# Patient Record
Sex: FEMALE | Race: BLACK OR AFRICAN AMERICAN | NOT HISPANIC OR LATINO | Employment: UNEMPLOYED | URBAN - METROPOLITAN AREA
[De-identification: names, ages, dates, MRNs, and addresses within clinical notes are randomized per-mention and may not be internally consistent; named-entity substitution may affect disease eponyms.]

---

## 2017-08-17 ENCOUNTER — TELEPHONE (OUTPATIENT)
Dept: OBSTETRICS AND GYNECOLOGY | Facility: CLINIC | Age: 57
End: 2017-08-17

## 2017-08-17 NOTE — TELEPHONE ENCOUNTER
----- Message from Vic Pichardo sent at 8/17/2017 12:36 PM CDT -----  Contact: [phone]/SELF  Patient would like orders put in the system for a Mammo. Please advise.

## 2017-08-17 NOTE — TELEPHONE ENCOUNTER
Last routine GYN exam 12/15/2016.   Last mammogram 12/15/2016.  Patient is scheduled for annual 9/20/17.  Patient will be too early for WWE and Mammogram.     Left message to return call.

## 2019-05-15 ENCOUNTER — TELEPHONE (OUTPATIENT)
Dept: OBSTETRICS AND GYNECOLOGY | Facility: CLINIC | Age: 59
End: 2019-05-15

## 2019-05-15 ENCOUNTER — HOSPITAL ENCOUNTER (EMERGENCY)
Facility: HOSPITAL | Age: 59
Discharge: HOME | End: 2019-05-15
Attending: FAMILY MEDICINE
Payer: MEDICAID

## 2019-05-15 VITALS
SYSTOLIC BLOOD PRESSURE: 160 MMHG | TEMPERATURE: 99 F | BODY MASS INDEX: 33.04 KG/M2 | WEIGHT: 175 LBS | HEART RATE: 60 BPM | RESPIRATION RATE: 19 BRPM | DIASTOLIC BLOOD PRESSURE: 85 MMHG | OXYGEN SATURATION: 99 % | HEIGHT: 61 IN

## 2019-05-15 DIAGNOSIS — Z12.31 SCREENING MAMMOGRAM, ENCOUNTER FOR: Primary | ICD-10-CM
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
## 2019-05-15 PROCEDURE — 99284 EMERGENCY DEPT VISIT MOD MDM: CPT | Mod: ER

## 2019-05-15 PROCEDURE — 25000003 PHARM REV CODE 250: Mod: ER | Performed by: FAMILY MEDICINE

## 2019-05-15 PROCEDURE — 63600175 PHARM REV CODE 636 W HCPCS: Mod: ER | Performed by: FAMILY MEDICINE

## 2019-05-15 RX ORDER — PREDNISONE 20 MG/1
60 TABLET ORAL
Status: COMPLETED | OUTPATIENT
Start: 2019-05-15 | End: 2019-05-15

## 2019-05-15 RX ORDER — SULFAMETHOXAZOLE AND TRIMETHOPRIM 800; 160 MG/1; MG/1
1 TABLET ORAL
Status: COMPLETED | OUTPATIENT
Start: 2019-05-15 | End: 2019-05-15

## 2019-05-15 RX ORDER — SULFAMETHOXAZOLE AND TRIMETHOPRIM 800; 160 MG/1; MG/1
1 TABLET ORAL 2 TIMES DAILY
Qty: 14 TABLET | Refills: 0 | Status: SHIPPED | OUTPATIENT
Start: 2019-05-15 | End: 2019-05-22

## 2019-05-15 RX ORDER — PREDNISONE 20 MG/1
40 TABLET ORAL DAILY
Qty: 10 TABLET | Refills: 0 | Status: SHIPPED | OUTPATIENT
Start: 2019-05-15 | End: 2019-05-20

## 2019-05-15 RX ADMIN — PREDNISONE 60 MG: 20 TABLET ORAL at 10:05

## 2019-05-15 RX ADMIN — SULFAMETHOXAZOLE AND TRIMETHOPRIM 1 TABLET: 800; 160 TABLET ORAL at 10:05

## 2019-05-15 NOTE — ED PROVIDER NOTES
Encounter Date: 5/15/2019       History     Chief Complaint   Patient presents with    Hand Pain     PT reports left thumb swelling that started saturday. Denies injury     Patient complains of swelling, tenderness and pain in her left thumb at metacarpophalangeal joint- she noticed while on a cruise on Saturday.  Swelling has been progressively decreasing.  Pain also improved.  No fever.  She is able to bend her thumb.  Patient took 2 Aleve so far and nothing else.  No other lesions or bruising on the body.      The history is provided by the patient.     Review of patient's allergies indicates:  No Known Allergies  Past Medical History:   Diagnosis Date    Hypertension     Tendonitis      Past Surgical History:   Procedure Laterality Date    colonoscopy N/A 9/19/2014    Performed by Linda Yusuf [facility] at [facility]    HEEL SPUR SURGERY Left 02/14/2013    HYSTERECTOMY  10/2009    WHITNEY    OOPHORECTOMY  10/2009    Bilateral     Family History   Problem Relation Age of Onset    Hypertension Mother     Breast cancer Cousin 51    Colon cancer Neg Hx     Ovarian cancer Neg Hx      Social History     Tobacco Use    Smoking status: Never Smoker    Smokeless tobacco: Never Used   Substance Use Topics    Alcohol use: No    Drug use: No     Review of Systems   Constitutional: Negative for activity change, appetite change, chills and fever.   HENT: Negative for congestion, ear discharge, rhinorrhea, sinus pressure, sinus pain, sore throat and trouble swallowing.    Eyes: Negative for photophobia, pain, discharge, redness, itching and visual disturbance.   Respiratory: Negative for cough, chest tightness, shortness of breath and wheezing.    Cardiovascular: Negative for chest pain, palpitations and leg swelling.   Gastrointestinal: Negative for abdominal distention, abdominal pain, constipation, diarrhea, nausea and vomiting.   Genitourinary: Negative for dysuria, flank pain, frequency and hematuria.    Musculoskeletal: Negative for back pain, gait problem, neck pain and neck stiffness.   Skin: Negative for rash and wound.   Neurological: Negative for dizziness, tremors, seizures, syncope, speech difficulty, weakness, light-headedness, numbness and headaches.   Psychiatric/Behavioral: Negative for behavioral problems, confusion, hallucinations and sleep disturbance. The patient is not nervous/anxious.    All other systems reviewed and are negative.      Physical Exam     Initial Vitals [05/15/19 0955]   BP Pulse Resp Temp SpO2   (!) 175/108 61 20 98 °F (36.7 °C) 100 %      MAP       --         Physical Exam    Nursing note and vitals reviewed.  Constitutional: Vital signs are normal. She appears well-developed and well-nourished. She is active.   HENT:   Head: Normocephalic and atraumatic.   Nose: Nose normal.   Mouth/Throat: Oropharynx is clear and moist.   Eyes: Conjunctivae and lids are normal.   Neck: Trachea normal, normal range of motion and full passive range of motion without pain. Neck supple. Normal range of motion present. No neck rigidity.   Cardiovascular: Normal rate, regular rhythm, S1 normal, S2 normal, normal heart sounds, intact distal pulses and normal pulses.   Pulmonary/Chest: Breath sounds normal.   Abdominal: Soft. Normal appearance and bowel sounds are normal. She exhibits no distension. There is no tenderness.   Musculoskeletal: Normal range of motion.        Arms:  0.5 cm swelling noted in left dorsal MCP area which is like tenderness and bruising noted.   Lymphadenopathy:     She has no cervical adenopathy.   Neurological: She is alert and oriented to person, place, and time. She has normal reflexes. No cranial nerve deficit or sensory deficit. GCS eye subscore is 4. GCS verbal subscore is 5. GCS motor subscore is 6.   Skin: Skin is warm and intact. Capillary refill takes less than 2 seconds. Bruising noted. No abrasion and no rash noted.   Psychiatric: She has a normal mood and affect.  Her speech is normal and behavior is normal. Judgment and thought content normal. She is not actively hallucinating. Cognition and memory are normal. She is attentive.         ED Course   Procedures  Labs Reviewed - No data to display       Imaging Results    None          Medical Decision Making:   Initial Assessment:   Left hand with possible insect bite with swelling and tenderness  Differential Diagnosis:   Insect bite, cellulitis, allergic reaction.  ED Management:  Patient is treated with Bactrim, prednisone.  Advised to follow up with primary care physician in next 2 days for resolution.  Follow up ER with worsening of symptoms.                      Clinical Impression:       ICD-10-CM ICD-9-CM   1. Insect bite, initial encounter W57.XXXA 919.4     E906.4         Disposition:   Disposition: Discharged  Condition: Stable                        Ed Block MD  05/15/19 1970

## 2019-05-15 NOTE — TELEPHONE ENCOUNTER
----- Message from Hortensia Barraza sent at 5/15/2019  3:24 PM CDT -----  Contact: [phone]/self  Pt would like mammo orders put in system.

## 2019-05-15 NOTE — ED TRIAGE NOTES
Pt complains of left thumb swelling and itching, denies any trauma, swelling noted, increased pain with  movement, pt states she took 2 Aleve with  No relief on sat

## 2019-05-16 ENCOUNTER — TELEPHONE (OUTPATIENT)
Dept: OBSTETRICS AND GYNECOLOGY | Facility: CLINIC | Age: 59
End: 2019-05-16

## 2019-05-30 ENCOUNTER — OFFICE VISIT (OUTPATIENT)
Dept: OBSTETRICS AND GYNECOLOGY | Facility: CLINIC | Age: 59
End: 2019-05-30
Payer: MEDICAID

## 2019-05-30 VITALS
SYSTOLIC BLOOD PRESSURE: 122 MMHG | DIASTOLIC BLOOD PRESSURE: 76 MMHG | WEIGHT: 168.88 LBS | BODY MASS INDEX: 31.91 KG/M2

## 2019-05-30 DIAGNOSIS — Z01.419 WELL WOMAN EXAM WITH ROUTINE GYNECOLOGICAL EXAM: Primary | ICD-10-CM

## 2019-05-30 PROCEDURE — 99396 PR PREVENTIVE VISIT,EST,40-64: ICD-10-PCS | Mod: S$PBB,,, | Performed by: OBSTETRICS & GYNECOLOGY

## 2019-05-30 PROCEDURE — 99396 PREV VISIT EST AGE 40-64: CPT | Mod: S$PBB,,, | Performed by: OBSTETRICS & GYNECOLOGY

## 2019-05-30 PROCEDURE — 99999 PR PBB SHADOW E&M-EST. PATIENT-LVL II: ICD-10-PCS | Mod: PBBFAC,,, | Performed by: OBSTETRICS & GYNECOLOGY

## 2019-05-30 PROCEDURE — 99212 OFFICE O/P EST SF 10 MIN: CPT | Mod: PBBFAC,PO | Performed by: OBSTETRICS & GYNECOLOGY

## 2019-05-30 PROCEDURE — 99999 PR PBB SHADOW E&M-EST. PATIENT-LVL II: CPT | Mod: PBBFAC,,, | Performed by: OBSTETRICS & GYNECOLOGY

## 2019-05-30 NOTE — PROGRESS NOTES
OBSTETRIC HISTORY:   OB History        1    Para   1    Term   1            AB        Living   1       SAB        TAB        Ectopic        Multiple        Live Births   1                COMPREHENSIVE GYN HISTORY:  PAP History: Denies abnormal Paps.  Infection History: Denies STDs. Denies PID.  Benign History: Denies uterine fibroids. Denies ovarian cysts. Denies endometriosis.   Cancer History: Denies cervical cancer. Denies uterine cancer or hyperplasia. Denies ovarian cancer. Denies vulvar cancer or pre-cancer. Denies vaginal cancer or pre-cancer. Denies breast cancer. Denies colon cancer.  Sexual Activity History:   reports that she currently engages in sexual activity. She reports using the following method of birth control/protection: Post-menopausal.         HPI:   58 y.o.  No LMP recorded (lmp unknown). Patient has had a hysterectomy.    Patient is  here for her annual gynecologic exam.  She has no complaints. She denies bladder, bowel and breast complaints.    ROS:  GENERAL: Denies weight gain or weight loss. Feeling well overall.   SKIN: Denies rash or lesions.   HEAD: Denies headache.   NODES: Denies enlarged lymph nodes.   CHEST: Denies shortness of breath.   ABDOMEN: No abdominal pain, constipation, diarrhea, nausea, vomiting or rectal bleeding.   URINARY: No frequency, dysuria, hematuria, or burning on urination.  REPRODUCTIVE: See HPI.   BREASTS: The patient denies pain, lumps, or nipple discharge.   HEMATOLOGIC: No easy bruisability.   MUSCULOSKELETAL: Denies joint pain or back pain.   NEUROLOGIC: Denies weakness.   PSYCHIATRIC: Denies depression, anxiety or mood swings.      PE:   /76   Wt 76.6 kg (168 lb 14 oz)   LMP  (LMP Unknown)   BMI 31.91 kg/m²   APPEARANCE: Well nourished, well developed, in no acute distress.  NECK: Neck symmetric without thyromegaly.  NODES: No inguinal or cervical lymph node enlargement.  CHEST: Lungs clear to auscultation.  HEART: Regular rate and  rhythm, no murmurs, rubs or gallops.  ABDOMEN: Soft. No tenderness or masses. No hernias.  BREASTS: Symmetrical, no skin changes or visible lesions. No palpable masses, nipple discharge or adenopathy bilaterally.  VULVA: No lesions. Normal female genitalia.  URETHRAL MEATUS: Normal size and location, no lesions, no prolapse.  URETHRA: No masses, tenderness, prolapse or scarring.  VAGINA: Moist and well rugated, no discharge, no significant cystocele or rectocele.  CERVIX AND UTERUS SURGICALLY ABSENT.   ADNEXA: No masses or tenderness.    PROCEDURES:  Pap smear -- NOT INDICATED    Assessment:  Normal Gynecologic Exam    Plan:  Mammogram and Colonoscopy if indicated by current recommendations.  Return to clinic in one year or for any problems or complaints.    Counseling:  Patient was counseled today on A.C.S. Pap guidelines and recommendations for yearly pelvic exams and monthly self breast exams; to see her PCP for other health maintenance.Regular exercise and healthy diet.

## 2019-06-05 ENCOUNTER — HOSPITAL ENCOUNTER (OUTPATIENT)
Dept: RADIOLOGY | Facility: HOSPITAL | Age: 59
Discharge: HOME | End: 2019-06-05
Attending: OBSTETRICS & GYNECOLOGY
Payer: MEDICAID

## 2019-06-05 DIAGNOSIS — Z12.31 SCREENING MAMMOGRAM, ENCOUNTER FOR: ICD-10-CM

## 2019-06-05 PROCEDURE — 77063 MAMMO DIGITAL SCREENING BILAT WITH TOMOSYNTHESIS_CAD: ICD-10-PCS | Mod: 26,,, | Performed by: RADIOLOGY

## 2019-06-05 PROCEDURE — 77067 SCR MAMMO BI INCL CAD: CPT | Mod: 26,,, | Performed by: RADIOLOGY

## 2019-06-05 PROCEDURE — 77067 SCR MAMMO BI INCL CAD: CPT | Mod: TC

## 2019-06-05 PROCEDURE — 77067 MAMMO DIGITAL SCREENING BILAT WITH TOMOSYNTHESIS_CAD: ICD-10-PCS | Mod: 26,,, | Performed by: RADIOLOGY

## 2019-06-05 PROCEDURE — 77063 BREAST TOMOSYNTHESIS BI: CPT | Mod: 26,,, | Performed by: RADIOLOGY

## 2020-06-08 ENCOUNTER — TELEPHONE (OUTPATIENT)
Dept: OBSTETRICS AND GYNECOLOGY | Facility: CLINIC | Age: 60
End: 2020-06-08

## 2020-06-08 DIAGNOSIS — Z12.31 ENCOUNTER FOR SCREENING MAMMOGRAM FOR MALIGNANT NEOPLASM OF BREAST: Primary | ICD-10-CM

## 2020-06-08 NOTE — TELEPHONE ENCOUNTER
----- Message from Carly Eric sent at 6/8/2020  9:47 AM CDT -----  Contact: Self [phone]  Patient is calling to get Mammo Orders.

## 2020-07-15 ENCOUNTER — OFFICE VISIT (OUTPATIENT)
Dept: OBSTETRICS AND GYNECOLOGY | Facility: CLINIC | Age: 60
End: 2020-07-15
Payer: MEDICAID

## 2020-07-15 VITALS
WEIGHT: 183.19 LBS | HEIGHT: 61 IN | BODY MASS INDEX: 34.58 KG/M2 | SYSTOLIC BLOOD PRESSURE: 140 MMHG | DIASTOLIC BLOOD PRESSURE: 82 MMHG

## 2020-07-15 DIAGNOSIS — Z12.31 ENCOUNTER FOR SCREENING MAMMOGRAM FOR MALIGNANT NEOPLASM OF BREAST: ICD-10-CM

## 2020-07-15 DIAGNOSIS — Z01.419 WELL WOMAN EXAM WITH ROUTINE GYNECOLOGICAL EXAM: Primary | ICD-10-CM

## 2020-07-15 PROCEDURE — 99396 PREV VISIT EST AGE 40-64: CPT | Mod: S$PBB,,, | Performed by: OBSTETRICS & GYNECOLOGY

## 2020-07-15 PROCEDURE — 99999 PR PBB SHADOW E&M-EST. PATIENT-LVL III: CPT | Mod: PBBFAC,,, | Performed by: OBSTETRICS & GYNECOLOGY

## 2020-07-15 PROCEDURE — 99999 PR PBB SHADOW E&M-EST. PATIENT-LVL III: ICD-10-PCS | Mod: PBBFAC,,, | Performed by: OBSTETRICS & GYNECOLOGY

## 2020-07-15 PROCEDURE — 99396 PR PREVENTIVE VISIT,EST,40-64: ICD-10-PCS | Mod: S$PBB,,, | Performed by: OBSTETRICS & GYNECOLOGY

## 2020-07-15 PROCEDURE — 99213 OFFICE O/P EST LOW 20 MIN: CPT | Mod: PBBFAC,PO | Performed by: OBSTETRICS & GYNECOLOGY

## 2020-07-15 NOTE — PROGRESS NOTES
"  OBSTETRIC HISTORY:   OB History        1    Para   1    Term   1            AB        Living   1       SAB        TAB        Ectopic        Multiple        Live Births   1                COMPREHENSIVE GYN HISTORY:  PAP History: Denies abnormal Paps.  Infection History: Denies STDs. Denies PID.  Benign History: Denies uterine fibroids. Denies ovarian cysts. Denies endometriosis.   Cancer History: Denies cervical cancer. Denies uterine cancer or hyperplasia. Denies ovarian cancer. Denies vulvar cancer or pre-cancer. Denies vaginal cancer or pre-cancer. Denies breast cancer. Denies colon cancer.  Sexual Activity History:   reports that she currently engages in sexual activity. She reports using the following method of birth control/protection: Post-menopausal.              HPI:   59 y.o. No LMP recorded (lmp unknown). Patient has had a hysterectomy.    Patient is  here for her annual gynecologic exam.  She has no complaints. She denies bladder, bowel and breast complaints.    ROS:  GENERAL: Denies weight gain or weight loss. Feeling well overall.   SKIN: Denies rash or lesions.   HEAD: Denies headache.   NODES: Denies enlarged lymph nodes.   CHEST: Denies shortness of breath.   ABDOMEN: No abdominal pain, constipation, diarrhea, nausea, vomiting or rectal bleeding.   URINARY: No frequency, dysuria, hematuria, or burning on urination.  REPRODUCTIVE: See HPI.   BREASTS: The patient denies pain, lumps, or nipple discharge.   HEMATOLOGIC: No easy bruisability.   MUSCULOSKELETAL: Denies joint pain or back pain.   NEUROLOGIC: Denies weakness.   PSYCHIATRIC: Denies depression, anxiety or mood swings.        PE:   BP (!) 140/82   Ht 5' 1" (1.549 m)   Wt 83.1 kg (183 lb 3.2 oz)   LMP  (LMP Unknown)   BMI 34.62 kg/m²   APPEARANCE: Well nourished, well developed, in no acute distress.  NECK: Neck symmetric without thyromegaly.  NODES: No inguinal or cervical lymph node enlargement.  CHEST: Lungs clear to " auscultation.  HEART: Regular rate and rhythm, no murmurs, rubs or gallops.  ABDOMEN: Soft. No tenderness or masses. No hernias.  BREASTS: Symmetrical, no skin changes or visible lesions. No palpable masses, nipple discharge or adenopathy bilaterally.  VULVA: No lesions. Normal female genitalia.  URETHRAL MEATUS: Normal size and location, no lesions, no prolapse.  URETHRA: No masses, tenderness, prolapse or scarring.  VAGINA: Atrophic and not well rugated, no discharge, has second degree cystocele and minimal rectocele, vault prolapse.  CERVIX AND UTERUS SURGICALLY ABSENT.   ADNEXA: No masses or tenderness.    PROCEDURES:  Pap smear -- NOT INDICATED    Assessment:  Normal Gynecologic Exam  Prolapse-- use stool softeners/Miralax so not straining and losing weight will help reverse prolapse    Plan:  Mammogram and Colonoscopy if indicated by current recommendations.  Return to clinic in one year or for any problems or complaints.    Counseling:  Patient was counseled today on A.C.S. Pap guidelines and recommendations for yearly pelvic exams and monthly self breast exams; to see her PCP for other health maintenance. Regular exercise and healthy diet.

## 2020-08-06 ENCOUNTER — HOSPITAL ENCOUNTER (OUTPATIENT)
Dept: RADIOLOGY | Facility: HOSPITAL | Age: 60
Discharge: HOME | End: 2020-08-06
Attending: OBSTETRICS & GYNECOLOGY
Payer: MEDICAID

## 2020-08-06 DIAGNOSIS — Z12.31 ENCOUNTER FOR SCREENING MAMMOGRAM FOR MALIGNANT NEOPLASM OF BREAST: ICD-10-CM

## 2020-08-06 PROCEDURE — 77063 MAMMO DIGITAL SCREENING BILAT WITH TOMOSYNTHESIS_CAD: ICD-10-PCS | Mod: 26,,, | Performed by: RADIOLOGY

## 2020-08-06 PROCEDURE — 77067 MAMMO DIGITAL SCREENING BILAT WITH TOMOSYNTHESIS_CAD: ICD-10-PCS | Mod: 26,,, | Performed by: RADIOLOGY

## 2020-08-06 PROCEDURE — 77067 SCR MAMMO BI INCL CAD: CPT | Mod: 26,,, | Performed by: RADIOLOGY

## 2020-08-06 PROCEDURE — 77063 BREAST TOMOSYNTHESIS BI: CPT | Mod: 26,,, | Performed by: RADIOLOGY

## 2020-08-06 PROCEDURE — 77067 SCR MAMMO BI INCL CAD: CPT | Mod: TC

## 2020-08-14 ENCOUNTER — HOSPITAL ENCOUNTER (OUTPATIENT)
Dept: RADIOLOGY | Facility: HOSPITAL | Age: 60
Discharge: HOME | End: 2020-08-14
Attending: OBSTETRICS & GYNECOLOGY
Payer: MEDICAID

## 2020-08-14 DIAGNOSIS — R92.8 ABNORMAL MAMMOGRAM: ICD-10-CM

## 2020-08-14 PROCEDURE — 77061 BREAST TOMOSYNTHESIS UNI: CPT | Mod: 26,,, | Performed by: RADIOLOGY

## 2020-08-14 PROCEDURE — 77065 MAMMO DIGITAL DIAGNOSTIC RIGHT WITH TOMOSYNTHESIS_CAD: ICD-10-PCS | Mod: 26,,, | Performed by: RADIOLOGY

## 2020-08-14 PROCEDURE — 77065 DX MAMMO INCL CAD UNI: CPT | Mod: TC

## 2020-08-14 PROCEDURE — 77061 MAMMO DIGITAL DIAGNOSTIC RIGHT WITH TOMOSYNTHESIS_CAD: ICD-10-PCS | Mod: 26,,, | Performed by: RADIOLOGY

## 2020-08-14 PROCEDURE — 77065 DX MAMMO INCL CAD UNI: CPT | Mod: 26,,, | Performed by: RADIOLOGY

## 2021-03-27 ENCOUNTER — IMMUNIZATION (OUTPATIENT)
Dept: PRIMARY CARE CLINIC | Facility: CLINIC | Age: 61
End: 2021-03-27
Payer: MEDICAID

## 2021-03-27 DIAGNOSIS — Z23 NEED FOR VACCINATION: Primary | ICD-10-CM

## 2021-03-27 PROCEDURE — 0001A PR IMMUNIZ ADMIN, SARS-COV-2 COVID-19 VACC, 30MCG/0.3ML, 1ST DOSE: ICD-10-PCS | Mod: CV19,,, | Performed by: INTERNAL MEDICINE

## 2021-03-27 PROCEDURE — 0001A PR IMMUNIZ ADMIN, SARS-COV-2 COVID-19 VACC, 30MCG/0.3ML, 1ST DOSE: CPT | Mod: CV19,,, | Performed by: INTERNAL MEDICINE

## 2021-03-27 PROCEDURE — 91300 PR SARS-COV- 2 COVID-19 VACCINE, NO PRSV, 30MCG/0.3ML, IM: CPT | Mod: ,,, | Performed by: INTERNAL MEDICINE

## 2021-03-27 PROCEDURE — 91300 PR SARS-COV- 2 COVID-19 VACCINE, NO PRSV, 30MCG/0.3ML, IM: ICD-10-PCS | Mod: ,,, | Performed by: INTERNAL MEDICINE

## 2021-03-27 RX ADMIN — Medication 0.3 ML: at 10:03

## 2021-04-17 ENCOUNTER — IMMUNIZATION (OUTPATIENT)
Dept: PRIMARY CARE CLINIC | Facility: CLINIC | Age: 61
End: 2021-04-17

## 2021-04-17 DIAGNOSIS — Z23 NEED FOR VACCINATION: Primary | ICD-10-CM

## 2021-04-17 PROCEDURE — 0002A COVID-19, MRNA, LNP-S, PF, 30 MCG/0.3 ML DOSE VACCINE: ICD-10-PCS | Mod: CV19,,, | Performed by: FAMILY MEDICINE

## 2021-04-17 PROCEDURE — 91300 COVID-19, MRNA, LNP-S, PF, 30 MCG/0.3 ML DOSE VACCINE: CPT | Mod: ,,, | Performed by: FAMILY MEDICINE

## 2021-04-17 PROCEDURE — 0002A COVID-19, MRNA, LNP-S, PF, 30 MCG/0.3 ML DOSE VACCINE: CPT | Mod: CV19,,, | Performed by: FAMILY MEDICINE

## 2021-04-17 PROCEDURE — 91300 COVID-19, MRNA, LNP-S, PF, 30 MCG/0.3 ML DOSE VACCINE: ICD-10-PCS | Mod: ,,, | Performed by: FAMILY MEDICINE

## 2021-06-17 ENCOUNTER — TELEPHONE (OUTPATIENT)
Dept: OBSTETRICS AND GYNECOLOGY | Facility: CLINIC | Age: 61
End: 2021-06-17

## 2021-06-17 DIAGNOSIS — Z12.31 ENCOUNTER FOR SCREENING MAMMOGRAM FOR MALIGNANT NEOPLASM OF BREAST: Primary | ICD-10-CM

## 2021-07-21 ENCOUNTER — OFFICE VISIT (OUTPATIENT)
Dept: OBSTETRICS AND GYNECOLOGY | Facility: CLINIC | Age: 61
End: 2021-07-21
Payer: MEDICAID

## 2021-07-21 VITALS
HEIGHT: 61 IN | DIASTOLIC BLOOD PRESSURE: 90 MMHG | SYSTOLIC BLOOD PRESSURE: 140 MMHG | WEIGHT: 180 LBS | BODY MASS INDEX: 33.99 KG/M2

## 2021-07-21 DIAGNOSIS — Z01.419 WELL WOMAN EXAM WITH ROUTINE GYNECOLOGICAL EXAM: Primary | ICD-10-CM

## 2021-07-21 PROCEDURE — 99999 PR PBB SHADOW E&M-EST. PATIENT-LVL III: ICD-10-PCS | Mod: PBBFAC,,, | Performed by: OBSTETRICS & GYNECOLOGY

## 2021-07-21 PROCEDURE — 99396 PR PREVENTIVE VISIT,EST,40-64: ICD-10-PCS | Mod: S$PBB,,, | Performed by: OBSTETRICS & GYNECOLOGY

## 2021-07-21 PROCEDURE — 99396 PREV VISIT EST AGE 40-64: CPT | Mod: S$PBB,,, | Performed by: OBSTETRICS & GYNECOLOGY

## 2021-07-21 PROCEDURE — 99999 PR PBB SHADOW E&M-EST. PATIENT-LVL III: CPT | Mod: PBBFAC,,, | Performed by: OBSTETRICS & GYNECOLOGY

## 2021-07-21 PROCEDURE — 99213 OFFICE O/P EST LOW 20 MIN: CPT | Mod: PBBFAC,PO | Performed by: OBSTETRICS & GYNECOLOGY

## 2021-08-16 ENCOUNTER — HOSPITAL ENCOUNTER (OUTPATIENT)
Dept: RADIOLOGY | Facility: HOSPITAL | Age: 61
Discharge: HOME | End: 2021-08-16
Attending: OBSTETRICS & GYNECOLOGY
Payer: MEDICAID

## 2021-08-16 DIAGNOSIS — Z12.31 ENCOUNTER FOR SCREENING MAMMOGRAM FOR MALIGNANT NEOPLASM OF BREAST: ICD-10-CM

## 2021-08-16 PROCEDURE — 77067 MAMMO DIGITAL SCREENING BILAT WITH TOMO: ICD-10-PCS | Mod: 26,,, | Performed by: RADIOLOGY

## 2021-08-16 PROCEDURE — 77067 SCR MAMMO BI INCL CAD: CPT | Mod: TC

## 2021-08-16 PROCEDURE — 77063 MAMMO DIGITAL SCREENING BILAT WITH TOMO: ICD-10-PCS | Mod: 26,,, | Performed by: RADIOLOGY

## 2021-08-16 PROCEDURE — 77067 SCR MAMMO BI INCL CAD: CPT | Mod: 26,,, | Performed by: RADIOLOGY

## 2021-08-16 PROCEDURE — 77063 BREAST TOMOSYNTHESIS BI: CPT | Mod: 26,,, | Performed by: RADIOLOGY

## 2022-05-31 ENCOUNTER — HOSPITAL ENCOUNTER (INPATIENT)
Facility: HOSPITAL | Age: 62
LOS: 2 days | Discharge: HOME | DRG: 163 | End: 2022-06-02
Attending: EMERGENCY MEDICINE | Admitting: INTERNAL MEDICINE
Payer: MEDICAID

## 2022-05-31 DIAGNOSIS — R07.9 CHEST PAIN: ICD-10-CM

## 2022-05-31 DIAGNOSIS — I26.02 ACUTE SADDLE PULMONARY EMBOLISM WITH ACUTE COR PULMONALE: Primary | ICD-10-CM

## 2022-05-31 DIAGNOSIS — R53.1 WEAKNESS: ICD-10-CM

## 2022-05-31 DIAGNOSIS — I26.99 BILATERAL PULMONARY EMBOLISM: ICD-10-CM

## 2022-05-31 PROBLEM — I26.92 SADDLE EMBOLUS OF PULMONARY ARTERY: Status: ACTIVE | Noted: 2022-05-31

## 2022-05-31 PROBLEM — R06.02 SOB (SHORTNESS OF BREATH): Status: ACTIVE | Noted: 2022-05-31

## 2022-05-31 PROBLEM — N17.9 AKI (ACUTE KIDNEY INJURY): Status: ACTIVE | Noted: 2022-05-31

## 2022-05-31 LAB
ALBUMIN SERPL BCP-MCNC: 3.8 G/DL (ref 3.5–5.2)
ALLENS TEST: ABNORMAL
ALP SERPL-CCNC: 85 U/L (ref 55–135)
ALT SERPL W/O P-5'-P-CCNC: 92 U/L (ref 10–44)
ANION GAP SERPL CALC-SCNC: 21 MMOL/L (ref 8–16)
AORTIC ROOT ANNULUS: 3 CM
APTT BLDCRRT: 23.8 SEC (ref 21–32)
APTT BLDCRRT: 95.7 SEC (ref 21–32)
AST SERPL-CCNC: 76 U/L (ref 10–40)
AV MEAN GRADIENT: 4 MMHG
AV PEAK GRADIENT: 7 MMHG
BASOPHILS # BLD AUTO: 0.03 K/UL (ref 0–0.2)
BASOPHILS # BLD AUTO: 0.03 K/UL (ref 0–0.2)
BASOPHILS NFR BLD: 0.2 % (ref 0–1.9)
BASOPHILS NFR BLD: 0.3 % (ref 0–1.9)
BILIRUB SERPL-MCNC: 0.9 MG/DL (ref 0.1–1)
BNP SERPL-MCNC: 629 PG/ML (ref 0–99)
BSA FOR ECHO PROCEDURE: 1.86 M2
BUN SERPL-MCNC: 30 MG/DL (ref 6–20)
CALCIUM SERPL-MCNC: 9.5 MG/DL (ref 8.7–10.5)
CHLORIDE SERPL-SCNC: 104 MMOL/L (ref 95–110)
CO2 SERPL-SCNC: 15 MMOL/L (ref 23–29)
CREAT SERPL-MCNC: 1.6 MG/DL (ref 0.5–1.4)
CTP QC/QA: YES
CV ECHO LV RWT: 0.92 CM
D DIMER PPP IA.FEU-MCNC: 17.25 MG/L FEU
DELSYS: ABNORMAL
DIFFERENTIAL METHOD: ABNORMAL
DIFFERENTIAL METHOD: ABNORMAL
DOP CALC AO PEAK VEL: 1.35 M/S
DOP CALC AO VTI: 16.29 CM
DOP CALC LVOT AREA: 2.4 CM2
DOP CALC LVOT DIAMETER: 1.76 CM
DOP CALC MV VTI: 13.84 CM
E WAVE DECELERATION TIME: 225.34 MSEC
E/A RATIO: 0.58
E/E' RATIO: 7.54 M/S
ECHO LV POSTERIOR WALL: 1.07 CM (ref 0.6–1.1)
EJECTION FRACTION: 75 %
EOSINOPHIL # BLD AUTO: 0 K/UL (ref 0–0.5)
EOSINOPHIL # BLD AUTO: 0 K/UL (ref 0–0.5)
EOSINOPHIL NFR BLD: 0 % (ref 0–8)
EOSINOPHIL NFR BLD: 0.1 % (ref 0–8)
ERYTHROCYTE [DISTWIDTH] IN BLOOD BY AUTOMATED COUNT: 12.8 % (ref 11.5–14.5)
ERYTHROCYTE [DISTWIDTH] IN BLOOD BY AUTOMATED COUNT: 12.9 % (ref 11.5–14.5)
EST. GFR  (AFRICAN AMERICAN): 40 ML/MIN/1.73 M^2
EST. GFR  (NON AFRICAN AMERICAN): 35 ML/MIN/1.73 M^2
FLOW: 3
FRACTIONAL SHORTENING: 33 % (ref 28–44)
GLUCOSE SERPL-MCNC: 196 MG/DL (ref 70–110)
HCO3 UR-SCNC: 23.2 MMOL/L (ref 24–28)
HCT VFR BLD AUTO: 45.9 % (ref 37–48.5)
HCT VFR BLD AUTO: 48.6 % (ref 37–48.5)
HCT VFR BLD CALC: 41 %PCV (ref 36–54)
HGB BLD-MCNC: 14 G/DL
HGB BLD-MCNC: 14.7 G/DL (ref 12–16)
HGB BLD-MCNC: 15.9 G/DL (ref 12–16)
IMM GRANULOCYTES # BLD AUTO: 0.04 K/UL (ref 0–0.04)
IMM GRANULOCYTES # BLD AUTO: 0.06 K/UL (ref 0–0.04)
IMM GRANULOCYTES NFR BLD AUTO: 0.5 % (ref 0–0.5)
IMM GRANULOCYTES NFR BLD AUTO: 0.5 % (ref 0–0.5)
INR PPP: 1.1 (ref 0.8–1.2)
INTERVENTRICULAR SEPTUM: 1.44 CM (ref 0.6–1.1)
LA MAJOR: 4 CM
LA MINOR: 4.59 CM
LA WIDTH: 2.77 CM
LACTATE SERPL-SCNC: 3.4 MMOL/L (ref 0.5–2.2)
LACTATE SERPL-SCNC: 5 MMOL/L (ref 0.5–2.2)
LACTATE SERPL-SCNC: 6.8 MMOL/L (ref 0.5–2.2)
LACTATE SERPL-SCNC: 8.2 MMOL/L (ref 0.5–2.2)
LEFT ATRIUM SIZE: 2.43 CM
LEFT ATRIUM VOLUME INDEX MOD: 11.4 ML/M2
LEFT ATRIUM VOLUME INDEX: 13.6 ML/M2
LEFT ATRIUM VOLUME MOD: 20.58 CM3
LEFT ATRIUM VOLUME: 24.46 CM3
LEFT INTERNAL DIMENSION IN SYSTOLE: 1.57 CM (ref 2.1–4)
LEFT VENTRICLE DIASTOLIC VOLUME INDEX: 10.41 ML/M2
LEFT VENTRICLE DIASTOLIC VOLUME: 18.73 ML
LEFT VENTRICLE MASS INDEX: 47 G/M2
LEFT VENTRICLE SYSTOLIC VOLUME INDEX: 3.8 ML/M2
LEFT VENTRICLE SYSTOLIC VOLUME: 6.78 ML
LEFT VENTRICULAR INTERNAL DIMENSION IN DIASTOLE: 2.33 CM (ref 3.5–6)
LEFT VENTRICULAR MASS: 84.41 G
LV LATERAL E/E' RATIO: 7 M/S
LV SEPTAL E/E' RATIO: 8.17 M/S
LYMPHOCYTES # BLD AUTO: 2.2 K/UL (ref 1–4.8)
LYMPHOCYTES # BLD AUTO: 2.9 K/UL (ref 1–4.8)
LYMPHOCYTES NFR BLD: 22.5 % (ref 18–48)
LYMPHOCYTES NFR BLD: 25.4 % (ref 18–48)
MAGNESIUM SERPL-MCNC: 2.2 MG/DL (ref 1.6–2.6)
MCH RBC QN AUTO: 29.1 PG (ref 27–31)
MCH RBC QN AUTO: 29.2 PG (ref 27–31)
MCHC RBC AUTO-ENTMCNC: 32 G/DL (ref 32–36)
MCHC RBC AUTO-ENTMCNC: 32.7 G/DL (ref 32–36)
MCV RBC AUTO: 89 FL (ref 82–98)
MCV RBC AUTO: 91 FL (ref 82–98)
MODE: ABNORMAL
MONOCYTES # BLD AUTO: 0.5 K/UL (ref 0.3–1)
MONOCYTES # BLD AUTO: 0.9 K/UL (ref 0.3–1)
MONOCYTES NFR BLD: 5.3 % (ref 4–15)
MONOCYTES NFR BLD: 6.8 % (ref 4–15)
MV MEAN GRADIENT: 0 MMHG
MV PEAK A VEL: 0.85 M/S
MV PEAK E VEL: 0.49 M/S
MV PEAK GRADIENT: 3 MMHG
MV STENOSIS PRESSURE HALF TIME: 65.35 MS
MV VALVE AREA P 1/2 METHOD: 3.37 CM2
NEUTROPHILS # BLD AUTO: 5.9 K/UL (ref 1.8–7.7)
NEUTROPHILS # BLD AUTO: 8.9 K/UL (ref 1.8–7.7)
NEUTROPHILS NFR BLD: 68.4 % (ref 38–73)
NEUTROPHILS NFR BLD: 70 % (ref 38–73)
NRBC BLD-RTO: 0 /100 WBC
NRBC BLD-RTO: 0 /100 WBC
PCO2 BLDA: 54.7 MMHG (ref 35–45)
PH SMN: 7.24 [PH] (ref 7.35–7.45)
PHOSPHATE SERPL-MCNC: 3.6 MG/DL (ref 2.7–4.5)
PISA TR MAX VEL: 3.47 M/S
PLATELET # BLD AUTO: 189 K/UL (ref 150–450)
PLATELET # BLD AUTO: 213 K/UL (ref 150–450)
PLATELET BLD QL SMEAR: ABNORMAL
PMV BLD AUTO: 10 FL (ref 9.2–12.9)
PMV BLD AUTO: 10 FL (ref 9.2–12.9)
PO2 BLDA: 19 MMHG (ref 40–60)
POC BE: -4 MMOL/L
POC SATURATED O2: 22 % (ref 95–100)
POC TCO2: 25 MMOL/L (ref 24–29)
POTASSIUM BLD-SCNC: 5.1 MMOL/L (ref 3.5–5.1)
POTASSIUM SERPL-SCNC: 5 MMOL/L (ref 3.5–5.1)
PROT SERPL-MCNC: 8.4 G/DL (ref 6–8.4)
PROTHROMBIN TIME: 11.6 SEC (ref 9–12.5)
PROTHROMBIN TIME: 11.6 SEC (ref 9–12.5)
PROTHROMBIN TIME: 11.8 SEC (ref 9–12.5)
PV MEAN GRADIENT: 1.86 MMHG
PV PEAK VELOCITY: 0.94 CM/S
RA MAJOR: 4.59 CM
RA PRESSURE: 8 MMHG
RA WIDTH: 3.26 CM
RBC # BLD AUTO: 5.06 M/UL (ref 4–5.4)
RBC # BLD AUTO: 5.45 M/UL (ref 4–5.4)
RIGHT VENTRICULAR END-DIASTOLIC DIMENSION: 2.96 CM
RV MID DIAMA: 2 CM
RV TISSUE DOPPLER FREE WALL SYSTOLIC VELOCITY 1 (APICAL 4 CHAMBER VIEW): 9.25 CM/S
SAMPLE: ABNORMAL
SARS-COV-2 RDRP RESP QL NAA+PROBE: NEGATIVE
SITE: ABNORMAL
SODIUM BLD-SCNC: 139 MMOL/L (ref 136–145)
SODIUM SERPL-SCNC: 140 MMOL/L (ref 136–145)
TDI LATERAL: 0.07 M/S
TDI SEPTAL: 0.06 M/S
TDI: 0.07 M/S
TR MAX PG: 48 MMHG
TRICUSPID ANNULAR PLANE SYSTOLIC EXCURSION: 1.07 CM
TROPONIN I SERPL DL<=0.01 NG/ML-MCNC: 2.63 NG/ML (ref 0–0.03)
TROPONIN I SERPL DL<=0.01 NG/ML-MCNC: 3.47 NG/ML (ref 0–0.03)
TV REST PULMONARY ARTERY PRESSURE: 56 MMHG
WBC # BLD AUTO: 12.73 K/UL (ref 3.9–12.7)
WBC # BLD AUTO: 8.69 K/UL (ref 3.9–12.7)

## 2022-05-31 PROCEDURE — 85379 FIBRIN DEGRADATION QUANT: CPT | Performed by: STUDENT IN AN ORGANIZED HEALTH CARE EDUCATION/TRAINING PROGRAM

## 2022-05-31 PROCEDURE — 63600175 PHARM REV CODE 636 W HCPCS: Performed by: STUDENT IN AN ORGANIZED HEALTH CARE EDUCATION/TRAINING PROGRAM

## 2022-05-31 PROCEDURE — 85610 PROTHROMBIN TIME: CPT | Mod: 91 | Performed by: STUDENT IN AN ORGANIZED HEALTH CARE EDUCATION/TRAINING PROGRAM

## 2022-05-31 PROCEDURE — 99223 1ST HOSP IP/OBS HIGH 75: CPT | Mod: ,,, | Performed by: INTERNAL MEDICINE

## 2022-05-31 PROCEDURE — 80053 COMPREHEN METABOLIC PANEL: CPT | Performed by: STUDENT IN AN ORGANIZED HEALTH CARE EDUCATION/TRAINING PROGRAM

## 2022-05-31 PROCEDURE — 96360 HYDRATION IV INFUSION INIT: CPT

## 2022-05-31 PROCEDURE — 93010 ELECTROCARDIOGRAM REPORT: CPT | Mod: ,,, | Performed by: INTERNAL MEDICINE

## 2022-05-31 PROCEDURE — 99223 PR INITIAL HOSPITAL CARE,LEVL III: ICD-10-PCS | Mod: ,,, | Performed by: INTERNAL MEDICINE

## 2022-05-31 PROCEDURE — 83880 ASSAY OF NATRIURETIC PEPTIDE: CPT | Performed by: STUDENT IN AN ORGANIZED HEALTH CARE EDUCATION/TRAINING PROGRAM

## 2022-05-31 PROCEDURE — 93005 ELECTROCARDIOGRAM TRACING: CPT

## 2022-05-31 PROCEDURE — 96361 HYDRATE IV INFUSION ADD-ON: CPT

## 2022-05-31 PROCEDURE — 83735 ASSAY OF MAGNESIUM: CPT | Performed by: STUDENT IN AN ORGANIZED HEALTH CARE EDUCATION/TRAINING PROGRAM

## 2022-05-31 PROCEDURE — 83605 ASSAY OF LACTIC ACID: CPT | Mod: 91 | Performed by: NURSE PRACTITIONER

## 2022-05-31 PROCEDURE — 84100 ASSAY OF PHOSPHORUS: CPT | Performed by: STUDENT IN AN ORGANIZED HEALTH CARE EDUCATION/TRAINING PROGRAM

## 2022-05-31 PROCEDURE — 93010 EKG 12-LEAD: ICD-10-PCS | Mod: 76,,, | Performed by: INTERNAL MEDICINE

## 2022-05-31 PROCEDURE — 99900035 HC TECH TIME PER 15 MIN (STAT)

## 2022-05-31 PROCEDURE — 85730 THROMBOPLASTIN TIME PARTIAL: CPT | Performed by: INTERNAL MEDICINE

## 2022-05-31 PROCEDURE — 99285 EMERGENCY DEPT VISIT HI MDM: CPT | Mod: 25

## 2022-05-31 PROCEDURE — 63600175 PHARM REV CODE 636 W HCPCS: Performed by: EMERGENCY MEDICINE

## 2022-05-31 PROCEDURE — 85730 THROMBOPLASTIN TIME PARTIAL: CPT | Mod: 91 | Performed by: EMERGENCY MEDICINE

## 2022-05-31 PROCEDURE — U0002 COVID-19 LAB TEST NON-CDC: HCPCS | Performed by: STUDENT IN AN ORGANIZED HEALTH CARE EDUCATION/TRAINING PROGRAM

## 2022-05-31 PROCEDURE — 83605 ASSAY OF LACTIC ACID: CPT | Performed by: STUDENT IN AN ORGANIZED HEALTH CARE EDUCATION/TRAINING PROGRAM

## 2022-05-31 PROCEDURE — 36415 COLL VENOUS BLD VENIPUNCTURE: CPT | Performed by: STUDENT IN AN ORGANIZED HEALTH CARE EDUCATION/TRAINING PROGRAM

## 2022-05-31 PROCEDURE — 25500020 PHARM REV CODE 255: Performed by: EMERGENCY MEDICINE

## 2022-05-31 PROCEDURE — 85025 COMPLETE CBC W/AUTO DIFF WBC: CPT | Mod: 91 | Performed by: EMERGENCY MEDICINE

## 2022-05-31 PROCEDURE — 87040 BLOOD CULTURE FOR BACTERIA: CPT | Performed by: STUDENT IN AN ORGANIZED HEALTH CARE EDUCATION/TRAINING PROGRAM

## 2022-05-31 PROCEDURE — 82803 BLOOD GASES ANY COMBINATION: CPT

## 2022-05-31 PROCEDURE — 84484 ASSAY OF TROPONIN QUANT: CPT | Mod: 91 | Performed by: STUDENT IN AN ORGANIZED HEALTH CARE EDUCATION/TRAINING PROGRAM

## 2022-05-31 PROCEDURE — 84484 ASSAY OF TROPONIN QUANT: CPT | Performed by: STUDENT IN AN ORGANIZED HEALTH CARE EDUCATION/TRAINING PROGRAM

## 2022-05-31 PROCEDURE — 83605 ASSAY OF LACTIC ACID: CPT | Mod: 91 | Performed by: STUDENT IN AN ORGANIZED HEALTH CARE EDUCATION/TRAINING PROGRAM

## 2022-05-31 PROCEDURE — 85610 PROTHROMBIN TIME: CPT | Performed by: INTERNAL MEDICINE

## 2022-05-31 PROCEDURE — 25000003 PHARM REV CODE 250: Performed by: STUDENT IN AN ORGANIZED HEALTH CARE EDUCATION/TRAINING PROGRAM

## 2022-05-31 PROCEDURE — 20000000 HC ICU ROOM

## 2022-05-31 PROCEDURE — 85025 COMPLETE CBC W/AUTO DIFF WBC: CPT | Performed by: STUDENT IN AN ORGANIZED HEALTH CARE EDUCATION/TRAINING PROGRAM

## 2022-05-31 PROCEDURE — 85520 HEPARIN ASSAY: CPT | Performed by: STUDENT IN AN ORGANIZED HEALTH CARE EDUCATION/TRAINING PROGRAM

## 2022-05-31 RX ORDER — ASPIRIN 325 MG
325 TABLET ORAL
Status: COMPLETED | OUTPATIENT
Start: 2022-05-31 | End: 2022-05-31

## 2022-05-31 RX ORDER — HEPARIN SODIUM,PORCINE/D5W 25000/250
0-40 INTRAVENOUS SOLUTION INTRAVENOUS CONTINUOUS
Status: DISCONTINUED | OUTPATIENT
Start: 2022-05-31 | End: 2022-06-01

## 2022-05-31 RX ORDER — NAPROXEN SODIUM 220 MG
220 TABLET ORAL 2 TIMES DAILY PRN
COMMUNITY
End: 2022-07-26

## 2022-05-31 RX ORDER — DEXTROSE MONOHYDRATE AND SODIUM CHLORIDE 5; .9 G/100ML; G/100ML
INJECTION, SOLUTION INTRAVENOUS CONTINUOUS
Status: DISCONTINUED | OUTPATIENT
Start: 2022-05-31 | End: 2022-06-01

## 2022-05-31 RX ORDER — SODIUM CHLORIDE 0.9 % (FLUSH) 0.9 %
10 SYRINGE (ML) INJECTION
Status: DISCONTINUED | OUTPATIENT
Start: 2022-05-31 | End: 2022-06-02 | Stop reason: HOSPADM

## 2022-05-31 RX ORDER — ASCORBIC ACID 500 MG
500 TABLET ORAL DAILY
COMMUNITY

## 2022-05-31 RX ORDER — HEPARIN SODIUM,PORCINE/D5W 25000/250
0-40 INTRAVENOUS SOLUTION INTRAVENOUS CONTINUOUS
Status: DISCONTINUED | OUTPATIENT
Start: 2022-05-31 | End: 2022-05-31

## 2022-05-31 RX ADMIN — SODIUM CHLORIDE, SODIUM LACTATE, POTASSIUM CHLORIDE, AND CALCIUM CHLORIDE 1000 ML: .6; .31; .03; .02 INJECTION, SOLUTION INTRAVENOUS at 01:05

## 2022-05-31 RX ADMIN — HEPARIN SODIUM AND DEXTROSE 29.51 UNITS/KG/HR: 10000; 5 INJECTION INTRAVENOUS at 04:05

## 2022-05-31 RX ADMIN — IOHEXOL 100 ML: 350 INJECTION, SOLUTION INTRAVENOUS at 02:05

## 2022-05-31 RX ADMIN — SODIUM CHLORIDE, SODIUM LACTATE, POTASSIUM CHLORIDE, AND CALCIUM CHLORIDE 500 ML: .6; .31; .03; .02 INJECTION, SOLUTION INTRAVENOUS at 02:05

## 2022-05-31 RX ADMIN — DEXTROSE AND SODIUM CHLORIDE: 5; .9 INJECTION, SOLUTION INTRAVENOUS at 10:05

## 2022-05-31 RX ADMIN — ASPIRIN 325 MG ORAL TABLET 325 MG: 325 PILL ORAL at 02:05

## 2022-05-31 NOTE — H&P (VIEW-ONLY)
[facility] - Emergency Dept  Cardiology  Consult Note    Patient Name: Mehreen Benites  MRN: [id number]  Admission Date: 5/31/2022  Hospital Length of Stay: 0 days  Code Status: No Order   Attending Provider: Henry Kapadia MD   Consulting Provider: LIZZY Bustamante, NAGA  Primary Care Physician: Sabra Gupta MD  Principal Problem:<principal problem not specified>    Patient information was obtained from patient, relative(s), past medical records and ER records.     [facility]  Consult performed by: LIZZY Harris ANP  Consult ordered by: Henry Kapadia MD  Reason for consult: PE        Subjective:     Chief Complaint:  SOB     HPI:   62yo female with HTN who presented to the ER with complaints of SOB. She complains of SOB for the past day with progressive worsening today. She complains of SOB with minimal exertion that will relieve with rest. She also reports weakness and diaphoresis and a general ill feeling today prompting her to present. She denies any history other than HTN and takes HCTZ 25mg po daily. She denies any recent surgery, immobilization or long travels. She does not take HRT and is up to date on cancer screenings with mammogram and colonoscopy. She reports her son was recently diagnosed with PE in [place]. She denies any chest pain, hemoptysis or other bleeding issues.       Past Medical History:   Diagnosis Date    Hypertension     Tendonitis        Past Surgical History:   Procedure Laterality Date    HEEL SPUR SURGERY Left 02/14/2013    HYSTERECTOMY  10/2009    WHITNEY    OOPHORECTOMY  10/2009    Bilateral       Review of patient's allergies indicates:  No Known Allergies    No current facility-administered medications on file prior to encounter.     Current Outpatient Medications on File Prior to Encounter   Medication Sig    hydrochlorothiazide (HYDRODIURIL) 25 MG tablet Take 25 mg by mouth once daily.       Family History       Problem  Relation (Age of Onset)    Breast cancer Maternal Cousin (51), Maternal Aunt, Paternal Aunt    Hypertension Mother          Tobacco Use    Smoking status: Never Smoker    Smokeless tobacco: Never Used   Substance and Sexual Activity    Alcohol use: No    Drug use: No    Sexual activity: Yes     Partners: Male     Birth control/protection: Post-menopausal, See Surgical Hx     Comment:      Review of Systems   Constitutional: Positive for diaphoresis (wtih SOB/ALFONSO). Negative for chills, decreased appetite and fever.   Cardiovascular:  Positive for dyspnea on exertion. Negative for chest pain, claudication, cyanosis, irregular heartbeat, leg swelling, near-syncope, orthopnea, palpitations, paroxysmal nocturnal dyspnea and syncope.   Respiratory:  Negative for cough, hemoptysis, shortness of breath and wheezing.    Gastrointestinal:  Negative for bloating, abdominal pain, constipation, diarrhea, melena, nausea and vomiting.   Neurological:  Positive for weakness (with SOB/ALFONSO). Negative for dizziness.   Objective:     Vital Signs (Most Recent):  Temp: 97.6 °F (36.4 °C) (05/31/22 1206)  Pulse: 96 (05/31/22 1442)  Resp: (!) 28 (05/31/22 1442)  BP: 113/76 (05/31/22 1443)  SpO2: 95 % (05/31/22 1442)   Vital Signs (24h Range):  Temp:  [97.6 °F (36.4 °C)] 97.6 °F (36.4 °C)  Pulse:  [] 96  Resp:  [22-28] 28  SpO2:  [92 %-95 %] 95 %  BP: (104-119)/(76-88) 113/76     Weight: 80.7 kg (178 lb)  Body mass index is 33.63 kg/m².    SpO2: 95 %  O2 Device (Oxygen Therapy): nasal cannula    No intake or output data in the 24 hours ending 05/31/22 1552    Lines/Drains/Airways       Peripheral Intravenous Line  Duration                  Peripheral IV - Single Lumen 05/31/22 1219 22 G Left Antecubital <1 day                    Physical Exam  Constitutional:       General: She is not in acute distress.     Appearance: She is well-developed.   Cardiovascular:      Rate and Rhythm: Normal rate and regular rhythm.      Heart  sounds: No murmur heard.    No gallop.   Pulmonary:      Effort: Pulmonary effort is normal. Tachypnea (mildly) present. No respiratory distress.      Breath sounds: Normal breath sounds. No wheezing.   Abdominal:      General: Bowel sounds are normal. There is no distension.      Palpations: Abdomen is soft.      Tenderness: There is no abdominal tenderness.   Musculoskeletal:      Right lower leg: Edema present.      Left lower leg: Edema present.   Skin:     General: Skin is warm and dry.   Neurological:      Mental Status: She is alert and oriented to person, place, and time.       Significant Labs: BMP:   Recent Labs   Lab 05/31/22  1307   *      K 5.0      CO2 15*   BUN 30*   CREATININE 1.6*   CALCIUM 9.5   MG 2.2   , CBC   Recent Labs   Lab 05/31/22  1308   WBC 8.69   HGB 15.9   HCT 48.6*      , and Troponin   Recent Labs   Lab 05/31/22  1307   TROPONINI 3.474*       Significant Imaging: Echocardiogram: Transthoracic echo (TTE) complete (Cupid Only): final result pending     Assessment and Plan:     SEDRICK (acute kidney injury)  - creatinine 1.6  - no previous reading available for comparison  - hold HCTZ; BP stable for now; will monitor closely  - will hold IVF for now; monitor closely given contrast with CTA     SOB (shortness of breath)  - related to PE  - will start IV Heparin  - echo with prelim results with normal LVEF      Saddle embolus of pulmonary artery  - CTA with saddle PE with RH strain  - HR 90s-100s SBP 100s-110s; BNP elevated at 629 troponin elevated at 3.474 D dimer 17.25  - BLE venous ultrasound pending  - will start IV Heparin drip; echo with prelim read per Dr. Ferguson in ER with normal LVEF, RV>LV, PA 65mmHg CVP 15  - admit to ICU; monitor closely overnigth; if acute decompensation occurs low threshold to use systemic TPA overnight otherwise will plan to proceed with thrombectomy tomorrow morning- case discussed with Dr. Fiore and Dr. Ferguson   - dose of TPA:  100mg IV over 2 hours and can continue IV Heparin during TPA infusion to prevent acute/new clot formation as long as PTT less than 1.5x normal APTT     Hypertension  - on HCTZ as an outpatient  - SBP 100s-110s upon admission  - hold HCTZ; monitor BP closely       VTE Risk Mitigation (From admission, onward)         Ordered     heparin 25,000 units in dextrose 5% (100 units/ml) IV bolus from bag - ADDITIONAL PRN BOLUS - 60 units/kg  As needed (PRN)        Question:  Heparin Infusion Adjustment (DO NOT MODIFY ANSWER)  Answer:  [email or web address]\[email or web address]\Images\Pharmacy\HeparinInfusions\heparin HIGH INTENSITY nomogram for [facility] [id number]    05/31/22 1529     heparin 25,000 units in dextrose 5% (100 units/ml) IV bolus from bag - ADDITIONAL PRN BOLUS - 30 units/kg  As needed (PRN)        Question:  Heparin Infusion Adjustment (DO NOT MODIFY ANSWER)  Answer:  [email or web address]\epic\Images\Pharmacy\HeparinInfusions\heparin HIGH INTENSITY nomogram for [facility] [id number]    05/31/22 1529     heparin 25,000 units in dextrose 5% (100 units/ml) IV bolus from bag INITIAL BOLUS  Once        Question:  Heparin Infusion Adjustment (DO NOT MODIFY ANSWER)  Answer:  [email or web address]\epic\Images\Pharmacy\HeparinInfusions\heparin HIGH INTENSITY nomogram for [facility] [id number]    05/31/22 1529     heparin 25,000 units in dextrose 5% 250 mL (100 units/mL) infusion HIGH INTENSITY nomogram - [facility]  Continuous        Question Answer Comment   Heparin Infusion Adjustment (DO NOT MODIFY ANSWER) [email or web address]\epic\Images\Pharmacy\HeparinInfusions\heparin HIGH INTENSITY nomogram for [facility] [id number]    Begin at (in units/kg/hr) 18        05/31/22 1529                Thank you for your consult. I will follow-up with patient. Please contact us if you have any additional questions.    Michelle Estrada, APRN, ANP  Cardiology   [facility] - Emergency Dept

## 2022-05-31 NOTE — ED PROVIDER NOTES
Encounter Date: 5/31/2022       History     Chief Complaint   Patient presents with    Weakness     Pt relates dizziness, weakness onset prev pm after bathing; pt tachypneic, tachycardic, diaphoretic upon arrival, note postural/orthostatic changes w/ pt movement.  Pt denies pn, n/v/d or other medical c/o.     61-year-old female with past medical history of hypertension presenting to the ED with complaint of generalized fatigue and dyspnea on exertion.  Patient states that she was in the bath last night when she felt overheated.  When she stood, she states that she felt lightheaded, weak and short of breath.  She also reports transient crushing chest pain that lasted about 10 seconds before resolving on its own.  Patient states that she awoke this morning with continued dyspnea on exertion and fatigue.  She is not on any anticoagulation.  She had 1 episode of white foamy emesis but denies any abdominal pain.  She also reports lightheadedness with standing.  She has no other medical history other than high blood pressure.  She denies calf pain and has not been on any long car rides or flights.  No fevers, chills, nausea, headache, vertigo, numbness, unilateral weakness, or dysarthria.         Review of patient's allergies indicates:  No Known Allergies  Past Medical History:   Diagnosis Date    Hypertension     Tendonitis      Past Surgical History:   Procedure Laterality Date    HEEL SPUR SURGERY Left 02/14/2013    HYSTERECTOMY  10/2009    WHITNEY    OOPHORECTOMY  10/2009    Bilateral     Family History   Problem Relation Age of Onset    Hypertension Mother     Breast cancer Maternal Cousin 51    Breast cancer Maternal Aunt     Breast cancer Paternal Aunt     Colon cancer Neg Hx     Ovarian cancer Neg Hx      Social History     Tobacco Use    Smoking status: Never Smoker    Smokeless tobacco: Never Used   Substance Use Topics    Alcohol use: No    Drug use: No     Review of Systems   Neurological:  Positive for light-headedness.       Physical Exam     Initial Vitals [05/31/22 1206]   BP Pulse Resp Temp SpO2   104/81 106 (!) 22 97.6 °F (36.4 °C) (!) 92 %      MAP       --         Physical Exam    Nursing note and vitals reviewed.  Constitutional: She appears well-nourished. She is not diaphoretic.   Fatigued appearing female of stated age.    HENT:   Head: Normocephalic and atraumatic.   Mouth/Throat: Oropharynx is clear and moist.   Eyes: Conjunctivae and EOM are normal. Pupils are equal, round, and reactive to light.   +horizontal nystagmus   Neck: Neck supple.   Normal range of motion.  Cardiovascular: Regular rhythm, normal heart sounds and intact distal pulses.   +Tachycardia   Pulmonary/Chest: Breath sounds normal. She has no wheezes. She has no rhonchi. She has no rales.   Tachypnea. On 4L NC.   Abdominal: Abdomen is soft. She exhibits no distension. There is no abdominal tenderness.   Musculoskeletal:         General: No edema. Normal range of motion.      Cervical back: Normal range of motion and neck supple.     Neurological: She is alert and oriented to person, place, and time. She has normal strength. No cranial nerve deficit or sensory deficit.   Skin: Skin is warm and dry. Capillary refill takes less than 2 seconds.         ED Course   Procedures  Labs Reviewed   CBC W/ AUTO DIFFERENTIAL - Abnormal; Notable for the following components:       Result Value    RBC 5.45 (*)     Hematocrit 48.6 (*)     All other components within normal limits   COMPREHENSIVE METABOLIC PANEL - Abnormal; Notable for the following components:    CO2 15 (*)     Glucose 196 (*)     BUN 30 (*)     Creatinine 1.6 (*)     AST 76 (*)     ALT 92 (*)     Anion Gap 21 (*)     eGFR if  40 (*)     eGFR if non  35 (*)     All other components within normal limits   TROPONIN I - Abnormal; Notable for the following components:    Troponin I 3.474 (*)     All other components within normal limits    B-TYPE NATRIURETIC PEPTIDE - Abnormal; Notable for the following components:     (*)     All other components within normal limits   D DIMER, QUANTITATIVE - Abnormal; Notable for the following components:    D-Dimer 17.25 (*)     All other components within normal limits   LACTIC ACID, PLASMA - Abnormal; Notable for the following components:    Lactate (Lactic Acid) 6.8 (*)     All other components within normal limits    Narrative:     LA critical result(s) called and verbal readback obtained from Yessica Mariscal RN. by RL1 05/31/2022 13:50   CULTURE, BLOOD   MAGNESIUM   PHOSPHORUS   ANTI-XA HEPARIN MONITORING   PROTIME-INR   APTT   PROTIME-INR   CBC W/ AUTO DIFFERENTIAL   LACTIC ACID, PLASMA   SARS-COV-2 RDRP GENE        ECG Results          EKG 12-lead (In process)  Result time 05/31/22 15:43:24    In process by Interface, Lab In [facility] (05/31/22 15:43:24)                 Narrative:    Test Reason : R53.1,    Vent. Rate : 100 BPM     Atrial Rate : 100 BPM     P-R Int : 144 ms          QRS Dur : 080 ms      QT Int : 372 ms       P-R-T Axes : 060 042 -10 degrees     QTc Int : 479 ms    Normal sinus rhythm  Biatrial enlargement  Anteroseptal infarct (cited on or before 31-MAY-2022)  T wave abnormality, consider inferior ischemia  Abnormal ECG  When compared with ECG of 31-MAY-2022 12:04,  Non-specific change in ST segment in Inferior leads  Non-specific change in ST segment in Lateral leads    Referred By: [email or web address]   SELF           Confirmed By:                              EKG 12-lead (In process)  Result time 05/31/22 13:39:18    In process by Interface, Lab In [facility] (05/31/22 13:39:18)                 Narrative:    Test Reason : R53.1,    Vent. Rate : 102 BPM     Atrial Rate : 102 BPM     P-R Int : 148 ms          QRS Dur : 080 ms      QT Int : 350 ms       P-R-T Axes : 063 046 003 degrees     QTc Int : 456 ms    Sinus tachycardia  Biatrial enlargement  Anteroseptal infarct ,age  undetermined  Abnormal ECG  No previous ECGs available    Referred By: [email or web address]   SELF           Confirmed By:                             Imaging Results           CTA Chest Non-Coronary (PE Study) (Final result)  Result time 05/31/22 15:38:41    Final result by Tremayne Cantu MD (05/31/22 15:38:41)                 Impression:      1. Study is positive for pulmonary emboli with saddle embolus formation and bilateral upper and lower lobe segmental pulmonary emboli.  See above comments.  Follow-up recommended.  2. Cardiomegaly with possible right heart strain.  Follow-up recommended.  3.  This report was flagged in Epic as abnormal.      Electronically signed by: Tremayne Cantu  Date:    05/31/2022  Time:    15:38             Narrative:    EXAMINATION:  CTA CHEST NON CORONARY    CLINICAL HISTORY:  Pulmonary embolism (PE) suspected, high prob;Pulmonary embolism (PE) suspected, positive D-dimer;    TECHNIQUE:  Low dose axial images, sagittal and coronal reformations were obtained from the thoracic inlet to the lung bases following the IV administration of 100 mL of Omnipaque 350.  Contrast timing was optimized to evaluate the pulmonary arteries.  MIP images were performed.    COMPARISON:  None    FINDINGS:  Pulmonary arteries:Pulmonary arteries are adequately enhanced.Saddle pulmonary embolism better seen in the left main pulmonary artery.  Bilateral segmental pulmonary emboli involving upper and lower lobes, more prominent in the lower lobes.  Follow-up recommended.    Thoracic soft tissues: Unremarkable.    Aorta: Left-sided aortic arch.  No aneurysm or dissection.    Heart: Heart is mildly enlarged.  Slight prominence of the right atrium and ventricle with mild contrast reflux in the IVC and hepatic veins.  This could indicate right heart strain associated with pulmonary emboli.  Follow-up recommended.    Betty/Mediastinum: No pathologic tamika enlargement.    Airways: Patent.    Lungs/Pleura: Clear lungs. No  pleural effusion or thickening.    Esophagus: Unremarkable.    Upper Abdomen: No abnormality of the partially imaged upper abdomen.    Bones: No acute fracture. No suspicious lytic or sclerotic lesions.                               X-Ray Chest AP Portable (Final result)  Result time 05/31/22 14:24:00    Final result by Addy Capone MD (05/31/22 14:24:00)                 Impression:      No convincing evidence of acute cardiopulmonary disease.      Electronically signed by: Addy Capone  Date:    05/31/2022  Time:    14:24             Narrative:    EXAMINATION:  XR CHEST AP PORTABLE    CLINICAL HISTORY:  hypoxia;    TECHNIQUE:  Single frontal view of the chest was performed.    COMPARISON:  None    FINDINGS:  Monitoring leads overlie the chest.  Obliquely oriented linear focus of increased attenuation overlies the cardiac silhouette, possibly external to the patient.  Correlation recommended.    Borderline enlargement the cardiac silhouette.  The lungs appear essentially clear.  No definite pneumothorax or large volume pleural effusion.    No acute findings identified in the visualized abdomen.    Osseous and soft tissue structures appear without definite acute abnormality.                                 Medications   heparin 25,000 units in dextrose 5% (100 units/ml) IV bolus from bag INITIAL BOLUS (has no administration in time range)   heparin 25,000 units in dextrose 5% 250 mL (100 units/mL) infusion HIGH INTENSITY nomogram - [facility] (has no administration in time range)   heparin 25,000 units in dextrose 5% (100 units/ml) IV bolus from bag - ADDITIONAL PRN BOLUS - 60 units/kg (has no administration in time range)   heparin 25,000 units in dextrose 5% (100 units/ml) IV bolus from bag - ADDITIONAL PRN BOLUS - 30 units/kg (has no administration in time range)   lactated ringers bolus 1,000 mL (0 mLs Intravenous Stopped 5/31/22 1423)   lactated ringers bolus 500 mL (0 mLs Intravenous Stopped 5/31/22 1518)    aspirin tablet 325 mg (325 mg Oral Given 5/31/22 1416)   iohexoL (OMNIPAQUE 350) injection 100 mL (100 mLs Intravenous Given 5/31/22 4)     Medical Decision Making:   History:   Old Medical Records: I decided to obtain old medical records.  Initial Assessment:   61-year-old female with past medical history of hypertension presenting to the ED with complaint of generalized fatigue and dyspnea on exertion.    Differential Diagnosis:   Pulmonary embolism  ACS  CHF  Pneumonia  Viral URI  Sepsis  Malignancy  Clinical Tests:   Lab Tests: Ordered and Reviewed  Radiological Study: Ordered and Reviewed  ED Management:  Patient is tachycardic with soft blood pressure.  1L IVF bolus given with improvement in vitals. Upon presentation, she was tachypneic and hypoxic to 91%.  Patient placed on 4 L oxygen via nasal cannula with improved saturation. COVID-19 test is negative. Labs notable for d-dimer 17.25, troponin 3.474, , lactate 6.8, Cr 1.6. CT PE ordered which demonstrated pulmonary emboli with saddle embolus formation and bilateral upper and lower lobe segmental pulmonary emboli. Anticoagulation with high-dose heparin ordered.  Cardiology consulted. Patient admitted to hospital medicine for further management.              ED Course as of 05/31/22 1556   Tue May 31, 2022   1355 Lactate, Sathish(!!): 6.8 [NP]   1355 Ideal body weight 30mL/kg bolus approx 1500mL. Additional 500mL bolus ordered. [NP]      ED Course User Index  [NP] Henry Kapadia MD             Clinical Impression:   Final diagnoses:  [R53.1] Weakness  [R07.9] Chest pain  [I26.99] Bilateral pulmonary embolism                 Chen Perez MD  Resident  05/31/22 1556

## 2022-05-31 NOTE — Clinical Note
An angiography was performed of the Left and Right PA via power injection. Injection was performed with 30 mL contrast at 15 mL/s.

## 2022-05-31 NOTE — HOSPITAL COURSE
5/31/2022 Presented with SOB with progressive worsening. Troponin 3.474  D dimer 17.25 Lactic acid 6.8. HR 90s-100s SBP 100s-110s pulse ox 93-95% on 2LPM. CTA with bilateral saddle PE with RH strain. Cardiology consulted for acute PE  6/1/2022 On IV Heparin overnight. Remains on NC with stable sats. HR and BP stable. BLE venous ultrasound with non occlusive DVT to right popliteal. NPO for thrombectomy today   6/2/2022 Thrombectomy yesterday via RCFV access with significant bilateral PA thrombus noted and successful mechanical thrombectomy- see full report for details. Tolerated procedure well. Transitioned to Xarelto yesterday. OOB this AM with no issues per patient. SOB resolved

## 2022-05-31 NOTE — Clinical Note
A percutaneous stick to the right femoral artery and vein was performed. Ultrasound guidance was used to obtain access.

## 2022-05-31 NOTE — ASSESSMENT & PLAN NOTE
- creatinine 1.6  - no previous reading available for comparison  - hold HCTZ; BP stable for now; will monitor closely  - will hold IVF for now; monitor closely given contrast with CTA

## 2022-05-31 NOTE — Clinical Note
Diagnosis: Acute saddle pulmonary embolism with acute cor pulmonale [[id number]]   Admitting Provider:: JAMIE BOBO [[id number]]   Future Attending Provider: JAMIE BOBO [[id number]]   Reason for IP Medical Treatment  (Clinical interventions that can only be accomplished in the IP setting? ) :: IV anticoag, risk strat with ECHO, poss thrombectomy   Estimated Length of Stay:: 2 midnights   I certify that Inpatient services for greater than or equal to 2 midnights are medically necessary:: Yes   Plans for Post-Acute care--if anticipated (pick the single best option):: A. No post acute care anticipated at this time   Special Needs:: No Special Needs [1]

## 2022-05-31 NOTE — SUBJECTIVE & OBJECTIVE
Past Medical History:   Diagnosis Date    Hypertension     Tendonitis        Past Surgical History:   Procedure Laterality Date    HEEL SPUR SURGERY Left 02/14/2013    HYSTERECTOMY  10/2009    WHITNEY    OOPHORECTOMY  10/2009    Bilateral       Review of patient's allergies indicates:  No Known Allergies    No current facility-administered medications on file prior to encounter.     Current Outpatient Medications on File Prior to Encounter   Medication Sig    hydrochlorothiazide (HYDRODIURIL) 25 MG tablet Take 25 mg by mouth once daily.       Family History       Problem Relation (Age of Onset)    Breast cancer Maternal Cousin (51), Maternal Aunt, Paternal Aunt    Hypertension Mother          Tobacco Use    Smoking status: Never Smoker    Smokeless tobacco: Never Used   Substance and Sexual Activity    Alcohol use: No    Drug use: No    Sexual activity: Yes     Partners: Male     Birth control/protection: Post-menopausal, See Surgical Hx     Comment:      Review of Systems   Constitutional: Positive for diaphoresis (wtih SOB/ALFONSO). Negative for chills, decreased appetite and fever.   Cardiovascular:  Positive for dyspnea on exertion. Negative for chest pain, claudication, cyanosis, irregular heartbeat, leg swelling, near-syncope, orthopnea, palpitations, paroxysmal nocturnal dyspnea and syncope.   Respiratory:  Negative for cough, hemoptysis, shortness of breath and wheezing.    Gastrointestinal:  Negative for bloating, abdominal pain, constipation, diarrhea, melena, nausea and vomiting.   Neurological:  Positive for weakness (with SOB/ALFONSO). Negative for dizziness.   Objective:     Vital Signs (Most Recent):  Temp: 97.6 °F (36.4 °C) (05/31/22 1206)  Pulse: 96 (05/31/22 1442)  Resp: (!) 28 (05/31/22 1442)  BP: 113/76 (05/31/22 1443)  SpO2: 95 % (05/31/22 1442)   Vital Signs (24h Range):  Temp:  [97.6 °F (36.4 °C)] 97.6 °F (36.4 °C)  Pulse:  [] 96  Resp:  [22-28] 28  SpO2:  [92 %-95 %] 95 %  BP:  (104-119)/(76-88) 113/76     Weight: 80.7 kg (178 lb)  Body mass index is 33.63 kg/m².    SpO2: 95 %  O2 Device (Oxygen Therapy): nasal cannula    No intake or output data in the 24 hours ending 05/31/22 1552    Lines/Drains/Airways       Peripheral Intravenous Line  Duration                  Peripheral IV - Single Lumen 05/31/22 1219 22 G Left Antecubital <1 day                    Physical Exam  Constitutional:       General: She is not in acute distress.     Appearance: She is well-developed.   Cardiovascular:      Rate and Rhythm: Normal rate and regular rhythm.      Heart sounds: No murmur heard.    No gallop.   Pulmonary:      Effort: Pulmonary effort is normal. Tachypnea (mildly) present. No respiratory distress.      Breath sounds: Normal breath sounds. No wheezing.   Abdominal:      General: Bowel sounds are normal. There is no distension.      Palpations: Abdomen is soft.      Tenderness: There is no abdominal tenderness.   Musculoskeletal:      Right lower leg: Edema present.      Left lower leg: Edema present.   Skin:     General: Skin is warm and dry.   Neurological:      Mental Status: She is alert and oriented to person, place, and time.       Significant Labs: BMP:   Recent Labs   Lab 05/31/22  1307   *      K 5.0      CO2 15*   BUN 30*   CREATININE 1.6*   CALCIUM 9.5   MG 2.2   , CBC   Recent Labs   Lab 05/31/22  1308   WBC 8.69   HGB 15.9   HCT 48.6*      , and Troponin   Recent Labs   Lab 05/31/22  1307   TROPONINI 3.474*       Significant Imaging: Echocardiogram: Transthoracic echo (TTE) complete (Cupid Only): final result pending

## 2022-05-31 NOTE — Clinical Note
An angiography of the  right femoral artery was performed to evaluate for the placement of a closure device. vein

## 2022-05-31 NOTE — HPI
60yo female with HTN who presented to the ER with complaints of SOB. She complains of SOB for the past day with progressive worsening today. She complains of SOB with minimal exertion that will relieve with rest. She also reports weakness and diaphoresis and a general ill feeling today prompting her to present. She denies any history other than HTN and takes HCTZ 25mg po daily. She denies any recent surgery, immobilization or long travels. She does not take HRT and is up to date on cancer screenings with mammogram and colonoscopy. She reports her son was recently diagnosed with PE in [place]. She denies any chest pain, hemoptysis or other bleeding issues.

## 2022-05-31 NOTE — CONSULTS
[facility] - Emergency Dept  Cardiology  Consult Note    Patient Name: Mehreen Benites  MRN: [id number]  Admission Date: 5/31/2022  Hospital Length of Stay: 0 days  Code Status: No Order   Attending Provider: Henry Kapadia MD   Consulting Provider: LIZZY Bustamante, NAGA  Primary Care Physician: Sabra Gupta MD  Principal Problem:<principal problem not specified>    Patient information was obtained from patient, relative(s), past medical records and ER records.     [facility]  Consult performed by: LIZZY Harris ANP  Consult ordered by: Henry Kapadia MD  Reason for consult: PE        Subjective:     Chief Complaint:  SOB     HPI:   62yo female with HTN who presented to the ER with complaints of SOB. She complains of SOB for the past day with progressive worsening today. She complains of SOB with minimal exertion that will relieve with rest. She also reports weakness and diaphoresis and a general ill feeling today prompting her to present. She denies any history other than HTN and takes HCTZ 25mg po daily. She denies any recent surgery, immobilization or long travels. She does not take HRT and is up to date on cancer screenings with mammogram and colonoscopy. She reports her son was recently diagnosed with PE in [place]. She denies any chest pain, hemoptysis or other bleeding issues.       Past Medical History:   Diagnosis Date    Hypertension     Tendonitis        Past Surgical History:   Procedure Laterality Date    HEEL SPUR SURGERY Left 02/14/2013    HYSTERECTOMY  10/2009    WHITNEY    OOPHORECTOMY  10/2009    Bilateral       Review of patient's allergies indicates:  No Known Allergies    No current facility-administered medications on file prior to encounter.     Current Outpatient Medications on File Prior to Encounter   Medication Sig    hydrochlorothiazide (HYDRODIURIL) 25 MG tablet Take 25 mg by mouth once daily.       Family History       Problem  Relation (Age of Onset)    Breast cancer Maternal Cousin (51), Maternal Aunt, Paternal Aunt    Hypertension Mother          Tobacco Use    Smoking status: Never Smoker    Smokeless tobacco: Never Used   Substance and Sexual Activity    Alcohol use: No    Drug use: No    Sexual activity: Yes     Partners: Male     Birth control/protection: Post-menopausal, See Surgical Hx     Comment:      Review of Systems   Constitutional: Positive for diaphoresis (wtih SOB/ALFONSO). Negative for chills, decreased appetite and fever.   Cardiovascular:  Positive for dyspnea on exertion. Negative for chest pain, claudication, cyanosis, irregular heartbeat, leg swelling, near-syncope, orthopnea, palpitations, paroxysmal nocturnal dyspnea and syncope.   Respiratory:  Negative for cough, hemoptysis, shortness of breath and wheezing.    Gastrointestinal:  Negative for bloating, abdominal pain, constipation, diarrhea, melena, nausea and vomiting.   Neurological:  Positive for weakness (with SOB/ALFONSO). Negative for dizziness.   Objective:     Vital Signs (Most Recent):  Temp: 97.6 °F (36.4 °C) (05/31/22 1206)  Pulse: 96 (05/31/22 1442)  Resp: (!) 28 (05/31/22 1442)  BP: 113/76 (05/31/22 1443)  SpO2: 95 % (05/31/22 1442)   Vital Signs (24h Range):  Temp:  [97.6 °F (36.4 °C)] 97.6 °F (36.4 °C)  Pulse:  [] 96  Resp:  [22-28] 28  SpO2:  [92 %-95 %] 95 %  BP: (104-119)/(76-88) 113/76     Weight: 80.7 kg (178 lb)  Body mass index is 33.63 kg/m².    SpO2: 95 %  O2 Device (Oxygen Therapy): nasal cannula    No intake or output data in the 24 hours ending 05/31/22 1552    Lines/Drains/Airways       Peripheral Intravenous Line  Duration                  Peripheral IV - Single Lumen 05/31/22 1219 22 G Left Antecubital <1 day                    Physical Exam  Constitutional:       General: She is not in acute distress.     Appearance: She is well-developed.   Cardiovascular:      Rate and Rhythm: Normal rate and regular rhythm.      Heart  sounds: No murmur heard.    No gallop.   Pulmonary:      Effort: Pulmonary effort is normal. Tachypnea (mildly) present. No respiratory distress.      Breath sounds: Normal breath sounds. No wheezing.   Abdominal:      General: Bowel sounds are normal. There is no distension.      Palpations: Abdomen is soft.      Tenderness: There is no abdominal tenderness.   Musculoskeletal:      Right lower leg: Edema present.      Left lower leg: Edema present.   Skin:     General: Skin is warm and dry.   Neurological:      Mental Status: She is alert and oriented to person, place, and time.       Significant Labs: BMP:   Recent Labs   Lab 05/31/22  1307   *      K 5.0      CO2 15*   BUN 30*   CREATININE 1.6*   CALCIUM 9.5   MG 2.2   , CBC   Recent Labs   Lab 05/31/22  1308   WBC 8.69   HGB 15.9   HCT 48.6*      , and Troponin   Recent Labs   Lab 05/31/22  1307   TROPONINI 3.474*       Significant Imaging: Echocardiogram: Transthoracic echo (TTE) complete (Cupid Only): final result pending     Assessment and Plan:     SEDRICK (acute kidney injury)  - creatinine 1.6  - no previous reading available for comparison  - hold HCTZ; BP stable for now; will monitor closely  - will hold IVF for now; monitor closely given contrast with CTA     SOB (shortness of breath)  - related to PE  - will start IV Heparin  - echo with prelim results with normal LVEF      Saddle embolus of pulmonary artery  - CTA with saddle PE with RH strain  - HR 90s-100s SBP 100s-110s; BNP elevated at 629 troponin elevated at 3.474 D dimer 17.25  - BLE venous ultrasound pending  - will start IV Heparin drip; echo with prelim read per Dr. Ferguson in ER with normal LVEF, RV>LV, PA 65mmHg CVP 15  - admit to ICU; monitor closely overnigth; if acute decompensation occurs low threshold to use systemic TPA overnight otherwise will plan to proceed with thrombectomy tomorrow morning- case discussed with Dr. Fiore and Dr. Ferguson   - dose of TPA:  100mg IV over 2 hours and can continue IV Heparin during TPA infusion to prevent acute/new clot formation as long as PTT less than 1.5x normal APTT     Hypertension  - on HCTZ as an outpatient  - SBP 100s-110s upon admission  - hold HCTZ; monitor BP closely       VTE Risk Mitigation (From admission, onward)         Ordered     heparin 25,000 units in dextrose 5% (100 units/ml) IV bolus from bag - ADDITIONAL PRN BOLUS - 60 units/kg  As needed (PRN)        Question:  Heparin Infusion Adjustment (DO NOT MODIFY ANSWER)  Answer:  [email or web address]\epic\Images\Pharmacy\HeparinInfusions\heparin HIGH INTENSITY nomogram for [facility] [id number].pdf    05/31/22 1529     heparin 25,000 units in dextrose 5% (100 units/ml) IV bolus from bag - ADDITIONAL PRN BOLUS - 30 units/kg  As needed (PRN)        Question:  Heparin Infusion Adjustment (DO NOT MODIFY ANSWER)  Answer:  \\[email or web address]\epic\Images\Pharmacy\HeparinInfusions\heparin HIGH INTENSITY nomogram for [facility] [id number].pdf    05/31/22 1529     heparin 25,000 units in dextrose 5% (100 units/ml) IV bolus from bag INITIAL BOLUS  Once        Question:  Heparin Infusion Adjustment (DO NOT MODIFY ANSWER)  Answer:  [email or web address]\epic\Images\Pharmacy\HeparinInfusions\heparin HIGH INTENSITY nomogram for [facility] [id number].pdf    05/31/22 1529     heparin 25,000 units in dextrose 5% 250 mL (100 units/mL) infusion HIGH INTENSITY nomogram - [facility]  Continuous        Question Answer Comment   Heparin Infusion Adjustment (DO NOT MODIFY ANSWER) [email or web address]\epic\Images\Pharmacy\HeparinInfusions\heparin HIGH INTENSITY nomogram for [facility] [id number].pdf    Begin at (in units/kg/hr) 18        05/31/22 1529                Thank you for your consult. I will follow-up with patient. Please contact us if you have any additional questions.    Michelle Estrada, APRN, ANP  Cardiology   [facility] - Emergency Dept

## 2022-05-31 NOTE — ASSESSMENT & PLAN NOTE
- CTA with saddle PE with RH strain  - HR 90s-100s SBP 100s-110s; BNP elevated at 629 troponin elevated at 3.474 D dimer 17.25  - BLE venous ultrasound pending  - will start IV Heparin drip; echo with prelim read per Dr. Ferguson in ER with normal LVEF, RV>LV, PA 65mmHg CVP 15  - monitor closely; if acute decompensation occurs low threshold to use systemic TPA overnight otherwise will plan to proceed with thrombectomy tomorrow morning- case discussed with Dr. Fiore and Dr. Ferguson   - dose of TPA: 100mg IV over 2 hours and can continue IV Heparin during TPA infusion to prevent acute/new clot formation

## 2022-05-31 NOTE — PHARMACY MED REC
"  Admission Medication History     The home medication history was taken by Ileana Posey CPhT.    Medication history obtained from, Patient Verified    You may go to "Admission" then "Reconcile Home Medications" tabs to review and/or act upon these items.      The home medication list has been updated by the Pharmacy department.    Please read ALL comments highlighted in yellow.    Please address this information as you see fit.     Feel free to contact us if you have any questions or require assistance.            Ileana Posey CPhT.  Ext [phone]              .          "

## 2022-06-01 PROBLEM — I82.431 ACUTE DEEP VEIN THROMBOSIS (DVT) OF POPLITEAL VEIN OF RIGHT LOWER EXTREMITY: Status: ACTIVE | Noted: 2022-06-01

## 2022-06-01 LAB
ALBUMIN SERPL BCP-MCNC: 3.4 G/DL (ref 3.5–5.2)
ALP SERPL-CCNC: 65 U/L (ref 55–135)
ALT SERPL W/O P-5'-P-CCNC: 101 U/L (ref 10–44)
ANION GAP SERPL CALC-SCNC: 12 MMOL/L (ref 8–16)
APTT BLDCRRT: 87.7 SEC (ref 21–32)
AST SERPL-CCNC: 66 U/L (ref 10–40)
BASOPHILS # BLD AUTO: 0.06 K/UL (ref 0–0.2)
BASOPHILS # BLD AUTO: 0.06 K/UL (ref 0–0.2)
BASOPHILS NFR BLD: 0.5 % (ref 0–1.9)
BASOPHILS NFR BLD: 0.5 % (ref 0–1.9)
BILIRUB SERPL-MCNC: 0.9 MG/DL (ref 0.1–1)
BUN SERPL-MCNC: 27 MG/DL (ref 6–20)
CALCIUM SERPL-MCNC: 8.9 MG/DL (ref 8.7–10.5)
CHLORIDE SERPL-SCNC: 107 MMOL/L (ref 95–110)
CO2 SERPL-SCNC: 20 MMOL/L (ref 23–29)
CREAT SERPL-MCNC: 1.5 MG/DL (ref 0.5–1.4)
DIFFERENTIAL METHOD: ABNORMAL
DIFFERENTIAL METHOD: ABNORMAL
EOSINOPHIL # BLD AUTO: 0 K/UL (ref 0–0.5)
EOSINOPHIL # BLD AUTO: 0 K/UL (ref 0–0.5)
EOSINOPHIL NFR BLD: 0.2 % (ref 0–8)
EOSINOPHIL NFR BLD: 0.2 % (ref 0–8)
ERYTHROCYTE [DISTWIDTH] IN BLOOD BY AUTOMATED COUNT: 13.2 % (ref 11.5–14.5)
ERYTHROCYTE [DISTWIDTH] IN BLOOD BY AUTOMATED COUNT: 13.2 % (ref 11.5–14.5)
EST. GFR  (AFRICAN AMERICAN): 44 ML/MIN/1.73 M^2
EST. GFR  (NON AFRICAN AMERICAN): 38 ML/MIN/1.73 M^2
ESTIMATED AVG GLUCOSE: 137 MG/DL (ref 68–131)
FACT X PPP CHRO-ACNC: 0.75 IU/ML (ref 0.3–0.7)
FACT X PPP CHRO-ACNC: <0.1 IU/ML (ref 0.3–0.7)
GLUCOSE SERPL-MCNC: 172 MG/DL (ref 70–110)
HBA1C MFR BLD: 6.4 % (ref 4–5.6)
HCT VFR BLD AUTO: 40.4 % (ref 37–48.5)
HCT VFR BLD AUTO: 40.4 % (ref 37–48.5)
HGB BLD-MCNC: 13.4 G/DL (ref 12–16)
HGB BLD-MCNC: 13.4 G/DL (ref 12–16)
IMM GRANULOCYTES # BLD AUTO: 0.05 K/UL (ref 0–0.04)
IMM GRANULOCYTES # BLD AUTO: 0.05 K/UL (ref 0–0.04)
IMM GRANULOCYTES NFR BLD AUTO: 0.4 % (ref 0–0.5)
IMM GRANULOCYTES NFR BLD AUTO: 0.4 % (ref 0–0.5)
LACTATE SERPL-SCNC: 2.6 MMOL/L (ref 0.5–2.2)
LACTATE SERPL-SCNC: 2.6 MMOL/L (ref 0.5–2.2)
LACTATE SERPL-SCNC: 3.1 MMOL/L (ref 0.5–2.2)
LACTATE SERPL-SCNC: 3.3 MMOL/L (ref 0.5–2.2)
LYMPHOCYTES # BLD AUTO: 4.1 K/UL (ref 1–4.8)
LYMPHOCYTES # BLD AUTO: 4.1 K/UL (ref 1–4.8)
LYMPHOCYTES NFR BLD: 33.5 % (ref 18–48)
LYMPHOCYTES NFR BLD: 33.5 % (ref 18–48)
MCH RBC QN AUTO: 29.5 PG (ref 27–31)
MCH RBC QN AUTO: 29.5 PG (ref 27–31)
MCHC RBC AUTO-ENTMCNC: 33.2 G/DL (ref 32–36)
MCHC RBC AUTO-ENTMCNC: 33.2 G/DL (ref 32–36)
MCV RBC AUTO: 89 FL (ref 82–98)
MCV RBC AUTO: 89 FL (ref 82–98)
MONOCYTES # BLD AUTO: 0.9 K/UL (ref 0.3–1)
MONOCYTES # BLD AUTO: 0.9 K/UL (ref 0.3–1)
MONOCYTES NFR BLD: 7 % (ref 4–15)
MONOCYTES NFR BLD: 7 % (ref 4–15)
NEUTROPHILS # BLD AUTO: 7.1 K/UL (ref 1.8–7.7)
NEUTROPHILS # BLD AUTO: 7.1 K/UL (ref 1.8–7.7)
NEUTROPHILS NFR BLD: 58.4 % (ref 38–73)
NEUTROPHILS NFR BLD: 58.4 % (ref 38–73)
NRBC BLD-RTO: 0 /100 WBC
NRBC BLD-RTO: 0 /100 WBC
PLATELET # BLD AUTO: 193 K/UL (ref 150–450)
PLATELET # BLD AUTO: 193 K/UL (ref 150–450)
PMV BLD AUTO: 10.4 FL (ref 9.2–12.9)
PMV BLD AUTO: 10.4 FL (ref 9.2–12.9)
POCT GLUCOSE: 135 MG/DL (ref 70–110)
POCT GLUCOSE: 87 MG/DL (ref 70–110)
POTASSIUM SERPL-SCNC: 5.3 MMOL/L (ref 3.5–5.1)
PROT SERPL-MCNC: 7 G/DL (ref 6–8.4)
RBC # BLD AUTO: 4.55 M/UL (ref 4–5.4)
RBC # BLD AUTO: 4.55 M/UL (ref 4–5.4)
SODIUM SERPL-SCNC: 139 MMOL/L (ref 136–145)
TROPONIN I SERPL DL<=0.01 NG/ML-MCNC: 1.77 NG/ML (ref 0–0.03)
WBC # BLD AUTO: 12.23 K/UL (ref 3.9–12.7)
WBC # BLD AUTO: 12.23 K/UL (ref 3.9–12.7)

## 2022-06-01 PROCEDURE — 36014 PLACE CATHETER IN ARTERY: CPT | Mod: 50,,, | Performed by: INTERNAL MEDICINE

## 2022-06-01 PROCEDURE — 37184 PRIM ART M-THRMBC 1ST VSL: CPT | Mod: 50,59,51, | Performed by: INTERNAL MEDICINE

## 2022-06-01 PROCEDURE — 83036 HEMOGLOBIN GLYCOSYLATED A1C: CPT | Performed by: STUDENT IN AN ORGANIZED HEALTH CARE EDUCATION/TRAINING PROGRAM

## 2022-06-01 PROCEDURE — 25000003 PHARM REV CODE 250

## 2022-06-01 PROCEDURE — 99900035 HC TECH TIME PER 15 MIN (STAT)

## 2022-06-01 PROCEDURE — 85520 HEPARIN ASSAY: CPT | Performed by: STUDENT IN AN ORGANIZED HEALTH CARE EDUCATION/TRAINING PROGRAM

## 2022-06-01 PROCEDURE — 99153 MOD SED SAME PHYS/QHP EA: CPT | Performed by: INTERNAL MEDICINE

## 2022-06-01 PROCEDURE — 99152 PR MOD CONSCIOUS SEDATION, SAME PHYS, 5+ YRS, FIRST 15 MIN: ICD-10-PCS | Mod: ,,, | Performed by: INTERNAL MEDICINE

## 2022-06-01 PROCEDURE — C1757 CATH, THROMBECTOMY/EMBOLECT: HCPCS | Performed by: INTERNAL MEDICINE

## 2022-06-01 PROCEDURE — C1769 GUIDE WIRE: HCPCS | Performed by: INTERNAL MEDICINE

## 2022-06-01 PROCEDURE — 36014 PR PLACE CATH IN LT/RT PULM ART: ICD-10-PCS | Mod: 50,,, | Performed by: INTERNAL MEDICINE

## 2022-06-01 PROCEDURE — 94760 N-INVAS EAR/PLS OXIMETRY 1: CPT

## 2022-06-01 PROCEDURE — 84484 ASSAY OF TROPONIN QUANT: CPT | Performed by: NURSE PRACTITIONER

## 2022-06-01 PROCEDURE — 99152 MOD SED SAME PHYS/QHP 5/>YRS: CPT | Performed by: INTERNAL MEDICINE

## 2022-06-01 PROCEDURE — 63600175 PHARM REV CODE 636 W HCPCS: Performed by: INTERNAL MEDICINE

## 2022-06-01 PROCEDURE — 25000003 PHARM REV CODE 250: Performed by: INTERNAL MEDICINE

## 2022-06-01 PROCEDURE — 36415 COLL VENOUS BLD VENIPUNCTURE: CPT | Performed by: STUDENT IN AN ORGANIZED HEALTH CARE EDUCATION/TRAINING PROGRAM

## 2022-06-01 PROCEDURE — 11000001 HC ACUTE MED/SURG PRIVATE ROOM

## 2022-06-01 PROCEDURE — C1760 CLOSURE DEV, VASC: HCPCS | Performed by: INTERNAL MEDICINE

## 2022-06-01 PROCEDURE — 85347 COAGULATION TIME ACTIVATED: CPT | Performed by: INTERNAL MEDICINE

## 2022-06-01 PROCEDURE — 83605 ASSAY OF LACTIC ACID: CPT | Mod: 91 | Performed by: STUDENT IN AN ORGANIZED HEALTH CARE EDUCATION/TRAINING PROGRAM

## 2022-06-01 PROCEDURE — 27000221 HC OXYGEN, UP TO 24 HOURS

## 2022-06-01 PROCEDURE — 99152 MOD SED SAME PHYS/QHP 5/>YRS: CPT | Mod: ,,, | Performed by: INTERNAL MEDICINE

## 2022-06-01 PROCEDURE — 63600175 PHARM REV CODE 636 W HCPCS: Performed by: STUDENT IN AN ORGANIZED HEALTH CARE EDUCATION/TRAINING PROGRAM

## 2022-06-01 PROCEDURE — 37184 PRIM ART M-THRMBC 1ST VSL: CPT | Performed by: INTERNAL MEDICINE

## 2022-06-01 PROCEDURE — 85025 COMPLETE CBC W/AUTO DIFF WBC: CPT | Performed by: STUDENT IN AN ORGANIZED HEALTH CARE EDUCATION/TRAINING PROGRAM

## 2022-06-01 PROCEDURE — 25500020 PHARM REV CODE 255: Performed by: INTERNAL MEDICINE

## 2022-06-01 PROCEDURE — 94761 N-INVAS EAR/PLS OXIMETRY MLT: CPT

## 2022-06-01 PROCEDURE — 85730 THROMBOPLASTIN TIME PARTIAL: CPT | Performed by: INTERNAL MEDICINE

## 2022-06-01 PROCEDURE — 37184 PR THROMBECTOMY, NON CORONARY, 1ST VESSEL: ICD-10-PCS | Mod: 50,59,51, | Performed by: INTERNAL MEDICINE

## 2022-06-01 PROCEDURE — C1894 INTRO/SHEATH, NON-LASER: HCPCS | Performed by: INTERNAL MEDICINE

## 2022-06-01 PROCEDURE — 36014 PLACE CATHETER IN ARTERY: CPT | Performed by: INTERNAL MEDICINE

## 2022-06-01 PROCEDURE — 80053 COMPREHEN METABOLIC PANEL: CPT | Performed by: STUDENT IN AN ORGANIZED HEALTH CARE EDUCATION/TRAINING PROGRAM

## 2022-06-01 RX ORDER — HEPARIN SODIUM 1000 [USP'U]/ML
INJECTION, SOLUTION INTRAVENOUS; SUBCUTANEOUS
Status: DISCONTINUED | OUTPATIENT
Start: 2022-06-01 | End: 2022-06-01 | Stop reason: HOSPADM

## 2022-06-01 RX ORDER — LIDOCAINE HYDROCHLORIDE 10 MG/ML
INJECTION, SOLUTION EPIDURAL; INFILTRATION; INTRACAUDAL; PERINEURAL
Status: DISCONTINUED | OUTPATIENT
Start: 2022-06-01 | End: 2022-06-01 | Stop reason: HOSPADM

## 2022-06-01 RX ORDER — IODIXANOL 320 MG/ML
INJECTION, SOLUTION INTRAVASCULAR
Status: DISCONTINUED | OUTPATIENT
Start: 2022-06-01 | End: 2022-06-01 | Stop reason: HOSPADM

## 2022-06-01 RX ORDER — HEPARIN SODIUM 200 [USP'U]/100ML
INJECTION, SOLUTION INTRAVENOUS
Status: DISCONTINUED | OUTPATIENT
Start: 2022-06-01 | End: 2022-06-01

## 2022-06-01 RX ORDER — SODIUM CHLORIDE 9 MG/ML
INJECTION, SOLUTION INTRAVENOUS
Status: DISCONTINUED | OUTPATIENT
Start: 2022-06-01 | End: 2022-06-01

## 2022-06-01 RX ORDER — INSULIN ASPART 100 [IU]/ML
1-10 INJECTION, SOLUTION INTRAVENOUS; SUBCUTANEOUS EVERY 6 HOURS PRN
Status: DISCONTINUED | OUTPATIENT
Start: 2022-06-01 | End: 2022-06-02 | Stop reason: HOSPADM

## 2022-06-01 RX ORDER — GLUCAGON 1 MG
1 KIT INJECTION
Status: DISCONTINUED | OUTPATIENT
Start: 2022-06-01 | End: 2022-06-02 | Stop reason: HOSPADM

## 2022-06-01 RX ORDER — CEFAZOLIN SODIUM 1 G/3ML
INJECTION, POWDER, FOR SOLUTION INTRAMUSCULAR; INTRAVENOUS
Status: DISCONTINUED | OUTPATIENT
Start: 2022-06-01 | End: 2022-06-01 | Stop reason: HOSPADM

## 2022-06-01 RX ORDER — FENTANYL CITRATE 50 UG/ML
INJECTION, SOLUTION INTRAMUSCULAR; INTRAVENOUS
Status: DISCONTINUED | OUTPATIENT
Start: 2022-06-01 | End: 2022-06-01 | Stop reason: HOSPADM

## 2022-06-01 RX ORDER — SODIUM CHLORIDE 9 MG/ML
INJECTION, SOLUTION INTRAVENOUS CONTINUOUS
Status: ACTIVE | OUTPATIENT
Start: 2022-06-01 | End: 2022-06-01

## 2022-06-01 RX ORDER — MIDAZOLAM HYDROCHLORIDE 1 MG/ML
INJECTION, SOLUTION INTRAMUSCULAR; INTRAVENOUS
Status: DISCONTINUED | OUTPATIENT
Start: 2022-06-01 | End: 2022-06-01 | Stop reason: HOSPADM

## 2022-06-01 RX ADMIN — DEXTROSE AND SODIUM CHLORIDE: 5; .9 INJECTION, SOLUTION INTRAVENOUS at 06:06

## 2022-06-01 RX ADMIN — RIVAROXABAN 15 MG: 15 TABLET, FILM COATED ORAL at 02:06

## 2022-06-01 RX ADMIN — SODIUM CHLORIDE: 0.9 INJECTION, SOLUTION INTRAVENOUS at 12:06

## 2022-06-01 NOTE — NURSING
Received pt from Cath Lab.Attached to bedside monitors.Pt easily arousable;lying flat x4 hrs post cath.bbs clear and equal , pt states she is breathing better, sats 100% on 2l nc; rt groin perclose site , dsg cdi with gauze and tegaderm in place. Pt instructed to keep rt leg straight and will lie flat till approximately 12 the patient aware.rt foot pedal pulses palpable ,pt wiggles toes. Rt groin site soft upon palpation no visible bleeding noted .    Pt lying flat rt groin site with small amt of bloody drainage to site ;soft upon palpation. Pedal pulses strong feet warm with brisk cap refill.

## 2022-06-01 NOTE — NURSING
Pt transferred to floor room 466 via WC on room air. Rt Groin site dsg with same bloody drainage ;soft upon palpation;pt denies pain at site. AAox4,  accompanied to floor.Report given to RN at bedside handoff.

## 2022-06-01 NOTE — H&P
[facility] Medicine H&P Note     Admitting Team: [facility] Hospitalist Team B  Attending Physician: William Lemus MD  Resident: Memo Espinal MD  Intern: Shani Kim MD     Date of Admit: 5/31/2022    Chief Complaint     SOB x 1 Day     Subjective:      History of Present Illness:  Mehreen Benites is a 61 y.o. with a past medical history of hypertension who presents to [facility] ED for shortness of breath for 1 day. The patient was in her usual state of health (indepenent in all ADLs) until the day prior to admission when she suddenly felt short of breath while taking a shower. On presentation to the ED she was tachycardiac to 106 and hypoxic to 92% which improved to 94% with 3L of oxygen by nasal cannula. The patient denies any fevers, chills, chest pain, nausea, vomiting, lower extremity swelling, weight loss, long car trips or recent acute illness. She endorses headaches last night that have since resolved and sweating at night if she does not have her air conditioner on. She took some milk of magnesia for constipation yesterday and had a small bowel movement the day of admission.     Past Medical History:  Hypertension    Past Surgical History:  Past Surgical History:   Procedure Laterality Date    HEEL SPUR SURGERY Left 02/14/2013    HYSTERECTOMY  10/2009    WHITNEY    OOPHORECTOMY  10/2009    Bilateral     Per patient hysterectomy for fibroids   Per patient had cataract surgery recently     Allergies:  Review of patient's allergies indicates:  No Known Allergies    Home Medications:  Hydrochlorothiazide 25 mg daily     Family History:  Family History   Problem Relation Age of Onset    Hypertension Mother     Breast cancer Maternal Cousin 51    Breast cancer Maternal Aunt     Breast cancer Paternal Aunt     Colon cancer Neg Hx     Ovarian cancer Neg Hx    No family history of unprovoked blood clots, son had provoked blood clot (he works in the  and often goes on 18 to 20 hour long  "rides)    Social History:  Social History     Tobacco Use    Smoking status: Never Smoker    Smokeless tobacco: Never Used   Substance Use Topics    Alcohol use: No    Drug use: No     Review of Systems:  Pertinent items are noted in HPI. All other systems are reviewed and are negative.    Health Maintaince :   Primary Care Physician: Brandon Gutierrez MD    Immunizations:   Immunization History   Administered Date(s) Administered    COVID-19, MRNA, LN-S, PF (Pfizer) (Purple Cap) 2021, 2021     TDap: per patient unsure of tetanus immunization    Flu: per patient has not had flu shot last    Pna: per patient has not had pneumonia vaccine     Cancer Screening:  PAP: 2015 per gynecology notes not indicated   MM2021 with no evidence of malignancy   Colonoscopy:  with 8 mm hyperplastic rectal polyp      Objective:   Last 24 Hour Vital Signs:  BP  Min: 104/81  Max: 130/89  Temp  Av.6 °F (36.4 °C)  Min: 97.6 °F (36.4 °C)  Max: 97.6 °F (36.4 °C)  Pulse  Av.1  Min: 91  Max: 106  Resp  Av.4  Min: 18  Max: 40  SpO2  Av.8 %  Min: 91 %  Max: 95 %  Height  Av' 1" (154.9 cm)  Min: 5' 1" (154.9 cm)  Max: 5' 1" (154.9 cm)  Weight  Av.7 kg (178 lb)  Min: 80.7 kg (178 lb)  Max: 80.7 kg (178 lb)  Body mass index is 33.63 kg/m².  No intake/output data recorded.    Physical Examination:  General: Patient resting comfortably on 3L of oxygen by nasal cannula in no distress  HEENT: EOM intact, PERRL, oropharynx clear, mucous membranes clear and moist   Pulmonary: no resp distress, clear to ascultation bilaterally   Cardiovascular: tachycardic, reg rhythm, no murmurs,rubs or gallops  Abdomen: soft, non-tender, no distended no splenomegaly or hepatomegaly   Skin: warm, dry, no erythema, no rashes    Extremities: periph pulses intact, no cyanosis or edema   Neuro: a/ox4, clear speech, follows commands, no weakness or focal neurologic  deficit   Psych: mood and behavior normal    "   Laboratory:  Most Recent Data:  CBC:   Lab Results   Component Value Date    WBC 12.73 (H) 05/31/2022    HGB 14.7 05/31/2022    HCT 41 05/31/2022     05/31/2022    MCV 91 05/31/2022    RDW 12.8 05/31/2022     WBC Differential: 68.4 % N, 0 % Bands, 25 % L, 5 % M, 0.1 % Eo, 0.3 % Baso, no additional cells seen    BMP:   Lab Results   Component Value Date     05/31/2022    K 5.0 05/31/2022     05/31/2022    CO2 15 (L) 05/31/2022    BUN 30 (H) 05/31/2022    CREATININE 1.6 (H) 05/31/2022     (H) 05/31/2022    CALCIUM 9.5 05/31/2022    MG 2.2 05/31/2022    PHOS 3.6 05/31/2022     LFTs:   Lab Results   Component Value Date    PROT 8.4 05/31/2022    ALBUMIN 3.8 05/31/2022    BILITOT 0.9 05/31/2022    AST 76 (H) 05/31/2022    ALKPHOS 85 05/31/2022    ALT 92 (H) 05/31/2022     Coags:   Lab Results   Component Value Date    INR 1.1 05/31/2022    INR 1.1 05/31/2022     FLP: No results found for: CHOL, HDL, LDLCALC, TRIG, CHOLHDL  DM:   Lab Results   Component Value Date    CREATININE 1.6 (H) 05/31/2022     Thyroid: No results found for: TSH, FREET4, S5SQNYW, U9OXUVT, THYROIDAB  Anemia: No results found for: IRON, TIBC, FERRITIN, [id number], FOLATE  Cardiac:   Lab Results   Component Value Date    TROPONINI 3.474 (H) 05/31/2022     (H) 05/31/2022     Urinalysis: No results found for: LABURIN, COLORU, PHUA, CLARITYU, SPECGRAV, LABSPEC, NITRITE, PROTEINUR, GLUCOSEU, KETONESU, UROBILINOGEN, BILIRUBINUR, BLOODU, RBCU, WBCUA    Trended Lab Data:  Recent Labs   Lab 05/31/22  1307 05/31/22  1308 05/31/22  1632 05/31/22  1634   WBC  --  8.69 12.73*  --    HGB  --  15.9 14.7  --    HCT  --  48.6* 45.9 41   PLT  --  213 189  --    MCV  --  89 91  --    RDW  --  12.9 12.8  --      --   --   --    K 5.0  --   --   --      --   --   --    CO2 15*  --   --   --    BUN 30*  --   --   --    CREATININE 1.6*  --   --   --    *  --   --   --    PROT 8.4  --   --   --    ALBUMIN 3.8  --   --    --    BILITOT 0.9  --   --   --    AST 76*  --   --   --    ALKPHOS 85  --   --   --    ALT 92*  --   --   --      Trended Cardiac Data:  Recent Labs   Lab 05/31/22  1307 05/31/22  1308   TROPONINI 3.474*  --    BNP  --  629*     Microbiology Data:  Microbiology Results (last 7 days)     Procedure Component Value Units Date/Time    Blood culture [[id number]] Collected: 05/31/22 1307    Order Status: Sent Specimen: Blood Updated: 05/31/22 1936        Other Results:  EKG (my interpretation): nonspecific ST and T waves changes, sinus tachycardia.    Radiology:  Imaging Results           CTA Chest Non-Coronary (PE Study) (Final result)  Result time 05/31/22 15:38:41    Final result by Tremayne Cantu MD (05/31/22 15:38:41)                 Impression:      1. Study is positive for pulmonary emboli with saddle embolus formation and bilateral upper and lower lobe segmental pulmonary emboli.  See above comments.  Follow-up recommended.  2. Cardiomegaly with possible right heart strain.  Follow-up recommended.  3.  This report was flagged in Epic as abnormal.      Electronically signed by: Tremayne Cantu  Date:    05/31/2022  Time:    15:38             Narrative:    EXAMINATION:  CTA CHEST NON CORONARY    CLINICAL HISTORY:  Pulmonary embolism (PE) suspected, high prob;Pulmonary embolism (PE) suspected, positive D-dimer;    TECHNIQUE:  Low dose axial images, sagittal and coronal reformations were obtained from the thoracic inlet to the lung bases following the IV administration of 100 mL of Omnipaque 350.  Contrast timing was optimized to evaluate the pulmonary arteries.  MIP images were performed.    COMPARISON:  None    FINDINGS:  Pulmonary arteries:Pulmonary arteries are adequately enhanced.Saddle pulmonary embolism better seen in the left main pulmonary artery.  Bilateral segmental pulmonary emboli involving upper and lower lobes, more prominent in the lower lobes.  Follow-up recommended.    Thoracic soft tissues:  Unremarkable.    Aorta: Left-sided aortic arch.  No aneurysm or dissection.    Heart: Heart is mildly enlarged.  Slight prominence of the right atrium and ventricle with mild contrast reflux in the IVC and hepatic veins.  This could indicate right heart strain associated with pulmonary emboli.  Follow-up recommended.    Betty/Mediastinum: No pathologic tamika enlargement.    Airways: Patent.    Lungs/Pleura: Clear lungs. No pleural effusion or thickening.    Esophagus: Unremarkable.    Upper Abdomen: No abnormality of the partially imaged upper abdomen.    Bones: No acute fracture. No suspicious lytic or sclerotic lesions.                               X-Ray Chest AP Portable (Final result)  Result time 05/31/22 14:24:00    Final result by Addy Capone MD (05/31/22 14:24:00)                 Impression:      No convincing evidence of acute cardiopulmonary disease.      Electronically signed by: Addy Capone  Date:    05/31/2022  Time:    14:24             Narrative:    EXAMINATION:  XR CHEST AP PORTABLE    CLINICAL HISTORY:  hypoxia;    TECHNIQUE:  Single frontal view of the chest was performed.    COMPARISON:  None    FINDINGS:  Monitoring leads overlie the chest.  Obliquely oriented linear focus of increased attenuation overlies the cardiac silhouette, possibly external to the patient.  Correlation recommended.    Borderline enlargement the cardiac silhouette.  The lungs appear essentially clear.  No definite pneumothorax or large volume pleural effusion.    No acute findings identified in the visualized abdomen.    Osseous and soft tissue structures appear without definite acute abnormality.                              Assessment:     Mehreen Benites is a 61 y.o. with a past medical history of hypertension who presents to [facility] ED for shortness of breath for 1 day. The patient was in her usual state of health (indepenent in all ADLs) until the day prior to admission when she suddenly felt short  of breath while taking a shower. Patient tachycardiac and hypoxic on admission, maintaining adequate blood pressures. Work-up notable for saddle PE and right heart strain. Heparin drip started. Patient admitted to the ICU.       Plan:     Saddle Pulmonary Embolism, Unprovoked    - Acute onset shortness of breath with hypoxia and tachycardia on admission   - D-dimer elevated to 17.25, Troponin elevated to 3.474 on admission  - EKG with ventricular rate of 100 and T-wave inversion in V3   - CTA chest with pulmonary emboli with saddle embolus formation and bilateral upper and lower lobe segmental pulmonary emboli with evidence of right heart strain   - Echocardiogram with concentric remodeling and ejection fraction of 75%, abnormal septal wall motion and RV strain    - Patient denies recent long periods of immobilization, not all her cancer screening is up to date, encouraged to maintain cancer screening up to date, counseled on possibility for further testing for thrombophilia outpatient   - Aspirin loaded in the ED and started on heparin drip   - Hold HCTZ given acute state  - Cardiology consulted, appreciate recommendations   - Cardiac diet, NPO at midnight for thrombectomy  - Maintain oxygen saturation >92%    Lactic Acidosis   - Sodium 140, chloride of 104 and CO2 of 15, anion gap of 21 on admission CMP  - Lactic acid on admission 6.8, increased to 8.2 now 5.0   - Continue maintenance IV fluids (dextrose and half normal saline)  - Monitor q 4 hrs for resolution     Hypertension  - Hold HCTZ in the setting of acute pulmonary embolism     SEDRICK vs. CKD  - Creatinine on admission 1.6 with an eGFR of 35-40  - No prior labs available for review   - Will continue to monitor daily, if not improving with fluids will obtain FeUrea given patient takes hydrochlorothiazide     Code Status:     PPX: Heparin Drip   DIET: Cardiac Diet   DISPO: Admit to ICU    Memo Espinal MD  [employer] [facility] HO-II    [facility] Medicine  Hospitalist Pager numbers:   [facility] Hospitalist Medicine Team A (Keon/Malka): [phone]  [facility] Hospitalist Medicine Team B (Allan/Martine):  [phone]

## 2022-06-01 NOTE — INTERVAL H&P NOTE
The patient has been examined and the H&P has been reviewed:    I concur with the findings and no changes have occurred since H&P was written.    Anesthesia/Surgery risks, benefits and alternative options discussed and understood by patient/family.          Active Hospital Problems    Diagnosis  POA    *Saddle embolus of pulmonary artery [I26.92]  Yes    Acute deep vein thrombosis (DVT) of popliteal vein of right lower extremity [I82.431]  Yes    SOB (shortness of breath) [R06.02]  Yes    SEDRICK (acute kidney injury) [N17.9]  Yes    Hypertension [I10]  Yes      Resolved Hospital Problems   No resolved problems to display.

## 2022-06-01 NOTE — EICU
EICU BRIEF INITIAL EVALUATION:       HISTORY:      61-year-old woman presented with shortness of breath.  Found to have extensive pulmonary emboli with saddle component and right heart strain.  She was started on heparin drip.  Plan is for thrombectomy in the morning but will received tPA if she decompensates overnight.  No prior history of thromboembolism and no obvious inciting factors, however, her son recently had a pulmonary embolus  History of hypertension    DVT prophylaxis-fully anticoagulated  GI prophylaxis not indicated    /87, P 97, R 32, O2 sat 94  Resting comfortably on Nasal cannula    CAMERA ASSESSMENT: Yes      TELEMETRY: Reviewed      NOTES: Reviewed     LABS: Reviewed      IMAGING: Personally reviewed.      DISCUSSED with bedside nurse     ASSESSMENT AND PLAN:     Acute pulmonary embolus with large clot burden-continue heparin drip.  Plan for thrombectomy in the morning.  Will need long-term anticoagulation and full coagulation evaluation when this episode is resolved    Hypertension-blood pressure currently well controlled.  Hold home medications.  Will treat p.r.n. for SBP greater than 160    SEDRICK - avoid nephrotoxins, renally dose medications.  Monitor renal function and urine output.  Gentle hydration    Elevated troponin-most likely demand ischemia.  Anticoagulated.  Trend    Elevated lactate-combination of previous hypoxia and poor renal clearance.  Continue to follow     MARILU Wong MD  eICU Attending  [phone]

## 2022-06-01 NOTE — EICU
eICU Physician Virtual/Remote Brief Evaluation Note      Telephone call RN  APTT 95  Patient has nomogram ordered  On 18 mL/hour of heparin  Chart reviewed, discussed with RN  Hold heparin for 2 hours then we draw APTT and restart heparin at 14 as per nomogram      B. Jose Wong MD  [facility] Attending  [phone]    This report has been created through the use of M-Zad[employer] dictation software. Typographical and content errors may occur with this process. While efforts are made to detect and correct such errors, in some cases errors will persist. For this reason, wording in this document should be considered in the proper context and not strictly verbatim

## 2022-06-01 NOTE — PROCEDURES
: Kendall Fiore MD  Pre-procedure diagnosis: Submassive PE  Post-procedure diagnosis: PE    Post Procedure Note: Mechanical thrombectomy    The pt was brought to the cath lab and under sterile technique, RCFV access was obtained without difficulty. Images were obtained in multiple views and significant bilateral PA thrombus noted. Successful mechanical thrombectomy with Lightening 12 device with excellent results. Please see full report for details. The pt tolerated the procedure well without complications. Perclose closure device used with successful hemostasis.     Vitals:    06/01/22 0800 06/01/22 0900 06/01/22 0905 06/01/22 1200   BP:  (!) 121/94  (!) 140/82   BP Location:    Left arm   Patient Position:    Lying   Pulse: 91 92 95 77   Resp: (!) 25 (!) 31 (!) 34 20   Temp:    97.3 °F (36.3 °C)   TempSrc:    Oral   SpO2: (!) 94% 97% 98% 100%   Weight:       Height:             Gen: NAD  Ext: 2+ fem pulse, no evidence of hematoma  Estimated blood loss: < 50 cc    Plan:  -Post cath care per protocol  -DOAC  -OK to stepdown and D/C later today after protocol complete and the patient has ambulated  -OK to start DOAC 2 hours after return from procedure

## 2022-06-01 NOTE — NURSING
0705-Pt AAOx4 on 4l NC sats 99%,pt becomes sob with turning.Heparin drip infusing per protocol.VSS,denies pain.    0920-Pt to OR per transport on monitor. Heparin drip stopped.Family at bedside accompanies pt to OR waiting room.

## 2022-06-01 NOTE — PLAN OF CARE
Pt had Thrombectomy today for PE.Tolerated procedure well. VSS pt lying flat x4 hrs;rt groin site cdi with small amt of bloody drainage .Sats 100% on 2l nc .Waiting to transfer pt to floor.Pts  at bedside updated on care

## 2022-06-01 NOTE — PROGRESS NOTES
"[facility] Hospital Medicine Progress Note    Primary Team: [facility] Hospitalist Team B  Attending Physician: MD Allan  Resident: MD Kylie  Intern: Tasha    Subjective:      Patient is sitting upright in bed on 4 L NC. Patient had no acute events overnight. Reports that her SOB has improved. Denies chest pain. Patient is undergoing thrombectomy today.      Objective:     Last 24 Hour Vital Signs:  BP  Min: 104/81  Max: 148/73  Temp  Av.6 °F (36.4 °C)  Min: 97.2 °F (36.2 °C)  Max: 98.2 °F (36.8 °C)  Pulse  Av.6  Min: 89  Max: 106  Resp  Av.2  Min: 18  Max: 41  SpO2  Av.3 %  Min: 91 %  Max: 98 %  Height  Av' 1" (154.9 cm)  Min: 5' 1" (154.9 cm)  Max: 5' 1" (154.9 cm)  Weight  Av.7 kg (178 lb)  Min: 80.7 kg (178 lb)  Max: 80.7 kg (178 lb)  I/O last 3 completed shifts:  In: 1573.6 [P.O.:480; I.V.:1093.6]  Out: -     Physical Examination:  General: Patient resting comfortably on 4L of oxygen by nasal cannula in no distress  HEENT: EOM intact, mucous membranes clear and moist   Pulmonary: no resp distress, clear to ascultation bilaterally   Cardiovascular: tachycardic, reg rhythm, no murmurs,rubs or gallops  Abdomen: soft, non-tender, non distended  Skin: warm, dry, no erythema, no rashes    Extremities: periph pulses intact, no cyanosis or edema   Neuro: a/ox4, clear speech, follows commands, no weakness or focal neurologic  deficit   Psych: mood and behavior normal     Laboratory:  Laboratory Data Reviewed: yes  Pertinent Findings:  Lactate 3.1 --> 2.6  D dimer 17.25  Troponin 3.47--> 1.77    Microbiology Data Reviewed: yes  Pertinent Findings:  Blood culture 22 NGTD    Other Results:  EKG (my interpretation):   Sinus rhythm, biatrial enlargement     Radiology Data Reviewed: yes  Pertinent Findings:  US Lower Extremity Veins Bilateral   Final Result   Abnormal      Findings compatible with partially occlusive thrombus involving the right popliteal vein in this patient with known pulmonary " thromboembolism.      This report was flagged in Epic as abnormal.         Electronically signed by: Maury Carvalho MD   Date:    05/31/2022   Time:    22:59      CTA Chest Non-Coronary (PE Study)   Final Result   Abnormal      1. Study is positive for pulmonary emboli with saddle embolus formation and bilateral upper and lower lobe segmental pulmonary emboli.  See above comments.  Follow-up recommended.   2. Cardiomegaly with possible right heart strain.  Follow-up recommended.   3.  This report was flagged in Epic as abnormal.         Electronically signed by: Tremayne Irizarry   Date:    05/31/2022   Time:    15:38      X-Ray Chest AP Portable   Final Result      No convincing evidence of acute cardiopulmonary disease.         Electronically signed by: Addy Capone   Date:    05/31/2022   Time:    14:24            Current Medications:     Infusions:   dextrose 5 % and 0.9 % NaCl 125 mL/hr at 06/01/22 0617    heparin (porcine) in D5W 25.51 Units/kg/hr (06/01/22 0603)        Scheduled:       PRN:  dextrose 10%, glucagon (human recombinant), heparin (PORCINE), heparin (PORCINE), insulin aspart U-100, sodium chloride 0.9%      Assessment:     Mehreen Benites is a 61 y.o.female with  Patient Active Problem List    Diagnosis Date Noted    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    SOB (shortness of breath) 05/31/2022    SEDRICK (acute kidney injury) 05/31/2022    Screen for colon cancer 09/19/2014    Colon cancer screening 05/23/2014    Hypertension      Mehreen Benites is a 61 y.o. with a past medical history of hypertension who presents to [facility] ED for shortness of breath for 1 day. The patient was in her usual state of health (indepenent in all ADLs) until the day prior to admission when she suddenly felt short of breath while taking a shower. Patient tachycardiac and hypoxic on admission, maintaining adequate blood pressures. Work-up notable for saddle PE and right heart strain. Heparin  drip started. Patient admitted to the ICU.     Plan:     Saddle Pulmonary Embolism, Unprovoked    - Acute onset shortness of breath with hypoxia and tachycardia on admission   - D-dimer elevated to 17.25, Troponin elevated to 3.474 on admission  - EKG with ventricular rate of 100 and T-wave inversion in V3   - CTA chest with pulmonary emboli with saddle embolus formation and bilateral upper and lower lobe segmental pulmonary emboli with evidence of right heart strain   - Echocardiogram with concentric remodeling and ejection fraction of 75%, abnormal septal wall motion and RV strain    - Patient denies recent long periods of immobilization, not all her cancer screening is up to date, encouraged to maintain cancer screening up to date, counseled on possibility for further testing for thrombophilia outpatient   - Aspirin loaded in the ED and started on heparin drip   - Hold HCTZ given acute state  - Cardiology consulted, appreciate recommendations   - Cardiac diet, NPO at midnight for thrombectomy  - Maintain oxygen saturation >92%  - Pending thrombectomy today      Lactic Acidosis   - Sodium 140, chloride of 104 and CO2 of 15, anion gap of 21 on admission CMP  - Lactic acid on admission 6.8, increased to 8.2 now 5.0 --> 2.6  - Continue maintenance IV fluids (dextrose and half normal saline)  - Monitor q 4 hrs for resolution      Hypertension  - Hold HCTZ in the setting of acute pulmonary embolism      SEDRICK vs. CKD  - Creatinine on admission 1.6 with an eGFR of 35-40  - No prior labs available for review   - Will continue to monitor daily, if not improving with fluids will obtain FeUrea given patient takes hydrochlorothiazide     Davey Cordova MD  [facility] Internal Medicine HO-I    [facility] Medicine Hospitalist Pager numbers:   [facility] Hospitalist Medicine Team A (Keon/Malka): [phone]  [facility] Hospitalist Medicine Team B (Allan/Martine):  [phone]

## 2022-06-01 NOTE — NURSING
Pt has arrived on the unit from ICU to room 466 . Report received from Honey CESAR. Pt has been oriented to the room and unit . Pt is lying in bed awake AOX4 ,talking to  at bedside , with patent IV , tele-monitor on , and respiration unlabored on room air. Pt has gauze and clear Tegaderm to right groin with bloody drainage, site soft upon palpation, and  pt denies any pain/numbness/tingleing  at the site.  Pedal pulses palpable , pt can wiggle toes and has sensation . Bed in the lowest position , belonging and call light in reach . Safety/ fall precautions in place . Pt and family understands the plan for today and has no questions or concerns at this time .

## 2022-06-02 ENCOUNTER — TELEPHONE (OUTPATIENT)
Dept: PRIMARY CARE CLINIC | Facility: CLINIC | Age: 62
End: 2022-06-02
Payer: MEDICAID

## 2022-06-02 VITALS
BODY MASS INDEX: 33.61 KG/M2 | HEIGHT: 61 IN | DIASTOLIC BLOOD PRESSURE: 86 MMHG | WEIGHT: 178 LBS | OXYGEN SATURATION: 97 % | HEART RATE: 87 BPM | TEMPERATURE: 98 F | RESPIRATION RATE: 18 BRPM | SYSTOLIC BLOOD PRESSURE: 127 MMHG

## 2022-06-02 LAB
ALBUMIN SERPL BCP-MCNC: 3.1 G/DL (ref 3.5–5.2)
ALP SERPL-CCNC: 57 U/L (ref 55–135)
ALT SERPL W/O P-5'-P-CCNC: 68 U/L (ref 10–44)
ANION GAP SERPL CALC-SCNC: 9 MMOL/L (ref 8–16)
AST SERPL-CCNC: 42 U/L (ref 10–40)
BILIRUB SERPL-MCNC: 0.8 MG/DL (ref 0.1–1)
BUN SERPL-MCNC: 22 MG/DL (ref 6–20)
CALCIUM SERPL-MCNC: 8.6 MG/DL (ref 8.7–10.5)
CHLORIDE SERPL-SCNC: 104 MMOL/L (ref 95–110)
CO2 SERPL-SCNC: 26 MMOL/L (ref 23–29)
CREAT SERPL-MCNC: 1.2 MG/DL (ref 0.5–1.4)
EST. GFR  (AFRICAN AMERICAN): 57 ML/MIN/1.73 M^2
EST. GFR  (NON AFRICAN AMERICAN): 50 ML/MIN/1.73 M^2
GLUCOSE SERPL-MCNC: 99 MG/DL (ref 70–110)
POCT GLUCOSE: 110 MG/DL (ref 70–110)
POCT GLUCOSE: 111 MG/DL (ref 70–110)
POCT GLUCOSE: 113 MG/DL (ref 70–110)
POTASSIUM SERPL-SCNC: 4.2 MMOL/L (ref 3.5–5.1)
PROT SERPL-MCNC: 6.5 G/DL (ref 6–8.4)
SODIUM SERPL-SCNC: 139 MMOL/L (ref 136–145)

## 2022-06-02 PROCEDURE — 99900035 HC TECH TIME PER 15 MIN (STAT)

## 2022-06-02 PROCEDURE — 25000003 PHARM REV CODE 250: Performed by: NURSE PRACTITIONER

## 2022-06-02 PROCEDURE — 99233 SBSQ HOSP IP/OBS HIGH 50: CPT | Mod: ,,, | Performed by: NURSE PRACTITIONER

## 2022-06-02 PROCEDURE — 80053 COMPREHEN METABOLIC PANEL: CPT | Performed by: STUDENT IN AN ORGANIZED HEALTH CARE EDUCATION/TRAINING PROGRAM

## 2022-06-02 PROCEDURE — 36415 COLL VENOUS BLD VENIPUNCTURE: CPT | Performed by: STUDENT IN AN ORGANIZED HEALTH CARE EDUCATION/TRAINING PROGRAM

## 2022-06-02 PROCEDURE — 99233 PR SUBSEQUENT HOSPITAL CARE,LEVL III: ICD-10-PCS | Mod: ,,, | Performed by: NURSE PRACTITIONER

## 2022-06-02 RX ADMIN — RIVAROXABAN 15 MG: 15 TABLET, FILM COATED ORAL at 08:06

## 2022-06-02 NOTE — PLAN OF CARE
Discharge orders noted. Additional clinical references attached. Patient's discharge instructions given by bedside RN and reviewed by this VN with patient and spouse at bedside. Education provided on home care instructions, new and previous medications, diagnosis, when to seek medical attention, and follow-up appointments. New medications delivered by pharmacy. Patient verbalized understanding and teach back method was used. Patient's family to provide ride/transportation home. All questions answered. Transport to [facility] requested. Floor nurse notified.

## 2022-06-02 NOTE — ASSESSMENT & PLAN NOTE
- CTA with saddle PE with RH strain  - BNP elevated at 629 troponin elevated at 3.474 D dimer 17.25  - BLE venous ultrasound with non occlusive right popliteal DVT   - successful thrombectomy yesterday; transitioned to Xarelto; continue 15mg po BID x 21 days then 20mg po daily thereafter; OOB today and ambulate and suitable for discharge from cardiac standpoint with follow up with Cardiology and Hem Onc

## 2022-06-02 NOTE — PLAN OF CARE
SW met with pt and pts , Laurie [phone] via [employer] to discuss dc planning.     Pt confirmed information on chart. Pt reported that she resides at home with her . Pts physical  address is [address] [place] [address]. Pt is independent in ADLs. Pt has no HH/DME at home. Pt reported that upon dc her  will transport her home. Pt was made aware to contact SW with any future questions or concerns.     SW will continue to follow pt throughout her transitions of care and assist with any dc needs.     Requested Hem/Onc  Requested Cradi    Future Appointments   Date Time Provider Department Center   6/8/2022  9:00 AM Angela Green MD [facility]        06/02/22 1119   Discharge Assessment   Assessment Type Discharge Planning Assessment   Confirmed/corrected address, phone number and insurance Yes   Confirmed Demographics Correct on Facesheet   Source of Information patient   Communicated ERICK with patient/caregiver Yes   Reason For Admission Saddle embolus of pulmonary artery   Lives With spouse   Do you expect to return to your current living situation? Yes   Do you have help at home or someone to help you manage your care at home? Yes   Who are your caregiver(s) and their phone number(s)? Laurie () [phone]   Prior to hospitilization cognitive status: Alert/Oriented   Current cognitive status: Alert/Oriented   Walking or Climbing Stairs Difficulty none   Dressing/Bathing Difficulty none   Equipment Currently Used at Home none   Readmission within 30 days? No   Patient currently being followed by outpatient case management? No   Do you currently have service(s) that help you manage your care at home? No   Do you take prescription medications? Yes   Do you have prescription coverage? Yes   Coverage Medicaid   Do you have any problems affording any of your prescribed medications? No   Is the patient taking medications as prescribed? yes   Who is going to help you  get home at discharge? Laurie () [phone]   How do you get to doctors appointments? family or friend will provide   Are you on dialysis? No   Do you take coumadin? No   Discharge Plan A Home with family   DME Needed Upon Discharge  none   Discharge Plan discussed with: Patient;Spouse/sig other   Name(s) and Number(s) Laurie () [phone]   Discharge Barriers Identified None   Relationship/Environment   Name(s) of Who Lives With Patient Laurie () [phone]

## 2022-06-02 NOTE — TELEPHONE ENCOUNTER
[facility] RN met with patient and spouse [facility]ie regarding [facility] hospital follow up upon discharge. Pt agreeable to hospital follow up .RN informed pt of scheduled appointment and that appointment will also appear on d/c AVS. Patient informed to bring portable home O2 if used and all medication bottles to [facility] follow up appointment.  [facility] information handout, appointment letter and folder provided to patient. Pt states her spouse  will provide transportation to appointment.    Patient Contact info:  [phone]      Person providing transportation contact info: Laurie Benites (Spouse)   [phone] (Home Phone)    Barriers to attending PCC visit: patient lives in [place]    Future Appointments   Date Time Provider Department Center   6/8/2022  9:00 AM Angela Green MD [facility]   6/21/2022 10:00 AM Sonny Brandt MD [facility] HEM ONC Lindsay [facility]

## 2022-06-02 NOTE — PLAN OF CARE
Pt will dc with no needs noted. Pts  will transport home upon dc. Pt was made aware to check MyChart for future follow up appointments. SW will continue to follow pt throughout her transitions of care and assist with any dc needs.     Reqested Hem/Onc  Requested Cardi    Future Appointments   Date Time Provider Department Center   6/8/2022  9:00 AM Angela Green MD [facility]        06/02/22 1123   Final Note   Assessment Type Final Discharge Note   Anticipated Discharge Disposition Home   Hospital Resources/Appts/Education Provided Appointments scheduled by Navigator/Coordinator   Post-Acute Status   Discharge Delays None known at this time

## 2022-06-02 NOTE — PROGRESS NOTES
[facility]                    Pharmacy       Discharge Medication Education    Patient ACCEPTED medication education. Pharmacy has provided education on the name, indication, and possible side effects of the medication(s) prescribed, using teach-back method.     The following medications have also been discussed, during this admission.        Medication List        START taking these medications      * rivaroxaban 15 mg Tab  Commonly known as: XARELTO  Take 1 tablet (15 mg total) by mouth 2 (two) times daily with meals. for 21 days     * rivaroxaban 20 mg Tab  Commonly known as: XARELTO  Take 1 tablet (20 mg total) by mouth before dinner.  Start taking on: June 23, 2022           * This list has 2 medication(s) that are the same as other medications prescribed for you. Read the directions carefully, and ask your doctor or other care provider to review them with you.                CONTINUE taking these medications      ascorbic acid (vitamin C) 500 MG tablet  Commonly known as: VITAMIN C     hydroCHLOROthiazide 25 MG tablet  Commonly known as: HYDRODIURIL     naproxen sodium 220 MG tablet  Commonly known as: ANAPROX               Where to Get Your Medications        These medications were sent to [facility] [address] [place], [place] [address]  [facility]  [address]  [facility] [address]      Phone: [phone]   rivaroxaban 15 mg Tab  rivaroxaban 20 mg Tab          Thank you  Torsten Cramer, PharmD  [phone]

## 2022-06-02 NOTE — DISCHARGE SUMMARY
[facility] Hospital Medicine Discharge Summary    Primary Team: [facility] Hospitalist Team B  Attending Physician: MD Allan  Resident: Kylie  Intern: Tasha    Date of Admit: 5/31/2022  Date of Discharge: 6/2/2022    Discharge to: Home  Condition: Fair    Discharge Diagnoses     Patient Active Problem List   Diagnosis    Hypertension    Colon cancer screening    Screen for colon cancer    Saddle embolus of pulmonary artery    SOB (shortness of breath)    SEDRICK (acute kidney injury)    Acute deep vein thrombosis (DVT) of popliteal vein of right lower extremity       Consultants and Procedures     Consultants:  Cardiology    Procedures:   Thrombectomy    Imaging:  US Lower Extremity Veins Bilateral   Final Result   Abnormal      Findings compatible with partially occlusive thrombus involving the right popliteal vein in this patient with known pulmonary thromboembolism.      This report was flagged in Epic as abnormal.         Electronically signed by: Maury Carvalho MD   Date:    05/31/2022   Time:    22:59      CTA Chest Non-Coronary (PE Study)   Final Result   Abnormal      1. Study is positive for pulmonary emboli with saddle embolus formation and bilateral upper and lower lobe segmental pulmonary emboli.  See above comments.  Follow-up recommended.   2. Cardiomegaly with possible right heart strain.  Follow-up recommended.   3.  This report was flagged in Epic as abnormal.         Electronically signed by: Tremayne Irizarry   Date:    05/31/2022   Time:    15:38      X-Ray Chest AP Portable   Final Result      No convincing evidence of acute cardiopulmonary disease.         Electronically signed by: Addy Capone   Date:    05/31/2022   Time:    14:24            Brief History of Present Illness      Mehreen Benites is a 61 y.o. with a past medical history of hypertension who presents to [facility] ED for shortness of breath for 1 day. The patient was in her usual state of health (indepenent in all ADLs) until  the day prior to admission when she suddenly felt short of breath while taking a shower. On presentation to the ED she was tachycardiac to 106 and hypoxic to 92% which improved to 94% with 3L of oxygen by nasal cannula. The patient denies any fevers, chills, chest pain, nausea, vomiting, lower extremity swelling, weight loss, long car trips or recent acute illness. She endorses headaches last night that have since resolved and sweating at night if she does not have her air conditioner on. She took some milk of magnesia for constipation yesterday and had a small bowel movement the day of admission.     During her hospitalization, the patient underwent a CTA chest that was remarkable for pulmonary emboli with saddle embolus formation and bilateral upper and lower lobe segmental pulmonary emboli, and cardiomegaly with possible right heart strain. Later in her hospitalization, a LE US was remarkable for findings compatible with partially occlusive thrombus involving the right popliteal vein. Patient underwent TTE which showed abnormal septal wall motion, systolic and diastolic flattening of interventricular septum consistent with right ventricle pressure and volume overload, moderate right ventricular enlargement with reduced RV systolic function, moderate tricuspid regurgitation, mild pulmonic regurgitation, apical sparing suggestive RV strain from PE, and pulmonary hypertension. The patient was palced on 4 L NC due to hypoxic respiratory failure, started on a therapeutic heparin drip, and underwent mechanical thrombectomy via right femoral with cardiology. After the procedure, the patient tolerated room air, was transitioned to xarelto, and was deemed stable for discharge. The patient will followup with cardiology, heme/onc, and primary care.    Physical Exam:  General: Patient resting comfortably on room air in no acute distress  HEENT: EOM intact, mucous membranes clear and moist   Pulmonary: no resp distress, clear  to ascultation bilaterally   Cardiovascular: regular rate, reg rhythm, no murmurs,rubs or gallops  Abdomen: soft, non-tender, non distended  Skin: warm, dry, no erythema, no rashes, bandage over right femoral site, no drainage or swelling noted   Extremities: periph pulses intact, no cyanosis or edema   Neuro: a/ox4, clear speech, follows commands, no weakness or focal neurologic  deficit   Psych: mood and behavior normal     For the full HPI please refer to the History & Physical from this admission.    Hospital Course By Problem with Pertinent Findings     Saddle Pulmonary Embolism, right popliteal DVT, Unprovoked   - Acute onset shortness of breath with hypoxia and tachycardia on admission   - D-dimer elevated to 17.25, Troponin elevated to 3.474 on admission  - EKG with ventricular rate of 100 and T-wave inversion in V3   - CTA chest with pulmonary emboli with saddle embolus formation and bilateral upper and lower lobe segmental pulmonary emboli with evidence of right heart strain   - Echocardiogram with concentric remodeling and ejection fraction of 75%, abnormal septal wall motion and RV strain    - Patient denied recent long periods of immobilization, not all her cancer screening is up to date, encouraged to maintain cancer screening up to date, counseled on possibility for further testing for thrombophilia outpatient   - Aspirin loaded in the ED and started on heparin drip   - Patient underwent mechanical thrombectomy with cardiology on 6/1/22. Patient tolerated room air, and was transitioned to oral xarelto 15 mg BID for 21 days, then 20 mg QD.   - Patient will followup with cardiology, PCP.  - Will place referral to hematology/oncology for possible coagulopathy workup in setting of unprovoked PE, DVT and history of unprovoked PE in son.     Lactic Acidosis  - Sodium 140, chloride of 104 and CO2 of 15, anion gap of 21 on admission CMP  - Lactic acid on admission 6.8, increased to 8.2 now 5.0 --> 2.6  -  Administered intravenous fluids      Hypertension  - Held home HCTZ in the setting of acute pulmonary embolism      SEDRICK vs. CKD  - Creatinine on admission 1.6 with an eGFR of 35-40  - No prior labs available for review   - Improved to 1.2 prior to discharged    Discharge Medications        Medication List      START taking these medications    * rivaroxaban 15 mg Tab  Commonly known as: XARELTO  Take 1 tablet (15 mg total) by mouth 2 (two) times daily with meals. for 21 days     * rivaroxaban 20 mg Tab  Commonly known as: XARELTO  Take 1 tablet (20 mg total) by mouth before dinner.  Start taking on: June 23, 2022         * This list has 2 medication(s) that are the same as other medications prescribed for you. Read the directions carefully, and ask your doctor or other care provider to review them with you.            CONTINUE taking these medications    ascorbic acid (vitamin C) 500 MG tablet  Commonly known as: VITAMIN C     hydroCHLOROthiazide 25 MG tablet  Commonly known as: HYDRODIURIL     naproxen sodium 220 MG tablet  Commonly known as: ANAPROX           Where to Get Your Medications      These medications were [facility] to [facility] [address] [place] [address] [facility] [facility] [address]    Phone: [phone]   · rivaroxaban 15 mg Tab  · rivaroxaban 20 mg Tab           Discharge Information:   Diet:  Regular    Physical Activity:  As tolerated             Instructions:  1. Take all medications as prescribed  2. Keep all follow-up appointments  3. Return to the hospital or call your primary care physicians if any worsening symptoms such as fever, chest pain, shortness of breath, return of symptoms, or any other concerns.    Follow-Up Appointments:  Cardiology, primary care, hematology/oncology    Davey Cordova MD  [facility] Internal Medicine, [place]

## 2022-06-02 NOTE — SUBJECTIVE & OBJECTIVE
Review of Systems   Constitutional: Negative for chills, decreased appetite, diaphoresis and fever.   Cardiovascular:  Negative for chest pain, claudication, cyanosis, dyspnea on exertion, irregular heartbeat, leg swelling, near-syncope, orthopnea, palpitations, paroxysmal nocturnal dyspnea and syncope.   Respiratory:  Negative for cough, hemoptysis, shortness of breath and wheezing.    Gastrointestinal:  Negative for bloating, abdominal pain, constipation, diarrhea, melena, nausea and vomiting.   Neurological:  Negative for dizziness and weakness.   Objective:     Vital Signs (Most Recent):  Temp: 98 °F (36.7 °C) (06/02/22 0736)  Pulse: 86 (06/02/22 0736)  Resp: 18 (06/02/22 0736)  BP: 120/75 (06/02/22 0736)  SpO2: 98 % (06/02/22 0736) Vital Signs (24h Range):  Temp:  [97.3 °F (36.3 °C)-99.2 °F (37.3 °C)] 98 °F (36.7 °C)  Pulse:  [] 86  Resp:  [18-34] 18  SpO2:  [63 %-100 %] 98 %  BP: (120-157)/(75-94) 120/75     Weight: 80.7 kg (178 lb)  Body mass index is 33.63 kg/m².     SpO2: 98 %  O2 Device (Oxygen Therapy): room air      Intake/Output Summary (Last 24 hours) at 6/2/2022 0935  Last data filed at 6/1/2022 2009  Gross per 24 hour   Intake 100 ml   Output 550 ml   Net -450 ml       Lines/Drains/Airways       Peripheral Intravenous Line  Duration                  Peripheral IV - Single Lumen 05/31/22 1219 22 G Left Antecubital 1 day                    Physical Exam  Constitutional:       General: She is not in acute distress.     Appearance: She is well-developed.   Cardiovascular:      Rate and Rhythm: Normal rate and regular rhythm.      Heart sounds: No murmur heard.    No gallop.   Pulmonary:      Effort: Pulmonary effort is normal. No respiratory distress.      Breath sounds: Normal breath sounds. No wheezing.   Abdominal:      General: Bowel sounds are normal. There is no distension.      Palpations: Abdomen is soft.      Tenderness: There is no abdominal tenderness.   Skin:     General: Skin is  warm and dry.   Neurological:      Mental Status: She is alert and oriented to person, place, and time.       Significant Labs: BMP:   Recent Labs   Lab 05/31/22  1307 06/01/22  0414 06/02/22  0254   * 172* 99    139 139   K 5.0 5.3* 4.2    107 104   CO2 15* 20* 26   BUN 30* 27* 22*   CREATININE 1.6* 1.5* 1.2   CALCIUM 9.5 8.9 8.6*   MG 2.2  --   --     and CBC   Recent Labs   Lab 05/31/22  1308 05/31/22  1632 05/31/22  1634 06/01/22  0413   WBC 8.69 12.73*  --  12.23  12.23   HGB 15.9 14.7  --  13.4  13.4   HCT 48.6* 45.9   < > 40.4  40.4    189  --  193  193    < > = values in this interval not displayed.       Significant Imaging: Echocardiogram: Transthoracic echo (TTE) complete (Cupid Only):   Results for orders placed or performed during the hospital encounter of 05/31/22   Echo   Result Value Ref Range    BSA 1.86 m2    TDI SEPTAL 0.06 m/s    LV LATERAL E/E' RATIO 7.00 m/s    LV SEPTAL E/E' RATIO 8.17 m/s    LA WIDTH 2.77 cm    RV mid diameter 2.00 cm    TDI LATERAL 0.07 m/s    PV PEAK VELOCITY 0.94 cm/s    LVIDd 2.33 (A) 3.5 - 6.0 cm    IVS 1.44 (A) 0.6 - 1.1 cm    Posterior Wall 1.07 0.6 - 1.1 cm    LVIDs 1.57 (A) 2.1 - 4.0 cm    FS 33 28 - 44 %    LA volume 24.46 cm3    LV mass 84.41 g    LA size 2.43 cm    RVDD 2.96 cm    RV S' 9.25 cm/s    Left Ventricle Relative Wall Thickness 0.92 cm    AV mean gradient 4 mmHg    MV valve area p 1/2 method 3.37 cm2    E/A ratio 0.58     Mean e' 0.07 m/s    LVOT diameter 1.76 cm    LVOT area 2.4 cm2    Ao peak [place] 1.35 m/s    Ao VTI 16.29 cm    AV peak gradient 7 mmHg    PV mean gradient 1.86 mmHg    E/E' ratio 7.54 m/s    MV Peak E [place] 0.49 m/s    TR Max [place] 3.47 m/s    MV VTI 13.84 cm    MV stenosis pressure 1/2 time 65.35 ms    MV Peak A [place] 0.85 m/s    LA Volume Index 13.6 mL/m2    LV Mass Index 47 g/m2    RA Major Axis 4.59 cm    Left Atrium Minor Axis 4.59 cm    Left Atrium Major Axis 4.00 cm    Triscuspid Valve Regurgitation Peak  Gradient 48 mmHg    RA Width 3.26 cm    Right Atrial Pressure (from IVC) 8 mmHg    EF 75 %    Ao root annulus 3.00 cm    TAPSE 1.07 cm    MV mean gradient 0 mmHg    E wave deceleration time 225.34 msec    MV peak gradient 3 mmHg    TV rest pulmonary artery pressure 56 mmHg    LV Systolic Volume 6.78 mL    LV Systolic Volume Index 3.8 mL/m2    LV Diastolic Volume 18.73 mL    LV Diastolic Volume Index 10.41 mL/m2    LA Volume Index (Mod) 11.4 mL/m2    LA volume (mod) 20.58 cm3    Narrative    · The left ventricle is normal in size with concentric remodeling and   hyperdynamic systolic function.  · The estimated ejection fraction is 75%.  · There is abnormal septal wall motion. There is systolic and diastolic   flattening of the interventricular septum consistent with right ventricle   pressure and volume overload.  · Moderate right ventricular enlargement with moderately reduced right   ventricular systolic function. Apical sparing suggestive RV strain from   PE.  · Moderate tricuspid regurgitation.  · Mild pulmonic regurgitation.  · Intermediate central venous pressure (8 mmHg).  · The estimated PA systolic pressure is 56 mmHg.  · There is pulmonary hypertension.

## 2022-06-02 NOTE — PROGRESS NOTES
[place] - Telemetry  Cardiology  Progress Note    Patient Name: Mehreen Benites  MRN: [id number]  Admission Date: 5/31/2022  Hospital Length of Stay: 2 days  Code Status: Full Code   Attending Physician: Jone Farley MD   Primary Care Physician: Sabra Gupta MD  Expected Discharge Date:   Principal Problem:Saddle embolus of pulmonary artery    Subjective:     Hospital Course:   5/31/2022 Presented with SOB with progressive worsening. Troponin 3.474  D dimer 17.25 Lactic acid 6.8. HR 90s-100s SBP 100s-110s pulse ox 93-95% on 2LPM. CTA with bilateral saddle PE with RH strain. Cardiology consulted for acute PE  6/1/2022 On IV Heparin overnight. Remains on NC with stable sats. HR and BP stable. BLE venous ultrasound with non occlusive DVT to right popliteal. NPO for thrombectomy today   6/2/2022 Thrombectomy yesterday via RCFV access with significant bilateral PA thrombus noted and successful mechanical thrombectomy- see full report for details. Tolerated procedure well. Transitioned to Xarelto yesterday. OOB this AM with no issues per patient. SOB resolved           Review of Systems   Constitutional: Negative for chills, decreased appetite, diaphoresis and fever.   Cardiovascular:  Negative for chest pain, claudication, cyanosis, dyspnea on exertion, irregular heartbeat, leg swelling, near-syncope, orthopnea, palpitations, paroxysmal nocturnal dyspnea and syncope.   Respiratory:  Negative for cough, hemoptysis, shortness of breath and wheezing.    Gastrointestinal:  Negative for bloating, abdominal pain, constipation, diarrhea, melena, nausea and vomiting.   Neurological:  Negative for dizziness and weakness.   Objective:     Vital Signs (Most Recent):  Temp: 98 °F (36.7 °C) (06/02/22 0736)  Pulse: 86 (06/02/22 0736)  Resp: 18 (06/02/22 0736)  BP: 120/75 (06/02/22 0736)  SpO2: 98 % (06/02/22 0736) Vital Signs (24h Range):  Temp:  [97.3 °F (36.3 °C)-99.2 °F (37.3 °C)] 98 °F (36.7 °C)  Pulse:  []  86  Resp:  [18-34] 18  SpO2:  [63 %-100 %] 98 %  BP: (120-157)/(75-94) 120/75     Weight: 80.7 kg (178 lb)  Body mass index is 33.63 kg/m².     SpO2: 98 %  O2 Device (Oxygen Therapy): room air      Intake/Output Summary (Last 24 hours) at 6/2/2022 0935  Last data filed at 6/1/2022 2009  Gross per 24 hour   Intake 100 ml   Output 550 ml   Net -450 ml       Lines/Drains/Airways       Peripheral Intravenous Line  Duration                  Peripheral IV - Single Lumen 05/31/22 1219 22 G Left Antecubital 1 day                    Physical Exam  Constitutional:       General: She is not in acute distress.     Appearance: She is well-developed.   Cardiovascular:      Rate and Rhythm: Normal rate and regular rhythm.      Heart sounds: No murmur heard.    No gallop.   Pulmonary:      Effort: Pulmonary effort is normal. No respiratory distress.      Breath sounds: Normal breath sounds. No wheezing.   Abdominal:      General: Bowel sounds are normal. There is no distension.      Palpations: Abdomen is soft.      Tenderness: There is no abdominal tenderness.   Skin:     General: Skin is warm and dry.   Neurological:      Mental Status: She is alert and oriented to person, place, and time.       Significant Labs: BMP:   Recent Labs   Lab 05/31/22  1307 06/01/22  0414 06/02/22  0254   * 172* 99    139 139   K 5.0 5.3* 4.2    107 104   CO2 15* 20* 26   BUN 30* 27* 22*   CREATININE 1.6* 1.5* 1.2   CALCIUM 9.5 8.9 8.6*   MG 2.2  --   --     and CBC   Recent Labs   Lab 05/31/22  1308 05/31/22  1632 05/31/22  1634 06/01/22  0413   WBC 8.69 12.73*  --  12.23  12.23   HGB 15.9 14.7  --  13.4  13.4   HCT 48.6* 45.9   < > 40.4  40.4    189  --  193  193    < > = values in this interval not displayed.       Significant Imaging: Echocardiogram: Transthoracic echo (TTE) complete (Cupid Only):   Results for orders placed or performed during the hospital encounter of 05/31/22   Echo   Result Value Ref Range    BSA  1.86 m2    TDI SEPTAL 0.06 m/s    LV LATERAL E/E' RATIO 7.00 m/s    LV SEPTAL E/E' RATIO 8.17 m/s    LA WIDTH 2.77 cm    RV mid diameter 2.00 cm    TDI LATERAL 0.07 m/s    PV PEAK VELOCITY 0.94 cm/s    LVIDd 2.33 (A) 3.5 - 6.0 cm    IVS 1.44 (A) 0.6 - 1.1 cm    Posterior Wall 1.07 0.6 - 1.1 cm    LVIDs 1.57 (A) 2.1 - 4.0 cm    FS 33 28 - 44 %    LA volume 24.46 cm3    LV mass 84.41 g    LA size 2.43 cm    RVDD 2.96 cm    RV S' 9.25 cm/s    Left Ventricle Relative Wall Thickness 0.92 cm    AV mean gradient 4 mmHg    MV valve area p 1/2 method 3.37 cm2    E/A ratio 0.58     Mean e' 0.07 m/s    LVOT diameter 1.76 cm    LVOT area 2.4 cm2    Ao peak [place] 1.35 m/s    Ao VTI 16.29 cm    AV peak gradient 7 mmHg    PV mean gradient 1.86 mmHg    E/E' ratio 7.54 m/s    MV Peak E [place] 0.49 m/s    TR Max [place] 3.47 m/s    MV VTI 13.84 cm    MV stenosis pressure 1/2 time 65.35 ms    MV Peak A [place] 0.85 m/s    LA Volume Index 13.6 mL/m2    LV Mass Index 47 g/m2    RA Major Axis 4.59 cm    Left Atrium Minor Axis 4.59 cm    Left Atrium Major Axis 4.00 cm    Triscuspid Valve Regurgitation Peak Gradient 48 mmHg    RA Width 3.26 cm    Right Atrial Pressure (from IVC) 8 mmHg    EF 75 %    Ao root annulus 3.00 cm    TAPSE 1.07 cm    MV mean gradient 0 mmHg    E wave deceleration time 225.34 msec    MV peak gradient 3 mmHg    TV rest pulmonary artery pressure 56 mmHg    LV Systolic Volume 6.78 mL    LV Systolic Volume Index 3.8 mL/m2    LV Diastolic Volume 18.73 mL    LV Diastolic Volume Index 10.41 mL/m2    LA Volume Index (Mod) 11.4 mL/m2    LA volume (mod) 20.58 cm3    Narrative    · The left ventricle is normal in size with concentric remodeling and   hyperdynamic systolic function.  · The estimated ejection fraction is 75%.  · There is abnormal septal wall motion. There is systolic and diastolic   flattening of the interventricular septum consistent with right ventricle   pressure and volume overload.  · Moderate right ventricular  enlargement with moderately reduced right   ventricular systolic function. Apical sparing suggestive RV strain from   PE.  · Moderate tricuspid regurgitation.  · Mild pulmonic regurgitation.  · Intermediate central venous pressure (8 mmHg).  · The estimated PA systolic pressure is 56 mmHg.  · There is pulmonary hypertension.        Assessment and Plan:     Brief HPI: Seen this morning on AM rounds while resting in bed. Reports feeling much better and able to ambulate around room without any issues.Discussed POC as detailed below-verbalized understanding and agrees with POC     * Saddle embolus of pulmonary artery  - CTA with saddle PE with RH strain  - BNP elevated at 629 troponin elevated at 3.474 D dimer 17.25  - BLE venous ultrasound with non occlusive right popliteal DVT   - successful thrombectomy yesterday; transitioned to Xarelto; continue 15mg po BID x 21 days then 20mg po daily thereafter; OOB today and ambulate and suitable for discharge from cardiac standpoint with follow up with Cardiology and Hem Onc      Acute deep vein thrombosis (DVT) of popliteal vein of right lower extremity  - non occlusive DVT to right popliteal  - Xarelto as detailed under PE     SEDRICK (acute kidney injury)  - creatinine 1.6 yesterday; down to 1.2 this AM       SOB (shortness of breath)  - related to PE  - improved per patient  - echo with normal LVEF     Hypertension  - on HCTZ as an outpatient  - SBP 120s-150s overnight   - resume HCTZ upon discharge         VTE Risk Mitigation (From admission, onward)         Ordered     rivaroxaban tablet 20 mg  with dinner         06/01/22 1426     rivaroxaban tablet 15 mg  2 times daily with meals         06/01/22 1426     IP VTE HIGH RISK PATIENT  Once         05/31/22 1724              Plan as detailed above. Suitable for discharge from cardiac standpoint with Cardiology and Hem Onc follow up as an outpatient     LIZZY Bustamante, ANP  Cardiology  [place] - Telemetry

## 2022-06-02 NOTE — NURSING
Shift assessment complete. Pt is alert and oriented x 4. Right groin site intact and soft to touch. Old blood on gauze. Watching TV in bed. Bed in lowest position, locked, and side rails up x 3. Bed alarm on. Call light in reach.

## 2022-06-02 NOTE — NURSING
Pt is lying in bed awake AOX4 , with patent IV , tele-monitor on , and respiration unlabored on room air. Pt has gauze and clear Tegaderm to right groin with old bloody drainage, site soft upon palpation, and  pt denies any pain/numbness/tingleing  at the site.  Pedal pulses palpable , pt can wiggle toes and has sensation . Bed in the lowest position , belonging and call light in reach . Safety/ fall precautions in place . Pt  understands the plan for today and has no questions or concerns at this time.

## 2022-06-04 ENCOUNTER — NURSE TRIAGE (OUTPATIENT)
Dept: ADMINISTRATIVE | Facility: CLINIC | Age: 62
End: 2022-06-04
Payer: MEDICAID

## 2022-06-05 LAB — BACTERIA BLD CULT: NORMAL

## 2022-06-05 NOTE — TELEPHONE ENCOUNTER
Pt reports had a Thrombectomy on 6/1/22 but the dressing over her incision fell off and it was suppose to stay on until her follow up appointment on Wednesday. Pt wanting to know if she needs to put another dressing on or not, pt denies any redness, drainage, worsening pain, or fever. Call placed to on call MD, Dr. Wili Cassidy, who advised that pt may leave the dressing off for now. Pt informed and encouraged to call back with any worsening symptoms or questions. Pt verbalized understanding.    Reason for Disposition   [1] Caller has URGENT question AND [2] triager unable to answer question    Additional Information   Negative: [1] Major abdominal surgical incision AND [2] wound gaping open AND [3] visible internal organs   Negative: Sounds like a life-threatening emergency to the triager   Negative: [1] Bleeding from incision AND [2] won't stop after 10 minutes of direct pressure   Negative: [1] Widespread rash AND [2] bright red, sunburn-like   Negative: Severe pain in the incision   Negative: [1] Incision gaping open AND [2] < 48 hours since wound re-opened   Negative: [1] Incision gaping open AND [2] length of opening > 2 inches (5 cm)   Negative: Patient sounds very sick or weak to the triager   Negative: Sounds like a serious complication to the triager   Negative: Fever > 100.4 F (38.0 C)   Negative: [1] Incision looks infected (spreading redness, pain) AND [2] fever > 99.5 F (37.5 C)   Negative: [1] Incision looks infected (spreading redness, pain) AND [2] large red area (> 2 in. or 5 cm)   Negative: [1] Incision looks infected (spreading redness, pain) AND [2] face wound   Negative: [1] Red streak runs from the incision AND [2] longer than 1 inch (2.5 cm)   Negative: [1] Pus or bad-smelling fluid draining from incision AND [2] no fever   Negative: [1] Post-op pain AND [2] not controlled with pain medications   Negative: Dressing soaked with blood or body fluid (e.g., drainage)    Negative: [1] Raised bruise and [2] size > 2 inches (5 cm) and expanding    Protocols used: POST-OP INCISION SYMPTOMS AND [id number]

## 2022-06-06 ENCOUNTER — TELEPHONE (OUTPATIENT)
Dept: PRIMARY CARE CLINIC | Facility: CLINIC | Age: 62
End: 2022-06-06
Payer: MEDICAID

## 2022-06-06 LAB
POC ACTIVATED CLOTTING TIME K: 297 SEC (ref 74–137)
SAMPLE: ABNORMAL

## 2022-06-06 NOTE — TELEPHONE ENCOUNTER
Offered an earlier appointment to patient on 6/7. Pt accepted earlier appointment.       Future Appointments   Date Time Provider Department Center   6/7/2022  9:00 AM Angela Green MD [facility] IMPRI [place]   6/15/2022 10:00 AM Suzanne Morales NP [facility] CARDIO [place]   6/21/2022 10:00 AM Sonny Barndt MD [facility] HEM ONC [place]

## 2022-06-06 NOTE — PHYSICIAN QUERY
PT Name: Mehreen Benites  MR #: [id number]     DOCUMENTATION CLARIFICATION     CDS: Lenora Lockhart RN, CCDS   Contact Information: [email or web address]    This form is a permanent document in the medical record.     Query Date: June 6, 2022    By submitting this query, we are merely seeking further clarification of documentation.  Please utilize your independent clinical judgment when addressing the question(s) below.  The Medical Record contains the following   Indicators   Supporting Clinical Findings Location in Medical Record    x SOB, ALFONSO, Wheezing, Productive Cough, Use of Accessory Muscles, etc.  Pt tachypneic, tachycardic, diaphoretic upon arrival    5/31 ED, Dr. Chris malcolm RR         ABGs         O2 sat  5/31 1206: R 22, SpO2 92% 2L NC   5/31 1648: R 40, SpO2 91% no oxygen documented   6/1 1600: R 22, SpO2 63% 2L NC        05/31/22 16:34   POC PH 7.236 (L)   POC PCO2 54.7 (H)   POC PO2 19 (L)   POC BE -4   POC HCO3 23.2 (L)   POC SATURATED O2 22 (L)   POC TCO2 25   Flow 3   Sample VENOUS   DelSys Nasal Can       Flowsheets           Labs    x Hypoxia/Hypercapnia  Tachypneic and hypoxic to 91%  5/31 ED, Dr. Perez    BiPAP/Intubation /Mechanical Ventilation      x Supplemental O2  Placed on 4 L oxygen via nasal cannula with improved saturation  5/31 ED, Dr. Perez    Home O2, Oxygen Dependence      x Respiratory distress or failure  Hypoxic respiratory failure    6/2 DCS, Dr. Tasha malcolm Radiology findings  CT Chest: positive for pulmonary emboli with saddle embolus formation and bilateral upper and lower lobe segmental pulmonary emboli  5/31 Radiology     x Acute/Chronic Illness  61-year-old female with past medical history of hypertension presenting to the ED with complaint of generalized fatigue and dyspnea on exertion.  Patient states that she was in the bath last night when she felt overheated.  When she stood, she states that she felt lightheaded, weak and short of breath.  She also  reports transient crushing chest pain that lasted about 10 seconds before resolving on its own.    Bilateral pulmonary embolism  5/31 ED, Dr. Chris malcolm Treatment  Continuous Oxygen 3LPM Titrate O2 per Oxygen Titration Protocol to maintain SpO2 goal of >=90%     Pulse Oximetry Q4H  Orders    Other         The noted clinical guidelines are only system guidelines and do not replace the providers clinical judgment.    Provider, please specify the diagnosis or diagnoses associated with above clinical findings.     [ x   ]  Acute Respiratory Failure with Hypoxia - ABG pO2 < 60 mmHg or O2 sat of <91% on room air and respiratory symptoms documented   [    ]  Other Respiratory Diagnosis (please specify): _________________   [   ]  Clinically Undetermined       Please document in your progress notes daily for the duration of treatment until resolved and include in your discharge summary.     Reference:    TROY Escobedo MD. (2020, March 13). Acute respiratory distress syndrome: Clinical features, diagnosis, and complications in adults ([id number] MADHURI Milner MD & [id number] DUANE Rojas MD, Eds.). Retrieved November 13, 2020, from [email or web address]?search=ards&source=search_result&selectedTitle=1~150&usage_type=default&display_rank=1  Form No. 25132

## 2022-06-06 NOTE — PHYSICIAN QUERY
PT Name: Mehreen Benites  MR #: [id number]     DOCUMENTATION CLARIFICATION      CDS: Lenora Lockhart RN, CCDS   Contact Information: [email or web address]    This form is a permanent document in the medical record.    Query Date: June 6, 2022    By submitting this query, we are merely seeking further clarification of documentation to reflect the severity of illness of your patient. Please utilize your independent clinical judgment when addressing the question(s) below.     The Medical Record contains the following:   Indicators   Supporting Clinical Findings Location in Medical Record    x Chest Pain, Angina  Reports transient crushing chest pain that lasted about 10 seconds before resolving on its own.    Tachypneic, tachycardic, diaphoretic upon arrival  5/31 ED, Dr. Perez    Coronary Artery Disease      x EKG  5/31 1204: EKG: Sinus tachycardia, Biatrial enlargement, Anteroseptal infarct ,age undetermined     5/31 1404: EKG: Normal sinus rhythm, Biatrial enlargement    T wave abnormality, consider inferior ischemia    When compared with ECG of 31-MAY-2022 12:04,    Non-specific change in ST segment in Inferior leads    Non-specific change in ST segment in Lateral leads      EKG with ventricular rate of 100 and T-wave inversion in V3     EKG's                     5/31 H&P, Dr. Julian Espinal    x  Troponin     05/31/22 13:07 05/31/22 21:28 06/01/22 04:14   Troponin I 3.474 (H) 2.631 (H) 1.771 (H)       Labs    x Echo Results  The left ventricle is normal in size with hyperdynamic systolic function. The estimated ejection fraction is 75%. There is abnormal septal motion. There is systolic and diastolic flattening of the interventricular septum consistent with right ventricle pressure and volume overload.  5/31 Echocardiogram     Angiography       x Documentation of acute cardiac condition  Elevated troponin-most likely demand ischemia    5/31 Critical Care, Dr. Judith malcolm  Medication/Treatment  Anticoagulated.  Trend     Mechanical Thrombectomy: Images were obtained in multiple views and significant bilateral PA thrombus noted. Successful mechanical thrombectomy with Lightening 12 device with excellent results.     Placed on 4 L oxygen via nasal cannula with improved saturation     Pulse Oximetry Q4H     Start IV Heparin drip  5/31 Critical Care, Dr. Wong     6/1 Cardiology, Dr. Fiore           5/31 ED, Dr. Chris Patel     5/31 Cardiology, LIZZY Estrada / Dr. Marty malcolm Other  61-year-old female with past medical history of hypertension presenting to the ED with complaint of generalized fatigue and dyspnea on exertion.  Patient states that she was in the bath last night when she felt overheated.  When she stood, she states that she felt lightheaded, weak and short of breath.   Bilateral pulmonary embolism  5/31 ED, Dr. Perez      Provider, please clarify the diagnosis related to the above documentation:    [  x ]  Demand Ischemia   [   ]  NSTEMI/Myocardial Infarction Type 2 due to (please specify): __________   [   ]  Acute Myocardial Injury, non-ischemic due to (if known, please specify): ____________________   [   ]  Other Cardiac Diagnosis (please specify): _______________   [   ]  Clinically Undetermined     Please document in your progress notes daily for the duration of treatment until resolved, and include in your discharge summary.  Form No. [id number]

## 2022-06-07 ENCOUNTER — OFFICE VISIT (OUTPATIENT)
Dept: PRIMARY CARE CLINIC | Facility: CLINIC | Age: 62
End: 2022-06-07
Payer: MEDICAID

## 2022-06-07 VITALS
TEMPERATURE: 99 F | HEART RATE: 70 BPM | SYSTOLIC BLOOD PRESSURE: 113 MMHG | HEIGHT: 61 IN | DIASTOLIC BLOOD PRESSURE: 78 MMHG | BODY MASS INDEX: 32.8 KG/M2 | WEIGHT: 173.75 LBS | OXYGEN SATURATION: 96 %

## 2022-06-07 DIAGNOSIS — I26.02 ACUTE SADDLE PULMONARY EMBOLISM WITH ACUTE COR PULMONALE: Primary | ICD-10-CM

## 2022-06-07 DIAGNOSIS — I10 PRIMARY HYPERTENSION: ICD-10-CM

## 2022-06-07 DIAGNOSIS — I26.99 PULMONARY EMBOLISM, BILATERAL: ICD-10-CM

## 2022-06-07 DIAGNOSIS — N17.9 AKI (ACUTE KIDNEY INJURY): ICD-10-CM

## 2022-06-07 DIAGNOSIS — I82.431 ACUTE DEEP VEIN THROMBOSIS (DVT) OF RIGHT POPLITEAL VEIN: ICD-10-CM

## 2022-06-07 DIAGNOSIS — R73.03 PRE-DIABETES: ICD-10-CM

## 2022-06-07 PROBLEM — R06.02 SOB (SHORTNESS OF BREATH): Status: RESOLVED | Noted: 2022-05-31 | Resolved: 2022-06-07

## 2022-06-07 PROCEDURE — 1160F RVW MEDS BY RX/DR IN RCRD: CPT | Mod: CPTII,,, | Performed by: INTERNAL MEDICINE

## 2022-06-07 PROCEDURE — 1159F PR MEDICATION LIST DOCUMENTED IN MEDICAL RECORD: ICD-10-PCS | Mod: CPTII,,, | Performed by: INTERNAL MEDICINE

## 2022-06-07 PROCEDURE — 1111F DSCHRG MED/CURRENT MED MERGE: CPT | Mod: CPTII,,, | Performed by: INTERNAL MEDICINE

## 2022-06-07 PROCEDURE — 1160F PR REVIEW ALL MEDS BY PRESCRIBER/CLIN PHARMACIST DOCUMENTED: ICD-10-PCS | Mod: CPTII,,, | Performed by: INTERNAL MEDICINE

## 2022-06-07 PROCEDURE — 3074F SYST BP LT 130 MM HG: CPT | Mod: CPTII,,, | Performed by: INTERNAL MEDICINE

## 2022-06-07 PROCEDURE — 1111F PR DISCHARGE MEDS RECONCILED W/ CURRENT OUTPATIENT MED LIST: ICD-10-PCS | Mod: CPTII,,, | Performed by: INTERNAL MEDICINE

## 2022-06-07 PROCEDURE — 3044F PR MOST RECENT HEMOGLOBIN A1C LEVEL <7.0%: ICD-10-PCS | Mod: CPTII,,, | Performed by: INTERNAL MEDICINE

## 2022-06-07 PROCEDURE — 3044F HG A1C LEVEL LT 7.0%: CPT | Mod: CPTII,,, | Performed by: INTERNAL MEDICINE

## 2022-06-07 PROCEDURE — 3074F PR MOST RECENT SYSTOLIC BLOOD PRESSURE < 130 MM HG: ICD-10-PCS | Mod: CPTII,,, | Performed by: INTERNAL MEDICINE

## 2022-06-07 PROCEDURE — 99213 OFFICE O/P EST LOW 20 MIN: CPT | Mod: PBBFAC,PO | Performed by: INTERNAL MEDICINE

## 2022-06-07 PROCEDURE — 99999 PR PBB SHADOW E&M-EST. PATIENT-LVL III: ICD-10-PCS | Mod: PBBFAC,,, | Performed by: INTERNAL MEDICINE

## 2022-06-07 PROCEDURE — 99205 PR OFFICE/OUTPT VISIT, NEW, LEVL V, 60-74 MIN: ICD-10-PCS | Mod: S$PBB,,, | Performed by: INTERNAL MEDICINE

## 2022-06-07 PROCEDURE — 99999 PR PBB SHADOW E&M-EST. PATIENT-LVL III: CPT | Mod: PBBFAC,,, | Performed by: INTERNAL MEDICINE

## 2022-06-07 PROCEDURE — 3008F BODY MASS INDEX DOCD: CPT | Mod: CPTII,,, | Performed by: INTERNAL MEDICINE

## 2022-06-07 PROCEDURE — 1159F MED LIST DOCD IN RCRD: CPT | Mod: CPTII,,, | Performed by: INTERNAL MEDICINE

## 2022-06-07 PROCEDURE — 3008F PR BODY MASS INDEX (BMI) DOCUMENTED: ICD-10-PCS | Mod: CPTII,,, | Performed by: INTERNAL MEDICINE

## 2022-06-07 PROCEDURE — 99205 OFFICE O/P NEW HI 60 MIN: CPT | Mod: S$PBB,,, | Performed by: INTERNAL MEDICINE

## 2022-06-07 PROCEDURE — 3078F PR MOST RECENT DIASTOLIC BLOOD PRESSURE < 80 MM HG: ICD-10-PCS | Mod: CPTII,,, | Performed by: INTERNAL MEDICINE

## 2022-06-07 PROCEDURE — 3078F DIAST BP <80 MM HG: CPT | Mod: CPTII,,, | Performed by: INTERNAL MEDICINE

## 2022-06-07 RX ORDER — HYDROCHLOROTHIAZIDE 25 MG/1
25 TABLET ORAL DAILY
Qty: 90 TABLET | Refills: 3 | Status: SHIPPED | OUTPATIENT
Start: 2022-06-07 | End: 2023-07-25 | Stop reason: SDUPTHER

## 2022-06-07 NOTE — PROGRESS NOTES
[facility]   New Visit Progress Note   Recent Hospital Discharge     PRESENTING HISTORY     Chief Complaint/Reason for Admission:  Follow up Hospital Discharge   PCP: Sabra Gupta MD    History of Present Illness:  Ms. Mehreen Benites is a 61 y.o. female who was recently admitted to the hospital.    \[facility]\"" Hospital Medicine Discharge Summary  Primary Team: \[facility]\"" Hospitalist Team B  Attending Physician: MD Allan  Resident: Kylie  Intern: Tasha  Date of Admit: 5/31/2022  Date of Discharge: 6/2/2022  Discharge to: Home  Condition: Fair  ___________________________________________________________________    Today:  [facility] for [facility] follow up.  Recently hospitalized for acute bilateral pulmonary emboli with saddle embolus.  Further complicated by cardiomegaly, R ventricular strain, lactic acidosis, and SEDRICK (no baseline for comparison).   LE US with partially occlusive DVT, R popliteal vein.   She required 4 L NC for hypoxia.  Heparin infusion initiated.   Seen in consultation with Interventional Cardiology department-> underwent mechanical thrombectomy via R femoral.   Responded well to above interventions and supportive care.  Heparin infusion transitioned to Xarelto.   Weaned from NC to room air.   Patient discharged to home.     Accompanied today by her .  Ambulatory and independent with ADL's.  Reports compliance with all medication.  No previous DVT.  Family hx significant for son (40 yo) with DVT/PE and maternal first cousin (41 yo) with DVT/PE.      Review of Systems  General ROS: negative for chills, fever or weight loss  Psychological ROS: negative for hallucination, depression or suicidal ideation  Ophthalmic ROS: negative for blurry vision, photophobia or eye pain  ENT ROS: negative for epistaxis, sore throat or rhinorrhea  Respiratory ROS: no cough, shortness of breath, or wheezing  Cardiovascular ROS: no chest pain or dyspnea on  exertion  Gastrointestinal ROS: no abdominal pain, change in bowel habits, or black/ bloody stools  Genito-Urinary ROS: no dysuria, trouble voiding, or hematuria  Musculoskeletal ROS: negative for gait disturbance or muscular weakness  Neurological ROS: no syncope or seizures; no ataxia  Dermatological ROS: negative for pruritis, rash and jaundice      PAST HISTORY:     Past Medical History:   Diagnosis Date    Hypertension     Tendonitis        Past Surgical History:   Procedure Laterality Date    HEEL SPUR SURGERY Left 02/14/2013    HYSTERECTOMY  10/2009    WHITNEY    OOPHORECTOMY  10/2009    Bilateral    PERCUTANEOUS TRANSLUMINAL ANGIOPLASTY (PTA) OF PERIPHERAL VESSEL N/A 6/1/2022    Procedure: PTA, PERIPHERAL VESSEL;  Surgeon: Kendall Fiore MD;  Location: [facility] CATH LAB/EP;  Service: Cardiology;  Laterality: N/A;    THROMBECTOMY N/A 6/1/2022    Procedure: THROMBECTOMY;  Surgeon: Kendall Fiore MD;  Location: [facility] CATH LAB/EP;  Service: Cardiology;  Laterality: N/A;       Family History   Problem Relation Age of Onset    Hypertension Mother     Breast cancer Maternal Cousin 51    Breast cancer Maternal Aunt     Breast cancer Paternal Aunt     Colon cancer Neg Hx     Ovarian cancer Neg Hx          MEDICATIONS & ALLERGIES:     Current Outpatient Medications on File Prior to Visit   Medication Sig Dispense Refill    ascorbic acid, vitamin C, (VITAMIN C) 500 MG tablet Take 500 mg by mouth once daily.      hydrochlorothiazide (HYDRODIURIL) 25 MG tablet Take 25 mg by mouth once daily.        naproxen sodium (ANAPROX) 220 MG tablet Take 220 mg by mouth 2 (two) times daily as needed.      rivaroxaban (XARELTO) 15 mg Tab Take 1 tablet (15 mg total) by mouth daily with dinner or evening meal. Please follow dosage instructions given by physician. 42 tablet 0    [START ON 6/23/2022] rivaroxaban (XARELTO) 20 mg Tab Take 1 tablet (20 mg total) by mouth before dinner. 90 tablet 0     No current  "facility-administered medications on file prior to visit.        Review of patient's allergies indicates:  No Known Allergies    OBJECTIVE:     Vital Signs:  /78 (BP Location: Left arm, Patient Position: Sitting)   Pulse 70   Temp 98.8 °F (37.1 °C) (Oral)   Ht 5' 1" (1.549 m)   Wt 78.8 kg (173 lb 11.6 oz)   LMP  (LMP Unknown)   SpO2 96%   BMI 32.82 kg/m²   Wt Readings from Last 3 Encounters:   05/31/22 1600 80.7 kg (178 lb)   05/31/22 1206 80.7 kg (178 lb)   07/21/21 1352 81.6 kg (180 lb)   07/15/20 1014 83.1 kg (183 lb 3.2 oz)     Body mass index is 32.82 kg/m².        Physical Exam:  /78 (BP Location: Left arm, Patient Position: Sitting)   Pulse 70   Temp 98.8 °F (37.1 °C) (Oral)   Ht 5' 1" (1.549 m)   Wt 78.8 kg (173 lb 11.6 oz)   LMP  (LMP Unknown)   SpO2 96%   BMI 32.82 kg/m²   General appearance: alert, cooperative, no distress  Constitutional:Oriented to person, place, and time  + appears well-developed and well-nourished.   HEENT: Normocephalic, atraumatic, neck symmetrical, no nasal discharge   Eyes: conjunctivae/corneas clear, PERRL, EOM's intact  Lungs: clear to auscultation bilaterally, no dullness to percussion bilaterally  Heart: regular rate and rhythm without rub; no displacement of the PMI   Abdomen: soft, non-tender; bowel sounds normoactive; no organomegaly  Extremities: extremities symmetric; no clubbing, cyanosis, or edema, no calf tenderness  Integument: Skin color, texture, turgor normal; no rashes; hair distrubution normal  Neurologic: Alert and oriented X 3, normal strength, normal coordination and gait  Psychiatric: no pressured speech; normal affect; no evidence of impaired cognition     Laboratory  Lab Results   Component Value Date    WBC 12.23 06/01/2022    WBC 12.23 06/01/2022    HGB 13.4 06/01/2022    HGB 13.4 06/01/2022    HCT 40.4 06/01/2022    HCT 40.4 06/01/2022    MCV 89 06/01/2022    MCV 89 06/01/2022     06/01/2022     06/01/2022 "     BMP  Lab Results   Component Value Date     06/02/2022    K 4.2 06/02/2022     06/02/2022    CO2 26 06/02/2022    BUN 22 (H) 06/02/2022    CREATININE 1.2 06/02/2022    CALCIUM 8.6 (L) 06/02/2022    ANIONGAP 9 06/02/2022    ESTGFRAFRICA 57 (A) 06/02/2022    EGFRNONAA 50 (A) 06/02/2022     Lab Results   Component Value Date    ALT 68 (H) 06/02/2022    AST 42 (H) 06/02/2022    ALKPHOS 57 06/02/2022    BILITOT 0.8 06/02/2022     Lab Results   Component Value Date    INR 1.1 05/31/2022    INR 1.1 05/31/2022    INR 1.1 05/31/2022     Lab Results   Component Value Date    HGBA1C 6.4 (H) 06/01/2022       Diagnostic Results:    2 D echo 5/31/22:  · The left ventricle is normal in size with concentric remodeling and hyperdynamic systolic function.  · The estimated ejection fraction is 75%.  · There is abnormal septal wall motion. There is systolic and diastolic flattening of the interventricular septum consistent with right ventricle pressure and volume overload.  · Moderate right ventricular enlargement with moderately reduced right ventricular systolic function. Apical sparing suggestive RV strain from PE.  · Moderate tricuspid regurgitation.  · Mild pulmonic regurgitation.  · Intermediate central venous pressure (8 mmHg).  · The estimated PA systolic pressure is 56 mmHg.  · There is pulmonary hypertension.    CTA Chest, Non coronary 5/31/22:  1. Study is positive for pulmonary emboli with saddle embolus formation and bilateral upper and lower lobe segmental pulmonary emboli.  See above comments.  Follow-up recommended.  2. Cardiomegaly with possible right heart strain.  Follow-up recommended.  3.  This report was flagged in Epic as abnormal.     Ultrasound Bilateral LE 5/31/22:  Findings compatible with partially occlusive thrombus involving the right popliteal vein in this patient with known pulmonary thromboembolism.       ASSESSMENT & PLAN:         Acute saddle pulmonary embolism with acute cor  pulmonale  Pulmonary embolism, bilateral  Acute deep vein thrombosis (DVT) of right popliteal vein  - on Xarelto  - s/p mechanical thrombectomy   - has Cardiology follow up 6/1/22  - has Hematology eval 6/21/22     SEDRICK (acute kidney injury)  - Cr improved to 1.2 at discharge     Primary hypertension  - at goal on current regimen   -     hydroCHLOROthiazide (HYDRODIURIL) 25 MG tablet; Take 1 tablet (25 mg total) by mouth once daily.  Dispense: 90 tablet; Refill: 3    Pre-diabetes  - HgBA1C 6.4  - counseling and education provided  - BMI 32-> weight loss encouraged     [facility] will [facility].  She will see Dr Rdz, [facility], 7/26/22 to establish new Primary Care.    Instructions for the patient:      Scheduled Follow-up :  Future Appointments   Date Time Provider Department Center   6/15/2022 10:00 AM Suzanne Morales NP [facility] CARDIO [place]   6/21/2022 10:00 AM Sonny Brandt MD [facility] HEM ONC [facility]   7/26/2022  9:20 AM Abdiel Rdz MD [facility]       Post Visit Medication List:     Medication List          Accurate as of June 7, 2022  9:57 AM. If you have any questions, ask your nurse or doctor.            CHANGE how you take these medications    * rivaroxaban 15 mg Tab  Commonly known as: XARELTO  Take 1 tablet (15 mg total) by mouth daily with dinner or evening meal. Please follow dosage instructions given by physician.  What changed: when to take this     * rivaroxaban 20 mg Tab  Commonly known as: XARELTO  Take 1 tablet (20 mg total) by mouth before dinner.  Start taking on: June 23, 2022  What changed: Another medication with the same name was changed. Make sure you understand how and when to take each.         * This list has 2 medication(s) that are the same as other medications prescribed for you. Read the directions carefully, and ask your doctor or other care provider to review them with you.            CONTINUE taking these medications     ascorbic acid (vitamin C) 500 MG tablet  Commonly known as: VITAMIN C     hydroCHLOROthiazide 25 MG tablet  Commonly known as: HYDRODIURIL  Take 1 tablet (25 mg total) by mouth once daily.     naproxen sodium 220 MG tablet  Commonly known as: ANAPROX           Where to Get Your Medications      These medications were sent to [id number] [address] [facility]  [address]  [place] [facility] [address]    Phone: [phone]   · hydroCHLOROthiazide 25 MG tablet         Signing Physician:  Angela Green MD

## 2022-06-15 ENCOUNTER — OFFICE VISIT (OUTPATIENT)
Dept: CARDIOLOGY | Facility: CLINIC | Age: 62
End: 2022-06-15
Payer: MEDICAID

## 2022-06-15 ENCOUNTER — LAB VISIT (OUTPATIENT)
Dept: LAB | Facility: HOSPITAL | Age: 62
End: 2022-06-15
Attending: NURSE PRACTITIONER
Payer: MEDICAID

## 2022-06-15 VITALS
BODY MASS INDEX: 32.51 KG/M2 | DIASTOLIC BLOOD PRESSURE: 81 MMHG | OXYGEN SATURATION: 98 % | HEART RATE: 78 BPM | WEIGHT: 172.19 LBS | SYSTOLIC BLOOD PRESSURE: 118 MMHG | HEIGHT: 61 IN

## 2022-06-15 DIAGNOSIS — I26.99 BILATERAL PULMONARY EMBOLISM: ICD-10-CM

## 2022-06-15 DIAGNOSIS — N17.9 AKI (ACUTE KIDNEY INJURY): ICD-10-CM

## 2022-06-15 DIAGNOSIS — I10 PRIMARY HYPERTENSION: ICD-10-CM

## 2022-06-15 DIAGNOSIS — N17.9 AKI (ACUTE KIDNEY INJURY): Primary | ICD-10-CM

## 2022-06-15 LAB
ANION GAP SERPL CALC-SCNC: 6 MMOL/L (ref 8–16)
CALCIUM SERPL-MCNC: 10 MG/DL (ref 8.7–10.5)
CHLORIDE SERPL-SCNC: 101 MMOL/L (ref 95–110)
CO2 SERPL-SCNC: 32 MMOL/L (ref 23–29)
CREAT SERPL-MCNC: 1.23 MG/DL (ref 0.5–1.4)
EST. GFR  (AFRICAN AMERICAN): 55.5 ML/MIN/1.73 M^2
EST. GFR  (NON AFRICAN AMERICAN): 48.1 ML/MIN/1.73 M^2
GLUCOSE SERPL-MCNC: 106 MG/DL (ref 70–110)
POTASSIUM SERPL-SCNC: 4.6 MMOL/L (ref 3.5–5.1)
SODIUM SERPL-SCNC: 139 MMOL/L (ref 136–145)
UUN UR-MCNC: 17 MG/DL (ref 7–17)

## 2022-06-15 PROCEDURE — 99213 OFFICE O/P EST LOW 20 MIN: CPT | Mod: PBBFAC,PO | Performed by: NURSE PRACTITIONER

## 2022-06-15 PROCEDURE — 3044F PR MOST RECENT HEMOGLOBIN A1C LEVEL <7.0%: ICD-10-PCS | Mod: CPTII,,, | Performed by: NURSE PRACTITIONER

## 2022-06-15 PROCEDURE — 3079F PR MOST RECENT DIASTOLIC BLOOD PRESSURE 80-89 MM HG: ICD-10-PCS | Mod: CPTII,,, | Performed by: NURSE PRACTITIONER

## 2022-06-15 PROCEDURE — 99214 PR OFFICE/OUTPT VISIT, EST, LEVL IV, 30-39 MIN: ICD-10-PCS | Mod: S$PBB,,, | Performed by: NURSE PRACTITIONER

## 2022-06-15 PROCEDURE — 99999 PR PBB SHADOW E&M-EST. PATIENT-LVL III: ICD-10-PCS | Mod: PBBFAC,,, | Performed by: NURSE PRACTITIONER

## 2022-06-15 PROCEDURE — 80048 BASIC METABOLIC PNL TOTAL CA: CPT | Mod: PO | Performed by: NURSE PRACTITIONER

## 2022-06-15 PROCEDURE — 99999 PR PBB SHADOW E&M-EST. PATIENT-LVL III: CPT | Mod: PBBFAC,,, | Performed by: NURSE PRACTITIONER

## 2022-06-15 PROCEDURE — 3074F SYST BP LT 130 MM HG: CPT | Mod: CPTII,,, | Performed by: NURSE PRACTITIONER

## 2022-06-15 PROCEDURE — 99214 OFFICE O/P EST MOD 30 MIN: CPT | Mod: S$PBB,,, | Performed by: NURSE PRACTITIONER

## 2022-06-15 PROCEDURE — 1111F PR DISCHARGE MEDS RECONCILED W/ CURRENT OUTPATIENT MED LIST: ICD-10-PCS | Mod: CPTII,,, | Performed by: NURSE PRACTITIONER

## 2022-06-15 PROCEDURE — 3008F PR BODY MASS INDEX (BMI) DOCUMENTED: ICD-10-PCS | Mod: CPTII,,, | Performed by: NURSE PRACTITIONER

## 2022-06-15 PROCEDURE — 3079F DIAST BP 80-89 MM HG: CPT | Mod: CPTII,,, | Performed by: NURSE PRACTITIONER

## 2022-06-15 PROCEDURE — 3008F BODY MASS INDEX DOCD: CPT | Mod: CPTII,,, | Performed by: NURSE PRACTITIONER

## 2022-06-15 PROCEDURE — 36415 COLL VENOUS BLD VENIPUNCTURE: CPT | Mod: PO | Performed by: NURSE PRACTITIONER

## 2022-06-15 PROCEDURE — 1160F PR REVIEW ALL MEDS BY PRESCRIBER/CLIN PHARMACIST DOCUMENTED: ICD-10-PCS | Mod: CPTII,,, | Performed by: NURSE PRACTITIONER

## 2022-06-15 PROCEDURE — 3074F PR MOST RECENT SYSTOLIC BLOOD PRESSURE < 130 MM HG: ICD-10-PCS | Mod: CPTII,,, | Performed by: NURSE PRACTITIONER

## 2022-06-15 PROCEDURE — 1159F PR MEDICATION LIST DOCUMENTED IN MEDICAL RECORD: ICD-10-PCS | Mod: CPTII,,, | Performed by: NURSE PRACTITIONER

## 2022-06-15 PROCEDURE — 1159F MED LIST DOCD IN RCRD: CPT | Mod: CPTII,,, | Performed by: NURSE PRACTITIONER

## 2022-06-15 PROCEDURE — 3044F HG A1C LEVEL LT 7.0%: CPT | Mod: CPTII,,, | Performed by: NURSE PRACTITIONER

## 2022-06-15 PROCEDURE — 1160F RVW MEDS BY RX/DR IN RCRD: CPT | Mod: CPTII,,, | Performed by: NURSE PRACTITIONER

## 2022-06-15 PROCEDURE — 1111F DSCHRG MED/CURRENT MED MERGE: CPT | Mod: CPTII,,, | Performed by: NURSE PRACTITIONER

## 2022-06-15 NOTE — PROGRESS NOTES
Cardiology Clinic note    Subjective:   Patient ID:  Mehreen Benites is a 61 y.o. female who presents for follow-up of PE with right heart strain    History:  Hypertension, pre-diabetes    Patient presented to ER May 31, 2022 with complaints of shortness of breath, weakness, diureses and overall not feeling well.  D-dimer 17.5.  Creatinine 1.6 on admission, corrected prior to discharge at 1.2.  Troponin 3.474.  Echo with EF 75%, abnormal septal wall motion, moderate right ventricle enlargement with moderately reduced right ventricular systolic function suggestive of right heart strain from PE, moderate TR, mild GA, PA pressure 56 mmHg. CTA revealed saddle PE with right heart strain. Had bilateral venous ultrasound with   partially occlusive thrombus involving the right popliteal vein in this patient with known pulmonary thromboembolism.  No recent surgeries, immobilization, long trips or previous DVT. Reported family history for son and nephew with hx DVT/PE.  Patient underwent successful mechanical thrombectomy with Lightening 12 device for bilateral GA thrombus; discharged on Xarelto.    Here for follow-up visit; reports medication compliance. Takes HCTZ for HTN; BP controlled. Reports no bruising or bleeding since starting Xarelto. Non-smoker.    Labs reviewed    She denies CP, SOB/ALFONSO, palpitations, BLE swelling, orthopnea, PND, weakness or syncope.     HPI:   Mehreen Benites  has a past medical history of Hypertension and Tendonitis.          Patient Active Problem List    Diagnosis Date Noted    Pulmonary embolism, bilateral 06/07/2022    Acute deep vein thrombosis (DVT) of right popliteal vein 06/07/2022    Pre-diabetes 06/07/2022    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    SEDRICK (acute kidney injury) 05/31/2022    Screen for colon cancer 09/19/2014    Colon cancer screening 05/23/2014    Hypertension        Patient's Medications   New Prescriptions    No medications on file   Previous  "Medications    ASCORBIC ACID, VITAMIN C, (VITAMIN C) 500 MG TABLET    Take 500 mg by mouth once daily.    HYDROCHLOROTHIAZIDE (HYDRODIURIL) 25 MG TABLET    Take 1 tablet (25 mg total) by mouth once daily.    NAPROXEN SODIUM (ANAPROX) 220 MG TABLET    Take 220 mg by mouth 2 (two) times daily as needed.    RIVAROXABAN (XARELTO) 15 MG TAB    Take 1 tablet (15 mg total) by mouth daily with dinner or evening meal. Please follow dosage instructions given by physician.    RIVAROXABAN (XARELTO) 20 MG TAB    Take 1 tablet (20 mg total) by mouth before dinner.   Modified Medications    No medications on file   Discontinued Medications    No medications on file        Review of Systems   Constitutional: Negative.   Cardiovascular: Negative.    Respiratory: Negative.    Musculoskeletal: Negative.    Neurological: Negative.    Psychiatric/Behavioral: Negative.          Objective:   Vitals  Vitals:    06/15/22 1009   BP: 118/81   Pulse: 78   SpO2: 98%   Weight: 78.1 kg (172 lb 3.2 oz)   Height: 5' 1" (1.549 m)          Physical Exam  Constitutional:       Appearance: She is obese.   Cardiovascular:      Rate and Rhythm: Normal rate and regular rhythm.      Heart sounds: Normal heart sounds.   Pulmonary:      Effort: Pulmonary effort is normal.      Breath sounds: Normal breath sounds.   Musculoskeletal:         General: Normal range of motion.   Skin:     General: Skin is warm.   Neurological:      Mental Status: She is alert and oriented to person, place, and time.   Psychiatric:         Mood and Affect: Mood normal.           Lab Results    Lab Results   Component Value Date    WBC 12.23 06/01/2022    WBC 12.23 06/01/2022    HGB 13.4 06/01/2022    HGB 13.4 06/01/2022    HCT 40.4 06/01/2022    HCT 40.4 06/01/2022    HCT 41 05/31/2022    MCV 89 06/01/2022    MCV 89 06/01/2022       Lab Results   Component Value Date     06/01/2022     06/01/2022    INR 1.1 05/31/2022       Lab Results   Component Value Date    K 4.2 " 06/02/2022    MG 2.2 05/31/2022    BUN 22 (H) 06/02/2022    CREATININE 1.2 06/02/2022       Lab Results   Component Value Date    GLU 99 06/02/2022    HGBA1C 6.4 (H) 06/01/2022       Lab Results   Component Value Date    AST 42 (H) 06/02/2022    ALT 68 (H) 06/02/2022    ALBUMIN 3.1 (L) 06/02/2022    PROT 6.5 06/02/2022       No results found for: CHOL, HDL, LDLCALC, TRIG    Lab Results   Component Value Date     (H) 05/31/2022       Cardiac Studies  Significant Imaging: Echocardiogram:   Transthoracic echo (TTE) complete ([facility] Only):   Results for orders placed or performed during the hospital encounter of 05/31/22   Echo   Result Value Ref Range    BSA 1.86 m2    TDI SEPTAL 0.06 m/s    LV LATERAL E/E' RATIO 7.00 m/s    LV SEPTAL E/E' RATIO 8.17 m/s    LA WIDTH 2.77 cm    RV mid diameter 2.00 cm    TDI LATERAL 0.07 m/s    PV PEAK VELOCITY 0.94 cm/s    LVIDd 2.33 (A) 3.5 - 6.0 cm    IVS 1.44 (A) 0.6 - 1.1 cm    Posterior Wall 1.07 0.6 - 1.1 cm    LVIDs 1.57 (A) 2.1 - 4.0 cm    FS 33 28 - 44 %    LA volume 24.46 cm3    LV mass 84.41 g    LA size 2.43 cm    RVDD 2.96 cm    RV S' 9.25 cm/s    Left Ventricle Relative Wall Thickness 0.92 cm    AV mean gradient 4 mmHg    MV valve area p 1/2 method 3.37 cm2    E/A ratio 0.58     Mean e' 0.07 m/s    LVOT diameter 1.76 cm    LVOT area 2.4 cm2    Ao peak [place] 1.35 m/s    Ao VTI 16.29 cm    AV peak gradient 7 mmHg    PV mean gradient 1.86 mmHg    E/E' ratio 7.54 m/s    MV Peak E [place] 0.49 m/s    TR Max [place] 3.47 m/s    MV VTI 13.84 cm    MV stenosis pressure 1/2 time 65.35 ms    MV Peak A [place] 0.85 m/s    LA Volume Index 13.6 mL/m2    LV Mass Index 47 g/m2    RA Major Axis 4.59 cm    Left Atrium Minor Axis 4.59 cm    Left Atrium Major Axis 4.00 cm    Triscuspid Valve Regurgitation Peak Gradient 48 mmHg    RA Width 3.26 cm    Right Atrial Pressure (from IVC) 8 mmHg    EF 75 %    Ao root annulus 3.00 cm    TAPSE 1.07 cm    MV mean gradient 0 mmHg    E wave deceleration time  225.34 msec    MV peak gradient 3 mmHg    TV rest pulmonary artery pressure 56 mmHg    LV Systolic Volume 6.78 mL    LV Systolic Volume Index 3.8 mL/m2    LV Diastolic Volume 18.73 mL    LV Diastolic Volume Index 10.41 mL/m2    LA Volume Index (Mod) 11.4 mL/m2    LA volume (mod) 20.58 cm3    Narrative    · The left ventricle is normal in size with concentric remodeling and   hyperdynamic systolic function.  · The estimated ejection fraction is 75%.  · There is abnormal septal wall motion. There is systolic and diastolic   flattening of the interventricular septum consistent with right ventricle   pressure and volume overload.  · Moderate right ventricular enlargement with moderately reduced right   ventricular systolic function. Apical sparing suggestive RV strain from   PE.  · Moderate tricuspid regurgitation.  · Mild pulmonic regurgitation.  · Intermediate central venous pressure (8 mmHg).  · The estimated PA systolic pressure is 56 mmHg.  · There is pulmonary hypertension.        ECG:  NSR    Stress Test: n/a    Assessment:     1. SEDRICK (acute kidney injury)    2. Bilateral pulmonary embolism    3. Primary hypertension        Plan:     BP at goal; continue HCTZ; creatinine stable at 1.2    Repeat echo/ bilateral venous u/s in nearest future for re-evaluation DVT/PE since initiation of Xarelto; currently asymptomatic and doing well w/out residual effects     Upcoming hematology appointment 6/21/2022; likely some genetic association to DVT/PE, given family history with son/nephew; will likely require long-term DOAC    Weightloss    Lipid panel for ASCVD risk at next visit and further discussion for management    Otherwise, continue with current medical plan and lifestyle changes.  Continue DOAC therapy      Orders Placed This Encounter   Procedures    Basic metabolic panel     Standing Status:   Future     Number of Occurrences:   1     Standing Expiration Date:   8/14/2023    Lipid Panel     Standing Status:    Future     Standing Expiration Date:   8/14/2023       Follow up in 1 month  Return sooner for concerns or questions. If symptoms persist go to the ED    She expressed verbal understanding and agreed with the plan    Thank you for the opportunity to care for this patient. Will be available for questions if needed.     Total duration of face to face visit time 30 minutes.  Total time spent counseling greater than fifty percent of total visit time.  Counseling included discussion regarding imaging findings, diagnosis, possibilities, treatment options, risks and benefits.    TIP Sahni-ANDREIA  Cardiology Clinic  [facility]

## 2022-06-21 ENCOUNTER — OFFICE VISIT (OUTPATIENT)
Dept: HEMATOLOGY/ONCOLOGY | Facility: CLINIC | Age: 62
End: 2022-06-21
Payer: MEDICAID

## 2022-06-21 VITALS
WEIGHT: 172.38 LBS | DIASTOLIC BLOOD PRESSURE: 94 MMHG | OXYGEN SATURATION: 100 % | HEART RATE: 64 BPM | RESPIRATION RATE: 18 BRPM | SYSTOLIC BLOOD PRESSURE: 140 MMHG | BODY MASS INDEX: 32.57 KG/M2

## 2022-06-21 DIAGNOSIS — I26.99 PULMONARY EMBOLISM, BILATERAL: ICD-10-CM

## 2022-06-21 DIAGNOSIS — N17.9 AKI (ACUTE KIDNEY INJURY): Primary | ICD-10-CM

## 2022-06-21 DIAGNOSIS — I82.431 ACUTE DEEP VEIN THROMBOSIS (DVT) OF RIGHT POPLITEAL VEIN: ICD-10-CM

## 2022-06-21 DIAGNOSIS — I26.02 ACUTE SADDLE PULMONARY EMBOLISM WITH ACUTE COR PULMONALE: ICD-10-CM

## 2022-06-21 PROCEDURE — 3077F PR MOST RECENT SYSTOLIC BLOOD PRESSURE >= 140 MM HG: ICD-10-PCS | Mod: CPTII,,, | Performed by: INTERNAL MEDICINE

## 2022-06-21 PROCEDURE — 3008F BODY MASS INDEX DOCD: CPT | Mod: CPTII,,, | Performed by: INTERNAL MEDICINE

## 2022-06-21 PROCEDURE — 99204 PR OFFICE/OUTPT VISIT, NEW, LEVL IV, 45-59 MIN: ICD-10-PCS | Mod: S$PBB,,, | Performed by: INTERNAL MEDICINE

## 2022-06-21 PROCEDURE — 1111F PR DISCHARGE MEDS RECONCILED W/ CURRENT OUTPATIENT MED LIST: ICD-10-PCS | Mod: CPTII,,, | Performed by: INTERNAL MEDICINE

## 2022-06-21 PROCEDURE — 3077F SYST BP >= 140 MM HG: CPT | Mod: CPTII,,, | Performed by: INTERNAL MEDICINE

## 2022-06-21 PROCEDURE — 3080F PR MOST RECENT DIASTOLIC BLOOD PRESSURE >= 90 MM HG: ICD-10-PCS | Mod: CPTII,,, | Performed by: INTERNAL MEDICINE

## 2022-06-21 PROCEDURE — 1160F PR REVIEW ALL MEDS BY PRESCRIBER/CLIN PHARMACIST DOCUMENTED: ICD-10-PCS | Mod: CPTII,,, | Performed by: INTERNAL MEDICINE

## 2022-06-21 PROCEDURE — 3044F PR MOST RECENT HEMOGLOBIN A1C LEVEL <7.0%: ICD-10-PCS | Mod: CPTII,,, | Performed by: INTERNAL MEDICINE

## 2022-06-21 PROCEDURE — 99204 OFFICE O/P NEW MOD 45 MIN: CPT | Mod: S$PBB,,, | Performed by: INTERNAL MEDICINE

## 2022-06-21 PROCEDURE — 1111F DSCHRG MED/CURRENT MED MERGE: CPT | Mod: CPTII,,, | Performed by: INTERNAL MEDICINE

## 2022-06-21 PROCEDURE — 99999 PR PBB SHADOW E&M-EST. PATIENT-LVL III: ICD-10-PCS | Mod: PBBFAC,,, | Performed by: INTERNAL MEDICINE

## 2022-06-21 PROCEDURE — 99999 PR PBB SHADOW E&M-EST. PATIENT-LVL III: CPT | Mod: PBBFAC,,, | Performed by: INTERNAL MEDICINE

## 2022-06-21 PROCEDURE — 1159F MED LIST DOCD IN RCRD: CPT | Mod: CPTII,,, | Performed by: INTERNAL MEDICINE

## 2022-06-21 PROCEDURE — 1159F PR MEDICATION LIST DOCUMENTED IN MEDICAL RECORD: ICD-10-PCS | Mod: CPTII,,, | Performed by: INTERNAL MEDICINE

## 2022-06-21 PROCEDURE — 1160F RVW MEDS BY RX/DR IN RCRD: CPT | Mod: CPTII,,, | Performed by: INTERNAL MEDICINE

## 2022-06-21 PROCEDURE — 3008F PR BODY MASS INDEX (BMI) DOCUMENTED: ICD-10-PCS | Mod: CPTII,,, | Performed by: INTERNAL MEDICINE

## 2022-06-21 PROCEDURE — 3080F DIAST BP >= 90 MM HG: CPT | Mod: CPTII,,, | Performed by: INTERNAL MEDICINE

## 2022-06-21 PROCEDURE — 99213 OFFICE O/P EST LOW 20 MIN: CPT | Mod: PBBFAC,PO | Performed by: INTERNAL MEDICINE

## 2022-06-21 PROCEDURE — 3044F HG A1C LEVEL LT 7.0%: CPT | Mod: CPTII,,, | Performed by: INTERNAL MEDICINE

## 2022-06-21 NOTE — PROGRESS NOTES
PATIENT: Mehreen Benites  MRN: [id number]  DATE: 6/21/2022    Chief Complaint: PE, DVT    Subjective:     History of Present Illness:     She presented to the ED 05/31/2022 with sudden onset lightheadedness, weakness and dyspnea - found to have massive saddle pulmonary embolism with right heart strain and partially occlusive thrombus in the right popliteal vein.  No recent surgeries, immobilizations, long trips or prior history of DVT.    She underwent mechanical thrombectomy because of massive PE causing right heart strain.    Family hx significant for son (40 yo) with DVT/PE and maternal first cousin (39 yo) with DVT/PE.    Past Medical, Surgical, Family and Social History Reviewed.    Medications and Allergies reviewed    Review of Systems   Constitutional: Positive for fatigue. Negative for fever and unexpected weight change.   All other systems reviewed and are negative.      Objective:     Vitals:    06/21/22 1012   BP: (!) 140/94   BP Location: Left arm   Patient Position: Sitting   Pulse: 64   Resp: 18   SpO2: 100%   Weight: 78.2 kg (172 lb 6.4 oz)       BMI: Body mass index is 32.57 kg/m².    Physical Exam  Vitals and nursing note reviewed.   Constitutional:       General: She is not in acute distress.  Pulmonary:      Effort: Pulmonary effort is normal.      Breath sounds: Normal breath sounds.   Neurological:      Mental Status: She is alert. Mental status is at baseline.         Assessment:       1. SEDRICK (acute kidney injury)    2. Acute saddle pulmonary embolism with acute cor pulmonale    3. Pulmonary embolism, bilateral    4. Acute deep vein thrombosis (DVT) of right popliteal vein        Plan:   Massive unprovoked pulmonary embolism  -1st episode, unprovoked, required mechanical thrombectomy  -positive family history blood clots  -she clearly has a hypercoagulable state based on her clinical presentation  -no need hypercoagulable workup  -continue Xarelto at full dose  -indefinite anticoagulation  needed  -will see her for follow-up in 2 months with labs  -will subsequently repeat CTA chest and ultrasound of the lower legs

## 2022-07-11 NOTE — PROGRESS NOTES
.  Subjective:   @Patient ID:  Mehreen Benites is a 61 y.o. female who presents for follow-up of PE/DVT       HPI:   Here for f/u   Unprovoked PE and DVT. S/P PE thrombectomy 6/1/2022  Seen by hematology team. Recommend full anticoagulation indefinitely     She is doing well  BP is a little elevated  Will get Colonoscopy and mammogram setup in 9/2022  Will get lipid panel with PCP soon        Prior cardiovascular  Hx  --------------------------------  - Thrombectomy PE 6/1/2022  · Saddle PE with sub massive criteria and evidence of right heart strain  · Successful mechanical thrombectomy with Lightning 12 device           - ECHO 5/31/2022  · The left ventricle is normal in size with concentric remodeling and hyperdynamic systolic function.  · The estimated ejection fraction is 75%.  · There is abnormal septal wall motion. There is systolic and diastolic flattening of the interventricular septum consistent with right ventricle pressure and volume overload.  · Moderate right ventricular enlargement with moderately reduced right ventricular systolic function. Apical sparing suggestive RV strain from PE.  · Moderate tricuspid regurgitation.  · Mild pulmonic regurgitation.  · Intermediate central venous pressure (8 mmHg).  · The estimated PA systolic pressure is 56 mmHg.  · There is pulmonary hypertension.     EKG 5/31/2022 Sinus tachycardia, S1, Q3, T3 pattern            Patient Active Problem List    Diagnosis Date Noted    Pulmonary embolism, bilateral 06/07/2022    Acute deep vein thrombosis (DVT) of right popliteal vein 06/07/2022    Pre-diabetes 06/07/2022    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    SEDRICK (acute kidney injury) 05/31/2022    Screen for colon cancer 09/19/2014    Colon cancer screening 05/23/2014    Hypertension                     LAST HbA1c  Lab Results   Component Value Date    HGBA1C 6.4 (H) 06/01/2022       Lipid panel  No results found for: CHOL  No results found for: HDL  No  results found for: LDLCALC  No results found for: TRIG  No results found for: CHOLHDL         Review of Systems   Constitutional: Negative for chills and fever.   HENT: Negative for hearing loss and nosebleeds.    Eyes: Negative for blurred vision.   Cardiovascular: Negative for chest pain, leg swelling and palpitations.   Respiratory: Negative for hemoptysis and shortness of breath.    Hematologic/Lymphatic: Negative for bleeding problem.   Skin: Negative for itching.   Musculoskeletal: Negative for falls.   Gastrointestinal: Negative for abdominal pain and hematochezia.   Genitourinary: Negative for hematuria.   Neurological: Negative for dizziness and loss of balance.   Psychiatric/Behavioral: Negative for altered mental status and depression.       Objective:   Physical Exam  Constitutional:       Appearance: She is well-developed.   HENT:      Head: Normocephalic and atraumatic.   Eyes:      Conjunctiva/sclera: Conjunctivae normal.   Neck:      Vascular: No carotid bruit or JVD.   Cardiovascular:      Rate and Rhythm: Normal rate and regular rhythm.      Pulses:           Carotid pulses are 2+ on the right side and 2+ on the left side.       Radial pulses are 2+ on the right side and 2+ on the left side.      Heart sounds: Normal heart sounds. No murmur heard.    No friction rub. No gallop.   Pulmonary:      Effort: Pulmonary effort is normal. No respiratory distress.      Breath sounds: Normal breath sounds. No stridor. No wheezing.   Musculoskeletal:      Cervical back: Neck supple.   Skin:     General: Skin is warm and dry.   Neurological:      Mental Status: She is alert and oriented to person, place, and time.   Psychiatric:         Behavior: Behavior normal.         Assessment:     1. Pulmonary embolism, bilateral    2. Acute deep vein thrombosis (DVT) of right popliteal vein    3. Primary hypertension        Plan:   Lifelong anticoagulation  Will add Losartan 25 mg daily   Repeat Echo after 6 months      I spent 5-10 minutes asking, assessing, assisting, arranging and advising heart healthy diet improvements. This included low-salt meals, portion control and health food alternatives. I also encourage 30 minutes of moderate exercise 3-4x a week.     Pertinent cardiac images and EKG reviewed independently.    Continue with current medical plan and lifestyle changes.  Return sooner for concerns or questions. If symptoms persist go to the ED  I have reviewed all pertinent data including patient's medical history in detail and updated the computerized patient record.     No orders of the defined types were placed in this encounter.      Follow up as scheduled.     She expressed verbal understanding and agreed with the plan    Patient's Medications   New Prescriptions    LOSARTAN (COZAAR) 25 MG TABLET    Take 1 tablet (25 mg total) by mouth once daily.   Previous Medications    ASCORBIC ACID, VITAMIN C, (VITAMIN C) 500 MG TABLET    Take 500 mg by mouth once daily.    HYDROCHLOROTHIAZIDE (HYDRODIURIL) 25 MG TABLET    Take 1 tablet (25 mg total) by mouth once daily.    NAPROXEN SODIUM (ANAPROX) 220 MG TABLET    Take 220 mg by mouth 2 (two) times daily as needed.    RIVAROXABAN (XARELTO) 20 MG TAB    Take 1 tablet (20 mg total) by mouth before dinner.   Modified Medications    No medications on file   Discontinued Medications    RIVAROXABAN (XARELTO) 15 MG TAB    Take 1 tablet (15 mg total) by mouth daily with dinner or evening meal. Please follow dosage instructions given by physician.

## 2022-07-13 ENCOUNTER — OFFICE VISIT (OUTPATIENT)
Dept: CARDIOLOGY | Facility: CLINIC | Age: 62
End: 2022-07-13
Payer: MEDICAID

## 2022-07-13 VITALS
HEIGHT: 61 IN | WEIGHT: 174.38 LBS | DIASTOLIC BLOOD PRESSURE: 92 MMHG | SYSTOLIC BLOOD PRESSURE: 135 MMHG | HEART RATE: 88 BPM | OXYGEN SATURATION: 100 % | BODY MASS INDEX: 32.92 KG/M2

## 2022-07-13 DIAGNOSIS — I26.99 PULMONARY EMBOLISM, BILATERAL: Primary | ICD-10-CM

## 2022-07-13 DIAGNOSIS — I10 PRIMARY HYPERTENSION: ICD-10-CM

## 2022-07-13 DIAGNOSIS — I82.431 ACUTE DEEP VEIN THROMBOSIS (DVT) OF RIGHT POPLITEAL VEIN: ICD-10-CM

## 2022-07-13 PROCEDURE — 1159F MED LIST DOCD IN RCRD: CPT | Mod: CPTII,S$GLB,, | Performed by: INTERNAL MEDICINE

## 2022-07-13 PROCEDURE — 3008F PR BODY MASS INDEX (BMI) DOCUMENTED: ICD-10-PCS | Mod: CPTII,S$GLB,, | Performed by: INTERNAL MEDICINE

## 2022-07-13 PROCEDURE — 3044F HG A1C LEVEL LT 7.0%: CPT | Mod: CPTII,S$GLB,, | Performed by: INTERNAL MEDICINE

## 2022-07-13 PROCEDURE — 99214 OFFICE O/P EST MOD 30 MIN: CPT | Mod: S$GLB,,, | Performed by: INTERNAL MEDICINE

## 2022-07-13 PROCEDURE — 3080F DIAST BP >= 90 MM HG: CPT | Mod: CPTII,S$GLB,, | Performed by: INTERNAL MEDICINE

## 2022-07-13 PROCEDURE — 3044F PR MOST RECENT HEMOGLOBIN A1C LEVEL <7.0%: ICD-10-PCS | Mod: CPTII,S$GLB,, | Performed by: INTERNAL MEDICINE

## 2022-07-13 PROCEDURE — 1159F PR MEDICATION LIST DOCUMENTED IN MEDICAL RECORD: ICD-10-PCS | Mod: CPTII,S$GLB,, | Performed by: INTERNAL MEDICINE

## 2022-07-13 PROCEDURE — 3075F PR MOST RECENT SYSTOLIC BLOOD PRESS GE 130-139MM HG: ICD-10-PCS | Mod: CPTII,S$GLB,, | Performed by: INTERNAL MEDICINE

## 2022-07-13 PROCEDURE — 3080F PR MOST RECENT DIASTOLIC BLOOD PRESSURE >= 90 MM HG: ICD-10-PCS | Mod: CPTII,S$GLB,, | Performed by: INTERNAL MEDICINE

## 2022-07-13 PROCEDURE — 3008F BODY MASS INDEX DOCD: CPT | Mod: CPTII,S$GLB,, | Performed by: INTERNAL MEDICINE

## 2022-07-13 PROCEDURE — 99214 PR OFFICE/OUTPT VISIT, EST, LEVL IV, 30-39 MIN: ICD-10-PCS | Mod: S$GLB,,, | Performed by: INTERNAL MEDICINE

## 2022-07-13 PROCEDURE — 3075F SYST BP GE 130 - 139MM HG: CPT | Mod: CPTII,S$GLB,, | Performed by: INTERNAL MEDICINE

## 2022-07-13 RX ORDER — LOSARTAN POTASSIUM 25 MG/1
25 TABLET ORAL DAILY
Qty: 90 TABLET | Refills: 3 | Status: SHIPPED | OUTPATIENT
Start: 2022-07-13 | End: 2023-10-23 | Stop reason: SDUPTHER

## 2022-07-26 ENCOUNTER — OFFICE VISIT (OUTPATIENT)
Dept: FAMILY MEDICINE | Facility: HOSPITAL | Age: 62
End: 2022-07-26
Payer: MEDICAID

## 2022-07-26 VITALS
SYSTOLIC BLOOD PRESSURE: 128 MMHG | WEIGHT: 177 LBS | HEART RATE: 64 BPM | HEIGHT: 61 IN | BODY MASS INDEX: 33.42 KG/M2 | DIASTOLIC BLOOD PRESSURE: 95 MMHG

## 2022-07-26 DIAGNOSIS — Z11.59 NEED FOR HEPATITIS C SCREENING TEST: ICD-10-CM

## 2022-07-26 DIAGNOSIS — R73.03 PRE-DIABETES: ICD-10-CM

## 2022-07-26 DIAGNOSIS — Z12.31 ENCOUNTER FOR SCREENING MAMMOGRAM FOR MALIGNANT NEOPLASM OF BREAST: Primary | ICD-10-CM

## 2022-07-26 PROCEDURE — 99213 OFFICE O/P EST LOW 20 MIN: CPT | Performed by: STUDENT IN AN ORGANIZED HEALTH CARE EDUCATION/TRAINING PROGRAM

## 2022-07-26 NOTE — PROGRESS NOTES
[facility]  History & Physical    SUBJECTIVE:     Chief Complaint:   Chief Complaint   Patient presents with    Establish Care    Knee Pain     Right knee x 2 days       History of Present Illness:  61 y.o. female who  has a past medical history of Hypertension and Tendonitis. presents to clinic today for follow-up related to recent saddle Pulmonary embolism and to establish [facility]. Patient reports mild right knee pain that occurred last week and lasted 2 days before resolving. Patient reports no current issues. Patient denies shortness of breath, chest pain, changes in vision, and headache.       Allergies:  Review of patient's allergies indicates:  No Known Allergies    Home Medications:  Current Outpatient Medications on File Prior to Visit   Medication Sig    ascorbic acid, vitamin C, (VITAMIN C) 500 MG tablet Take 500 mg by mouth once daily.    hydroCHLOROthiazide (HYDRODIURIL) 25 MG tablet Take 1 tablet (25 mg total) by mouth once daily.    losartan (COZAAR) 25 MG tablet Take 1 tablet (25 mg total) by mouth once daily.    rivaroxaban (XARELTO) 20 mg Tab Take 1 tablet (20 mg total) by mouth before dinner.    naproxen sodium (ANAPROX) 220 MG tablet Take 220 mg by mouth 2 (two) times daily as needed.     No current facility-administered medications on file prior to visit.       Past Medical History:   Diagnosis Date    Hypertension     Tendonitis      Past Surgical History:   Procedure Laterality Date    HEEL SPUR SURGERY Left 02/14/2013    HYSTERECTOMY  10/2009    WHITNEY    OOPHORECTOMY  10/2009    Bilateral    THROMBECTOMY N/A 6/1/2022    Procedure: THROMBECTOMY;  Surgeon: Kendall Fiore MD;  Location: [facility] CATH LAB/EP;  Service: Cardiology;  Laterality: N/A;     Family History   Problem Relation Age of Onset    Hypertension Mother     Breast cancer Maternal Cousin 51    Breast cancer Maternal Aunt     Breast cancer Paternal Aunt     Colon cancer Neg Hx     Ovarian cancer Neg Hx       Social History     Tobacco Use    Smoking status: Never Smoker    Smokeless tobacco: Never Used   Substance Use Topics    Alcohol use: No    Drug use: No        Review of Systems   Constitutional: Negative for fever and weight loss.   HENT: Negative for hearing loss and sore throat.    Eyes: Negative for blurred vision.   Respiratory: Negative for cough, shortness of breath and wheezing.    Cardiovascular: Negative for chest pain and leg swelling.   Gastrointestinal: Negative for heartburn, nausea and vomiting.   Genitourinary: Negative for dysuria.   Musculoskeletal: Negative for back pain, joint pain and myalgias.   Neurological: Negative for dizziness, weakness and headaches.        OBJECTIVE:     Vital Signs:  Pulse: 64 (07/26/22 0904)  BP: (!) 128/95 (07/26/22 0904)    Physical Exam  HENT:      Head: Normocephalic.      Right Ear: External ear normal.      Left Ear: External ear normal.      Nose: Nose normal.      Mouth/Throat:      Mouth: Mucous membranes are moist.   Eyes:      Extraocular Movements: Extraocular movements intact.      Pupils: Pupils are equal, round, and reactive to light.   Cardiovascular:      Rate and Rhythm: Normal rate.      Pulses: Normal pulses.   Pulmonary:      Effort: Pulmonary effort is normal.   Abdominal:      General: Abdomen is flat.   Musculoskeletal:         General: Tenderness present. Normal range of motion.      Cervical back: Normal range of motion.      Comments: Tenderness of the right knee inferior to patella when knee is fully extended   Skin:     General: Skin is warm.      Capillary Refill: Capillary refill takes less than 2 seconds.   Neurological:      General: No focal deficit present.      Mental Status: She is alert and oriented to person, place, and time.   Psychiatric:         Mood and Affect: Mood normal.         Laboratory:  Hemoglobin A1C   Date Value Ref Range Status   06/01/2022 6.4 (H) 4.0 - 5.6 % Final     Comment:     ADA Screening  Guidelines:  5.7-6.4%  Consistent with prediabetes  >or=6.5%  Consistent with diabetes    High levels of fetal hemoglobin interfere with the HbA1C  assay. Heterozygous hemoglobin variants (HbS, HgC, etc)do  not significantly interfere with this assay.   However, presence of multiple variants may affect accuracy.         A/P:  Mehreen was seen today for establish care, pre-diabetes, and knee pain. Discussed diet changes, preventive health orders placed.    Diagnoses and all orders for this visit:    Encounter for screening mammogram for malignant neoplasm of breast  -     Mammo Digital Screening Bilat; Future    Need for hepatitis C screening test  -     Hepatitis C Antibody; Future    Pre-diabetes     - Advised dietary changes including reduced sugars and carbohydrates   - Advised increased exercise regimen with a goal of both moderate weight loss as well as muscle gain   - Will order A1c at 3 month follow-up to assess for efficacy of lifestyle changes    Follow up in about 3 months (around 10/26/2022).      Abdiel Rdz MD  [facility] PGY-2  07/26/2022

## 2022-08-05 ENCOUNTER — LAB VISIT (OUTPATIENT)
Dept: LAB | Facility: HOSPITAL | Age: 62
End: 2022-08-05
Attending: INTERNAL MEDICINE
Payer: MEDICAID

## 2022-08-05 DIAGNOSIS — I26.02 ACUTE SADDLE PULMONARY EMBOLISM WITH ACUTE COR PULMONALE: ICD-10-CM

## 2022-08-05 LAB
ALBUMIN SERPL BCP-MCNC: 4.4 G/DL (ref 3.5–5.2)
ALP SERPL-CCNC: 74 U/L (ref 38–126)
ALT SERPL W/O P-5'-P-CCNC: 29 U/L (ref 10–44)
ANION GAP SERPL CALC-SCNC: 7 MMOL/L (ref 8–16)
AST SERPL-CCNC: 30 U/L (ref 15–46)
BASOPHILS # BLD AUTO: 0.06 K/UL (ref 0–0.2)
BASOPHILS NFR BLD: 0.8 % (ref 0–1.9)
BILIRUB SERPL-MCNC: 0.8 MG/DL (ref 0.1–1)
CALCIUM SERPL-MCNC: 9.2 MG/DL (ref 8.7–10.5)
CHLORIDE SERPL-SCNC: 99 MMOL/L (ref 95–110)
CO2 SERPL-SCNC: 32 MMOL/L (ref 23–29)
CREAT SERPL-MCNC: 1.25 MG/DL (ref 0.5–1.4)
D DIMER PPP IA.FEU-MCNC: <0.19 MG/L FEU
DIFFERENTIAL METHOD: NORMAL
EOSINOPHIL # BLD AUTO: 0.2 K/UL (ref 0–0.5)
EOSINOPHIL NFR BLD: 2.3 % (ref 0–8)
ERYTHROCYTE [DISTWIDTH] IN BLOOD BY AUTOMATED COUNT: 12 % (ref 11.5–14.5)
EST. GFR  (NO RACE VARIABLE): 49.7 ML/MIN/1.73 M^2
GLUCOSE SERPL-MCNC: 128 MG/DL (ref 70–110)
HCT VFR BLD AUTO: 40.6 % (ref 37–48.5)
HGB BLD-MCNC: 13.3 G/DL (ref 12–16)
IMM GRANULOCYTES # BLD AUTO: 0.02 K/UL (ref 0–0.04)
IMM GRANULOCYTES NFR BLD AUTO: 0.3 % (ref 0–0.5)
LYMPHOCYTES # BLD AUTO: 1.7 K/UL (ref 1–4.8)
LYMPHOCYTES NFR BLD: 22.9 % (ref 18–48)
MCH RBC QN AUTO: 29.2 PG (ref 27–31)
MCHC RBC AUTO-ENTMCNC: 32.8 G/DL (ref 32–36)
MCV RBC AUTO: 89 FL (ref 82–98)
MONOCYTES # BLD AUTO: 0.7 K/UL (ref 0.3–1)
MONOCYTES NFR BLD: 9.6 % (ref 4–15)
NEUTROPHILS # BLD AUTO: 4.8 K/UL (ref 1.8–7.7)
NEUTROPHILS NFR BLD: 64.1 % (ref 38–73)
NRBC BLD-RTO: 0 /100 WBC
PLATELET # BLD AUTO: 337 K/UL (ref 150–450)
PMV BLD AUTO: 10.1 FL (ref 9.2–12.9)
POTASSIUM SERPL-SCNC: 4 MMOL/L (ref 3.5–5.1)
PROT SERPL-MCNC: 8.1 G/DL (ref 6–8.4)
RBC # BLD AUTO: 4.56 M/UL (ref 4–5.4)
SODIUM SERPL-SCNC: 138 MMOL/L (ref 136–145)
UUN UR-MCNC: 17 MG/DL (ref 7–17)
WBC # BLD AUTO: 7.51 K/UL (ref 3.9–12.7)

## 2022-08-05 PROCEDURE — 80053 COMPREHEN METABOLIC PANEL: CPT | Mod: PO | Performed by: INTERNAL MEDICINE

## 2022-08-05 PROCEDURE — 85025 COMPLETE CBC W/AUTO DIFF WBC: CPT | Mod: PO | Performed by: INTERNAL MEDICINE

## 2022-08-05 PROCEDURE — 85379 FIBRIN DEGRADATION QUANT: CPT | Mod: PO | Performed by: INTERNAL MEDICINE

## 2022-08-05 PROCEDURE — 36415 COLL VENOUS BLD VENIPUNCTURE: CPT | Mod: PO | Performed by: INTERNAL MEDICINE

## 2022-08-08 ENCOUNTER — OFFICE VISIT (OUTPATIENT)
Dept: HEMATOLOGY/ONCOLOGY | Facility: CLINIC | Age: 62
End: 2022-08-08
Payer: MEDICAID

## 2022-08-08 VITALS
DIASTOLIC BLOOD PRESSURE: 90 MMHG | WEIGHT: 176.38 LBS | OXYGEN SATURATION: 99 % | RESPIRATION RATE: 20 BRPM | SYSTOLIC BLOOD PRESSURE: 136 MMHG | BODY MASS INDEX: 33.3 KG/M2 | HEART RATE: 80 BPM | HEIGHT: 61 IN | TEMPERATURE: 98 F

## 2022-08-08 DIAGNOSIS — I26.02 ACUTE SADDLE PULMONARY EMBOLISM WITH ACUTE COR PULMONALE: ICD-10-CM

## 2022-08-08 DIAGNOSIS — I26.99 PULMONARY EMBOLISM, BILATERAL: Primary | ICD-10-CM

## 2022-08-08 DIAGNOSIS — I82.431 ACUTE DEEP VEIN THROMBOSIS (DVT) OF RIGHT POPLITEAL VEIN: ICD-10-CM

## 2022-08-08 PROCEDURE — 3075F SYST BP GE 130 - 139MM HG: CPT | Mod: CPTII,,, | Performed by: INTERNAL MEDICINE

## 2022-08-08 PROCEDURE — 99214 OFFICE O/P EST MOD 30 MIN: CPT | Mod: S$PBB,,, | Performed by: INTERNAL MEDICINE

## 2022-08-08 PROCEDURE — 4010F PR ACE/ARB THEARPY RXD/TAKEN: ICD-10-PCS | Mod: CPTII,,, | Performed by: INTERNAL MEDICINE

## 2022-08-08 PROCEDURE — 3008F BODY MASS INDEX DOCD: CPT | Mod: CPTII,,, | Performed by: INTERNAL MEDICINE

## 2022-08-08 PROCEDURE — 99999 PR PBB SHADOW E&M-EST. PATIENT-LVL III: ICD-10-PCS | Mod: PBBFAC,,, | Performed by: INTERNAL MEDICINE

## 2022-08-08 PROCEDURE — 99214 PR OFFICE/OUTPT VISIT, EST, LEVL IV, 30-39 MIN: ICD-10-PCS | Mod: S$PBB,,, | Performed by: INTERNAL MEDICINE

## 2022-08-08 PROCEDURE — 99999 PR PBB SHADOW E&M-EST. PATIENT-LVL III: CPT | Mod: PBBFAC,,, | Performed by: INTERNAL MEDICINE

## 2022-08-08 PROCEDURE — 3080F DIAST BP >= 90 MM HG: CPT | Mod: CPTII,,, | Performed by: INTERNAL MEDICINE

## 2022-08-08 PROCEDURE — 3008F PR BODY MASS INDEX (BMI) DOCUMENTED: ICD-10-PCS | Mod: CPTII,,, | Performed by: INTERNAL MEDICINE

## 2022-08-08 PROCEDURE — 99213 OFFICE O/P EST LOW 20 MIN: CPT | Mod: PBBFAC,PO | Performed by: INTERNAL MEDICINE

## 2022-08-08 PROCEDURE — 3044F PR MOST RECENT HEMOGLOBIN A1C LEVEL <7.0%: ICD-10-PCS | Mod: CPTII,,, | Performed by: INTERNAL MEDICINE

## 2022-08-08 PROCEDURE — 1159F MED LIST DOCD IN RCRD: CPT | Mod: CPTII,,, | Performed by: INTERNAL MEDICINE

## 2022-08-08 PROCEDURE — 1160F RVW MEDS BY RX/DR IN RCRD: CPT | Mod: CPTII,,, | Performed by: INTERNAL MEDICINE

## 2022-08-08 PROCEDURE — 1159F PR MEDICATION LIST DOCUMENTED IN MEDICAL RECORD: ICD-10-PCS | Mod: CPTII,,, | Performed by: INTERNAL MEDICINE

## 2022-08-08 PROCEDURE — 3044F HG A1C LEVEL LT 7.0%: CPT | Mod: CPTII,,, | Performed by: INTERNAL MEDICINE

## 2022-08-08 PROCEDURE — 1160F PR REVIEW ALL MEDS BY PRESCRIBER/CLIN PHARMACIST DOCUMENTED: ICD-10-PCS | Mod: CPTII,,, | Performed by: INTERNAL MEDICINE

## 2022-08-08 PROCEDURE — 3075F PR MOST RECENT SYSTOLIC BLOOD PRESS GE 130-139MM HG: ICD-10-PCS | Mod: CPTII,,, | Performed by: INTERNAL MEDICINE

## 2022-08-08 PROCEDURE — 4010F ACE/ARB THERAPY RXD/TAKEN: CPT | Mod: CPTII,,, | Performed by: INTERNAL MEDICINE

## 2022-08-08 PROCEDURE — 3080F PR MOST RECENT DIASTOLIC BLOOD PRESSURE >= 90 MM HG: ICD-10-PCS | Mod: CPTII,,, | Performed by: INTERNAL MEDICINE

## 2022-08-08 NOTE — PROGRESS NOTES
"PATIENT: Mehreen Benites  MRN: [id number]  DATE: 8/8/2022    Chief Complaint: PE, DVT    Subjective:     History of Present Illness:     She presented to the ED 05/31/2022 with sudden onset lightheadedness, weakness and dyspnea - found to have massive saddle pulmonary embolism with right heart strain and partially occlusive thrombus in the right popliteal vein.  No recent surgeries, immobilizations, long trips or prior history of DVT.    She underwent mechanical thrombectomy because of massive PE causing right heart strain.    Family hx significant for son (38 yo) with DVT/PE and maternal first cousin (41 yo) with DVT/PE.    doing well on xarelto    Past Medical, Surgical, Family and Social History Reviewed.    Medications and Allergies reviewed    Review of Systems   Constitutional: Positive for fatigue. Negative for fever and unexpected weight change.   All other systems reviewed and are negative.      Objective:     Vitals:    08/08/22 0905   BP: (!) 136/90   BP Location: Right arm   Patient Position: Sitting   BP Method: Medium (Automatic)   Pulse: 80   Resp: 20   Temp: 98.4 °F (36.9 °C)   TempSrc: Oral   SpO2: 99%   Weight: 80 kg (176 lb 5.9 oz)   Height: 5' 1" (1.549 m)       BMI: Body mass index is 33.32 kg/m².    Physical Exam  Vitals and nursing note reviewed.   Constitutional:       General: She is not in acute distress.  Pulmonary:      Effort: Pulmonary effort is normal.      Breath sounds: Normal breath sounds.   Neurological:      Mental Status: She is alert. Mental status is at baseline.         Assessment:       1. Pulmonary embolism, bilateral    2. Acute saddle pulmonary embolism with acute cor pulmonale    3. Acute deep vein thrombosis (DVT) of right popliteal vein        Plan:   Massive unprovoked pulmonary embolism  -1st episode, unprovoked, required mechanical thrombectomy  -positive family history blood clots  -she clearly has a hypercoagulable state based on her clinical presentation  -no need " hypercoagulable workup  -continue Xarelto at full dose  -indefinite anticoagulation needed  -will see her for follow-up in 2 months with labs, repeat CTA chest and ultrasound of the left leg

## 2022-09-06 ENCOUNTER — HOSPITAL ENCOUNTER (OUTPATIENT)
Dept: RADIOLOGY | Facility: HOSPITAL | Age: 62
Discharge: HOME | End: 2022-09-06
Attending: STUDENT IN AN ORGANIZED HEALTH CARE EDUCATION/TRAINING PROGRAM
Payer: MEDICAID

## 2022-09-06 DIAGNOSIS — Z12.31 ENCOUNTER FOR SCREENING MAMMOGRAM FOR MALIGNANT NEOPLASM OF BREAST: ICD-10-CM

## 2022-09-06 PROCEDURE — 77067 SCR MAMMO BI INCL CAD: CPT | Mod: TC,PO

## 2022-09-06 PROCEDURE — 77063 BREAST TOMOSYNTHESIS BI: CPT | Mod: TC,PO

## 2022-09-20 ENCOUNTER — TELEPHONE (OUTPATIENT)
Dept: HEMATOLOGY/ONCOLOGY | Facility: CLINIC | Age: 62
End: 2022-09-20
Payer: MEDICAID

## 2022-09-20 NOTE — TELEPHONE ENCOUNTER
Confirmed rescheduled appt per patient request   9/27 starting at 720a Labs/us/ct  9/29 at 3p office visit with NAHOMI Ornelas     Pt notified of Dr. Brandt leaving

## 2022-09-20 NOTE — TELEPHONE ENCOUNTER
----- Message from Travis Fritz sent at 9/20/2022 10:10 AM CDT -----  Contact: pt  Type:  Sooner Apoointment Request    Caller is requesting a sooner appointment.  Caller declined first available appointment listed below.  Caller will not accept being placed on the waitlist and is requesting a message be sent to doctor.  Name of Caller:pt  When is the first available appointment?n/a  Symptoms:f/u  Would the patient rather a call back or a response via [facility]? Call   Best Call Back Number:[phone]  Additional Information:

## 2022-09-27 ENCOUNTER — HOSPITAL ENCOUNTER (OUTPATIENT)
Dept: RADIOLOGY | Facility: HOSPITAL | Age: 62
Discharge: HOME | End: 2022-09-27
Attending: INTERNAL MEDICINE
Payer: MEDICAID

## 2022-09-27 DIAGNOSIS — I26.99 PULMONARY EMBOLISM, BILATERAL: ICD-10-CM

## 2022-09-27 DIAGNOSIS — I82.431 ACUTE DEEP VEIN THROMBOSIS (DVT) OF RIGHT POPLITEAL VEIN: ICD-10-CM

## 2022-09-27 LAB
CREAT SERPL-MCNC: 1.4 MG/DL (ref 0.5–1.4)
SAMPLE: NORMAL

## 2022-09-27 PROCEDURE — 71275 CTA CHEST NON CORONARY: ICD-10-PCS | Mod: 26,,, | Performed by: STUDENT IN AN ORGANIZED HEALTH CARE EDUCATION/TRAINING PROGRAM

## 2022-09-27 PROCEDURE — 71275 CT ANGIOGRAPHY CHEST: CPT | Mod: 26,,, | Performed by: STUDENT IN AN ORGANIZED HEALTH CARE EDUCATION/TRAINING PROGRAM

## 2022-09-27 PROCEDURE — 25500020 PHARM REV CODE 255: Performed by: INTERNAL MEDICINE

## 2022-09-27 PROCEDURE — 71275 CT ANGIOGRAPHY CHEST: CPT | Mod: TC

## 2022-09-27 PROCEDURE — 93971 EXTREMITY STUDY: CPT | Mod: 26,RT,, | Performed by: STUDENT IN AN ORGANIZED HEALTH CARE EDUCATION/TRAINING PROGRAM

## 2022-09-27 PROCEDURE — 93971 EXTREMITY STUDY: CPT | Mod: TC,RT

## 2022-09-27 PROCEDURE — 93971 US LOWER EXTREMITY VEINS RIGHT: ICD-10-PCS | Mod: 26,RT,, | Performed by: STUDENT IN AN ORGANIZED HEALTH CARE EDUCATION/TRAINING PROGRAM

## 2022-09-27 RX ADMIN — IOHEXOL 100 ML: 350 INJECTION, SOLUTION INTRAVENOUS at 08:09

## 2022-09-29 ENCOUNTER — OFFICE VISIT (OUTPATIENT)
Dept: HEMATOLOGY/ONCOLOGY | Facility: CLINIC | Age: 62
End: 2022-09-29
Payer: MEDICAID

## 2022-09-29 VITALS
DIASTOLIC BLOOD PRESSURE: 92 MMHG | HEART RATE: 73 BPM | TEMPERATURE: 99 F | HEIGHT: 60 IN | BODY MASS INDEX: 34.85 KG/M2 | SYSTOLIC BLOOD PRESSURE: 149 MMHG | RESPIRATION RATE: 20 BRPM | WEIGHT: 177.5 LBS | OXYGEN SATURATION: 98 %

## 2022-09-29 DIAGNOSIS — N17.9 AKI (ACUTE KIDNEY INJURY): ICD-10-CM

## 2022-09-29 DIAGNOSIS — I26.99 PULMONARY EMBOLISM, BILATERAL: Primary | ICD-10-CM

## 2022-09-29 DIAGNOSIS — Z86.718 HISTORY OF DVT (DEEP VEIN THROMBOSIS): ICD-10-CM

## 2022-09-29 DIAGNOSIS — I10 ESSENTIAL HYPERTENSION: ICD-10-CM

## 2022-09-29 PROCEDURE — 99215 OFFICE O/P EST HI 40 MIN: CPT | Mod: S$PBB,,, | Performed by: NURSE PRACTITIONER

## 2022-09-29 PROCEDURE — 3077F SYST BP >= 140 MM HG: CPT | Mod: CPTII,,, | Performed by: NURSE PRACTITIONER

## 2022-09-29 PROCEDURE — 4010F PR ACE/ARB THEARPY RXD/TAKEN: ICD-10-PCS | Mod: CPTII,,, | Performed by: NURSE PRACTITIONER

## 2022-09-29 PROCEDURE — 3080F DIAST BP >= 90 MM HG: CPT | Mod: CPTII,,, | Performed by: NURSE PRACTITIONER

## 2022-09-29 PROCEDURE — 3044F HG A1C LEVEL LT 7.0%: CPT | Mod: CPTII,,, | Performed by: NURSE PRACTITIONER

## 2022-09-29 PROCEDURE — 3008F PR BODY MASS INDEX (BMI) DOCUMENTED: ICD-10-PCS | Mod: CPTII,,, | Performed by: NURSE PRACTITIONER

## 2022-09-29 PROCEDURE — 99215 PR OFFICE/OUTPT VISIT, EST, LEVL V, 40-54 MIN: ICD-10-PCS | Mod: S$PBB,,, | Performed by: NURSE PRACTITIONER

## 2022-09-29 PROCEDURE — 99213 OFFICE O/P EST LOW 20 MIN: CPT | Mod: PBBFAC,PO | Performed by: NURSE PRACTITIONER

## 2022-09-29 PROCEDURE — 99999 PR PBB SHADOW E&M-EST. PATIENT-LVL III: CPT | Mod: PBBFAC,,, | Performed by: NURSE PRACTITIONER

## 2022-09-29 PROCEDURE — 4010F ACE/ARB THERAPY RXD/TAKEN: CPT | Mod: CPTII,,, | Performed by: NURSE PRACTITIONER

## 2022-09-29 PROCEDURE — 3080F PR MOST RECENT DIASTOLIC BLOOD PRESSURE >= 90 MM HG: ICD-10-PCS | Mod: CPTII,,, | Performed by: NURSE PRACTITIONER

## 2022-09-29 PROCEDURE — 99999 PR PBB SHADOW E&M-EST. PATIENT-LVL III: ICD-10-PCS | Mod: PBBFAC,,, | Performed by: NURSE PRACTITIONER

## 2022-09-29 PROCEDURE — 3077F PR MOST RECENT SYSTOLIC BLOOD PRESSURE >= 140 MM HG: ICD-10-PCS | Mod: CPTII,,, | Performed by: NURSE PRACTITIONER

## 2022-09-29 PROCEDURE — 3008F BODY MASS INDEX DOCD: CPT | Mod: CPTII,,, | Performed by: NURSE PRACTITIONER

## 2022-09-29 PROCEDURE — 3044F PR MOST RECENT HEMOGLOBIN A1C LEVEL <7.0%: ICD-10-PCS | Mod: CPTII,,, | Performed by: NURSE PRACTITIONER

## 2022-09-29 RX ORDER — PREDNISOLONE ACETATE 10 MG/ML
SUSPENSION/ DROPS OPHTHALMIC
COMMUNITY
Start: 2022-04-26 | End: 2023-02-09

## 2022-10-14 ENCOUNTER — OFFICE VISIT (OUTPATIENT)
Dept: FAMILY MEDICINE | Facility: HOSPITAL | Age: 62
End: 2022-10-14
Payer: MEDICAID

## 2022-10-14 VITALS
BODY MASS INDEX: 33.71 KG/M2 | HEIGHT: 61 IN | SYSTOLIC BLOOD PRESSURE: 132 MMHG | WEIGHT: 178.56 LBS | DIASTOLIC BLOOD PRESSURE: 88 MMHG | HEART RATE: 82 BPM

## 2022-10-14 DIAGNOSIS — I26.99 PULMONARY EMBOLISM, BILATERAL: Primary | ICD-10-CM

## 2022-10-14 DIAGNOSIS — R06.02 SHORTNESS OF BREATH: ICD-10-CM

## 2022-10-14 PROCEDURE — 99213 OFFICE O/P EST LOW 20 MIN: CPT | Performed by: STUDENT IN AN ORGANIZED HEALTH CARE EDUCATION/TRAINING PROGRAM

## 2022-10-15 NOTE — PROGRESS NOTES
[facility]  History & Physical    SUBJECTIVE:     Chief Complaint:   Chief Complaint   Patient presents with    Follow-up     results    Leg Pain     Left leg x 5 days       History of Present Illness:  62 y.o. female who  has a past medical history of Hypertension and Tendonitis. presents to clinic today for Shortness of Breath. Patient reports leg pain that occurred 5 days ago in her left medial calf. There was no edema and pain has resolved today. Patient states she has been short of breath over the same 5 day period. The shortness of breath is worse with exertion and prolonged standing. Patient states that the feeling has not worsened and waxes and wanes. Patient says she gets very short of breath when she lays flat and needs to sleep with multiple pillows. Patient denies chest pain, fever, hemoptysis, nausea and vomiting.      Allergies:  Review of patient's allergies indicates:  No Known Allergies    Home Medications:  Current Outpatient Medications on File Prior to Visit   Medication Sig    ascorbic acid, vitamin C, (VITAMIN C) 500 MG tablet Take 500 mg by mouth once daily.    hydroCHLOROthiazide (HYDRODIURIL) 25 MG tablet Take 1 tablet (25 mg total) by mouth once daily.    losartan (COZAAR) 25 MG tablet Take 1 tablet (25 mg total) by mouth once daily.    prednisoLONE acetate (PRED FORTE) 1 % DrpS INSTILL 1 DROP IN SURGERY EYE 4 TIMES DAILY STARTING AFTER SURGERY    rivaroxaban (XARELTO) 20 mg Tab Take 1 tablet (20 mg total) by mouth before dinner.     No current facility-administered medications on file prior to visit.       Past Medical History:   Diagnosis Date    Hypertension     Tendonitis      Past Surgical History:   Procedure Laterality Date    HEEL SPUR SURGERY Left 02/14/2013    HYSTERECTOMY  10/2009    WHITNEY    OOPHORECTOMY  10/2009    Bilateral    THROMBECTOMY N/A 6/1/2022    Procedure: THROMBECTOMY;  Surgeon: Kendall Fiore MD;  Location: [facility] CATH LAB/EP;  Service: Cardiology;   Laterality: N/A;     Family History   Problem Relation Age of Onset    Hypertension Mother     Breast cancer Maternal Cousin 51    Breast cancer Maternal Aunt     Breast cancer Paternal Aunt     Colon cancer Neg Hx     Ovarian cancer Neg Hx      Social History     Tobacco Use    Smoking status: Never    Smokeless tobacco: Never   Substance Use Topics    Alcohol use: No    Drug use: No        Review of Systems   Constitutional:  Negative for chills and fever.   HENT:  Negative for congestion and sore throat.    Respiratory:  Positive for shortness of breath. Negative for cough and hemoptysis.    Cardiovascular:  Negative for chest pain, palpitations and claudication.   Gastrointestinal:  Negative for blood in stool, constipation, nausea and vomiting.   Musculoskeletal:  Positive for myalgias.   Neurological:  Negative for dizziness, tingling, sensory change, focal weakness, weakness and headaches.   Psychiatric/Behavioral: Negative.        OBJECTIVE:     Vital Signs:  Pulse: 82 (10/14/22 1005)  BP: 132/88 (10/14/22 1005)    Physical Exam  HENT:      Head: Normocephalic.      Right Ear: External ear normal.      Left Ear: External ear normal.      Nose: Nose normal.      Mouth/Throat:      Mouth: Mucous membranes are moist.   Eyes:      Extraocular Movements: Extraocular movements intact.      Pupils: Pupils are equal, round, and reactive to light.   Cardiovascular:      Rate and Rhythm: Normal rate.   Pulmonary:      Effort: Pulmonary effort is normal.      Comments: Pulse oximeter reading of 98% on room air  Abdominal:      Palpations: Abdomen is soft.   Musculoskeletal:         General: Normal range of motion.      Comments: No swelling or edema, calf diameter equal   Neurological:      General: No focal deficit present.      Mental Status: She is alert and oriented to person, place, and time.   Psychiatric:         Mood and Affect: Mood normal.       Laboratory:  Hemoglobin A1C   Date Value Ref Range Status    06/01/2022 6.4 (H) 4.0 - 5.6 % Final     Comment:     ADA Screening Guidelines:  5.7-6.4%  Consistent with prediabetes  >or=6.5%  Consistent with diabetes    High levels of fetal hemoglobin interfere with the HbA1C  assay. Heterozygous hemoglobin variants (HbS, HgC, etc)do  not significantly interfere with this assay.   However, presence of multiple variants may affect accuracy.         A/P:  Mehreen was seen today for follow-up and leg pain. Patient is currently on Xarelto daily and reports compliance. There is concern for a possible recurrence of DVT or PE. Patients symptoms may also be the development of worsening diastolic heart failure. Patient underwent an echo in may that showed right sided dilation with preserved LVEF, and pulmonary hypertension. This may have changed with the resolution of previous PE. Patient had appropriate O2 saturation in office today. Patient provided clear guidance to go to the emergency room if shed developed chest pain, rapid breathing or if her shortness of breath worsened. For now will order an outpatient CTA and echo to reevaluate function and screen for new PE.     Diagnoses and all orders for this visit:    Pulmonary embolism, bilateral  -     CTA Chest Non-Coronary (PE Studies); Future  -     Echo; Future    Shortness of Breath  -     CTA Chest Non-Coronary (PE Studies); Future  -     Echo; Future        Follow up in about 1 month (around 11/14/2022).      Abdiel Rdz MD  [facility] PGY-2  10/14/2022

## 2022-10-17 ENCOUNTER — HOSPITAL ENCOUNTER (OUTPATIENT)
Dept: CARDIOLOGY | Facility: HOSPITAL | Age: 62
Discharge: HOME | End: 2022-10-17
Attending: STUDENT IN AN ORGANIZED HEALTH CARE EDUCATION/TRAINING PROGRAM
Payer: MEDICAID

## 2022-10-17 VITALS — HEIGHT: 61 IN | BODY MASS INDEX: 33.61 KG/M2 | WEIGHT: 178 LBS

## 2022-10-17 DIAGNOSIS — I26.99 PULMONARY EMBOLISM, BILATERAL: ICD-10-CM

## 2022-10-17 DIAGNOSIS — R06.02 SHORTNESS OF BREATH: ICD-10-CM

## 2022-10-17 LAB
AORTIC ROOT ANNULUS: 2.57 CM
AORTIC VALVE CUSP SEPERATION: 1.69 CM
AV INDEX (PROSTH): 0.77
AV MEAN GRADIENT: 5 MMHG
AV PEAK GRADIENT: 9 MMHG
AV VALVE AREA: 2.4 CM2
AV VELOCITY RATIO: 0.72
BSA FOR ECHO PROCEDURE: 1.86 M2
CV ECHO LV RWT: 0.86 CM
DOP CALC AO PEAK VEL: 1.47 M/S
DOP CALC AO VTI: 30.1 CM
DOP CALC LVOT AREA: 3.1 CM2
DOP CALC LVOT DIAMETER: 1.99 CM
DOP CALC LVOT PEAK VEL: 1.06 M/S
DOP CALC LVOT STROKE VOLUME: 72.12 CM3
DOP CALC MV VTI: 29.1 CM
DOP CALCLVOT PEAK VEL VTI: 23.2 CM
E WAVE DECELERATION TIME: 236.98 MSEC
E/A RATIO: 0.88
E/E' RATIO: 11.69 M/S
ECHO LV POSTERIOR WALL: 1.27 CM (ref 0.6–1.1)
EJECTION FRACTION: 65 %
FRACTIONAL SHORTENING: 24 % (ref 28–44)
INTERVENTRICULAR SEPTUM: 1.22 CM (ref 0.6–1.1)
IVC DIAMETER: 1.28 CM
LA MAJOR: 4.97 CM
LA MINOR: 4.73 CM
LA WIDTH: 3.7 CM
LEFT ATRIUM SIZE: 3.06 CM
LEFT ATRIUM VOLUME INDEX: 25.9 ML/M2
LEFT ATRIUM VOLUME: 46.65 CM3
LEFT INTERNAL DIMENSION IN SYSTOLE: 2.25 CM (ref 2.1–4)
LEFT VENTRICLE DIASTOLIC VOLUME INDEX: 18.85 ML/M2
LEFT VENTRICLE DIASTOLIC VOLUME: 33.93 ML
LEFT VENTRICLE MASS INDEX: 63 G/M2
LEFT VENTRICLE SYSTOLIC VOLUME INDEX: 9.6 ML/M2
LEFT VENTRICLE SYSTOLIC VOLUME: 17.21 ML
LEFT VENTRICULAR INTERNAL DIMENSION IN DIASTOLE: 2.96 CM (ref 3.5–6)
LEFT VENTRICULAR MASS: 113.71 G
LV LATERAL E/E' RATIO: 9.5 M/S
LV SEPTAL E/E' RATIO: 15.2 M/S
LVOT MG: 2.93 MMHG
LVOT MV: 0.84 CM/S
MV A" WAVE DURATION": 141.87 MSEC
MV MEAN GRADIENT: 3 MMHG
MV PEAK A VEL: 0.86 M/S
MV PEAK E VEL: 0.76 M/S
MV PEAK GRADIENT: 5 MMHG
MV STENOSIS PRESSURE HALF TIME: 68.72 MS
MV VALVE AREA BY CONTINUITY EQUATION: 2.48 CM2
MV VALVE AREA P 1/2 METHOD: 3.2 CM2
PISA TR MAX VEL: 2.22 M/S
PULM VEIN S/D RATIO: 1.26
PV PEAK D VEL: 0.27 M/S
PV PEAK S VEL: 0.34 M/S
PV PEAK VELOCITY: 0.97 CM/S
RA MAJOR: 4.85 CM
RA PRESSURE: 3 MMHG
RA WIDTH: 3.1 CM
RIGHT VENTRICULAR END-DIASTOLIC DIMENSION: 2.2 CM
TDI LATERAL: 0.08 M/S
TDI SEPTAL: 0.05 M/S
TDI: 0.07 M/S
TR MAX PG: 20 MMHG
TV REST PULMONARY ARTERY PRESSURE: 23 MMHG

## 2022-10-17 PROCEDURE — 93306 ECHO (CUPID ONLY): ICD-10-PCS | Mod: 26,,, | Performed by: INTERNAL MEDICINE

## 2022-10-17 PROCEDURE — 93306 TTE W/DOPPLER COMPLETE: CPT | Mod: 26,,, | Performed by: INTERNAL MEDICINE

## 2022-10-17 PROCEDURE — 93306 TTE W/DOPPLER COMPLETE: CPT | Mod: PO

## 2022-10-17 NOTE — PROGRESS NOTES
I have reviewed the notes, assessments, and/or procedures performed by Dr. Rdz, I concur with her/his documentation of Mehreen Benites. Agree with nonemergent MISTY for her dyspnea to include CTA chest and echocardiogram

## 2023-01-03 ENCOUNTER — TELEPHONE (OUTPATIENT)
Dept: HEMATOLOGY/ONCOLOGY | Facility: CLINIC | Age: 63
End: 2023-01-03
Payer: MEDICAID

## 2023-01-17 ENCOUNTER — HOSPITAL ENCOUNTER (OUTPATIENT)
Dept: RADIOLOGY | Facility: HOSPITAL | Age: 63
Discharge: HOME | End: 2023-01-17
Attending: NURSE PRACTITIONER
Payer: MEDICAID

## 2023-01-17 DIAGNOSIS — I26.99 PULMONARY EMBOLISM, BILATERAL: ICD-10-CM

## 2023-01-17 LAB
CREAT SERPL-MCNC: 1.2 MG/DL (ref 0.5–1.4)
SAMPLE: NORMAL

## 2023-01-17 PROCEDURE — 71275 CT ANGIOGRAPHY CHEST: CPT | Mod: TC

## 2023-01-17 PROCEDURE — 25500020 PHARM REV CODE 255: Performed by: NURSE PRACTITIONER

## 2023-01-17 PROCEDURE — 71275 CT ANGIOGRAPHY CHEST: CPT | Mod: 26,,, | Performed by: RADIOLOGY

## 2023-01-17 PROCEDURE — 71275 CTA CHEST NON CORONARY (PE STUDIES): ICD-10-PCS | Mod: 26,,, | Performed by: RADIOLOGY

## 2023-01-17 RX ADMIN — IOHEXOL 100 ML: 350 INJECTION, SOLUTION INTRAVENOUS at 09:01

## 2023-01-24 ENCOUNTER — LAB VISIT (OUTPATIENT)
Dept: LAB | Facility: HOSPITAL | Age: 63
End: 2023-01-24
Attending: NURSE PRACTITIONER
Payer: MEDICAID

## 2023-01-24 ENCOUNTER — OFFICE VISIT (OUTPATIENT)
Dept: HEMATOLOGY/ONCOLOGY | Facility: CLINIC | Age: 63
End: 2023-01-24
Payer: MEDICAID

## 2023-01-24 VITALS
OXYGEN SATURATION: 98 % | WEIGHT: 191.13 LBS | SYSTOLIC BLOOD PRESSURE: 138 MMHG | HEART RATE: 74 BPM | DIASTOLIC BLOOD PRESSURE: 93 MMHG | BODY MASS INDEX: 36.12 KG/M2

## 2023-01-24 DIAGNOSIS — N18.31 STAGE 3A CHRONIC KIDNEY DISEASE: ICD-10-CM

## 2023-01-24 DIAGNOSIS — I10 ESSENTIAL HYPERTENSION: ICD-10-CM

## 2023-01-24 DIAGNOSIS — N17.9 AKI (ACUTE KIDNEY INJURY): ICD-10-CM

## 2023-01-24 DIAGNOSIS — R53.83 FATIGUE, UNSPECIFIED TYPE: ICD-10-CM

## 2023-01-24 DIAGNOSIS — Z86.718 HISTORY OF DVT (DEEP VEIN THROMBOSIS): ICD-10-CM

## 2023-01-24 DIAGNOSIS — I26.99 PULMONARY EMBOLISM, BILATERAL: Primary | ICD-10-CM

## 2023-01-24 LAB
ALBUMIN SERPL BCP-MCNC: 3.7 G/DL (ref 3.5–5.2)
ALP SERPL-CCNC: 77 U/L (ref 55–135)
ALT SERPL W/O P-5'-P-CCNC: 26 U/L (ref 10–44)
ANION GAP SERPL CALC-SCNC: 10 MMOL/L (ref 8–16)
AST SERPL-CCNC: 21 U/L (ref 10–40)
BASOPHILS # BLD AUTO: 0.06 K/UL (ref 0–0.2)
BASOPHILS NFR BLD: 0.9 % (ref 0–1.9)
BILIRUB SERPL-MCNC: 0.5 MG/DL (ref 0.1–1)
BUN SERPL-MCNC: 17 MG/DL (ref 6–20)
CALCIUM SERPL-MCNC: 10 MG/DL (ref 8.7–10.5)
CHLORIDE SERPL-SCNC: 106 MMOL/L (ref 95–110)
CO2 SERPL-SCNC: 26 MMOL/L (ref 23–29)
CREAT SERPL-MCNC: 1.2 MG/DL (ref 0.5–1.4)
DIFFERENTIAL METHOD: ABNORMAL
EOSINOPHIL # BLD AUTO: 0.1 K/UL (ref 0–0.5)
EOSINOPHIL NFR BLD: 1.9 % (ref 0–8)
ERYTHROCYTE [DISTWIDTH] IN BLOOD BY AUTOMATED COUNT: 12.7 % (ref 11.5–14.5)
EST. GFR  (NO RACE VARIABLE): 52 ML/MIN/1.73 M^2
FERRITIN SERPL-MCNC: 58 NG/ML (ref 20–300)
GLUCOSE SERPL-MCNC: 98 MG/DL (ref 70–110)
HCT VFR BLD AUTO: 40.6 % (ref 37–48.5)
HGB BLD-MCNC: 13.1 G/DL (ref 12–16)
IMM GRANULOCYTES # BLD AUTO: 0.03 K/UL (ref 0–0.04)
IMM GRANULOCYTES NFR BLD AUTO: 0.5 % (ref 0–0.5)
IRON SERPL-MCNC: 80 UG/DL (ref 30–160)
LYMPHOCYTES # BLD AUTO: 3.2 K/UL (ref 1–4.8)
LYMPHOCYTES NFR BLD: 49.5 % (ref 18–48)
MCH RBC QN AUTO: 29 PG (ref 27–31)
MCHC RBC AUTO-ENTMCNC: 32.3 G/DL (ref 32–36)
MCV RBC AUTO: 90 FL (ref 82–98)
MONOCYTES # BLD AUTO: 0.6 K/UL (ref 0.3–1)
MONOCYTES NFR BLD: 9.3 % (ref 4–15)
NEUTROPHILS # BLD AUTO: 2.4 K/UL (ref 1.8–7.7)
NEUTROPHILS NFR BLD: 37.9 % (ref 38–73)
NRBC BLD-RTO: 0 /100 WBC
PLATELET # BLD AUTO: 316 K/UL (ref 150–450)
PMV BLD AUTO: 10 FL (ref 9.2–12.9)
POTASSIUM SERPL-SCNC: 4.9 MMOL/L (ref 3.5–5.1)
PROT SERPL-MCNC: 7.8 G/DL (ref 6–8.4)
RBC # BLD AUTO: 4.51 M/UL (ref 4–5.4)
SATURATED IRON: 23 % (ref 20–50)
SODIUM SERPL-SCNC: 142 MMOL/L (ref 136–145)
T4 FREE SERPL-MCNC: 1.09 NG/DL (ref 0.71–1.51)
TOTAL IRON BINDING CAPACITY: 349 UG/DL (ref 250–450)
TRANSFERRIN SERPL-MCNC: 236 MG/DL (ref 200–375)
TSH SERPL DL<=0.005 MIU/L-ACNC: 4 UIU/ML (ref 0.4–4)
WBC # BLD AUTO: 6.43 K/UL (ref 3.9–12.7)

## 2023-01-24 PROCEDURE — 85025 COMPLETE CBC W/AUTO DIFF WBC: CPT | Performed by: NURSE PRACTITIONER

## 2023-01-24 PROCEDURE — 3075F SYST BP GE 130 - 139MM HG: CPT | Mod: CPTII,,, | Performed by: NURSE PRACTITIONER

## 2023-01-24 PROCEDURE — 36415 COLL VENOUS BLD VENIPUNCTURE: CPT | Performed by: NURSE PRACTITIONER

## 2023-01-24 PROCEDURE — 1160F RVW MEDS BY RX/DR IN RCRD: CPT | Mod: CPTII,,, | Performed by: NURSE PRACTITIONER

## 2023-01-24 PROCEDURE — 1160F PR REVIEW ALL MEDS BY PRESCRIBER/CLIN PHARMACIST DOCUMENTED: ICD-10-PCS | Mod: CPTII,,, | Performed by: NURSE PRACTITIONER

## 2023-01-24 PROCEDURE — 99215 OFFICE O/P EST HI 40 MIN: CPT | Mod: S$PBB,,, | Performed by: NURSE PRACTITIONER

## 2023-01-24 PROCEDURE — 82607 VITAMIN B-12: CPT | Performed by: NURSE PRACTITIONER

## 2023-01-24 PROCEDURE — 84439 ASSAY OF FREE THYROXINE: CPT | Performed by: NURSE PRACTITIONER

## 2023-01-24 PROCEDURE — 3008F PR BODY MASS INDEX (BMI) DOCUMENTED: ICD-10-PCS | Mod: CPTII,,, | Performed by: NURSE PRACTITIONER

## 2023-01-24 PROCEDURE — 4010F PR ACE/ARB THEARPY RXD/TAKEN: ICD-10-PCS | Mod: CPTII,,, | Performed by: NURSE PRACTITIONER

## 2023-01-24 PROCEDURE — 99999 PR PBB SHADOW E&M-EST. PATIENT-LVL III: ICD-10-PCS | Mod: PBBFAC,,, | Performed by: NURSE PRACTITIONER

## 2023-01-24 PROCEDURE — 3080F DIAST BP >= 90 MM HG: CPT | Mod: CPTII,,, | Performed by: NURSE PRACTITIONER

## 2023-01-24 PROCEDURE — 3008F BODY MASS INDEX DOCD: CPT | Mod: CPTII,,, | Performed by: NURSE PRACTITIONER

## 2023-01-24 PROCEDURE — 99999 PR PBB SHADOW E&M-EST. PATIENT-LVL III: CPT | Mod: PBBFAC,,, | Performed by: NURSE PRACTITIONER

## 2023-01-24 PROCEDURE — 84466 ASSAY OF TRANSFERRIN: CPT | Performed by: NURSE PRACTITIONER

## 2023-01-24 PROCEDURE — 4010F ACE/ARB THERAPY RXD/TAKEN: CPT | Mod: CPTII,,, | Performed by: NURSE PRACTITIONER

## 2023-01-24 PROCEDURE — 99215 PR OFFICE/OUTPT VISIT, EST, LEVL V, 40-54 MIN: ICD-10-PCS | Mod: S$PBB,,, | Performed by: NURSE PRACTITIONER

## 2023-01-24 PROCEDURE — 82728 ASSAY OF FERRITIN: CPT | Performed by: NURSE PRACTITIONER

## 2023-01-24 PROCEDURE — 1159F MED LIST DOCD IN RCRD: CPT | Mod: CPTII,,, | Performed by: NURSE PRACTITIONER

## 2023-01-24 PROCEDURE — 3075F PR MOST RECENT SYSTOLIC BLOOD PRESS GE 130-139MM HG: ICD-10-PCS | Mod: CPTII,,, | Performed by: NURSE PRACTITIONER

## 2023-01-24 PROCEDURE — 99213 OFFICE O/P EST LOW 20 MIN: CPT | Mod: PBBFAC,PO | Performed by: NURSE PRACTITIONER

## 2023-01-24 PROCEDURE — 84443 ASSAY THYROID STIM HORMONE: CPT | Performed by: NURSE PRACTITIONER

## 2023-01-24 PROCEDURE — 82746 ASSAY OF FOLIC ACID SERUM: CPT | Performed by: NURSE PRACTITIONER

## 2023-01-24 PROCEDURE — 80053 COMPREHEN METABOLIC PANEL: CPT | Performed by: NURSE PRACTITIONER

## 2023-01-24 PROCEDURE — 1159F PR MEDICATION LIST DOCUMENTED IN MEDICAL RECORD: ICD-10-PCS | Mod: CPTII,,, | Performed by: NURSE PRACTITIONER

## 2023-01-24 PROCEDURE — 3080F PR MOST RECENT DIASTOLIC BLOOD PRESSURE >= 90 MM HG: ICD-10-PCS | Mod: CPTII,,, | Performed by: NURSE PRACTITIONER

## 2023-01-24 NOTE — PROGRESS NOTES
PATIENT: Mehreen Benites  MRN: [id number]  DATE: 1/24/2023    Chief Complaint: PE, DVT    Subjective:     History of Present Illness:   HPI as per Dr Brandt's 8/8/22 office visit, reviewed and verified with pt    She presented to the ED 05/31/2022 with sudden onset lightheadedness, weakness and dyspnea - found to have massive saddle pulmonary embolism with right heart strain and partially occlusive thrombus in the right popliteal vein.  No recent surgeries, immobilizations, long trips or prior history of DVT.    She underwent mechanical thrombectomy because of massive PE causing right heart strain.    Family hx significant for son (40 yo) with DVT/PE and maternal first cousin (41 yo) with DVT/PE.    doing well on xarelto    INTERVAL  - pt here for dvt/pe follow up  - states she is feeling well with no chest pain, sob/ortiz, leg pains or swelling, lightheadedness or dizziness, easy bleeding or bruising. She does report fatigue, states she has not felt like doing much but pushes herself.  - pt elaborates on fatigue, has had depression after losing 2 brothers 5 months apart 7/2022 to 11/2022. No SI/HI. States she has an active [employer] group that helps her and she is considering going to [place] to help daughter in law with 4yr old who called her last night if she can travel.   - she is continuing to tolerate xarelto well    Past Medical, Surgical, Family and Social History Reviewed.    Medications and Allergies reviewed    Review of Systems   Constitutional:  Negative for fatigue, fever and unexpected weight change.   HENT:  Negative for mouth sores and nosebleeds.    Respiratory:  Negative for chest tightness and shortness of breath.    Cardiovascular:  Negative for chest pain, palpitations and leg swelling.   Gastrointestinal:  Negative for abdominal pain, blood in stool, constipation, diarrhea, nausea and vomiting.   Genitourinary:  Negative for hematuria.   Musculoskeletal:  Negative for arthralgias.   Skin:  Negative for  pallor.   Neurological:  Negative for dizziness, weakness and light-headedness.   Hematological:  Negative for adenopathy. Does not bruise/bleed easily.   Psychiatric/Behavioral:  Negative for suicidal ideas.         + depression due to family loss, negative si/hi   All other systems reviewed and are negative.    Objective:     Vitals:    01/24/23 0930   BP: (!) 138/93   Pulse: 74   SpO2: 98%   Weight: 86.7 kg (191 lb 2.2 oz)       BMI: Body mass index is 36.12 kg/m².    Physical Exam  Vitals and nursing note reviewed.   Constitutional:       General: She is not in acute distress.  HENT:      Head: Normocephalic and atraumatic.      Right Ear: External ear normal.      Left Ear: External ear normal.   Eyes:      General: No scleral icterus.     Pupils: Pupils are equal, round, and reactive to light.   Cardiovascular:      Rate and Rhythm: Normal rate and regular rhythm.      Pulses: Normal pulses.      Heart sounds: Normal heart sounds. No murmur heard.  Pulmonary:      Effort: Pulmonary effort is normal. No respiratory distress.      Breath sounds: Normal breath sounds.   Abdominal:      General: Bowel sounds are normal. There is no distension.      Palpations: Abdomen is soft.      Tenderness: There is no abdominal tenderness. There is no guarding.   Musculoskeletal:         General: No swelling or tenderness. Normal range of motion.      Cervical back: Normal range of motion and neck supple. No rigidity.      Right lower leg: No edema.      Left lower leg: No edema.   Lymphadenopathy:      Cervical: No cervical adenopathy.   Skin:     General: Skin is warm and dry.      Coloration: Skin is not jaundiced or pale.      Findings: No bruising or erythema.   Neurological:      Mental Status: She is alert and oriented to person, place, and time. Mental status is at baseline.      Gait: Gait normal.   Psychiatric:         Attention and Perception: Attention normal.         Mood and Affect: Mood is depressed.          Speech: Speech normal.         Behavior: Behavior normal. Behavior is cooperative.         Thought Content: Thought content normal.      Comments: She is depressed. She easily talks with staff, good eye contact, appropriate answers. Identifies support system. Denies SI/HI.      ECOG SCORE    1 - Restricted in strenuous activity-ambulatory and able to carry out work of a light nature       LABS  Lab Results   Component Value Date    WBC 6.32 09/27/2022    HGB 13.4 09/27/2022    HCT 41.6 09/27/2022    MCV 89 09/27/2022     09/27/2022       Lab Results   Component Value Date    DDIMER 0.31 09/27/2022     IMAGING    US Lower Extremity Veins Right 9/27/22  Impression:  No evidence of deep venous thrombosis in the right lower extremity.   Interval resolution of previously identified thrombus within the right popliteal vein.    CTA Chest Non-Coronary(PE Studies) 9/27/22  Impression:     1. Interval improvement of prior bilateral pulmonary arterial thromboemboli.  No residual central pulmonary thrombus identified.  Few small filling defects within segmental arteries potentially representing mild residual thrombus.  2. Mosaic attenuation of the bilateral pulmonary parenchyma which may represent increased pulmonary vascular resistance or small airways disease.  3. Mild cardiomegaly.  4. Additional findings as above.             Assessment:       1. Pulmonary embolism, bilateral    2. Fatigue, unspecified type    3. History of DVT (deep vein thrombosis)    4. Essential hypertension    5. SEDRICK (acute kidney injury)    6. Stage 3a chronic kidney disease            Plan:   Past records including clinic and ED visits, labs, imaging, medications reviewed as available in epic    Massive unprovoked pulmonary embolism  -1st episode, unprovoked, required mechanical thrombectomy  -positive family history blood clots  -she clearly has a hypercoagulable state based on her clinical presentation  -no need hypercoagulable  "workup  -continue Xarelto at full dose  -indefinite anticoagulation needed  -9/29/22 follow up with repeat US RLE (9/27/22) showing resolution of right popliteal vein thrombus, CTA chest (9/27/22) "Interval improvement of prior bilateral pulmonary arterial thromboemboli.  No residual central pulmonary thrombus identified.  Few small filling defects within segmental arteries potentially representing mild residual thrombus."  -9/27/22 d-dimer neg  -reports she has adequate xarelto at home and her pcp will prescribe, advised to call if any of this changes or pcp unavailable  -CTA chest 1/17/23 "No acute cardiopulmonary disease.  Specifically, no residual filling defects are seen within the pulmonary arteries."  -pt can travel, she will travel to [place] to help with grandchild, advised on q2hr stops during drive to walk and stretch for 15-20 min intervals  -will continue Xarelto, will consider reduction at 3 month follow up      HTN  -bp 138/93 today, denies associated symptoms of elevated bp  -management deferred to pcp    CKD stage 3a, SEDRICK history  -GFR 47 (9/27/22), creatinine is normal 1/17/23  -discussed pcp follow up, management deferred to pcp    Fatigue  -pt with depressed mood, she has support through [employer] and will travel to [place] for family visit  -she will follow up with pcp to decide if antidepressant needed for situational depression, symptoms may also improve with visiting and helping family  -will do labs for anemia, check tsh as well and call pt with results to see additionally if any of these are contributing to fatigue        OSMEL [place]  [facility]  Hematology/Oncology  [address]  [place]  [address] [phone]      OSMEL [place]  [facility]  Hematology/Oncology  [address]  [place]  [address] [phone]        "

## 2023-01-25 ENCOUNTER — TELEPHONE (OUTPATIENT)
Dept: HEMATOLOGY/ONCOLOGY | Facility: CLINIC | Age: 63
End: 2023-01-25
Payer: MEDICAID

## 2023-01-25 DIAGNOSIS — D50.9 IRON DEFICIENCY ANEMIA, UNSPECIFIED IRON DEFICIENCY ANEMIA TYPE: Primary | ICD-10-CM

## 2023-01-25 LAB
FOLATE SERPL-MCNC: 12.2 NG/ML (ref 4–24)
VIT B12 SERPL-MCNC: 245 PG/ML (ref 210–950)

## 2023-01-25 RX ORDER — FERROUS SULFATE 325(65) MG
325 TABLET ORAL DAILY
Qty: 90 TABLET | Refills: 3 | Status: SHIPPED | OUTPATIENT
Start: 2023-01-25 | End: 2023-11-17

## 2023-01-25 NOTE — TELEPHONE ENCOUNTER
Reviewed labs with pt, pt with mild low normal iron sat, low normal ferritin, no anemia on cbc. Low iron can contribute to fatigue. Pt will start iron tab once daily as prescribed. Normal folate and b-12. CMP with stable gfrr c/w CKD stage 3. TSH elevation at 4.004, normal T4, no previous thyroid issues. She will call Dr Rdz's office today/pcp for appt establishment for thyroid follow up and management. She was last supposed to see him 11/2022 and has not yet made an appt. She will call today, to let me know if needs any assistance in appt establishment otherwise. Repeat labs 3 months as already scheduled.

## 2023-02-09 ENCOUNTER — OFFICE VISIT (OUTPATIENT)
Dept: FAMILY MEDICINE | Facility: HOSPITAL | Age: 63
End: 2023-02-09
Payer: MEDICAID

## 2023-02-09 VITALS
HEART RATE: 91 BPM | WEIGHT: 189.38 LBS | HEIGHT: 61 IN | DIASTOLIC BLOOD PRESSURE: 78 MMHG | BODY MASS INDEX: 35.75 KG/M2 | SYSTOLIC BLOOD PRESSURE: 116 MMHG

## 2023-02-09 DIAGNOSIS — Z23 IMMUNIZATION DUE: ICD-10-CM

## 2023-02-09 DIAGNOSIS — Z11.4 SCREENING FOR HIV (HUMAN IMMUNODEFICIENCY VIRUS): ICD-10-CM

## 2023-02-09 DIAGNOSIS — M79.89 LEG SWELLING: Primary | ICD-10-CM

## 2023-02-09 DIAGNOSIS — Z12.11 SCREENING FOR MALIGNANT NEOPLASM OF COLON: ICD-10-CM

## 2023-02-09 PROCEDURE — 90677 PCV20 VACCINE IM: CPT

## 2023-02-09 PROCEDURE — 99214 OFFICE O/P EST MOD 30 MIN: CPT | Performed by: STUDENT IN AN ORGANIZED HEALTH CARE EDUCATION/TRAINING PROGRAM

## 2023-02-13 ENCOUNTER — HOSPITAL ENCOUNTER (OUTPATIENT)
Dept: RADIOLOGY | Facility: HOSPITAL | Age: 63
Discharge: HOME | End: 2023-02-13
Attending: STUDENT IN AN ORGANIZED HEALTH CARE EDUCATION/TRAINING PROGRAM
Payer: MEDICAID

## 2023-02-13 DIAGNOSIS — M79.89 LEG SWELLING: ICD-10-CM

## 2023-02-13 PROCEDURE — 93970 US LOWER EXTREMITY VEINS BILATERAL: ICD-10-PCS | Mod: 26,,, | Performed by: RADIOLOGY

## 2023-02-13 PROCEDURE — 93970 EXTREMITY STUDY: CPT | Mod: 26,,, | Performed by: RADIOLOGY

## 2023-02-13 PROCEDURE — 93970 EXTREMITY STUDY: CPT | Mod: TC,PO

## 2023-02-15 NOTE — PROGRESS NOTES
[facility] Family Medicine  History & Physical    SUBJECTIVE:     Chief Complaint:   Chief Complaint   Patient presents with    Annual Exam       History of Present Illness:  62 y.o. female who  has a past medical history of Hypertension and Tendonitis. presents to clinic today for leg pain and follow up of thyroid level. Patient reports lower leg pain, left worse than right. Patient states this has been going on for over a week. Patient also reports leg swelling. She says it is not as bad today as before. No shortness of breath. Patient says the pain is improved with walking but is worse at the end of the day and in the morning. Patient denies shortness of breath and chest pain.       Allergies:  Review of patient's allergies indicates:  No Known Allergies    Home Medications:  Current Outpatient Medications on File Prior to Visit   Medication Sig    ascorbic acid, vitamin C, (VITAMIN C) 500 MG tablet Take 500 mg by mouth once daily.    ferrous sulfate (FEOSOL) 325 mg (65 mg iron) Tab tablet Take 1 tablet (325 mg total) by mouth once daily.    hydroCHLOROthiazide (HYDRODIURIL) 25 MG tablet Take 1 tablet (25 mg total) by mouth once daily.    losartan (COZAAR) 25 MG tablet Take 1 tablet (25 mg total) by mouth once daily.    rivaroxaban (XARELTO) 20 mg Tab Take 1 tablet (20 mg total) by mouth before dinner.     No current facility-administered medications on file prior to visit.       Past Medical History:   Diagnosis Date    Hypertension     Tendonitis      Past Surgical History:   Procedure Laterality Date    HEEL SPUR SURGERY Left 02/14/2013    HYSTERECTOMY  10/2009    WHITNEY    OOPHORECTOMY  10/2009    Bilateral    THROMBECTOMY N/A 6/1/2022    Procedure: THROMBECTOMY;  Surgeon: Kendall Fiore MD;  Location: [facility] CATH LAB/EP;  Service: Cardiology;  Laterality: N/A;     Family History   Problem Relation Age of Onset    Hypertension Mother     Breast cancer Maternal Cousin 51    Breast cancer Maternal Aunt     Breast cancer  Paternal Aunt     Colon cancer Neg Hx     Ovarian cancer Neg Hx      Social History     Tobacco Use    Smoking status: Never    Smokeless tobacco: Never   Substance Use Topics    Alcohol use: No    Drug use: No        Review of Systems   Constitutional:  Negative for fever and weight loss.   HENT:  Negative for hearing loss and sore throat.    Eyes:  Negative for blurred vision.   Respiratory:  Negative for cough, shortness of breath and wheezing.    Cardiovascular:  Positive for leg swelling. Negative for chest pain.        Leg pain with rest and at end of day   Gastrointestinal:  Negative for heartburn, nausea and vomiting.   Genitourinary:  Negative for dysuria.   Musculoskeletal:  Negative for back pain, joint pain and myalgias.   Neurological:  Negative for dizziness, weakness and headaches.      OBJECTIVE:     Vital Signs:  Pulse: 91 (02/09/23 1002)  BP: 116/78 (02/09/23 1002)  Body mass index is 35.78 kg/m².  Physical Exam  Constitutional:       Appearance: She is obese.   HENT:      Head: Normocephalic.      Right Ear: External ear normal.      Left Ear: External ear normal.      Nose: Nose normal.      Mouth/Throat:      Mouth: Mucous membranes are moist.   Eyes:      Extraocular Movements: Extraocular movements intact.      Pupils: Pupils are equal, round, and reactive to light.   Cardiovascular:      Rate and Rhythm: Normal rate.      Pulses: Normal pulses.   Pulmonary:      Effort: Pulmonary effort is normal.   Abdominal:      Palpations: Abdomen is soft.   Musculoskeletal:         General: Normal range of motion.      Cervical back: Normal range of motion.      Comments: Mild swelling of bilateral lower leg  No tenderness to palpation  No Varicose veins or ulcers   Skin:     General: Skin is warm.      Capillary Refill: Capillary refill takes less than 2 seconds.   Neurological:      Mental Status: She is alert and oriented to person, place, and time.   Psychiatric:         Mood and Affect: Mood normal.        Laboratory:  Hemoglobin A1C   Date Value Ref Range Status   06/01/2022 6.4 (H) 4.0 - 5.6 % Final     Comment:     ADA Screening Guidelines:  5.7-6.4%  Consistent with prediabetes  >or=6.5%  Consistent with diabetes    High levels of fetal hemoglobin interfere with the HbA1C  assay. Heterozygous hemoglobin variants (HbS, HgC, etc)do  not significantly interfere with this assay.   However, presence of multiple variants may affect accuracy.         A/P:  Mehreen was seen today for annual exam. TSH only mildly elevated, no intervention at this time. Will ultrasound LE to assess for venous status and DVT. Patient accepts Prevnar a this time and will consider shingles vaccine soon.     Diagnoses and all orders for this visit:    Leg swelling  -     US Lower Extremity Veins Bilateral; Future    Screening for malignant neoplasm of colon  -     Case Request Endoscopy: COLONOSCOPY    Screening for HIV (human immunodeficiency virus)  -     HIV 1/2 Ag/Ab (4th Gen); Future    Immunization due  -     (In Office Administered) Pneumococcal Conjugate Vaccine (20 Valent) (IM)        Follow up in about 3 months (around 5/9/2023).      Abdiel Rdz MD  [facility] PGY-2  02/15/2023

## 2023-03-22 ENCOUNTER — OFFICE VISIT (OUTPATIENT)
Dept: FAMILY MEDICINE | Facility: HOSPITAL | Age: 63
End: 2023-03-22
Payer: MEDICAID

## 2023-03-22 VITALS
HEART RATE: 74 BPM | HEIGHT: 61 IN | WEIGHT: 191.81 LBS | DIASTOLIC BLOOD PRESSURE: 92 MMHG | SYSTOLIC BLOOD PRESSURE: 147 MMHG | BODY MASS INDEX: 36.21 KG/M2

## 2023-03-22 DIAGNOSIS — Z12.11 SCREENING FOR MALIGNANT NEOPLASM OF COLON: ICD-10-CM

## 2023-03-22 DIAGNOSIS — I10 PRIMARY HYPERTENSION: ICD-10-CM

## 2023-03-22 DIAGNOSIS — M79.89 LEG SWELLING: Primary | ICD-10-CM

## 2023-03-22 PROCEDURE — 99213 OFFICE O/P EST LOW 20 MIN: CPT | Performed by: STUDENT IN AN ORGANIZED HEALTH CARE EDUCATION/TRAINING PROGRAM

## 2023-03-22 NOTE — PROGRESS NOTES
[facility] Medicine  History & Physical    SUBJECTIVE:     Chief Complaint:   Chief Complaint   Patient presents with    Follow-up     Leg swelling is better, but off/on       History of Present Illness:  62 y.o. female who  has a past medical history of Hypertension and Tendonitis. presents to clinic today for follow up related to hypertension and leg swelling. Patient states that the swelling is improved but still comes and goes. Patient denies shortness of breath, chest pain, leg pain.      Allergies:  Review of patient's allergies indicates:  No Known Allergies    Home Medications:  Current Outpatient Medications on File Prior to Visit   Medication Sig    ascorbic acid, vitamin C, (VITAMIN C) 500 MG tablet Take 500 mg by mouth once daily.    ferrous sulfate (FEOSOL) 325 mg (65 mg iron) Tab tablet Take 1 tablet (325 mg total) by mouth once daily.    hydroCHLOROthiazide (HYDRODIURIL) 25 MG tablet Take 1 tablet (25 mg total) by mouth once daily.    losartan (COZAAR) 25 MG tablet Take 1 tablet (25 mg total) by mouth once daily.    rivaroxaban (XARELTO) 20 mg Tab Take 1 tablet (20 mg total) by mouth before dinner.     No current facility-administered medications on file prior to visit.       Past Medical History:   Diagnosis Date    Hypertension     Tendonitis      Past Surgical History:   Procedure Laterality Date    HEEL SPUR SURGERY Left 02/14/2013    HYSTERECTOMY  10/2009    WHITNEY    OOPHORECTOMY  10/2009    Bilateral    THROMBECTOMY N/A 6/1/2022    Procedure: THROMBECTOMY;  Surgeon: Kendall Fiore MD;  [facility] CATH LAB/EP;  Service: Cardiology;  Laterality: N/A;     Family History   Problem Relation Age of Onset    Hypertension Mother     Breast cancer Maternal Cousin 51    Breast cancer Maternal Aunt     Breast cancer Paternal Aunt     Colon cancer Neg Hx     Ovarian cancer Neg Hx      Social History     Tobacco Use    Smoking status: Never    Smokeless tobacco: Never   Substance Use Topics    Alcohol  use: No    Drug use: No        Review of Systems   Constitutional: Negative.    HENT:  Negative for congestion and sore throat.    Respiratory:  Negative for cough, hemoptysis, shortness of breath and wheezing.    Cardiovascular:  Positive for leg swelling. Negative for chest pain and palpitations.   Musculoskeletal:  Negative for myalgias.   Skin: Negative.    Neurological: Negative.    Psychiatric/Behavioral: Negative.        OBJECTIVE:     Vital Signs:  Pulse: 74 (03/22/23 1436)  BP: (!) 147/92 (03/22/23 1436)  Body mass index is 36.24 kg/m².  Physical Exam  Constitutional:       Appearance: She is obese.   HENT:      Head: Normocephalic.      Right Ear: External ear normal.      Left Ear: External ear normal.      Nose: Nose normal.      Mouth/Throat:      Mouth: Mucous membranes are moist.   Eyes:      Extraocular Movements: Extraocular movements intact.      Pupils: Pupils are equal, round, and reactive to light.   Cardiovascular:      Rate and Rhythm: Normal rate.   Pulmonary:      Effort: Pulmonary effort is normal.   Abdominal:      Palpations: Abdomen is soft.   Musculoskeletal:         General: No swelling or tenderness.      Cervical back: Normal range of motion.      Right lower leg: No edema.      Left lower leg: No edema.   Skin:     General: Skin is warm.      Capillary Refill: Capillary refill takes less than 2 seconds.   Neurological:      General: No focal deficit present.      Mental Status: She is alert and oriented to person, place, and time.   Psychiatric:         Mood and Affect: Mood normal.       Laboratory:  Hemoglobin A1C   Date Value Ref Range Status   06/01/2022 6.4 (H) 4.0 - 5.6 % Final     Comment:     ADA Screening Guidelines:  5.7-6.4%  Consistent with prediabetes  >or=6.5%  Consistent with diabetes    High levels of fetal hemoglobin interfere with the HbA1C  assay. Heterozygous hemoglobin variants (HbS, HgC, etc)do  not significantly interfere with this assay.   However, presence  of multiple variants may affect accuracy.         A/P:  Mehreen was seen today for follow-up. Patient states that she is out of breath and does not think the BP measurement is accurate. Leg swelling appears to be gravity dependent edema. Discussed elevating legs at night and when resting. Recommended compression stockings. Will message  to see if patient can be set up with a colonoscopy.    Diagnoses and all orders for this visit:    Leg swelling    Screening for malignant neoplasm of colon    Primary hypertension        Follow up in about 3 months (around 6/22/2023).      Abdiel Rdz MD  [facility] Family Medicine PGY-2  03/28/2023

## 2023-03-29 NOTE — PROGRESS NOTES
I assume primary medical responsibility for this patient. I have reviewed the history, physical, and assessment & treatment plan with the resident and agree that the care is reasonable and necessary. This service has been performed by a resident without the presence of a teaching physician under the primary care exception. If necessary, an addendum of additional findings or evaluation beyond the resident documentation will be noted below.        Cassius Chavarria Jr., DO    [facility]

## 2023-03-31 ENCOUNTER — TELEPHONE (OUTPATIENT)
Dept: GASTROENTEROLOGY | Facility: CLINIC | Age: 63
End: 2023-03-31
Payer: MEDICAID

## 2023-03-31 NOTE — TELEPHONE ENCOUNTER
----- Message from Alexandra Benton sent at 3/31/2023  1:46 PM CDT -----  Regarding: Appt request  Dr. Kendell Gentile is referring patient for a screening colonoscopy. Could you please assist patient in scheduling the procedure?  Contact #: [phone]    Thank you!

## 2023-03-31 NOTE — TELEPHONE ENCOUNTER
Clinic appt scheduled with pt on Wednesday, April 12, 2023 at 9am from referral by Dr. Kay.  Pt given clinic address and repeated correctly.

## 2023-04-12 ENCOUNTER — OFFICE VISIT (OUTPATIENT)
Dept: GASTROENTEROLOGY | Facility: CLINIC | Age: 63
End: 2023-04-12
Payer: MEDICAID

## 2023-04-12 VITALS
WEIGHT: 195.44 LBS | HEART RATE: 73 BPM | BODY MASS INDEX: 36.9 KG/M2 | TEMPERATURE: 98 F | HEIGHT: 61 IN | SYSTOLIC BLOOD PRESSURE: 134 MMHG | DIASTOLIC BLOOD PRESSURE: 95 MMHG

## 2023-04-12 DIAGNOSIS — Z86.010 HISTORY OF COLON POLYPS: Primary | ICD-10-CM

## 2023-04-12 PROCEDURE — 99213 OFFICE O/P EST LOW 20 MIN: CPT | Mod: PBBFAC,PO | Performed by: INTERNAL MEDICINE

## 2023-04-12 RX ORDER — SODIUM, POTASSIUM,MAG SULFATES 17.5-3.13G
1 SOLUTION, RECONSTITUTED, ORAL ORAL DAILY
Qty: 1 KIT | Refills: 0 | Status: SHIPPED | OUTPATIENT
Start: 2023-04-12 | End: 2023-04-14

## 2023-04-12 NOTE — PROGRESS NOTES
"[facility]    CC: Colon cancer screening     HPI 62 y.o. female with history of HTN and PE (on Xarelto) presenting for surveillance colonoscopy. She had a colonoscopy in 2014 with removal of one 8 mm polyp (hyperplastic) with recommendations for 5-year follow-up colonoscopy. She denies any abdominal pain, decreased appetite or weight loss. She takes Xarelto for a PE that was diagnosed in 6/2022. She denies any family history of colon cancer.     Prior endoscopy:  Colonoscopy (9/19/2014): Diverticulosis in sigmoid and descending colon. 8 mm rectal polyp (Hyperplastic), Lipoma in the ascending colon; 5 year follow-up recommended     Past Medical History  HTN  PE (on Xarelto)    Physical Examination  BP (!) 134/95 (BP Location: Left arm, Patient Position: Sitting, BP Method: Medium (Automatic))   Pulse 73   Temp 97.7 °F (36.5 °C) (Oral)   Ht 5' 1" (1.549 m)   Wt 88.6 kg (195 lb 7 oz)   LMP  (LMP Unknown)   BMI 36.93 kg/m²   General appearance: alert, cooperative, no distress  Lungs: clear to auscultation bilaterally, no dullness to percussion bilaterally  Heart: regular rate and rhythm without rub; no displacement of the PMI   Abdomen: soft, non-tender; bowel sounds normoactive; no organomegaly      Assessment:   62 y.o. woman with:  History of colon polyp with need for surveillance colonscopy  PE on Xarelto    Plan:  - Colonoscopy on 4/27/2023  - Suprep ordered  - Hold Xarelto for 2 days prior to colonoscopy   - Reinterview next visit     Zachary Torres MD   [address], [address]   [place] [address] [phone]    "

## 2023-04-12 NOTE — PATIENT INSTRUCTIONS
Hold Xarelto 2 days prior to colonoscopy(Tuesday, 4/18/23)    SUPREP Instructions    [facility] [address]  Clinic Office [phone]  Endoscopy Lab [phone]    You are scheduled for a Colonoscopy with Dr. Torres  on Thursday, April 27, 2023 at [facility] in [place].    Check in at the Hospital -1st floor, Information desk.   Call [phone] to reschedule.    An adult friend/family member must come with you to drive you home.  You cannot drive, take a taxi, Uber/Lyft or bus to leave the Endoscopy Center alone.  If you do not have someone to drive you home, your test will be cancelled.     Please follow the directions of your doctor if you take any pills that thin your blood. If you take these meds: Aggrenox, Brilinta, Effient, Eliquis, Lovenox, Plavix, Pletal, Pradaxa, Ticilid, Xarelto or Coumadin, let the doctor's office know.    DON'T: On the morning of the test do not take insulin or pills for diabetes.     DO: On the morning of the test, do take any pills for blood pressure, heart, anti-rejection and or seizures with a small sip of water. Bring any inhalers with you.    To have a good prep, you must follow these instructions - please do not use the directions from the pharmacy.    The doctor will send a prescription for the SUPREP.      The Day Before the test:    You can only drink CLEAR LIQUIDS the whole day before your test.  You can't eat any food for the whole day.    You CAN have:  Water, Coffee or decaf coffee (no milk or cream)  Tea  Soft drinks - regular and sugar free  Jello (green or yellow)  Apple Juice, white grape juice, white cranberry juice  Gatorade, Power Aid, Crystal Light, Baljit Aid  Lemonade and Limeade  Bouillon, clear soup  Snowball, popsicles  YOU CAN'T DRINK ANYTHING RED, PURPLE ORANGE OR BLUE   YOU CAN'T DRINK ALCOHOL  ONLY DRINK WHAT IS ON THE LIST      At 5 pm the night before your test:    Pour the 1st bottle of SUPREP into the cup provided  in the box. Add water to the line on the cup and mix well.  Drink the whole cup and then drink 2 more full cups of water over 1 hour.  This can be easier to drink if it is cold. You can mix it 20 minutes ahead of time and place in the refrigerator before you drink it.  You must drink it within 30-45 minutes of mixing it.  Do NOT pour the drink over ice.  You can drink it with a straw.    The Day of the test - We will call you 2 days before your test to tell you what time to get there.    5 hours before you come to the hospital (this may be in the middle of the night)  Pour the 2nd bottle of SUPREP into the cup provided in the box. Add water to the line on the cup and mix well.  Drink the whole cup and then drink 2 more full cups of water over 1 hour.  It might be easier to drink if it is cold. You can mix it 20 minutes ahead of time and place in the refrigerator before you drink it.  You must drink it within 30-45 minutes of mixing it.  Do NOT pour the drink over ice.  You can drink it with a straw.    YOU CAN'T EAT OR DRINK ANYTHING ELSE ONCE YOU FINISH THE PREP    Leave all valuables and jewelry at home. You will be at the hospital for 2-4 hours.    Call the Endoscopy department at [phone] with any questions about your procedure.

## 2023-04-18 ENCOUNTER — TELEPHONE (OUTPATIENT)
Dept: FAMILY MEDICINE | Facility: HOSPITAL | Age: 63
End: 2023-04-18
Payer: MEDICAID

## 2023-04-18 NOTE — TELEPHONE ENCOUNTER
Spoke to patient in regards to her message ,about paperwork needed to be signed told her that  will be in tomorrow and that her paperwork will be completed told her I will call her once its done

## 2023-04-18 NOTE — TELEPHONE ENCOUNTER
----- Message from Gloria Onofre sent at 4/18/2023 10:28 AM CDT -----  Regarding: call pt back   Pt called to see if the Dr can give her a call back.. call back # [phone]

## 2023-04-24 ENCOUNTER — LAB VISIT (OUTPATIENT)
Dept: LAB | Facility: HOSPITAL | Age: 63
End: 2023-04-24
Attending: NURSE PRACTITIONER
Payer: MEDICAID

## 2023-04-24 DIAGNOSIS — R53.83 FATIGUE, UNSPECIFIED TYPE: ICD-10-CM

## 2023-04-24 LAB
ALBUMIN SERPL BCP-MCNC: 4.3 G/DL (ref 3.5–5.2)
ALP SERPL-CCNC: 80 U/L (ref 38–126)
ALT SERPL W/O P-5'-P-CCNC: 34 U/L (ref 10–44)
ANION GAP SERPL CALC-SCNC: 5 MMOL/L (ref 8–16)
AST SERPL-CCNC: 33 U/L (ref 15–46)
BASOPHILS # BLD AUTO: 0.05 K/UL (ref 0–0.2)
BASOPHILS NFR BLD: 0.8 % (ref 0–1.9)
BILIRUB SERPL-MCNC: 1.1 MG/DL (ref 0.1–1)
CALCIUM SERPL-MCNC: 9.1 MG/DL (ref 8.7–10.5)
CHLORIDE SERPL-SCNC: 103 MMOL/L (ref 95–110)
CO2 SERPL-SCNC: 28 MMOL/L (ref 23–29)
CREAT SERPL-MCNC: 1.14 MG/DL (ref 0.5–1.4)
DIFFERENTIAL METHOD: NORMAL
EOSINOPHIL # BLD AUTO: 0.1 K/UL (ref 0–0.5)
EOSINOPHIL NFR BLD: 2.1 % (ref 0–8)
ERYTHROCYTE [DISTWIDTH] IN BLOOD BY AUTOMATED COUNT: 12.8 % (ref 11.5–14.5)
EST. GFR  (NO RACE VARIABLE): 55.1 ML/MIN/1.73 M^2
FERRITIN SERPL-MCNC: 91 NG/ML (ref 20–300)
GLUCOSE SERPL-MCNC: 105 MG/DL (ref 70–110)
HCT VFR BLD AUTO: 41.6 % (ref 37–48.5)
HGB BLD-MCNC: 13.4 G/DL (ref 12–16)
IMM GRANULOCYTES # BLD AUTO: 0.02 K/UL (ref 0–0.04)
IMM GRANULOCYTES NFR BLD AUTO: 0.3 % (ref 0–0.5)
IRON SERPL-MCNC: 80 UG/DL (ref 30–160)
LYMPHOCYTES # BLD AUTO: 3 K/UL (ref 1–4.8)
LYMPHOCYTES NFR BLD: 47.8 % (ref 18–48)
MCH RBC QN AUTO: 29.3 PG (ref 27–31)
MCHC RBC AUTO-ENTMCNC: 32.2 G/DL (ref 32–36)
MCV RBC AUTO: 91 FL (ref 82–98)
MONOCYTES # BLD AUTO: 0.6 K/UL (ref 0.3–1)
MONOCYTES NFR BLD: 10 % (ref 4–15)
NEUTROPHILS # BLD AUTO: 2.4 K/UL (ref 1.8–7.7)
NEUTROPHILS NFR BLD: 39 % (ref 38–73)
NRBC BLD-RTO: 0 /100 WBC
PLATELET # BLD AUTO: 296 K/UL (ref 150–450)
PMV BLD AUTO: 10.6 FL (ref 9.2–12.9)
POTASSIUM SERPL-SCNC: 4.2 MMOL/L (ref 3.5–5.1)
PROT SERPL-MCNC: 8.2 G/DL (ref 6–8.4)
RBC # BLD AUTO: 4.57 M/UL (ref 4–5.4)
SATURATED IRON: 23 % (ref 20–50)
SODIUM SERPL-SCNC: 136 MMOL/L (ref 136–145)
TOTAL IRON BINDING CAPACITY: 349 UG/DL (ref 250–450)
TRANSFERRIN SERPL-MCNC: 236 MG/DL (ref 200–375)
UUN UR-MCNC: 18 MG/DL (ref 7–17)
WBC # BLD AUTO: 6.17 K/UL (ref 3.9–12.7)

## 2023-04-24 PROCEDURE — 80053 COMPREHEN METABOLIC PANEL: CPT | Mod: PO | Performed by: NURSE PRACTITIONER

## 2023-04-24 PROCEDURE — 85025 COMPLETE CBC W/AUTO DIFF WBC: CPT | Mod: PO | Performed by: NURSE PRACTITIONER

## 2023-04-24 PROCEDURE — 84466 ASSAY OF TRANSFERRIN: CPT | Mod: PO | Performed by: NURSE PRACTITIONER

## 2023-04-24 PROCEDURE — 36415 COLL VENOUS BLD VENIPUNCTURE: CPT | Mod: PO | Performed by: NURSE PRACTITIONER

## 2023-04-24 PROCEDURE — 82728 ASSAY OF FERRITIN: CPT | Performed by: NURSE PRACTITIONER

## 2023-04-24 NOTE — H&P
[facility]     CC: Colon cancer screening      HPI 62 y.o. female with history of HTN and PE (on Xarelto) presenting for surveillance colonoscopy. She had a colonoscopy in 2014 with removal of one 8 mm polyp (hyperplastic) with recommendations for 5-year follow-up colonoscopy. She denies any abdominal pain, decreased appetite or weight loss. She takes Xarelto for a PE that was diagnosed in 6/2022. She denies any family history of colon cancer.      Prior endoscopy:  Colonoscopy (9/19/2014): Diverticulosis in sigmoid and descending colon. 8 mm rectal polyp (Hyperplastic), Lipoma in the ascending colon; 5 year follow-up recommended      Past Medical History  HTN  PE (on Xarelto)     Physical Examination  There were no vitals filed for this visit.    General appearance: alert, cooperative, no distress  Lungs: clear to auscultation bilaterally, no dullness to percussion bilaterally  Heart: regular rate and rhythm without rub; no displacement of the PMI   Abdomen: soft, non-tender; bowel sounds normoactive; no organomegaly        Assessment:   62 y.o. woman with:  History of colon polyp with need for surveillance colonscopy  PE on Xarelto     Plan:  - Colonoscopy today (Xarelto held for 2 days)      Zachary Torres MD   [address]   [place] [address] [phone]

## 2023-04-24 NOTE — PROGRESS NOTES
PATIENT: Mehreen Benites  MRN: [id number]  DATE: 4/25/2023    Chief Complaint: PE, DVT    Subjective:     History of Present Illness:   HPI as per Dr Brandt's 8/8/22 office visit, reviewed and verified with pt    She presented to the ED 05/31/2022 with sudden onset lightheadedness, weakness and dyspnea - found to have massive saddle pulmonary embolism with right heart strain and partially occlusive thrombus in the right popliteal vein.  No recent surgeries, immobilizations, long trips or prior history of DVT.    She underwent mechanical thrombectomy because of massive PE causing right heart strain.    Family hx significant for son (40 yo) with DVT/PE and maternal first cousin (41 yo) with DVT/PE.    doing well on xarelto    INTERVAL  - pt here for dvt/pe follow up  - states she is feeling well with no chest pain, sob/ortiz, lightheadedness or dizziness, easy bleeding or bruising.   - has been walking everyday 3 blocks, rests, then does 1-2 blocks. Has to stop after that due to right ankle swelling or pain. States swelling has been going on for past month to right ankle. Denies trauma.   - reports depression resolved and fatigue resolved, wants to get back to work now but being held back she states from pain and swelling of right ankle.   - she is continuing to tolerate xarelto well at 20mg daily. Denies bleeding issues.  - colonoscopy scheduled for 4/27/23 with Dr Torres, will hold xarelto 2 days prior    Past Medical, Surgical, Family and Social History Reviewed.    Medications and Allergies reviewed    Review of Systems   Constitutional:  Negative for fatigue, fever and unexpected weight change.   HENT:  Negative for mouth sores and nosebleeds.    Respiratory:  Negative for chest tightness and shortness of breath.    Cardiovascular:  Negative for chest pain, palpitations and leg swelling.   Gastrointestinal:  Negative for abdominal pain, blood in stool, constipation, diarrhea, nausea and vomiting.   Genitourinary:   Negative for hematuria.   Musculoskeletal:  Negative for arthralgias.        Right ankle swelling and pain   Skin:  Negative for pallor.   Neurological:  Negative for dizziness, weakness and light-headedness.   Hematological:  Negative for adenopathy. Does not bruise/bleed easily.   Psychiatric/Behavioral:  Negative for suicidal ideas.    All other systems reviewed and are negative.    Objective:     Vitals:    04/25/23 0847   BP: (!) 156/96   BP Location: Right arm   Patient Position: Sitting   BP Method: Medium (Automatic)   Pulse: 87   Resp: 18   SpO2: 97%   Weight: 88.3 kg (194 lb 10.7 oz)       BMI: Body mass index is 36.78 kg/m².    Physical Exam  Vitals and nursing note reviewed.   Constitutional:       General: She is not in acute distress.  HENT:      Head: Normocephalic and atraumatic.      Right Ear: External ear normal.      Left Ear: External ear normal.   Eyes:      General: No scleral icterus.     Pupils: Pupils are equal, round, and reactive to light.   Cardiovascular:      Rate and Rhythm: Normal rate and regular rhythm.      Pulses: Normal pulses.      Heart sounds: Normal heart sounds. No murmur heard.  Pulmonary:      Effort: Pulmonary effort is normal. No respiratory distress.      Breath sounds: Normal breath sounds.   Abdominal:      General: Bowel sounds are normal. There is no distension.      Palpations: Abdomen is soft.      Tenderness: There is no abdominal tenderness. There is no guarding.   Musculoskeletal:         General: Swelling present. No tenderness. Normal range of motion.      Cervical back: Normal range of motion and neck supple. No rigidity.      Right lower leg: No edema.      Left lower leg: No edema.      Comments: Pt with generalized mild swelling right ankle, medial ankle ttp on distal tib, normal arom and full weight bearing with steady gait. Negative homans.    Lymphadenopathy:      Cervical: No cervical adenopathy.   Skin:     General: Skin is warm and dry.       Coloration: Skin is not jaundiced or pale.      Findings: No bruising or erythema.   Neurological:      Mental Status: She is alert and oriented to person, place, and time. Mental status is at baseline.      Gait: Gait normal.   Psychiatric:         Attention and Perception: Attention normal.         Mood and Affect: Mood normal. Mood is not depressed.         Speech: Speech normal.         Behavior: Behavior normal. Behavior is cooperative.         Thought Content: Thought content normal.     ECOG SCORE           LABS  Lab Results   Component Value Date    WBC 6.17 04/24/2023    HGB 13.4 04/24/2023    HCT 41.6 04/24/2023    MCV 91 04/24/2023     04/24/2023       Lab Results   Component Value Date    DDIMER 0.31 09/27/2022     IMAGING    US Lower Extremity Veins Bilat (2/13/23)  Impression:     No evidence of deep venous thrombosis in either lower extremity.       US Lower Extremity Veins Right 9/27/22  Impression:  No evidence of deep venous thrombosis in the right lower extremity.   Interval resolution of previously identified thrombus within the right popliteal vein.    CTA Chest Non-Coronary(PE Studies) 9/27/22  Impression:     1. Interval improvement of prior bilateral pulmonary arterial thromboemboli.  No residual central pulmonary thrombus identified.  Few small filling defects within segmental arteries potentially representing mild residual thrombus.  2. Mosaic attenuation of the bilateral pulmonary parenchyma which may represent increased pulmonary vascular resistance or small airways disease.  3. Mild cardiomegaly.  4. Additional findings as above.             Assessment:       1. Pulmonary embolism, bilateral    2. History of DVT (deep vein thrombosis)    3. Essential hypertension    4. Stage 3a chronic kidney disease    5. SEDRICK (acute kidney injury)    6. Fatigue, unspecified type    7. Iron deficiency anemia, unspecified iron deficiency anemia type              Plan:   Past records including  "clinic and ED visits, labs, imaging, medications reviewed as available in epic    Massive unprovoked pulmonary embolism  -1st episode, unprovoked, required mechanical thrombectomy  -positive family history blood clots  -she clearly has a hypercoagulable state based on her clinical presentation  -no need hypercoagulable workup  -continue Xarelto at full dose  -indefinite anticoagulation needed  -9/29/22 follow up with repeat US RLE (9/27/22) showing resolution of right popliteal vein thrombus, CTA chest (9/27/22) "Interval improvement of prior bilateral pulmonary arterial thromboemboli.  No residual central pulmonary thrombus identified.  Few small filling defects within segmental arteries potentially representing mild residual thrombus."  -9/27/22 d-dimer neg  -reports she has adequate xarelto at home and her pcp will prescribe, advised to call if any of this changes or pcp unavailable  -CTA chest 1/17/23 "No acute cardiopulmonary disease.  Specifically, no residual filling defects are seen within the pulmonary arteries."  -Negative BLE US 2/13/23  -Has had right ankle swelling and pain x1 month, nontraumatic, will order xray right ankle and repeat RLE US  -will continue Xarelto, will consider reduction at 3 month follow up      HTN  -bp 156/96 today, denies associated symptoms of elevated bp, took bp medication this am an hour prior clinic appt, reports compliance  -encouraged home monitoring and close pcp follow up, educated on effects of htn on kidney health  -management deferred to pcp    CKD stage 3a, SEDRICK history  -GFR 55.1 (4/24/23) which is Improved from GFR 47 (9/27/22)  -educated on kidney health  -management deferred to pcp    Fatigue  -currently resolved along with depression  -iron studies 4/24/23 normal, no karen evidence          Ceci Herrera, NAHOMI-C  [facility]  Hematology/Oncology  [address]  [place]  [address] [phone]            "

## 2023-04-25 ENCOUNTER — TELEPHONE (OUTPATIENT)
Dept: FAMILY MEDICINE | Facility: CLINIC | Age: 63
End: 2023-04-25
Payer: MEDICAID

## 2023-04-25 ENCOUNTER — OFFICE VISIT (OUTPATIENT)
Dept: HEMATOLOGY/ONCOLOGY | Facility: CLINIC | Age: 63
End: 2023-04-25
Payer: MEDICAID

## 2023-04-25 ENCOUNTER — TELEPHONE (OUTPATIENT)
Dept: ENDOSCOPY | Facility: HOSPITAL | Age: 63
End: 2023-04-25
Payer: MEDICAID

## 2023-04-25 VITALS
WEIGHT: 194.69 LBS | SYSTOLIC BLOOD PRESSURE: 156 MMHG | RESPIRATION RATE: 18 BRPM | HEART RATE: 87 BPM | OXYGEN SATURATION: 97 % | BODY MASS INDEX: 36.78 KG/M2 | DIASTOLIC BLOOD PRESSURE: 96 MMHG

## 2023-04-25 DIAGNOSIS — M25.471 PAIN AND SWELLING OF RIGHT ANKLE: ICD-10-CM

## 2023-04-25 DIAGNOSIS — I26.99 PULMONARY EMBOLISM, BILATERAL: Primary | ICD-10-CM

## 2023-04-25 DIAGNOSIS — I10 ESSENTIAL HYPERTENSION: ICD-10-CM

## 2023-04-25 DIAGNOSIS — N18.31 STAGE 3A CHRONIC KIDNEY DISEASE: ICD-10-CM

## 2023-04-25 DIAGNOSIS — N17.9 AKI (ACUTE KIDNEY INJURY): ICD-10-CM

## 2023-04-25 DIAGNOSIS — M25.571 PAIN AND SWELLING OF RIGHT ANKLE: ICD-10-CM

## 2023-04-25 DIAGNOSIS — Z86.718 HISTORY OF DVT (DEEP VEIN THROMBOSIS): ICD-10-CM

## 2023-04-25 DIAGNOSIS — R53.83 FATIGUE, UNSPECIFIED TYPE: ICD-10-CM

## 2023-04-25 PROCEDURE — 4010F PR ACE/ARB THEARPY RXD/TAKEN: ICD-10-PCS | Mod: CPTII,,, | Performed by: NURSE PRACTITIONER

## 2023-04-25 PROCEDURE — 3080F DIAST BP >= 90 MM HG: CPT | Mod: CPTII,,, | Performed by: NURSE PRACTITIONER

## 2023-04-25 PROCEDURE — 4010F ACE/ARB THERAPY RXD/TAKEN: CPT | Mod: CPTII,,, | Performed by: NURSE PRACTITIONER

## 2023-04-25 PROCEDURE — 99999 PR PBB SHADOW E&M-EST. PATIENT-LVL IV: ICD-10-PCS | Mod: PBBFAC,,, | Performed by: NURSE PRACTITIONER

## 2023-04-25 PROCEDURE — 3008F PR BODY MASS INDEX (BMI) DOCUMENTED: ICD-10-PCS | Mod: CPTII,,, | Performed by: NURSE PRACTITIONER

## 2023-04-25 PROCEDURE — 1160F PR REVIEW ALL MEDS BY PRESCRIBER/CLIN PHARMACIST DOCUMENTED: ICD-10-PCS | Mod: CPTII,,, | Performed by: NURSE PRACTITIONER

## 2023-04-25 PROCEDURE — 99999 PR PBB SHADOW E&M-EST. PATIENT-LVL IV: CPT | Mod: PBBFAC,,, | Performed by: NURSE PRACTITIONER

## 2023-04-25 PROCEDURE — 1160F RVW MEDS BY RX/DR IN RCRD: CPT | Mod: CPTII,,, | Performed by: NURSE PRACTITIONER

## 2023-04-25 PROCEDURE — 99214 OFFICE O/P EST MOD 30 MIN: CPT | Mod: PBBFAC,PO | Performed by: NURSE PRACTITIONER

## 2023-04-25 PROCEDURE — 99215 PR OFFICE/OUTPT VISIT, EST, LEVL V, 40-54 MIN: ICD-10-PCS | Mod: S$PBB,,, | Performed by: NURSE PRACTITIONER

## 2023-04-25 PROCEDURE — 3080F PR MOST RECENT DIASTOLIC BLOOD PRESSURE >= 90 MM HG: ICD-10-PCS | Mod: CPTII,,, | Performed by: NURSE PRACTITIONER

## 2023-04-25 PROCEDURE — 1159F MED LIST DOCD IN RCRD: CPT | Mod: CPTII,,, | Performed by: NURSE PRACTITIONER

## 2023-04-25 PROCEDURE — 1159F PR MEDICATION LIST DOCUMENTED IN MEDICAL RECORD: ICD-10-PCS | Mod: CPTII,,, | Performed by: NURSE PRACTITIONER

## 2023-04-25 PROCEDURE — 3077F SYST BP >= 140 MM HG: CPT | Mod: CPTII,,, | Performed by: NURSE PRACTITIONER

## 2023-04-25 PROCEDURE — 3008F BODY MASS INDEX DOCD: CPT | Mod: CPTII,,, | Performed by: NURSE PRACTITIONER

## 2023-04-25 PROCEDURE — 99215 OFFICE O/P EST HI 40 MIN: CPT | Mod: S$PBB,,, | Performed by: NURSE PRACTITIONER

## 2023-04-25 PROCEDURE — 3077F PR MOST RECENT SYSTOLIC BLOOD PRESSURE >= 140 MM HG: ICD-10-PCS | Mod: CPTII,,, | Performed by: NURSE PRACTITIONER

## 2023-04-25 NOTE — TELEPHONE ENCOUNTER
Spoke with patient about arrival time @ 0800.   Colon    Prep instructions reviewed: the day before the procedure, follow a clear liquid diet all day, then start the first 1/2 of prep at 5pm and take 2nd 1/2 of prep @ 0300.  Pt must be completely NPO when prep completed @ 0500.              Medications: Do not take Insulin or oral diabetic medications the day of the procedure.  Take as prescribed: heart, seizure and blood pressure medication in the morning with a sip of water (less than an ounce).  Take any breathing medications and bring inhalers to hospital with you Leave all valuables and jewelry at home.    Wear comfortable clothes to procedure to change into hospital gown You cannot drive for 24 hours after your procedure because you will receive sedation for your procedure to make you comfortable.  A ride must be provided at discharge.

## 2023-04-25 NOTE — TELEPHONE ENCOUNTER
----- Message from Baljeet Ley sent at 4/25/2023 10:01 AM CDT -----  Type:  Needs Medical Advice    Who Called: pt  Would the patient rather a call back or a response via [facility]? call  Best Call Back Number: [phone]  Additional Information: pt would like call  to know if she can have pineapple popsicle due to being on liquid restriction  please call pt

## 2023-04-27 ENCOUNTER — HOSPITAL ENCOUNTER (OUTPATIENT)
Facility: HOSPITAL | Age: 63
Discharge: HOME | End: 2023-04-27
Attending: INTERNAL MEDICINE | Admitting: INTERNAL MEDICINE
Payer: MEDICAID

## 2023-04-27 ENCOUNTER — ANESTHESIA EVENT (OUTPATIENT)
Dept: ENDOSCOPY | Facility: HOSPITAL | Age: 63
End: 2023-04-27
Payer: MEDICAID

## 2023-04-27 ENCOUNTER — ANESTHESIA (OUTPATIENT)
Dept: ENDOSCOPY | Facility: HOSPITAL | Age: 63
End: 2023-04-27
Payer: MEDICAID

## 2023-04-27 VITALS
TEMPERATURE: 98 F | OXYGEN SATURATION: 100 % | HEIGHT: 60 IN | WEIGHT: 187 LBS | HEART RATE: 74 BPM | DIASTOLIC BLOOD PRESSURE: 83 MMHG | BODY MASS INDEX: 36.71 KG/M2 | RESPIRATION RATE: 20 BRPM | SYSTOLIC BLOOD PRESSURE: 111 MMHG

## 2023-04-27 DIAGNOSIS — K63.5 POLYP OF COLON, UNSPECIFIED PART OF COLON, UNSPECIFIED TYPE: ICD-10-CM

## 2023-04-27 DIAGNOSIS — Z86.010 HISTORY OF COLON POLYPS: Primary | ICD-10-CM

## 2023-04-27 PROCEDURE — D9220A PRA ANESTHESIA: Mod: 33,ANES,, | Performed by: ANESTHESIOLOGY

## 2023-04-27 PROCEDURE — 45385 COLONOSCOPY W/LESION REMOVAL: CPT | Performed by: INTERNAL MEDICINE

## 2023-04-27 PROCEDURE — D9220A PRA ANESTHESIA: ICD-10-PCS | Mod: 33,ANES,, | Performed by: ANESTHESIOLOGY

## 2023-04-27 PROCEDURE — 88305 TISSUE EXAM BY PATHOLOGIST: ICD-10-PCS | Mod: 26,,, | Performed by: STUDENT IN AN ORGANIZED HEALTH CARE EDUCATION/TRAINING PROGRAM

## 2023-04-27 PROCEDURE — D9220A PRA ANESTHESIA: Mod: 33,CRNA,, | Performed by: NURSE ANESTHETIST, CERTIFIED REGISTERED

## 2023-04-27 PROCEDURE — 88305 TISSUE EXAM BY PATHOLOGIST: CPT | Performed by: STUDENT IN AN ORGANIZED HEALTH CARE EDUCATION/TRAINING PROGRAM

## 2023-04-27 PROCEDURE — 88305 TISSUE EXAM BY PATHOLOGIST: CPT | Mod: 26,,, | Performed by: STUDENT IN AN ORGANIZED HEALTH CARE EDUCATION/TRAINING PROGRAM

## 2023-04-27 PROCEDURE — 37000009 HC ANESTHESIA EA ADD 15 MINS: Performed by: INTERNAL MEDICINE

## 2023-04-27 PROCEDURE — 37000008 HC ANESTHESIA 1ST 15 MINUTES: Performed by: INTERNAL MEDICINE

## 2023-04-27 PROCEDURE — D9220A PRA ANESTHESIA: ICD-10-PCS | Mod: 33,CRNA,, | Performed by: NURSE ANESTHETIST, CERTIFIED REGISTERED

## 2023-04-27 PROCEDURE — 00811 ANES LWR INTST NDSC NOS: CPT | Performed by: INTERNAL MEDICINE

## 2023-04-27 PROCEDURE — 25000003 PHARM REV CODE 250: Performed by: NURSE ANESTHETIST, CERTIFIED REGISTERED

## 2023-04-27 PROCEDURE — 63600175 PHARM REV CODE 636 W HCPCS: Performed by: NURSE ANESTHETIST, CERTIFIED REGISTERED

## 2023-04-27 PROCEDURE — 27201089 HC SNARE, DISP (ANY): Performed by: INTERNAL MEDICINE

## 2023-04-27 PROCEDURE — 25000003 PHARM REV CODE 250: Performed by: INTERNAL MEDICINE

## 2023-04-27 RX ORDER — SODIUM CHLORIDE 9 MG/ML
INJECTION, SOLUTION INTRAVENOUS CONTINUOUS
Status: DISCONTINUED | OUTPATIENT
Start: 2023-04-27 | End: 2023-04-27 | Stop reason: HOSPADM

## 2023-04-27 RX ORDER — SODIUM CHLORIDE 0.9 % (FLUSH) 0.9 %
10 SYRINGE (ML) INJECTION
Status: DISCONTINUED | OUTPATIENT
Start: 2023-04-27 | End: 2023-04-27 | Stop reason: HOSPADM

## 2023-04-27 RX ORDER — PROPOFOL 10 MG/ML
VIAL (ML) INTRAVENOUS
Status: DISCONTINUED | OUTPATIENT
Start: 2023-04-27 | End: 2023-04-27

## 2023-04-27 RX ORDER — LIDOCAINE HYDROCHLORIDE 20 MG/ML
INJECTION INTRAVENOUS
Status: DISCONTINUED | OUTPATIENT
Start: 2023-04-27 | End: 2023-04-27

## 2023-04-27 RX ORDER — PHENYLEPHRINE HYDROCHLORIDE 10 MG/ML
INJECTION INTRAVENOUS
Status: DISCONTINUED | OUTPATIENT
Start: 2023-04-27 | End: 2023-04-27

## 2023-04-27 RX ADMIN — LIDOCAINE HYDROCHLORIDE 100 MG: 20 INJECTION, SOLUTION INTRAVENOUS at 09:04

## 2023-04-27 RX ADMIN — SODIUM CHLORIDE: 0.9 INJECTION, SOLUTION INTRAVENOUS at 09:04

## 2023-04-27 RX ADMIN — SODIUM CHLORIDE: 0.9 INJECTION, SOLUTION INTRAVENOUS at 08:04

## 2023-04-27 RX ADMIN — PHENYLEPHRINE HYDROCHLORIDE 200 MCG: 10 INJECTION INTRAVENOUS at 09:04

## 2023-04-27 RX ADMIN — PROPOFOL 150 MCG/KG/MIN: 10 INJECTION, EMULSION INTRAVENOUS at 09:04

## 2023-04-27 NOTE — TRANSFER OF CARE
Anesthesia Transfer of Care Note    Patient: Mehreen Benites    Procedure(s) Performed: Procedure(s) (LRB):  COLONOSCOPY (N/A)    Patient location: GI    Anesthesia Type: general    Transport from OR: Transported from OR on room air with adequate spontaneous ventilation    Post pain: adequate analgesia    Post assessment: no apparent anesthetic complications and tolerated procedure well    Post vital signs: stable    Level of consciousness: responds to stimulation    Nausea/Vomiting: no nausea/vomiting    Complications: none    Transfer of care protocol was followed      Last vitals:   Visit Vitals  BP (!) 174/89 (BP Location: Left arm, Patient Position: Lying)   Pulse 89   Temp 36.5 °C (97.7 °F) (Temporal)   Resp 18   Ht 5' (1.524 m)   Wt 84.8 kg (187 lb)   LMP  (LMP Unknown)   SpO2 97%   Breastfeeding No   BMI 36.52 kg/m²

## 2023-04-27 NOTE — ANESTHESIA POSTPROCEDURE EVALUATION
Anesthesia Post Evaluation    Patient: Mehreen Benites    Procedure(s) Performed: Procedure(s) (LRB):  COLONOSCOPY (N/A)    Final Anesthesia Type: general      Patient location during evaluation: PACU  Patient participation: Yes- Able to Participate  Level of consciousness: awake and alert  Post-procedure vital signs: reviewed and stable  Pain management: adequate  Airway patency: patent    PONV status at discharge: No PONV  Anesthetic complications: no      Cardiovascular status: stable  Respiratory status: spontaneous ventilation  Hydration status: euvolemic  Follow-up not needed.          Vitals Value Taken Time   /83 04/27/23 1027   Temp 36.6 °C (97.9 °F) 04/27/23 1005   Pulse 74 04/27/23 1027   Resp 20 04/27/23 1027   SpO2 100 % 04/27/23 1027         Event Time   Out of Recovery 10:43:05         Pain/Viky Score: Viky Score: 9 (4/27/2023 10:01 AM)

## 2023-04-27 NOTE — PROVATION PATIENT INSTRUCTIONS
Discharge Summary/Instructions after an Endoscopic Procedure  Patient Name: Mehreen Benites  Patient MRN: [id number]  Patient YOB: 1960 Thursday, April 27, 2023  Zachary Torres MD  Dear patient,  As a result of recent federal legislation (The Federal Cures Act), you may   receive lab or pathology results from your procedure in your [facility]   account before your physician is able to contact you. Your physician or   their representative will relay the results to you with their   recommendations at their soonest availability.  Thank you,  Your health is very important to us during the Covid Crisis. Following your   procedure today, you will receive a daily text for 2 weeks asking about   signs or symptoms of Covid 19.  Please respond to this text when you   receive it so we can follow up and keep you as safe as possible.   RESTRICTIONS:  During your procedure today, you received medications for sedation.  These   medications may affect your judgment, balance and coordination.  Therefore,   for 24 hours, you have the following restrictions:   - DO NOT drive a car, operate machinery, make legal/financial decisions,   sign important papers or drink alcohol.    ACTIVITY:  Today: no heavy lifting, straining or running due to procedural   sedation/anesthesia.  The following day: return to full activity including work.  DIET:  Eat and drink normally unless instructed otherwise.     TREATMENT FOR COMMON SIDE EFFECTS:  - Mild abdominal pain, nausea, belching, bloating or excessive gas:  rest,   eat lightly and use a heating pad.  - Sore Throat: treat with throat lozenges and/or gargle with warm salt   water.  - Because air was used during the procedure, expelling large amounts of air   from your rectum or belching is normal.  - If a bowel prep was taken, you may not have a bowel movement for 1-3 days.    This is normal.  SYMPTOMS TO WATCH FOR AND REPORT TO YOUR PHYSICIAN:  1. Abdominal pain or bloating, other than  gas cramps.  2. Chest pain.  3. Back pain.  4. Signs of infection such as: chills or fever occurring within 24 hours   after the procedure.  5. Rectal bleeding, which would show as bright red, maroon, or black stools.   (A tablespoon of blood from the rectum is not serious, especially if   hemorrhoids are present.)  6. Vomiting.  7. Weakness or dizziness.  GO DIRECTLY TO THE NEAREST EMERGENCY ROOM IF YOU HAVE ANY OF THE FOLLOWING:      Difficulty breathing              Chills and/or fever over 101 F   Persistent vomiting and/or vomiting blood   Severe abdominal pain   Severe chest pain   Black, tarry stools   Bleeding- more than one tablespoon   Any other symptom or condition that you feel may need urgent attention  Your doctor recommends these additional instructions:  If any biopsies were taken, your doctors clinic will contact you in 1 to 2   weeks with any results.  - Repeat colonoscopy in 10 years for screening purposes.   - Discharge to home  - Resume previous diet and medications  - Condition stable   - The signs and symptoms of potential delayed complications were discussed   with the patient. If signs or symptoms of these complications develop, call   the [facility] at 1 [phone].   - Return to normal activities tomorrow.  Written discharge instructions were   provided to the patient.   - Patient has a contact number available for emergencies.  The signs and   symptoms of potential delayed complications were discussed with the   patient.  Return to normal activities tomorrow.  Written discharge   instructions were provided to the patient.  For questions, problems or results please call your physician - Zachary Torres MD.  EMERGENCY PHONE NUMBER: [phone],  LAB RESULTS: [phone]  IF A COMPLICATION OR EMERGENCY SITUATION ARISES AND YOU ARE UNABLE TO REACH   YOUR PHYSICIAN - GO DIRECTLY TO THE EMERGENCY ROOM.  MD Zachary Milligan MD  4/27/2023 9:58:14 AM  This  report has been verified and signed electronically.  Dear patient,  As a result of recent federal legislation (The Federal Cures Act), you may   receive lab or pathology results from your procedure in your [facility]   account before your physician is able to contact you. Your physician or   their representative will relay the results to you with their   recommendations at their soonest availability.  Thank you,  PROVATION

## 2023-04-27 NOTE — ANESTHESIA PREPROCEDURE EVALUATION
04/27/2023   Mehreen Benites is a 62 y.o., female. For screening colonoscopy      Pre-op Assessment    I have reviewed the Patient Summary Reports.     I have reviewed the Nursing Notes. I have reviewed the NPO Status.   I have reviewed the Medications.     Review of Systems  Cardiovascular:   Hypertension        Physical Exam    Airway:  Mallampati: III   Mouth Opening: Normal  Neck ROM: Normal ROM        Anesthesia Plan  Type of Anesthesia, risks & benefits discussed:    Anesthesia Type: Gen Natural Airway  Intra-op Monitoring Plan: Standard ASA Monitors  Post Op Pain Control Plan: multimodal analgesia  Induction:  IV  Informed Consent: Informed consent signed with the Patient and all parties understand the risks and agree with anesthesia plan.  All questions answered.   ASA Score: 2  Day of Surgery Review of History & Physical: H&P Update referred to the surgeon/provider.  Anesthesia Plan Notes: Echo 2022 normal EF    Ready For Surgery From Anesthesia Perspective.     .

## 2023-05-01 ENCOUNTER — HOSPITAL ENCOUNTER (OUTPATIENT)
Dept: RADIOLOGY | Facility: HOSPITAL | Age: 63
Discharge: HOME | End: 2023-05-01
Attending: NURSE PRACTITIONER
Payer: MEDICAID

## 2023-05-01 ENCOUNTER — HOSPITAL ENCOUNTER (EMERGENCY)
Facility: HOSPITAL | Age: 63
Discharge: HOME | End: 2023-05-01
Attending: STUDENT IN AN ORGANIZED HEALTH CARE EDUCATION/TRAINING PROGRAM
Payer: MEDICAID

## 2023-05-01 VITALS
TEMPERATURE: 98 F | RESPIRATION RATE: 19 BRPM | DIASTOLIC BLOOD PRESSURE: 84 MMHG | BODY MASS INDEX: 36.71 KG/M2 | HEIGHT: 60 IN | SYSTOLIC BLOOD PRESSURE: 140 MMHG | OXYGEN SATURATION: 100 % | WEIGHT: 187 LBS | HEART RATE: 89 BPM

## 2023-05-01 DIAGNOSIS — M25.571 ACUTE RIGHT ANKLE PAIN: Primary | ICD-10-CM

## 2023-05-01 DIAGNOSIS — M25.471 PAIN AND SWELLING OF RIGHT ANKLE: ICD-10-CM

## 2023-05-01 DIAGNOSIS — M25.571 PAIN AND SWELLING OF RIGHT ANKLE: ICD-10-CM

## 2023-05-01 DIAGNOSIS — S82.54XA CLOSED NONDISPLACED FRACTURE OF MEDIAL MALLEOLUS OF RIGHT TIBIA, INITIAL ENCOUNTER: ICD-10-CM

## 2023-05-01 DIAGNOSIS — S82.54XA CLOSED NONDISPLACED FRACTURE OF MEDIAL MALLEOLUS OF RIGHT TIBIA, INITIAL ENCOUNTER: Primary | ICD-10-CM

## 2023-05-01 PROCEDURE — 93971 EXTREMITY STUDY: CPT | Mod: TC,PO,RT

## 2023-05-01 PROCEDURE — 73610 X-RAY EXAM OF ANKLE: CPT | Mod: TC,FY,PO,RT

## 2023-05-01 PROCEDURE — 93971 EXTREMITY STUDY: CPT | Mod: 26,RT,, | Performed by: RADIOLOGY

## 2023-05-01 PROCEDURE — 93971 US LOWER EXTREMITY VEINS RIGHT: ICD-10-PCS | Mod: 26,RT,, | Performed by: RADIOLOGY

## 2023-05-01 PROCEDURE — 99282 EMERGENCY DEPT VISIT SF MDM: CPT | Mod: 25,ER

## 2023-05-01 PROCEDURE — 73610 X-RAY EXAM OF ANKLE: CPT | Mod: 26,RT,, | Performed by: STUDENT IN AN ORGANIZED HEALTH CARE EDUCATION/TRAINING PROGRAM

## 2023-05-01 PROCEDURE — 73610 XR ANKLE COMPLETE 3 VIEW RIGHT: ICD-10-PCS | Mod: 26,RT,, | Performed by: STUDENT IN AN ORGANIZED HEALTH CARE EDUCATION/TRAINING PROGRAM

## 2023-05-01 NOTE — PROGRESS NOTES
X-Ray Ankle Complete 3 View Right    Result Date: 5/1/2023  EXAMINATION: XR ANKLE COMPLETE 3 VIEW RIGHT CLINICAL HISTORY: Pain in right ankle and joints of right foot TECHNIQUE: AP, lateral, and oblique images of the right ankle were performed. COMPARISON: None FINDINGS: Mild irregularity at the distal medial malleolus, questionable fracture.  Tiny corticated osseous fragments at the distal medial and lateral malleoli suggesting sequela of remote injury.  No acute displaced fracture.  No dislocation.  Small osteochondral injury at the medial talar dome.  Moderate midfoot degenerative changes.  Mild tibiotalar degenerative changes.  Prominent calcaneal enthesophytes.  Small unfused os trigonum.  Moderate ankle swelling.     Questionable tiny acute fracture at the medial malleolus..  Chronic findings as above. This report was flagged in Epic as abnormal. Electronically signed by: Axel Baker Date:    05/01/2023 Time:    13:36    US Lower Extremity Veins Right    Result Date: 5/1/2023  EXAMINATION: US LOWER EXTREMITY VEINS RIGHT CLINICAL HISTORY: Pain in right ankle and joints of right foot TECHNIQUE: Multiple static images are submitted for interpretation with color flow and spectral doppler imaging. COMPARISON: None FINDINGS: The veins in the right lower extremity are normal in appearance and have normal compressibility. There are normal venous waveforms seen in the right lower extremity with augmentation during the compression of the veins. The right common femoral vein has a velocity of 12 cm/sec.     Normal study. Electronically signed by: Axel Thao MD Date:    05/01/2023 Time:    13:40         Called pt and reviewed xray results with questionable fracture, she will go to urgent care for boot/splinting. Ortho referral placed.    Negative US for DVT ble.

## 2023-05-01 NOTE — ED NOTES
Review of patient's allergies indicates:  No Known Allergies     Patient has verified the spelling of the name and  on armband.   APPEARANCE: Patient is alert, calm, oriented x 4, and does not appear distressed.  SKIN: Skin is normal for race, warm, and dry. Normal skin turgor and mucous membranes moist.  CARDIAC: Normal rate and rhythm, no murmur heard.   RESPIRATORY:Normal rate and effort. Breath sounds clear bilaterally throughout chest. Respirations are equal and unlabored.    GASTRO: Bowel sounds normal, abdomen is soft, no tenderness, and no abdominal distention.  MUSCLE: Full ROM of bilateral ankles, but swelling noted to right ankle. No c/o pain at this time.   PERIPHERAL VASCULAR: peripheral pulses present. Normal cap refill. No edema. Warm to touch.  NEURO: 5/5 strength major flexors/extensors bilaterally. Sensory intact to light touch bilaterally. [place] coma scale: eyes open spontaneously-4, oriented & converses-5, obeys commands-6. No neurological abnormalities.   MENTAL STATUS: awake, alert and aware of environment.  EYE: No overt deficits noted. No drainage. Sclera WNL  ENT: EARS: no obvious drainage. NOSE: no active bleeding. THROAT: no redness or swelling.  : Voids without complication

## 2023-05-01 NOTE — ED PROVIDER NOTES
NAME:  Mehreen Benites  CSN:     [id number]  MRN:    [id number]  ADMIT DATE: 5/1/2023        eMERGENCY dEPARTMENT eNCOUnter    CHIEF COMPLAINT    Chief Complaint   Patient presents with    Foot Injury     Pt reports had xray done on foot and was told to come to ED for splint       HPI    Mehreen Benites is a 62 y.o. female with a past medical history of  has a past medical history of Hypertension, Other pulmonary embolism without acute cor pulmonale (2022), and Tendonitis.     she presents to the ED due to outpatient x-ray indicating medial malleolus fracture.  Unsure of when the injury occurred, however, states she is been walking on it with pain for 1 month.  Did have outpatient imaging done to assess this today and was notified that she had a fracture and she should be seen in an urgent care to be placed in a splint.  Orthopedic follow-up was placed.      HPI       PAST MEDICAL HISTORY  Past Medical History:   Diagnosis Date    Hypertension     Other pulmonary embolism without acute cor pulmonale 2022    Tendonitis        SURGICAL HISTORY    Past Surgical History:   Procedure Laterality Date    COLONOSCOPY N/A 4/27/2023    Procedure: COLONOSCOPY;  Surgeon: Zachary Torres MD;  Location: [facility] ENDO;  Service: Endoscopy;  Laterality: N/A;    HEEL SPUR SURGERY Left 02/14/2013    HYSTERECTOMY  10/2009    WHITNEY    OOPHORECTOMY  10/2009    Bilateral    THROMBECTOMY N/A 6/1/2022    Procedure: THROMBECTOMY;  Surgeon: Kendall Fiore MD;  Location: [facility] CATH LAB/EP;  Service: Cardiology;  Laterality: N/A;       FAMILY HISTORY    Family History   Problem Relation Age of Onset    Hypertension Mother     Breast cancer Maternal Cousin 51    Breast cancer Maternal Aunt     Breast cancer Paternal Aunt     Colon cancer Neg Hx     Ovarian cancer Neg Hx        SOCIAL HISTORY    Social History     Socioeconomic History    Marital status: Legally    Tobacco Use    Smoking status: Never    Smokeless tobacco: Never    Substance and Sexual Activity    Alcohol use: No    Drug use: No    Sexual activity: Yes     Partners: Male     Birth control/protection: Post-menopausal, See Surgical Hx     Comment:        MEDICATIONS  Current Outpatient Medications   Medication Instructions    ascorbic acid (vitamin C) (VITAMIN C) 500 mg, Oral, Daily    ferrous sulfate (FEOSOL) 325 mg, Oral, Daily    hydroCHLOROthiazide (HYDRODIURIL) 25 mg, Oral, Daily    losartan (COZAAR) 25 mg, Oral, Daily    rivaroxaban (XARELTO) 20 mg, Oral, Before dinner       ALLERGIES    Review of patient's allergies indicates:  No Known Allergies      REVIEW OF SYSTEMS   Review of Systems       PHYSICAL EXAM    Reviewed Triage Note    VITAL SIGNS:   ED Triage Vitals [05/01/23 1658]   Enc Vitals Group      BP (!) 148/95      Pulse 90      Resp 18      Temp 98.6 °F (37 °C)      Temp Source Oral      SpO2 97 %      Weight 187 lb      Height       Head Circumference       Peak Flow       Pain Score       Pain Loc       Pain Edu?       Excl. in GC?        Patient Vitals for the past 24 hrs:   BP Temp Temp src Pulse Resp SpO2 Weight   05/01/23 1658 (!) 148/95 98.6 °F (37 °C) Oral 90 18 97 % 84.8 kg (187 lb)       Physical Exam    Nursing note and vitals reviewed.  Constitutional: She appears well-developed and well-nourished.   HENT:   Head: Normocephalic and atraumatic.   Eyes: EOM are normal. Pupils are equal, round, and reactive to light.   Neck: Neck supple.   Normal range of motion.  Cardiovascular:  Normal rate and regular rhythm.           Pulmonary/Chest: Breath sounds normal. No respiratory distress.   Musculoskeletal:         General: Normal range of motion.      Cervical back: Normal range of motion and neck supple.      Comments: Bilateral lower extremities are symmetric.  No overlying skin changes.  No are vascularly intact distally.  No reproducible tenderness with palpation to the right medial malleolus.     Neurological: She is alert and oriented to  person, place, and time.   Skin: Skin is warm and dry.   Psychiatric: She has a normal mood and affect.          EKG     Interpreted by EM physician if performed:               LABS  Pertinent labs reviewed. (See chart for details)   Labs Reviewed - No data to display      RADIOLOGY          Imaging Results    None           PROCEDURES    Procedures      ED COURSE & MEDICAL DECISION MAKING    Pertinent Labs & Imaging studies reviewed. (See chart for details and specific orders.)        Summary of review of records:     Heme/onc NP earlier today :X-Ray Ankle Complete 3 View Right     Result Date: 5/1/2023  EXAMINATION: XR ANKLE COMPLETE 3 VIEW RIGHT CLINICAL HISTORY: Pain in right ankle and joints of right foot TECHNIQUE: AP, lateral, and oblique images of the right ankle were performed. COMPARISON: None FINDINGS: Mild irregularity at the distal medial malleolus, questionable fracture.  Tiny corticated osseous fragments at the distal medial and lateral malleoli suggesting sequela of remote injury.  No acute displaced fracture.  No dislocation.  Small osteochondral injury at the medial talar dome.  Moderate midfoot degenerative changes.  Mild tibiotalar degenerative changes.  Prominent calcaneal enthesophytes.  Small unfused os trigonum.  Moderate ankle swelling.      Questionable tiny acute fracture at the medial malleolus..  Chronic findings as above. This report was flagged in Epic as abnormal. Electronically signed by:       Axel Baker Date:                                           05/01/2023 Time:                                      13:36     US Lower Extremity Veins Right     Result Date: 5/1/2023  EXAMINATION: US LOWER EXTREMITY VEINS RIGHT CLINICAL HISTORY: Pain in right ankle and joints of right foot TECHNIQUE: Multiple static images are submitted for interpretation with color flow and spectral doppler imaging. COMPARISON: None FINDINGS: The veins in the right lower extremity are normal in appearance  and have normal compressibility. There are normal venous waveforms seen in the right lower extremity with augmentation during the compression of the veins. The right common femoral vein has a velocity of 12 cm/sec.      Normal study. Electronically signed by:   Axel Thao MD Date:                                          05/01/2023 Time:                                      13:40            Called pt and reviewed xray results with questionable fracture, she will go to urgent care for boot/splinting. Ortho referral placed.       MDM:  Mehreen Benites is a 62 y.o. female who presents for referral here for splinting for possible medial malleolus fracture.  Walking boot ordered.  Ortho referral has already been placed.  Management discussed and all questions addressed.        Medications - No data to display           FINAL IMPRESSION    Final diagnoses:  [S82.54XA] Closed nondisplaced fracture of medial malleolus of right tibia, initial encounter (Primary)       DISPOSITION  Patient discharged in stable condition        ED Prescriptions    None       Follow-up Information       Follow up With Specialties Details Why Contact Info    Abdiel Rdz MD Family Medicine Schedule an appointment as soon as possible for a visit   [address], [facility] [address]  [facility] [address] [phone]      [facility] - Emergency Dept Emergency Medicine  As needed, If symptoms worsen [address] [facility] [address] [phone]              DISCLAIMER: This note was prepared with M*Chat[employer] voice recognition transcription software. Garbled syntax, mangled pronouns, and other bizarre constructions may be attributed to that software system.      DO Nya Renee DO  05/01/23 1732

## 2023-05-01 NOTE — Clinical Note
Put a referral to ortho for pt (1st choice [facility], 2nd choice [facility]) for possible fracture on xray RLE. Please send message to .

## 2023-05-02 LAB
FINAL PATHOLOGIC DIAGNOSIS: NORMAL
Lab: NORMAL

## 2023-05-07 PROBLEM — Z86.010 HISTORY OF COLON POLYPS: Status: ACTIVE | Noted: 2023-05-07

## 2023-05-07 PROBLEM — Z86.0100 HISTORY OF COLON POLYPS: Status: ACTIVE | Noted: 2023-05-07

## 2023-05-25 ENCOUNTER — OFFICE VISIT (OUTPATIENT)
Dept: ORTHOPEDICS | Facility: CLINIC | Age: 63
End: 2023-05-25
Payer: MEDICAID

## 2023-05-25 ENCOUNTER — HOSPITAL ENCOUNTER (OUTPATIENT)
Dept: RADIOLOGY | Facility: HOSPITAL | Age: 63
Discharge: HOME | End: 2023-05-25
Attending: PHYSICIAN ASSISTANT
Payer: MEDICAID

## 2023-05-25 VITALS
DIASTOLIC BLOOD PRESSURE: 92 MMHG | OXYGEN SATURATION: 93 % | HEART RATE: 78 BPM | SYSTOLIC BLOOD PRESSURE: 118 MMHG | WEIGHT: 190.5 LBS | BODY MASS INDEX: 37.4 KG/M2 | HEIGHT: 60 IN

## 2023-05-25 DIAGNOSIS — M25.571 ACUTE RIGHT ANKLE PAIN: Primary | ICD-10-CM

## 2023-05-25 DIAGNOSIS — M25.571 ACUTE RIGHT ANKLE PAIN: ICD-10-CM

## 2023-05-25 PROCEDURE — 99203 PR OFFICE/OUTPT VISIT, NEW, LEVL III, 30-44 MIN: ICD-10-PCS | Mod: S$PBB,,, | Performed by: PHYSICIAN ASSISTANT

## 2023-05-25 PROCEDURE — 73610 X-RAY EXAM OF ANKLE: CPT | Mod: 26,RT,, | Performed by: RADIOLOGY

## 2023-05-25 PROCEDURE — 4010F ACE/ARB THERAPY RXD/TAKEN: CPT | Mod: CPTII,,, | Performed by: PHYSICIAN ASSISTANT

## 2023-05-25 PROCEDURE — 73610 XR ANKLE COMPLETE 3 VIEW RIGHT: ICD-10-PCS | Mod: 26,RT,, | Performed by: RADIOLOGY

## 2023-05-25 PROCEDURE — 3008F BODY MASS INDEX DOCD: CPT | Mod: CPTII,,, | Performed by: PHYSICIAN ASSISTANT

## 2023-05-25 PROCEDURE — 3074F PR MOST RECENT SYSTOLIC BLOOD PRESSURE < 130 MM HG: ICD-10-PCS | Mod: CPTII,,, | Performed by: PHYSICIAN ASSISTANT

## 2023-05-25 PROCEDURE — 73610 X-RAY EXAM OF ANKLE: CPT | Mod: TC,RT

## 2023-05-25 PROCEDURE — 4010F PR ACE/ARB THEARPY RXD/TAKEN: ICD-10-PCS | Mod: CPTII,,, | Performed by: PHYSICIAN ASSISTANT

## 2023-05-25 PROCEDURE — 3074F SYST BP LT 130 MM HG: CPT | Mod: CPTII,,, | Performed by: PHYSICIAN ASSISTANT

## 2023-05-25 PROCEDURE — 1159F MED LIST DOCD IN RCRD: CPT | Mod: CPTII,,, | Performed by: PHYSICIAN ASSISTANT

## 2023-05-25 PROCEDURE — 3080F PR MOST RECENT DIASTOLIC BLOOD PRESSURE >= 90 MM HG: ICD-10-PCS | Mod: CPTII,,, | Performed by: PHYSICIAN ASSISTANT

## 2023-05-25 PROCEDURE — 1159F PR MEDICATION LIST DOCUMENTED IN MEDICAL RECORD: ICD-10-PCS | Mod: CPTII,,, | Performed by: PHYSICIAN ASSISTANT

## 2023-05-25 PROCEDURE — 99999 PR PBB SHADOW E&M-EST. PATIENT-LVL IV: ICD-10-PCS | Mod: PBBFAC,,, | Performed by: PHYSICIAN ASSISTANT

## 2023-05-25 PROCEDURE — 1160F PR REVIEW ALL MEDS BY PRESCRIBER/CLIN PHARMACIST DOCUMENTED: ICD-10-PCS | Mod: CPTII,,, | Performed by: PHYSICIAN ASSISTANT

## 2023-05-25 PROCEDURE — 99999 PR PBB SHADOW E&M-EST. PATIENT-LVL IV: CPT | Mod: PBBFAC,,, | Performed by: PHYSICIAN ASSISTANT

## 2023-05-25 PROCEDURE — 99214 OFFICE O/P EST MOD 30 MIN: CPT | Mod: PBBFAC | Performed by: PHYSICIAN ASSISTANT

## 2023-05-25 PROCEDURE — 1160F RVW MEDS BY RX/DR IN RCRD: CPT | Mod: CPTII,,, | Performed by: PHYSICIAN ASSISTANT

## 2023-05-25 PROCEDURE — 3008F PR BODY MASS INDEX (BMI) DOCUMENTED: ICD-10-PCS | Mod: CPTII,,, | Performed by: PHYSICIAN ASSISTANT

## 2023-05-25 PROCEDURE — 99203 OFFICE O/P NEW LOW 30 MIN: CPT | Mod: S$PBB,,, | Performed by: PHYSICIAN ASSISTANT

## 2023-05-25 PROCEDURE — 3080F DIAST BP >= 90 MM HG: CPT | Mod: CPTII,,, | Performed by: PHYSICIAN ASSISTANT

## 2023-05-25 NOTE — PROGRESS NOTES
"Subjective:      Patient ID: Mehreen Benites is a 62 y.o. female.    Chief Complaint: Pain of the Right Ankle ("ankle is tender")    Review of patient's allergies indicates:  No Known Allergies   63 yo F presents to clinic with c/o right ankle pain x 2 months.  No injury or trauma.  Sharp/achy pain and swelling to medial ankle.  Worse with walking and improves with rest.  She was seen in ED a month ago (at which time she had pain for a month prior) and was diagnosed with medial malleolus fracture.  She was placed in CAM boot which did provide some relief of pain.  Since then she has gradually weaned herself out of boot and began to wear normal tennis shoe.  Pain and swelling are gradually improving, but ankle still bothers her with certain activities.  She takes tylenol with some relief.      Review of Systems   Constitutional: Negative for chills, diaphoresis and fever.   HENT:  Negative for congestion, ear discharge and ear pain.    Eyes:  Negative for blurred vision, discharge, double vision and pain.   Cardiovascular:  Negative for chest pain, claudication and cyanosis.   Respiratory:  Negative for cough, hemoptysis and shortness of breath.    Endocrine: Negative for cold intolerance and heat intolerance.   Skin:  Negative for color change, dry skin, itching and rash.   Musculoskeletal:  Positive for joint pain and joint swelling. Negative for arthritis, back pain, falls, gout, muscle weakness and neck pain.   Gastrointestinal:  Negative for abdominal pain and change in bowel habit.   Neurological:  Negative for brief paralysis, disturbances in coordination, dizziness, numbness and paresthesias.   Psychiatric/Behavioral:  Negative for altered mental status and depression.        Objective:          General    Constitutional: She is oriented to person, place, and time. She appears well-developed and well-nourished. No distress.   HENT:   Head: Atraumatic.   Eyes: EOM are normal. Right eye exhibits no " discharge. Left eye exhibits no discharge.   Cardiovascular:  Normal rate.            Pulmonary/Chest: Effort normal. No respiratory distress.   Abdominal: Soft.   Neurological: She is alert and oriented to person, place, and time.   Psychiatric: She has a normal mood and affect. Her behavior is normal.         Right Ankle/Foot Exam     Inspection   Deformity: absent  Erythema: absent  Bruising: Ankle - absent Foot - absent    Swelling   The patient is swollen on the medial malleolus.    Pain   The patient exhibits pain of the medial malleolus.    Range of Motion   The patient has normal right ankle ROM.    Other   Sensation: normal    Comments:  No tenderness, erythema, warmth    Left Ankle/Foot Exam     Inspection  Deformity: absent  Erythema: absent  Bruising: Ankle - absent Foot - absent    Range of Motion   The patient has normal left ankle ROM.     Other   Sensation: normal      Vascular Exam       Edema  Right Lower Leg: absent  Left Lower Leg: absent            Assessment:         Xray Right Ankle 5/25/23:  a probable nondisplaced fracture of the tip of the medial malleolus is unchanged when compared to 05/01/2023. The fracture line remains distinct.  No significant surrounding periosteal reaction.  No additional fracture.  No dislocation.  No widening of the ankle mortise.  Degenerative changes are present.  Persistent medial malleolar soft tissue swelling.      Encounter Diagnosis   Name Primary?    Acute right ankle pain Yes    Acute right ankle pain  -     Ambulatory referral/consult to Orthopedics  -     X-Ray Ankle Complete Right; Future; Expected date: 05/25/2023  -     Ambulatory referral/consult to Podiatry; Future; Expected date: 06/01/2023               Plan:         I made the decision to obtain old records of the patient including previous notes and imaging. New imaging was ordered today of the extremity or extremities evaluated.    We discussed diagnosis and treatment options. Pt would like to  see podiatry. She declines PT and/or any other treatment at this time.    1. Podiatry referral.  2. Ice compress to the affected area 2-3x a day for 15-20 minutes as needed for pain management.  3. Continue tylenol as directed as needed.  4. RTC as needed.      Patient voices understanding of and agreement with treatment plan. All of the patient's questions were answered and the patient will contact us if she has any questions or concerns in the interim.

## 2023-06-09 ENCOUNTER — OFFICE VISIT (OUTPATIENT)
Dept: PODIATRY | Facility: CLINIC | Age: 63
End: 2023-06-09
Payer: MEDICAID

## 2023-06-09 VITALS — WEIGHT: 190 LBS | BODY MASS INDEX: 37.3 KG/M2 | HEIGHT: 60 IN

## 2023-06-09 DIAGNOSIS — M25.571 PAIN IN JOINT INVOLVING RIGHT ANKLE AND FOOT: Primary | ICD-10-CM

## 2023-06-09 DIAGNOSIS — M25.571 ACUTE RIGHT ANKLE PAIN: ICD-10-CM

## 2023-06-09 PROCEDURE — 99203 OFFICE O/P NEW LOW 30 MIN: CPT | Mod: S$PBB,,, | Performed by: PODIATRIST

## 2023-06-09 PROCEDURE — 1159F MED LIST DOCD IN RCRD: CPT | Mod: CPTII,,, | Performed by: PODIATRIST

## 2023-06-09 PROCEDURE — 3008F PR BODY MASS INDEX (BMI) DOCUMENTED: ICD-10-PCS | Mod: CPTII,,, | Performed by: PODIATRIST

## 2023-06-09 PROCEDURE — 3008F BODY MASS INDEX DOCD: CPT | Mod: CPTII,,, | Performed by: PODIATRIST

## 2023-06-09 PROCEDURE — 4010F PR ACE/ARB THEARPY RXD/TAKEN: ICD-10-PCS | Mod: CPTII,,, | Performed by: PODIATRIST

## 2023-06-09 PROCEDURE — 99999 PR PBB SHADOW E&M-EST. PATIENT-LVL III: CPT | Mod: PBBFAC,,, | Performed by: PODIATRIST

## 2023-06-09 PROCEDURE — 1160F RVW MEDS BY RX/DR IN RCRD: CPT | Mod: CPTII,,, | Performed by: PODIATRIST

## 2023-06-09 PROCEDURE — 1160F PR REVIEW ALL MEDS BY PRESCRIBER/CLIN PHARMACIST DOCUMENTED: ICD-10-PCS | Mod: CPTII,,, | Performed by: PODIATRIST

## 2023-06-09 PROCEDURE — 99203 PR OFFICE/OUTPT VISIT, NEW, LEVL III, 30-44 MIN: ICD-10-PCS | Mod: S$PBB,,, | Performed by: PODIATRIST

## 2023-06-09 PROCEDURE — 99999 PR PBB SHADOW E&M-EST. PATIENT-LVL III: ICD-10-PCS | Mod: PBBFAC,,, | Performed by: PODIATRIST

## 2023-06-09 PROCEDURE — 4010F ACE/ARB THERAPY RXD/TAKEN: CPT | Mod: CPTII,,, | Performed by: PODIATRIST

## 2023-06-09 PROCEDURE — 99213 OFFICE O/P EST LOW 20 MIN: CPT | Mod: PBBFAC,PN | Performed by: PODIATRIST

## 2023-06-09 PROCEDURE — 1159F PR MEDICATION LIST DOCUMENTED IN MEDICAL RECORD: ICD-10-PCS | Mod: CPTII,,, | Performed by: PODIATRIST

## 2023-06-09 NOTE — PROGRESS NOTES
Subjective:      Patient ID: Mehreen Benites is a 62 y.o. female.    Chief Complaint: Ankle Pain (right)      62 y.o. female presenting with right ankle pain.  Patient points posterior aspect of the heel area for pain.  Describes pain as throbbing aching.  Pain gets worse at night.  No acute foot/ankle injury.  Previous treatments include ankle brace which helped with the symptoms.  Patient does not smoke.  Patient has a history of DVT/PE on Xarelto.  Patient has history of left calcaneal exostectomy, River's deformity correction the past.      Level of ambulation/Occupation:   Smoking status:   Ave-D Def:   DM:  Other:     Review of Systems   Musculoskeletal:         R foot pain            Past Medical History:   Diagnosis Date    Hypertension     Other pulmonary embolism without acute cor pulmonale 2022    Tendonitis        Past Surgical History:   Procedure Laterality Date    COLONOSCOPY N/A 4/27/2023    Procedure: COLONOSCOPY;  Surgeon: Zachary Torres MD;  Location: [facility] ENDO;  Service: Endoscopy;  Laterality: N/A;    HEEL SPUR SURGERY Left 02/14/2013    HYSTERECTOMY  10/2009    WHITNEY    OOPHORECTOMY  10/2009    Bilateral    THROMBECTOMY N/A 6/1/2022    Procedure: THROMBECTOMY;  Surgeon: Kendall Fiore MD;  Location: [facility] CATH LAB/EP;  Service: Cardiology;  Laterality: N/A;       Family History   Problem Relation Age of Onset    Hypertension Mother     Breast cancer Maternal Cousin 51    Breast cancer Maternal Aunt     Breast cancer Paternal Aunt     Colon cancer Neg Hx     Ovarian cancer Neg Hx        Social History     Socioeconomic History    Marital status: Legally    Tobacco Use    Smoking status: Never    Smokeless tobacco: Never   Substance and Sexual Activity    Alcohol use: No    Drug use: No    Sexual activity: Yes     Partners: Male     Birth control/protection: Post-menopausal, See Surgical Hx     Comment:        Current Outpatient Medications   Medication Sig Dispense  Refill    ascorbic acid, vitamin C, (VITAMIN C) 500 MG tablet Take 500 mg by mouth once daily.      ferrous sulfate (FEOSOL) 325 mg (65 mg iron) Tab tablet Take 1 tablet (325 mg total) by mouth once daily. 90 tablet 3    hydroCHLOROthiazide (HYDRODIURIL) 25 MG tablet Take 1 tablet (25 mg total) by mouth once daily. 90 tablet 3    losartan (COZAAR) 25 MG tablet Take 1 tablet (25 mg total) by mouth once daily. 90 tablet 3    rivaroxaban (XARELTO) 20 mg Tab Take 1 tablet (20 mg total) by mouth before dinner. 90 tablet 3     No current facility-administered medications for this visit.       Review of patient's allergies indicates:  No Known Allergies    Vitals:    06/09/23 0811   Weight: 86.2 kg (190 lb)   Height: 5' (1.524 m)   PainSc:   3   PainLoc: Ankle       Objective:      Physical Exam  Constitutional:       General: She is not in acute distress.     Appearance: She is well-developed.   HENT:      Nose: Nose normal.   Eyes:      Conjunctiva/sclera: Conjunctivae normal.   Pulmonary:      Effort: Pulmonary effort is normal.   Chest:      Chest wall: No tenderness.   Abdominal:      Tenderness: There is no abdominal tenderness.   Musculoskeletal:      Cervical back: Normal range of motion.   Neurological:      Mental Status: She is alert and oriented to person, place, and time.   Psychiatric:         Behavior: Behavior normal.       Vascular: Distal DP/PT pulses palpable 2/4. CRT < 3 sec to tips of toes. No vericosities noted to LEs. Hair growth present LE, warm to touch LE, No edema noted to LE.    Dermatologic: No open lesions, lacerations or wounds. Interdigital spaces clean, dry and intact. No erythema, rubor, calor noted LE    Musculoskeletal: MMT 5/5 in DF/PF/Inv/Ev resistance. No calf tenderness LE, Compartments soft/compressible. ROM of ankles with < 10 deg DF is noted bilateral.   Right lower extremity:  Tender to touch along the posterior aspect of the heel, distal aspect of the Achilles tendon.  Pain over  retrocalcaneal bursa.      Neurological: Light touch, proprioception, and sharp/dull sensation are all intact. Protective threshold with the [place]-Wienstein monofilament is intact. Vibratory sensation intact.         Assessment:       Encounter Diagnosis   Name Primary?    Acute right ankle pain          Plan:       Mehreen was seen today for ankle pain.    Diagnoses and all orders for this visit:    Acute right ankle pain  -     Ambulatory referral/consult to Podiatry      I counseled the patient on her conditions, their implications and medical management.    62 y.o. female with right foot exostosis of the heel, retrocalcaneal exostosis, River's deformity, Achilles tendinitis.     -rx. MRI   -right foot ankle x-ray reviewed with the patient.  Retrocalcaneal exostosis with River's deformity noted.  Some arthritic changes noted in ankle joint (asymptomatic).  -discussed different treatment options with the patient.  Has been dealing with pain for several months.  Progressively getting worse.  Recommend MRI.  He tried the ankle brace without permanent symptom relief.     -I reviewed imaging, clinical history, and diagnosis as above with the patient. I attempted to use layman's terms to educate the patient as well as utilize foot models and/or pictures. I personally went through imaging with the patient.    -The nature of the condition, options for management, as well as potential risks and complications were discussed in detail with patient. Patient was amenable to my recommendations and left my office fully informed and will follow up as instructed or sooner if necessary.    -f/u after MRI     Note dictated with voice recognition software, please excuse any grammatical errors.

## 2023-06-26 ENCOUNTER — OFFICE VISIT (OUTPATIENT)
Dept: PODIATRY | Facility: CLINIC | Age: 63
End: 2023-06-26
Payer: MEDICAID

## 2023-06-26 ENCOUNTER — TELEPHONE (OUTPATIENT)
Dept: ORTHOPEDICS | Facility: CLINIC | Age: 63
End: 2023-06-26
Payer: MEDICAID

## 2023-06-26 VITALS — WEIGHT: 192 LBS | BODY MASS INDEX: 37.69 KG/M2 | HEIGHT: 60 IN

## 2023-06-26 DIAGNOSIS — M77.51 RETROCALCANEAL BURSITIS (BACK OF HEEL), RIGHT: ICD-10-CM

## 2023-06-26 DIAGNOSIS — M92.61 HAGLUND'S DEFORMITY OF RIGHT HEEL: ICD-10-CM

## 2023-06-26 DIAGNOSIS — M76.61 ACHILLES TENDINITIS OF RIGHT LOWER EXTREMITY: Primary | ICD-10-CM

## 2023-06-26 DIAGNOSIS — M21.6X9 EQUINUS DEFORMITY OF FOOT: ICD-10-CM

## 2023-06-26 DIAGNOSIS — M77.30 BONE SPUR OF POSTERIOR PORTION OF CALCANEUS, UNSPECIFIED LATERALITY: ICD-10-CM

## 2023-06-26 PROCEDURE — 99999 PR PBB SHADOW E&M-EST. PATIENT-LVL III: CPT | Mod: PBBFAC,,, | Performed by: PODIATRIST

## 2023-06-26 PROCEDURE — 1160F PR REVIEW ALL MEDS BY PRESCRIBER/CLIN PHARMACIST DOCUMENTED: ICD-10-PCS | Mod: CPTII,,, | Performed by: PODIATRIST

## 2023-06-26 PROCEDURE — 3008F PR BODY MASS INDEX (BMI) DOCUMENTED: ICD-10-PCS | Mod: CPTII,,, | Performed by: PODIATRIST

## 2023-06-26 PROCEDURE — 99213 OFFICE O/P EST LOW 20 MIN: CPT | Mod: S$PBB,,, | Performed by: PODIATRIST

## 2023-06-26 PROCEDURE — 1159F PR MEDICATION LIST DOCUMENTED IN MEDICAL RECORD: ICD-10-PCS | Mod: CPTII,,, | Performed by: PODIATRIST

## 2023-06-26 PROCEDURE — 99213 PR OFFICE/OUTPT VISIT, EST, LEVL III, 20-29 MIN: ICD-10-PCS | Mod: S$PBB,,, | Performed by: PODIATRIST

## 2023-06-26 PROCEDURE — 1160F RVW MEDS BY RX/DR IN RCRD: CPT | Mod: CPTII,,, | Performed by: PODIATRIST

## 2023-06-26 PROCEDURE — 99213 OFFICE O/P EST LOW 20 MIN: CPT | Mod: PBBFAC,PN | Performed by: PODIATRIST

## 2023-06-26 PROCEDURE — 4010F ACE/ARB THERAPY RXD/TAKEN: CPT | Mod: CPTII,,, | Performed by: PODIATRIST

## 2023-06-26 PROCEDURE — 4010F PR ACE/ARB THEARPY RXD/TAKEN: ICD-10-PCS | Mod: CPTII,,, | Performed by: PODIATRIST

## 2023-06-26 PROCEDURE — 1159F MED LIST DOCD IN RCRD: CPT | Mod: CPTII,,, | Performed by: PODIATRIST

## 2023-06-26 PROCEDURE — 99999 PR PBB SHADOW E&M-EST. PATIENT-LVL III: ICD-10-PCS | Mod: PBBFAC,,, | Performed by: PODIATRIST

## 2023-06-26 PROCEDURE — 3008F BODY MASS INDEX DOCD: CPT | Mod: CPTII,,, | Performed by: PODIATRIST

## 2023-06-26 NOTE — TELEPHONE ENCOUNTER
I attempted to contact pt in regards to her message in the portal. Pt didn't answer. Pt had a full mailbox so I wasn't able to leave message. ----- Message from Lamar Sullivan sent at 6/26/2023  1:49 PM CDT -----  Regarding: referred for surgery  Contact: [phone]  Patient is requesting a call back regarding being referred by Dr. Van for foot surgery.  Diagnosis:   Achilles tendinitis of right lower extremity    Bone spur of posterior portion of calcaneus, unspecified laterality    Retrocalcaneal bursitis (back of heel), right    River's deformity of right heel    Equinus deformity of foot    Would the patient rather a call back or a response via [facility]?  Call   Best Call Back Number:  [phone]  Additional Information:

## 2023-06-26 NOTE — PROGRESS NOTES
Subjective:      Patient ID: Mehreen Benites is a 62 y.o. female.    Chief Complaint: Follow-up (Mri review right foot )      62 y.o. female presenting with right ankle pain.  Patient points posterior aspect of the heel area for pain.  Describes pain as throbbing aching.  Pain gets worse at night.  No acute foot/ankle injury.  Previous treatments include ankle brace which helped with the symptoms.  Patient does not smoke.  Patient has a history of DVT/PE on Xarelto.  Patient has history of left calcaneal exostectomy, River's deformity correction the past.    6/26/23 f/u R foot pain posterior heel. Here to review MRI. MRI noted significant achilles tendonitis with ankle OCD. Pt denies pain on R ankle. Pt is looking to discuss surgical options right foot pain.     Level of ambulation/Occupation:   Smoking status:   Ave-D Def:   DM:  Other:     Review of Systems   Musculoskeletal:         R foot pain            Past Medical History:   Diagnosis Date    Hypertension     Other pulmonary embolism without acute cor pulmonale 2022    Tendonitis        Past Surgical History:   Procedure Laterality Date    COLONOSCOPY N/A 4/27/2023    Procedure: COLONOSCOPY;  Surgeon: Zachary Torres MD;  Location: [facility] ENDO;  Service: Endoscopy;  Laterality: N/A;    HEEL SPUR SURGERY Left 02/14/2013    HYSTERECTOMY  10/2009    WHITNEY    OOPHORECTOMY  10/2009    Bilateral    THROMBECTOMY N/A 6/1/2022    Procedure: THROMBECTOMY;  Surgeon: Kendall Fiore MD;  Location: [facility] CATH LAB/EP;  Service: Cardiology;  Laterality: N/A;       Family History   Problem Relation Age of Onset    Hypertension Mother     Breast cancer Maternal Cousin 51    Breast cancer Maternal Aunt     Breast cancer Paternal Aunt     Colon cancer Neg Hx     Ovarian cancer Neg Hx        Social History     Socioeconomic History    Marital status: Legally    Tobacco Use    Smoking status: Never    Smokeless tobacco: Never   Substance and Sexual Activity     Alcohol use: No    Drug use: No    Sexual activity: Yes     Partners: Male     Birth control/protection: Post-menopausal, See Surgical Hx     Comment:        Current Outpatient Medications   Medication Sig Dispense Refill    ascorbic acid, vitamin C, (VITAMIN C) 500 MG tablet Take 500 mg by mouth once daily.      ferrous sulfate (FEOSOL) 325 mg (65 mg iron) Tab tablet Take 1 tablet (325 mg total) by mouth once daily. 90 tablet 3    hydroCHLOROthiazide (HYDRODIURIL) 25 MG tablet Take 1 tablet (25 mg total) by mouth once daily. 90 tablet 3    losartan (COZAAR) 25 MG tablet Take 1 tablet (25 mg total) by mouth once daily. 90 tablet 3    rivaroxaban (XARELTO) 20 mg Tab Take 1 tablet (20 mg total) by mouth before dinner. 90 tablet 3     No current facility-administered medications for this visit.       Review of patient's allergies indicates:  No Known Allergies    Vitals:    06/26/23 0838   Weight: 87.1 kg (192 lb)   Height: 5' (1.524 m)   PainSc:   5   PainLoc: Foot       Objective:      Physical Exam  Constitutional:       General: She is not in acute distress.     Appearance: She is well-developed.   HENT:      Nose: Nose normal.   Eyes:      Conjunctiva/sclera: Conjunctivae normal.   Pulmonary:      Effort: Pulmonary effort is normal.   Chest:      Chest wall: No tenderness.   Abdominal:      Tenderness: There is no abdominal tenderness.   Musculoskeletal:      Cervical back: Normal range of motion.   Neurological:      Mental Status: She is alert and oriented to person, place, and time.   Psychiatric:         Behavior: Behavior normal.       Vascular: Distal DP/PT pulses palpable 2/4. CRT < 3 sec to tips of toes. No vericosities noted to LEs. Hair growth present LE, warm to touch LE, No edema noted to LE.    Dermatologic: No open lesions, lacerations or wounds. Interdigital spaces clean, dry and intact. No erythema, rubor, calor noted LE    Musculoskeletal: MMT 5/5 in DF/PF/Inv/Ev resistance. No calf  tenderness LE, Compartments soft/compressible. ROM of ankles with < 10 deg DF is noted bilateral.   Right lower extremity:  Tender to touch along the posterior aspect of the heel, distal aspect of the Achilles tendon.  Pain over retrocalcaneal bursa.      Neurological: Light touch, proprioception, and sharp/dull sensation are all intact. Protective threshold with the [place]-Wienstein monofilament is intact. Vibratory sensation intact.         Assessment:       Encounter Diagnoses   Name Primary?    Achilles tendinitis of right lower extremity Yes    Bone spur of posterior portion of calcaneus, unspecified laterality     Retrocalcaneal bursitis (back of heel), right     River's deformity of right heel     Equinus deformity of foot            Plan:       Mehreen was seen today for follow-up.    Diagnoses and all orders for this visit:    Achilles tendinitis of right lower extremity    Bone spur of posterior portion of calcaneus, unspecified laterality    Retrocalcaneal bursitis (back of heel), right    River's deformity of right heel    Equinus deformity of foot        I counseled the patient on her conditions, their implications and medical management.    62 y.o. female with right foot exostosis of the heel, retrocalcaneal exostosis, River's deformity, Achilles tendinitis.     -right foot MRI reviewed with the patient.  Significant amount of Achilles tendinitis with River's and retrocalcaneal exostosis.  OCD lesion noted on the medial aspect of the ankle however patient is asymptomatic.  -discussed different treatment options with the patient.  I went over both conservative and surgical options with the patient.  Patient is looking for surgical option.  I will refer to Dr. Kapoor for surgical consultation.     -I reviewed imaging, clinical history, and diagnosis as above with the patient. I attempted to use layman's terms to educate the patient as well as utilize foot models and/or pictures. I  personally went through imaging with the patient.    -The nature of the condition, options for management, as well as potential risks and complications were discussed in detail with patient. Patient was amenable to my recommendations and left my office fully informed and will follow up as instructed or sooner if necessary.    -f/u prn    Note dictated with voice recognition software, please excuse any grammatical errors.

## 2023-07-03 RX ORDER — TRAMADOL HYDROCHLORIDE 50 MG/1
50 TABLET ORAL EVERY 6 HOURS
Qty: 15 TABLET | Refills: 0 | Status: ON HOLD | OUTPATIENT
Start: 2023-07-03 | End: 2023-08-25

## 2023-07-19 ENCOUNTER — OFFICE VISIT (OUTPATIENT)
Dept: PODIATRY | Facility: CLINIC | Age: 63
End: 2023-07-19
Payer: MEDICAID

## 2023-07-19 ENCOUNTER — PATIENT MESSAGE (OUTPATIENT)
Dept: HEMATOLOGY/ONCOLOGY | Facility: CLINIC | Age: 63
End: 2023-07-19
Payer: MEDICAID

## 2023-07-19 ENCOUNTER — TELEPHONE (OUTPATIENT)
Dept: HEMATOLOGY/ONCOLOGY | Facility: CLINIC | Age: 63
End: 2023-07-19
Payer: MEDICAID

## 2023-07-19 VITALS — RESPIRATION RATE: 18 BRPM | WEIGHT: 179 LBS | BODY MASS INDEX: 35.14 KG/M2 | HEIGHT: 60 IN

## 2023-07-19 DIAGNOSIS — M77.51 RETROCALCANEAL BURSITIS (BACK OF HEEL), RIGHT: ICD-10-CM

## 2023-07-19 DIAGNOSIS — M92.61 HAGLUND'S DEFORMITY OF RIGHT HEEL: ICD-10-CM

## 2023-07-19 DIAGNOSIS — M76.61 ACHILLES TENDINITIS OF RIGHT LOWER EXTREMITY: Primary | ICD-10-CM

## 2023-07-19 DIAGNOSIS — M21.6X9 EQUINUS DEFORMITY OF FOOT: ICD-10-CM

## 2023-07-19 DIAGNOSIS — I82.431 ACUTE DEEP VEIN THROMBOSIS (DVT) OF RIGHT POPLITEAL VEIN: ICD-10-CM

## 2023-07-19 DIAGNOSIS — I26.99 PULMONARY EMBOLISM, BILATERAL: ICD-10-CM

## 2023-07-19 DIAGNOSIS — M77.30 BONE SPUR OF POSTERIOR PORTION OF CALCANEUS, UNSPECIFIED LATERALITY: ICD-10-CM

## 2023-07-19 PROCEDURE — 99213 PR OFFICE/OUTPT VISIT, EST, LEVL III, 20-29 MIN: ICD-10-PCS | Mod: S$PBB,,, | Performed by: PODIATRIST

## 2023-07-19 PROCEDURE — 1160F RVW MEDS BY RX/DR IN RCRD: CPT | Mod: CPTII,,, | Performed by: PODIATRIST

## 2023-07-19 PROCEDURE — 4010F PR ACE/ARB THEARPY RXD/TAKEN: ICD-10-PCS | Mod: CPTII,,, | Performed by: PODIATRIST

## 2023-07-19 PROCEDURE — 99214 OFFICE O/P EST MOD 30 MIN: CPT | Mod: PBBFAC,PO | Performed by: PODIATRIST

## 2023-07-19 PROCEDURE — 3008F BODY MASS INDEX DOCD: CPT | Mod: CPTII,,, | Performed by: PODIATRIST

## 2023-07-19 PROCEDURE — 99213 OFFICE O/P EST LOW 20 MIN: CPT | Mod: S$PBB,,, | Performed by: PODIATRIST

## 2023-07-19 PROCEDURE — 4010F ACE/ARB THERAPY RXD/TAKEN: CPT | Mod: CPTII,,, | Performed by: PODIATRIST

## 2023-07-19 PROCEDURE — 1160F PR REVIEW ALL MEDS BY PRESCRIBER/CLIN PHARMACIST DOCUMENTED: ICD-10-PCS | Mod: CPTII,,, | Performed by: PODIATRIST

## 2023-07-19 PROCEDURE — 3008F PR BODY MASS INDEX (BMI) DOCUMENTED: ICD-10-PCS | Mod: CPTII,,, | Performed by: PODIATRIST

## 2023-07-19 PROCEDURE — 1159F MED LIST DOCD IN RCRD: CPT | Mod: CPTII,,, | Performed by: PODIATRIST

## 2023-07-19 PROCEDURE — 1159F PR MEDICATION LIST DOCUMENTED IN MEDICAL RECORD: ICD-10-PCS | Mod: CPTII,,, | Performed by: PODIATRIST

## 2023-07-19 PROCEDURE — 99999 PR PBB SHADOW E&M-EST. PATIENT-LVL IV: ICD-10-PCS | Mod: PBBFAC,,, | Performed by: PODIATRIST

## 2023-07-19 PROCEDURE — 99999 PR PBB SHADOW E&M-EST. PATIENT-LVL IV: CPT | Mod: PBBFAC,,, | Performed by: PODIATRIST

## 2023-07-19 NOTE — PROGRESS NOTES
[facility] PODIATRY  [address] [place] [address], [address]  [facility] [address]  Dept: [phone]  Dept Fax: [phone]    Axel Kapoor Jr., DPM     Assessment:   MDM    Coding  1. Achilles tendinitis of right lower extremity  EKG 12-lead    X-Ray Chest PA And Lateral    Hemoglobin A1C    Comprehensive Metabolic Panel    Prealbumin    CBC Auto Differential    Case Request Operating Room: REPAIR, TENDON, ACHILLES, RESECTION, MUSCLE, GASTROCNEMIUS      2. Bone spur of posterior portion of calcaneus, unspecified laterality  Case Request Operating Room: REPAIR, TENDON, ACHILLES, RESECTION, MUSCLE, GASTROCNEMIUS      3. Retrocalcaneal bursitis (back of heel), right  Case Request Operating Room: REPAIR, TENDON, ACHILLES, RESECTION, MUSCLE, GASTROCNEMIUS      4. River's deformity of right heel  Case Request Operating Room: REPAIR, TENDON, ACHILLES, RESECTION, MUSCLE, GASTROCNEMIUS      5. Equinus deformity of foot  Case Request Operating Room: REPAIR, TENDON, ACHILLES, RESECTION, MUSCLE, GASTROCNEMIUS      6. Acute deep vein thrombosis (DVT) of right popliteal vein        7. Pulmonary embolism, bilateral            Plan:     Procedures  1. Achilles tendinitis of right lower extremity  -     EKG 12-lead; Future  -     X-Ray Chest PA And Lateral; Future; Expected date: 07/19/2023  -     Hemoglobin A1C; Future; Expected date: 07/19/2023  -     Comprehensive Metabolic Panel; Future; Expected date: 07/19/2023  -     Prealbumin; Future; Expected date: 07/19/2023  -     CBC Auto Differential; Future; Expected date: 07/19/2023  -     Case Request Operating Room: REPAIR, TENDON, ACHILLES, RESECTION, MUSCLE, GASTROCNEMIUS    2. Bone spur of posterior portion of calcaneus, unspecified laterality  -     Case Request Operating Room: REPAIR, TENDON, ACHILLES, RESECTION, MUSCLE, GASTROCNEMIUS    3. Retrocalcaneal bursitis (back of heel), right  -     Case Request Operating Room: REPAIR, TENDON, ACHILLES,  RESECTION, MUSCLE, GASTROCNEMIUS    4. River's deformity of right heel  -     Case Request Operating Room: REPAIR, TENDON, ACHILLES, RESECTION, MUSCLE, GASTROCNEMIUS    5. Equinus deformity of foot  -     Case Request Operating Room: REPAIR, TENDON, ACHILLES, RESECTION, MUSCLE, GASTROCNEMIUS    6. Acute deep vein thrombosis (DVT) of right popliteal vein    7. Pulmonary embolism, bilateral      Mehreen was seen today for tendonitis.    Diagnoses and all orders for this visit:    Achilles tendinitis of right lower extremity  -     EKG 12-lead; Future  -     X-Ray Chest PA And Lateral; Future  -     Hemoglobin A1C; Future  -     Comprehensive Metabolic Panel; Future  -     Prealbumin; Future  -     CBC Auto Differential; Future  -     Case Request Operating Room: REPAIR, TENDON, ACHILLES, RESECTION, MUSCLE, GASTROCNEMIUS    Bone spur of posterior portion of calcaneus, unspecified laterality  -     Case Request Operating Room: REPAIR, TENDON, ACHILLES, RESECTION, MUSCLE, GASTROCNEMIUS    Retrocalcaneal bursitis (back of heel), right  -     Case Request Operating Room: REPAIR, TENDON, ACHILLES, RESECTION, MUSCLE, GASTROCNEMIUS    River's deformity of right heel  -     Case Request Operating Room: REPAIR, TENDON, ACHILLES, RESECTION, MUSCLE, GASTROCNEMIUS    Equinus deformity of foot  -     Case Request Operating Room: REPAIR, TENDON, ACHILLES, RESECTION, MUSCLE, GASTROCNEMIUS    Acute deep vein thrombosis (DVT) of right popliteal vein    Pulmonary embolism, bilateral        -pt seen, evaluated, and managed  -dx discussed in detail. All questions/concerns addressed  -all tx options discussed. All alternatives, risks, benefits of all txs discussed  -the patient was educated about the diagnosis  -XR/imaging reviewed by me: agree with read  -given failure of conservative measures, we discussed surgical intervention  -pt would benefit from operative intervention: we discussed the following procedures: R OGR+ secondary  repair achilles tendon+removal of retrocalc bursa and exostosis+ BMAC  -we discussed approx postop course  -would be out pt, elective surgery  -would be GA + block, supine position  -would need heme-onc clearance before proceeding to manage bloodthinner given h/o clots  -labs and xr on way out  -potential DOS: 8/25/23      -rxs dispensed: none  -referrals: none  -WB: wbat      Follow up in 29 days (on 8/17/2023).    Subjective:      Patient ID: Mehreen Benites is a 62 y.o. female.    Chief Complaint:   Chief Complaint   Patient presents with    Tendonitis     Right        CC - posterior heel pain: patient presents to the clinic complaining of heel pain in the right foot. The pain is described as sharp and aching. The onset of the pain was gradual and has worsened over the past several months. Patient rates the pain as 9/10. Patient denies a history of trauma. Prior treatments include conservative management with Dr. Van. After failing conservative measures, an MRI was obtained. She was referred here for surgical eval. Reports pain keeps her from walking on foot. Essentially limbs with crutches now.      HPI    Last Podiatry Enc: Visit date not found  Last Enc w/ Me: Visit date not found    Outside reports reviewed: historical medical records.  Family hx: as below  Past Medical History:   Diagnosis Date    Hypertension     Other pulmonary embolism without acute cor pulmonale 2022    Tendonitis      Past Surgical History:   Procedure Laterality Date    COLONOSCOPY N/A 4/27/2023    Procedure: COLONOSCOPY;  Surgeon: Zachary Torres MD;  Location: [facility] ENDO;  Service: Endoscopy;  Laterality: N/A;    HEEL SPUR SURGERY Left 02/14/2013    HYSTERECTOMY  10/2009    WHITNEY    OOPHORECTOMY  10/2009    Bilateral    THROMBECTOMY N/A 6/1/2022    Procedure: THROMBECTOMY;  Surgeon: Kendall Fiore MD;  Location: [facility] CATH LAB/EP;  Service: Cardiology;  Laterality: N/A;     Family History   Problem Relation Age of Onset     Hypertension Mother     Breast cancer Maternal Cousin 51    Breast cancer Maternal Aunt     Breast cancer Paternal Aunt     Colon cancer Neg Hx     Ovarian cancer Neg Hx      Current Outpatient Medications   Medication Sig Dispense Refill    ascorbic acid, vitamin C, (VITAMIN C) 500 MG tablet Take 500 mg by mouth once daily.      ferrous sulfate (FEOSOL) 325 mg (65 mg iron) Tab tablet Take 1 tablet (325 mg total) by mouth once daily. 90 tablet 3    hydroCHLOROthiazide (HYDRODIURIL) 25 MG tablet Take 1 tablet (25 mg total) by mouth once daily. 90 tablet 3    rivaroxaban (XARELTO) 20 mg Tab Take 1 tablet (20 mg total) by mouth before dinner. 90 tablet 3    traMADoL (ULTRAM) 50 mg tablet Take 1 tablet (50 mg total) by mouth every 6 (six) hours. 15 tablet 0    losartan (COZAAR) 25 MG tablet Take 1 tablet (25 mg total) by mouth once daily. 90 tablet 3     No current facility-administered medications for this visit.     Review of patient's allergies indicates:  No Known Allergies  Social History     Socioeconomic History    Marital status: Legally    Tobacco Use    Smoking status: Never    Smokeless tobacco: Never   Substance and Sexual Activity    Alcohol use: No    Drug use: No    Sexual activity: Yes     Partners: Male     Birth control/protection: Post-menopausal, See Surgical Hx     Comment:        ROS    REVIEW OF SYSTEMS: Negative as documented below as well as positive findings in bold.       Constitutional  Respiratory  Gastrointestinal  Skin   - Fever - Cough - Heartburn - Rash   - Chills - Spit blood - Nausea - Itching   - Weight Loss - Shortness of breath - Vomiting - Nail pain   - Malaise/Fatigue - Wheezing - Abdominal Pain  Wound/Ulcer   - Weight Gain   - Blood in Stool  Poor wound healing       - Diarrhea          Cardiovascular  Genitourinary  Neurological  HEENT   - Chest Pain - Dysuria - Burning Sensation of feet - Headache   - Palpitations - Hematuria - Tingling / Paresthesia -  Congestion   - Pain at night in legs - Flank Pain - Dizziness - Sore Throat   - Cramping   - Tremor - Blurred Vision   - Leg Swelling   - Sensory Change - Double Vision   - Dizzy when standing   - Speech Change - Eye Redness       - Focal Weakness - Dry Eyes       - Loss of Consciousness          Endocrine  Musculoskeletal  Psychiatric   - Cold intolerance - Muscle Pain - Depression   - Heat intolerance - Neck Pain - Insomnia   - Anemia - Joint Pain - Memory Loss   -  Easy bruising, bleeding - Heel pain - Anxiety      Toe Pain        Leg/Ankle/Foot Pain         Objective:     Resp 18   Ht 5' (1.524 m)   Wt 81.2 kg (179 lb 0.2 oz)   LMP  (LMP Unknown)   BMI 34.96 kg/m²   Vitals:    07/19/23 1345   Resp: 18   Weight: 81.2 kg (179 lb 0.2 oz)   Height: 5' (1.524 m)   PainSc:   5   PainLoc: Foot       Physical Exam    General Appearance:   Patient appears well developed, well nourished  Patient appears stated age    Psychiatric:   Patient is oriented to time, place, and person.  Patient has appropriate mood and affect    Neck:  Trachea Midline  No visible masses    Respiratory/Ears:  No distress or labored breathing.  Able to differentiate between normal talking voice and whisper.  Able to follow commands    Eyes:  Visual Acuity intact  Lids and conjunctivae normal. No discoloration noted.    Foot Exam  Physical Exam  Ortho Exam  Ortho/SPM Exam  Physical Exam  Foot/Ankle Musculoskeletal Exam    R LE exam con't:  V:  DP 2/4, PT 2/4   CRT< 3s to all digits tested   Anterior tibial and popliteal lymph nodes are w/o abnormality    edema present, varicosities present    N: Patient displays normal ankle reflexes   SILT in SP/DP/T/Yesi/Saph distributions    Ortho: +Motor EHL/FHL/TA/GA   +TTP R posterior calcaneus in area of achilles insertion   Enlarged bony prominence is moderate and present   Compartments soft/compressible. No pain on passive stretch of big toe. No calf  Pain.   equinus deformity present    Derm: skin  intact, skin warm and dry, skin without ulcers or lesions, skin without induration, nails normal    Imaging / Labs:      MRI Ankle Without Contrast Right    Result Date: 6/19/2023  EXAMINATION: MRI ANKLE WITHOUT CONTRAST RIGHT CLINICAL HISTORY: Ankle pain, tendon abnormality suspected, neg xray;achilles tendon pain;  Pain in right ankle and joints of right foot TECHNIQUE: Routine MRI evaluation of the right ankle performed without contrast. COMPARISON: Radiograph 05/25/2023. FINDINGS: LIGAMENTS: Irregularity of the anterior talofibular ligament with associated osseous productive changes at the fibular insertion.  Irregularity and signal heterogeneity of the deep deltoid ligament with a questionable superimposed heterotopic ossific body.  Posterior talofibular, calcaneofibular, superomedial spring, superficial deltoid, anterior-inferior tibiofibular, posterior-inferior tibiofibular, dorsal talonavicular, bifurcate and dorsal calcaneocuboid ligaments are grossly intact.  Lisfranc ligament appears unremarkable. TENDONS: There is fusiform thickening of the midsubstance and insertional Achilles tendon with mild peritendinous edema, a large edematous enthesophyte, reactive calcaneal edema and a few intrasubstance linear areas of fluid signal.  Fusiform thickening and increased intrasubstance signal of the peroneus longus tendon extending from the peroneal tubercle to the level of the cuboid sulcus.  Flexor hallucis longus tendon sheath fluid at the level of the crossover point of Carmine's knot.  Posterior tibial, flexor digitorum longus, peroneus brevis and extensor tendons are normal. BONES: Subcortical edema at the posterior calcaneus, likely reactive.  No fractures.  No avascular necrosis.  No marrow infiltrative process. JOINTS/CARTILAGE: 1.4 x 0.7 cm focal area of full-thickness chondral fissuring with prominent associated subchondral cystic change and mild subchondral marrow edema the medial talar dome.  Partial to  full-thickness chondral fissuring with subchondral cystic change at the lateral talus and proximal articular surface of the navicular.  Suspected partial-thickness chondral fissuring at the dorsal aspect of the 2nd TMT joint with associated osteophyte formation.  Remaining visualized chondral surfaces appear within normal limits.  No MR imaging findings to suggest a tarsal coalition. MUSCLES: Mild fatty degeneration of the abductor digiti minimi.  No accessory muscles. MISCELLANEOUS: Plantar calcaneal enthesophyte with a normal appearance of the adjacent plantar aponeurosis.  Sinus tarsi demonstrates preserved signal intensity.  Tarsal tunnel is unremarkable.     1. 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 *Slightly prominent posterior-superior calcaneus/River's deformity with associated subcortical marrow edema. *Mild fluid distention of the retrocalcaneal bursa suggesting bursitis. 2.  Peroneus longus tendinosis extending from the peroneal tubercle to the level of the calcaneal sulcus. 3.  Flexor hallucis tenosynovitis. 4.  Medial talar dome osteochondral lesion. 5.  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 6.  Chronic sprains of the anterior talofibular and deep deltoid ligaments. Electronically signed by: Dm Alston MD Date:    06/19/2023 Time:    15:32        Note: This was dictated using a computer transcription program. Although proofread, it may contain computer transcription errors and phonetic errors. Other human proofreading errors may also exist. Corrections may be performed at a later time. Please contact us for any clarification if needed.    Axel Kapoor DPM  [facility]

## 2023-07-19 NOTE — TELEPHONE ENCOUNTER
----- Message from Ramin Boucher sent at 7/19/2023  2:39 PM CDT -----  Contact: Pt  .Type:  Needs Medical Advice    Who Called: pt    Would the patient rather a call back or a response via [facility]?  Call back  Best Call Back Number: [phone]  Additional Information: Pt. Is requesting a call back regarding a surgery clearance for a poditrist.

## 2023-07-19 NOTE — TELEPHONE ENCOUNTER
----- Message from Ridge Nascimento sent at 7/19/2023  4:24 PM CDT -----  Contact: pt  Type:  Patient Returning Call    Who Called:pt   Who Left Message for Patient:Danette Greco RN  Does the patient know what this is regarding?:  Would the patient rather a call back or a response via [facility]? call  Best Call Back Number:[phone]  Additional Information:

## 2023-07-19 NOTE — TELEPHONE ENCOUNTER
----- Message from Jelly Regan sent at 7/19/2023  4:09 PM CDT -----  Type:  Needs Medical Advice    Who Called: pt  Symptoms (please be specific): pt needs consult with  to get a clearance for her surgery on    August 14, 2023  Would the patient rather a call back or a response via [facility]? call  Best Call Back Number: [phone]  Additional Information:

## 2023-07-25 ENCOUNTER — OFFICE VISIT (OUTPATIENT)
Dept: HEMATOLOGY/ONCOLOGY | Facility: CLINIC | Age: 63
End: 2023-07-25
Payer: MEDICAID

## 2023-07-25 ENCOUNTER — OFFICE VISIT (OUTPATIENT)
Dept: FAMILY MEDICINE | Facility: HOSPITAL | Age: 63
End: 2023-07-25
Payer: MEDICAID

## 2023-07-25 VITALS
OXYGEN SATURATION: 99 % | SYSTOLIC BLOOD PRESSURE: 148 MMHG | BODY MASS INDEX: 36.9 KG/M2 | WEIGHT: 188.94 LBS | DIASTOLIC BLOOD PRESSURE: 79 MMHG | HEART RATE: 79 BPM

## 2023-07-25 VITALS
WEIGHT: 189.38 LBS | SYSTOLIC BLOOD PRESSURE: 143 MMHG | BODY MASS INDEX: 37.18 KG/M2 | HEART RATE: 81 BPM | HEIGHT: 60 IN | DIASTOLIC BLOOD PRESSURE: 97 MMHG

## 2023-07-25 DIAGNOSIS — Z01.818 PRE-OP EVALUATION: Primary | ICD-10-CM

## 2023-07-25 DIAGNOSIS — I10 ESSENTIAL HYPERTENSION: ICD-10-CM

## 2023-07-25 DIAGNOSIS — Z86.718 HISTORY OF DVT (DEEP VEIN THROMBOSIS): ICD-10-CM

## 2023-07-25 DIAGNOSIS — M67.88 ACHILLES TENDINOSIS: ICD-10-CM

## 2023-07-25 DIAGNOSIS — I10 PRIMARY HYPERTENSION: ICD-10-CM

## 2023-07-25 DIAGNOSIS — N18.31 STAGE 3A CHRONIC KIDNEY DISEASE: ICD-10-CM

## 2023-07-25 DIAGNOSIS — I26.99 PULMONARY EMBOLISM, BILATERAL: Primary | ICD-10-CM

## 2023-07-25 PROCEDURE — 99213 OFFICE O/P EST LOW 20 MIN: CPT | Mod: PBBFAC,PO | Performed by: NURSE PRACTITIONER

## 2023-07-25 PROCEDURE — 3008F PR BODY MASS INDEX (BMI) DOCUMENTED: ICD-10-PCS | Mod: CPTII,,, | Performed by: NURSE PRACTITIONER

## 2023-07-25 PROCEDURE — 1159F PR MEDICATION LIST DOCUMENTED IN MEDICAL RECORD: ICD-10-PCS | Mod: CPTII,,, | Performed by: NURSE PRACTITIONER

## 2023-07-25 PROCEDURE — 99213 OFFICE O/P EST LOW 20 MIN: CPT | Mod: 27 | Performed by: STUDENT IN AN ORGANIZED HEALTH CARE EDUCATION/TRAINING PROGRAM

## 2023-07-25 PROCEDURE — 1159F MED LIST DOCD IN RCRD: CPT | Mod: CPTII,,, | Performed by: NURSE PRACTITIONER

## 2023-07-25 PROCEDURE — 1160F RVW MEDS BY RX/DR IN RCRD: CPT | Mod: CPTII,,, | Performed by: NURSE PRACTITIONER

## 2023-07-25 PROCEDURE — 4010F ACE/ARB THERAPY RXD/TAKEN: CPT | Mod: CPTII,,, | Performed by: NURSE PRACTITIONER

## 2023-07-25 PROCEDURE — 99214 OFFICE O/P EST MOD 30 MIN: CPT | Mod: S$PBB,,, | Performed by: NURSE PRACTITIONER

## 2023-07-25 PROCEDURE — 3077F SYST BP >= 140 MM HG: CPT | Mod: CPTII,,, | Performed by: NURSE PRACTITIONER

## 2023-07-25 PROCEDURE — 3008F BODY MASS INDEX DOCD: CPT | Mod: CPTII,,, | Performed by: NURSE PRACTITIONER

## 2023-07-25 PROCEDURE — 4010F PR ACE/ARB THEARPY RXD/TAKEN: ICD-10-PCS | Mod: CPTII,,, | Performed by: NURSE PRACTITIONER

## 2023-07-25 PROCEDURE — 3078F PR MOST RECENT DIASTOLIC BLOOD PRESSURE < 80 MM HG: ICD-10-PCS | Mod: CPTII,,, | Performed by: NURSE PRACTITIONER

## 2023-07-25 PROCEDURE — 99999 PR PBB SHADOW E&M-EST. PATIENT-LVL III: CPT | Mod: PBBFAC,,, | Performed by: NURSE PRACTITIONER

## 2023-07-25 PROCEDURE — 99214 PR OFFICE/OUTPT VISIT, EST, LEVL IV, 30-39 MIN: ICD-10-PCS | Mod: S$PBB,,, | Performed by: NURSE PRACTITIONER

## 2023-07-25 PROCEDURE — 1160F PR REVIEW ALL MEDS BY PRESCRIBER/CLIN PHARMACIST DOCUMENTED: ICD-10-PCS | Mod: CPTII,,, | Performed by: NURSE PRACTITIONER

## 2023-07-25 PROCEDURE — 3078F DIAST BP <80 MM HG: CPT | Mod: CPTII,,, | Performed by: NURSE PRACTITIONER

## 2023-07-25 PROCEDURE — 3077F PR MOST RECENT SYSTOLIC BLOOD PRESSURE >= 140 MM HG: ICD-10-PCS | Mod: CPTII,,, | Performed by: NURSE PRACTITIONER

## 2023-07-25 PROCEDURE — 99999 PR PBB SHADOW E&M-EST. PATIENT-LVL III: ICD-10-PCS | Mod: PBBFAC,,, | Performed by: NURSE PRACTITIONER

## 2023-07-25 NOTE — PROGRESS NOTES
Progress Note  [facility]      Subjective:     Mehreen Benites is a 62 y.o. year old female with PMHx of HTN, unprovoked PE on xarelto who presented to clinic today for a pre-op evaluation. Patient is scheduled to undergo  Achilles tendon repair  with Dr. Kapoor on 8/25    For patient's HTN, she is taking HCTZ, Losartan. Patient reports good compliance with this.     Review of Systems   Constitutional:  Negative for chills, fever and malaise/fatigue.   HENT:  Negative for congestion, ear pain, hearing loss and sore throat.    Eyes:  Negative for blurred vision, pain and redness.   Respiratory:  Negative for cough and shortness of breath.    Cardiovascular:  Negative for chest pain, palpitations and leg swelling.   Gastrointestinal:  Negative for abdominal pain, constipation, diarrhea, heartburn, nausea and vomiting.   Genitourinary:  Negative for dysuria, frequency and urgency.   Musculoskeletal:  Positive for joint pain. Negative for back pain and myalgias.   Skin:  Negative for rash.   Neurological:  Negative for focal weakness, weakness and headaches.   Psychiatric/Behavioral:  Negative for depression and substance abuse.      Objective:     Vitals:    07/25/23 1408   BP: (!) 143/97   Pulse: 81       Wt Readings from Last 1 Encounters:   07/25/23 85.9 kg (189 lb 6 oz)       Physical Exam  Vitals and nursing note reviewed.   Constitutional:       Appearance: Normal appearance.   HENT:      Head: Normocephalic and atraumatic.      Right Ear: Tympanic membrane normal.      Left Ear: Tympanic membrane normal.      Mouth/Throat:      Mouth: Mucous membranes are moist.      Pharynx: Oropharynx is clear.   Eyes:      Extraocular Movements: Extraocular movements intact.      Pupils: Pupils are equal, round, and reactive to light.   Cardiovascular:      Rate and Rhythm: Normal rate and regular rhythm.      Pulses: Normal pulses.      Heart sounds: Normal heart sounds.   Pulmonary:      Effort: Pulmonary  effort is normal.      Breath sounds: Normal breath sounds.   Abdominal:      General: Abdomen is flat.      Palpations: Abdomen is soft.   Musculoskeletal:      Cervical back: Normal range of motion.      Left ankle: Swelling and deformity present. Tenderness present. Decreased range of motion.   Skin:     General: Skin is warm.   Neurological:      General: No focal deficit present.      Mental Status: She is alert and oriented to person, place, and time.   Psychiatric:         Mood and Affect: Mood normal.     Assessment/Plan:     Pre-operative evaluation with risk assessment              -           Scheduled for  Achilles Tendon Repair  on 8/25 by Dr. Kapoor   -           DASI score: 58.2 w/ Functional Capacity in METS: 16.6 (>4) with a revised cardiac risk index of 0.    - she  is on antiplatelets/anticoagulation with xarelto, per Heme Onc ok to stop 2 days prior to surgery.   - she does not have a history of blood dyscrasias or previous reaction to anesthesia   - she  is a NEVER SMOKER.  - she does have a prior h/o HTN. BP: 143/97.   - she does not have a prior h/o HLD/CAD w/ either MI or CVA. See below for latest ASCVD if all necessary values are available.  - she does not have a prior h/o DM.   - she does not have a prior h/o thyroid disease.   - she does not have a prior h/o COPD/Asthma/MELANIA/OHS.   - she does not have a prior h/o HF.   - BMI: Body mass index is 36.98 kg/m².               -           The patient is medically optimized with a low to moderate risk to proceed with (per ACC/AHA david-operative guidelines) a moderate risk surgery.   - Please call our office for any additional questions or concerns.    The ASCVD Risk score (Castro PATRICK, et al., 2019) failed to calculate for the following reasons:    Cannot find a previous HDL lab    Cannot find a previous total cholesterol lab  Lab Results   Component Value Date    HGBA1C 6.4 (H) 06/01/2022      Lab Results   Component Value Date    TSH 4.004 (H)  01/24/2023              Case discussed with staff: Dr. Kimi Chirinos MD PGY-2  [facility]   07/25/2023 2:27 PM    This note was partially created using [employer] Voice Recognition software. Typographical and content errors may occur with this process. While efforts are made to detect and correct such errors, in some cases errors will persist. For this reason, wording in this document should be considered in the proper context and not strictly verbatim.

## 2023-07-25 NOTE — Clinical Note
I have seen pt today. She is cleared from hemoc standpoint for surgery, stop Xarelto 2 days prior surgery and resume as soon as possible once hemodynamically stable, likely night of surgery. She can reach out to me via portal any questions as well she knows.  Thanks

## 2023-07-25 NOTE — PROGRESS NOTES
PATIENT: Mehreen Benites  MRN: [id number]  DATE: 7/25/2023    Chief Complaint: PE, DVT    Subjective:     History of Present Illness:   HPI as per Dr Brandt's 8/8/22 office visit, reviewed and verified with pt    She presented to the ED 05/31/2022 with sudden onset lightheadedness, weakness and dyspnea - found to have massive saddle pulmonary embolism with right heart strain and partially occlusive thrombus in the right popliteal vein.  No recent surgeries, immobilizations, long trips or prior history of DVT.    She underwent mechanical thrombectomy because of massive PE causing right heart strain.    Family hx significant for son (38 yo) with DVT/PE and maternal first cousin (41 yo) with DVT/PE.    doing well on xarelto    INTERVAL  - pt here for dvt/pe follow up and need for surgery clearance  - states she is feeling well with no chest pain, sob/ortiz, lightheadedness or dizziness, easy bleeding or bruising. She is dealing with pain re right ankle concerns and need for surgery.  - she is continuing to tolerate xarelto well at 20mg daily. Denies bleeding issues.  -5/1/23 RLE US negative dvt, 5/1/23 right ankle xray mild irregularity distal medial malleolus, referred to urgent care for splinting as well as ortho. 5/25/23 podiatry referral per ortho and now scheduled for achilles repair, muscle resection of gastrocnemius 8/2023  - colonoscopy 4/27/23 with Dr Torres, held xarelto 2 days prior. Colonoscopy with hemorrhoids and x1 polyp 4mm in descending colon removed, repeat in 10 yrs.    Past Medical, Surgical, Family and Social History Reviewed.    Medications and Allergies reviewed    Review of Systems   Constitutional:  Negative for fatigue, fever and unexpected weight change.   HENT:  Negative for mouth sores and nosebleeds.    Respiratory:  Negative for chest tightness and shortness of breath.    Cardiovascular:  Negative for chest pain, palpitations and leg swelling.   Gastrointestinal:  Negative for abdominal pain,  blood in stool, constipation, diarrhea, nausea and vomiting.   Genitourinary:  Negative for hematuria.   Musculoskeletal:  Negative for arthralgias.        Right ankle swelling and pain   Skin:  Negative for pallor.   Neurological:  Negative for dizziness, weakness and light-headedness.   Hematological:  Negative for adenopathy. Does not bruise/bleed easily.   Psychiatric/Behavioral:  Negative for suicidal ideas.    All other systems reviewed and are negative.    Objective:     Vitals:    07/25/23 1319   BP: (!) 148/79   Pulse: 79   SpO2: 99%   Weight: 85.7 kg (188 lb 15 oz)         BMI: Body mass index is 36.9 kg/m².    Physical Exam  Vitals and nursing note reviewed.   Constitutional:       General: She is not in acute distress.  HENT:      Head: Normocephalic and atraumatic.      Right Ear: External ear normal.      Left Ear: External ear normal.   Eyes:      General: No scleral icterus.     Pupils: Pupils are equal, round, and reactive to light.   Cardiovascular:      Rate and Rhythm: Normal rate and regular rhythm.      Pulses: Normal pulses.      Heart sounds: Normal heart sounds. No murmur heard.  Pulmonary:      Effort: Pulmonary effort is normal. No respiratory distress.      Breath sounds: Normal breath sounds.   Abdominal:      General: Bowel sounds are normal. There is no distension.      Palpations: Abdomen is soft.      Tenderness: There is no abdominal tenderness. There is no guarding.   Musculoskeletal:         General: Swelling present. No tenderness. Normal range of motion.      Cervical back: Normal range of motion and neck supple. No rigidity.      Right lower leg: No edema.      Left lower leg: No edema.      Comments: Pt with generalized mild swelling right ankle, nwb using crutches for ambulation   Lymphadenopathy:      Cervical: No cervical adenopathy.   Skin:     General: Skin is warm and dry.      Coloration: Skin is not jaundiced or pale.      Findings: No bruising or erythema.    Neurological:      Mental Status: She is alert and oriented to person, place, and time. Mental status is at baseline.      Gait: Gait normal.   Psychiatric:         Attention and Perception: Attention normal.         Mood and Affect: Mood normal. Mood is not depressed.         Speech: Speech normal.         Behavior: Behavior normal. Behavior is cooperative.         Thought Content: Thought content normal.     ECOG SCORE           LABS  Lab Results   Component Value Date    WBC 6.17 04/24/2023    HGB 13.4 04/24/2023    HCT 41.6 04/24/2023    MCV 91 04/24/2023     04/24/2023       Lab Results   Component Value Date    DDIMER 0.31 09/27/2022     IMAGING    US Lower Extremity Veins Bilat (2/13/23)  Impression:     No evidence of deep venous thrombosis in either lower extremity.       US Lower Extremity Veins Right 9/27/22  Impression:  No evidence of deep venous thrombosis in the right lower extremity.   Interval resolution of previously identified thrombus within the right popliteal vein.    CTA Chest Non-Coronary(PE Studies) 9/27/22  Impression:     1. Interval improvement of prior bilateral pulmonary arterial thromboemboli.  No residual central pulmonary thrombus identified.  Few small filling defects within segmental arteries potentially representing mild residual thrombus.  2. Mosaic attenuation of the bilateral pulmonary parenchyma which may represent increased pulmonary vascular resistance or small airways disease.  3. Mild cardiomegaly.  4. Additional findings as above.             Assessment:       1. Pulmonary embolism, bilateral    2. History of DVT (deep vein thrombosis)                Plan:   Past records including clinic and ED visits, labs, imaging, medications reviewed as available in epic    Massive unprovoked pulmonary embolism  -1st episode, unprovoked, required mechanical thrombectomy  -positive family history blood clots  -she clearly has a hypercoagulable state based on her clinical  "presentation  -no need hypercoagulable workup  -continue Xarelto at full dose  -indefinite anticoagulation needed  -9/29/22 follow up with repeat US RLE (9/27/22) showing resolution of right popliteal vein thrombus, CTA chest (9/27/22) "Interval improvement of prior bilateral pulmonary arterial thromboemboli.  No residual central pulmonary thrombus identified.  Few small filling defects within segmental arteries potentially representing mild residual thrombus."  -9/27/22 d-dimer neg  -reports she has adequate xarelto at home and her pcp will prescribe, advised to call if any of this changes or pcp unavailable  -CTA chest 1/17/23 "No acute cardiopulmonary disease.  Specifically, no residual filling defects are seen within the pulmonary arteries."  -Negative BLE US 2/13/23  -5/1/23 RLE US negative dvt, 5/1/23 right ankle xray mild irregularity distal medial malleolus. referred to urgent care, ortho. 5/25/23 podiatry referral per ortho and now scheduled for achilles repair, muscle resection of gastrocnemius  - pt is cleared for surgery from Hemoc standpoint. She will hold Xarelto 2 days prior surgery and start as soon as possible once hemodynamically stable post surgery with early mobilization, adequate hydration.   - Advised to reach out any questions or concerns. Discussed s/s of pe, dvt and when to seek emergent care.Questions answered.  - rtc 3 months for follow up    HTN  -bp 148/79 today, stable  -management deferred to pcp    CKD stage 3a, SEDRICK history  -GFR 55.1 (4/24/23) which is Improved from GFR 47 (9/27/22)  -management deferred to pcp        OSMEL [place]  [facility]  Hematology/Oncology  [address]  [place]  [address] [phone]              "

## 2023-07-26 RX ORDER — HYDROCHLOROTHIAZIDE 25 MG/1
25 TABLET ORAL DAILY
Qty: 90 TABLET | Refills: 3 | Status: SHIPPED | OUTPATIENT
Start: 2023-07-26 | End: 2024-01-11 | Stop reason: SDUPTHER

## 2023-07-28 ENCOUNTER — PATIENT MESSAGE (OUTPATIENT)
Dept: CARDIOLOGY | Facility: CLINIC | Age: 63
End: 2023-07-28
Payer: MEDICAID

## 2023-08-16 ENCOUNTER — OFFICE VISIT (OUTPATIENT)
Dept: PODIATRY | Facility: CLINIC | Age: 63
End: 2023-08-16
Payer: MEDICAID

## 2023-08-16 VITALS
HEART RATE: 85 BPM | HEIGHT: 60 IN | SYSTOLIC BLOOD PRESSURE: 128 MMHG | DIASTOLIC BLOOD PRESSURE: 88 MMHG | BODY MASS INDEX: 36.98 KG/M2

## 2023-08-16 DIAGNOSIS — M77.51 RETROCALCANEAL BURSITIS (BACK OF HEEL), RIGHT: ICD-10-CM

## 2023-08-16 DIAGNOSIS — M21.6X9 EQUINUS DEFORMITY OF FOOT: ICD-10-CM

## 2023-08-16 DIAGNOSIS — M76.61 ACHILLES TENDINITIS OF RIGHT LOWER EXTREMITY: Primary | ICD-10-CM

## 2023-08-16 DIAGNOSIS — M89.8X7 RETROCALCANEAL EXOSTOSIS: ICD-10-CM

## 2023-08-16 PROCEDURE — 99999 PR PBB SHADOW E&M-EST. PATIENT-LVL III: CPT | Mod: PBBFAC,,, | Performed by: PODIATRIST

## 2023-08-16 PROCEDURE — 3008F PR BODY MASS INDEX (BMI) DOCUMENTED: ICD-10-PCS | Mod: CPTII,,, | Performed by: PODIATRIST

## 2023-08-16 PROCEDURE — 99213 OFFICE O/P EST LOW 20 MIN: CPT | Mod: PBBFAC,PO | Performed by: PODIATRIST

## 2023-08-16 PROCEDURE — 99999 PR PBB SHADOW E&M-EST. PATIENT-LVL III: ICD-10-PCS | Mod: PBBFAC,,, | Performed by: PODIATRIST

## 2023-08-16 PROCEDURE — 4010F ACE/ARB THERAPY RXD/TAKEN: CPT | Mod: CPTII,,, | Performed by: PODIATRIST

## 2023-08-16 PROCEDURE — 3079F DIAST BP 80-89 MM HG: CPT | Mod: CPTII,,, | Performed by: PODIATRIST

## 2023-08-16 PROCEDURE — 1159F PR MEDICATION LIST DOCUMENTED IN MEDICAL RECORD: ICD-10-PCS | Mod: CPTII,,, | Performed by: PODIATRIST

## 2023-08-16 PROCEDURE — 3008F BODY MASS INDEX DOCD: CPT | Mod: CPTII,,, | Performed by: PODIATRIST

## 2023-08-16 PROCEDURE — 1160F RVW MEDS BY RX/DR IN RCRD: CPT | Mod: CPTII,,, | Performed by: PODIATRIST

## 2023-08-16 PROCEDURE — 3079F PR MOST RECENT DIASTOLIC BLOOD PRESSURE 80-89 MM HG: ICD-10-PCS | Mod: CPTII,,, | Performed by: PODIATRIST

## 2023-08-16 PROCEDURE — 99213 PR OFFICE/OUTPT VISIT, EST, LEVL III, 20-29 MIN: ICD-10-PCS | Mod: S$PBB,,, | Performed by: PODIATRIST

## 2023-08-16 PROCEDURE — 1160F PR REVIEW ALL MEDS BY PRESCRIBER/CLIN PHARMACIST DOCUMENTED: ICD-10-PCS | Mod: CPTII,,, | Performed by: PODIATRIST

## 2023-08-16 PROCEDURE — 3074F PR MOST RECENT SYSTOLIC BLOOD PRESSURE < 130 MM HG: ICD-10-PCS | Mod: CPTII,,, | Performed by: PODIATRIST

## 2023-08-16 PROCEDURE — 99213 OFFICE O/P EST LOW 20 MIN: CPT | Mod: S$PBB,,, | Performed by: PODIATRIST

## 2023-08-16 PROCEDURE — 1159F MED LIST DOCD IN RCRD: CPT | Mod: CPTII,,, | Performed by: PODIATRIST

## 2023-08-16 PROCEDURE — 3074F SYST BP LT 130 MM HG: CPT | Mod: CPTII,,, | Performed by: PODIATRIST

## 2023-08-16 PROCEDURE — 4010F PR ACE/ARB THEARPY RXD/TAKEN: ICD-10-PCS | Mod: CPTII,,, | Performed by: PODIATRIST

## 2023-08-16 RX ORDER — HYDROCODONE BITARTRATE AND ACETAMINOPHEN 5; 325 MG/1; MG/1
1 TABLET ORAL EVERY 6 HOURS PRN
Qty: 25 TABLET | Refills: 0 | Status: SHIPPED | OUTPATIENT
Start: 2023-08-16 | End: 2023-08-28 | Stop reason: SDUPTHER

## 2023-08-16 RX ORDER — GABAPENTIN 100 MG/1
100 CAPSULE ORAL 2 TIMES DAILY
Qty: 60 CAPSULE | Refills: 1 | Status: SHIPPED | OUTPATIENT
Start: 2023-08-16 | End: 2023-10-02 | Stop reason: SDUPTHER

## 2023-08-16 RX ORDER — MELOXICAM 7.5 MG/1
7.5 TABLET ORAL DAILY
Qty: 30 TABLET | Refills: 1 | Status: SHIPPED | OUTPATIENT
Start: 2023-08-16 | End: 2023-10-02 | Stop reason: SDUPTHER

## 2023-08-16 RX ORDER — CYCLOBENZAPRINE HCL 5 MG
5 TABLET ORAL 3 TIMES DAILY PRN
Qty: 30 TABLET | Refills: 0 | Status: SHIPPED | OUTPATIENT
Start: 2023-08-16 | End: 2023-08-26

## 2023-08-16 RX ORDER — CEPHALEXIN 500 MG/1
500 CAPSULE ORAL EVERY 6 HOURS
Qty: 28 CAPSULE | Refills: 0 | Status: SHIPPED | OUTPATIENT
Start: 2023-08-16 | End: 2023-08-23

## 2023-08-16 NOTE — PATIENT INSTRUCTIONS
Report to the Same Day Surgery unit on 8/25/23     1. DO NOT EAT OR DRINK ANYTHING AFTER THE MIDNIGHT BEFORE SURGERY     2. Do not drink any alcohol or take any mind-altering drugs 24 hours before surgery.     3. STOP TAKING: Coumadin, Plavix, Pletal, Aggrenox, Aspirin, Aleve, Naproxen, Advil, Motrin, Ibuprofen, [place], Celebrex, Allopurinol, and any medications containing aspirin or antiinflammatories N/A unless instructed otherwise by your Primary Care Provider     4. You may take Tylenol and your other medications up until midnight before your surgery.     5. Take the following medications the morning of your procedure with a sip of water (less than an ounce): Effexor,Depakote     6. Leave all valuables at home.     7. Bathe like you normally do the morning of or the night before surgery, preferably with an anti-bacterial soap     8. Only 2 visitors will be allowed on the unit with patients. Children under 12 years old are not allowed to visit on unit.     9. YOU WILL NOT BE ALLOWED TO DRIVE HOME AFTER YOUR PROCEDURE!!! A family member or friend must be in the unit with you to receive discharge instructions and to sign the papers for you to be discharged home. Also, you must make arrangements before your surgery day to have a ride home in a private vehicle (no buses or public transportation allowed). YOU WILL NOT BE ALLOWED TO WALK HOME, NOR WILL STAFF BE RESPONSIBLE FOR MAKING THESE ARRANGEMENTS FOR YOU!!! FAILURE TO MAKE THE PROPER ARRANGEMENTS FOR YOURSELF MAY RESULT IN THE CANCELLATION OF YOUR PROCEDURE!    10. Obtain a History & Physical for surgical clearance for your surgery from your Primary Care Provider within 30 days of your date of surgery. Have their office fax us a copy of the H&P. Bring a paper copy of the H&P with you to surgery.       Crutch Use    Sizing Crutches    Even if you have already been fitted for crutches, make sure your crutch pads and handgrips are set at the proper distance,  "as follows:          Crutch pad distance from armpits: The crutch pads (tops of crutches) should be 1½" to 2" (about two finger widths) below the armpits, with the shoulders relaxed.  Handgrip: Place it so your elbow is slightly bent--enough so you can fully extend your elbow when you take a step.  Crutch length (top to bottom): The total crutch length should equal the distance from your armpit to about 6" in front of a shoe.     Begin in the Tripod Position  The tripod position is the position in which you stand when using crutches. It is also the position in which you begin walking. To get into the tripod position, place the crutch tips about 4" to 6" to the side and in front of each foot. Stand on your good foot (the one that is weightbearing).        Walking with Crutches  (Nonweightbearing)    If your foot and ankle surgeon has told you to avoid all weightbearing, you will need sufficient upper-body strength to support all your weight with just your arms and shoulders.    Begin in the tripod position, remembering to keep all your weight on your good (weightbearing) foot.  Advance both crutches and the affected foot/leg.  Move the good weightbearing foot/leg forward (beyond the crutches).  Advance both crutches and then the affected foot/leg.  Repeat Steps 3 and 4.     Managing Chairs with Crutches  To get into and out of a chair safely:    Make sure the chair is stable and will not roll or slide. It must have arms and back support.  Stand with the backs of your legs touching the front of the seat.  Place both crutches in one hand, grasping them by the handgrips.  Hold on to the crutches (on one side) and the chair arm (on the other side) for balance and stability while lowering yourself to a seated position or raising yourself from the chair to stand up.     Managing Stairs without Crutches  The safest way to go up and down stairs is to use your seat, not your crutches.    To go up stairs:  Seat yourself on a " "low step.  Move your crutches upstairs by one of these methods:  If distance and reach allow, place the crutches at the top of the staircase.  If this is not possible, place crutches as far up the stairs as you can, and then move them to the top as you progress up the stairs. In the seated position, reach behind you with both arms. Use your arms and weightbearing foot/leg to lift yourself up one step. Repeat this process one step at a time. (Remember to move the crutches to the top of the staircase if you have not already done so.)     To go down stairs:  Seat yourself on the top step. Move your crutches downstairs by sliding them to the lowest possible point on the stairway. Then continue to move them down as you progress down the stairs. In the seated position, reach behind you with both arms. Use your arms and weightbearing foot/leg to lift yourself down one step. Repeat this process one step at a time. (Remember to move the crutches to the bottom of the staircase if you have not already done so.)          IMPORTANT!  Follow These Rules for Safety and Comfort  Dont look down. Look straight ahead as you normally do when you walk.  Dont use crutches if you feel dizzy or drowsy.  Dont walk on slippery surfaces. Avoid snowy, icy or rainy conditions.   Dont put any weight on the affected foot if your doctor has so advised.  Do make sure your crutches have rubber tips.  Do wear well-fitting, low-heel shoes (or shoe).  Do position the crutch hand  correctly (see Sizing Your Crutches)  Do keep the crutch pads 1½" to 2" below your armpits.  Do call your foot and ankle surgeon if you have any questions or difficulties.    "

## 2023-08-16 NOTE — PROGRESS NOTES
[facility] - PODIATRY  [address] [place] [address], [address]  [facility] [address]  Dept: [phone]  Dept Fax: [phone]    Axel Kapoor Jr., DPM     Assessment:   MDM     Amount and/or Complexity of Data Reviewed  Clinical lab tests: ordered and reviewed  Tests in the radiology section of CPT®: ordered and reviewed  Discussion of test results with the performing providers: yes  Review and summarize past medical records: yes  Independent visualization of images, tracings, or specimens: yes      MDM  Reviewed: previous chart, nursing note and vitals  Reviewed previous: labs, x-ray and MRI  Interpretation: labs, x-ray and MRI      1. Achilles tendinitis of right lower extremity  cephALEXin (KEFLEX) 500 MG capsule    gabapentin (NEURONTIN) 100 MG capsule    meloxicam (MOBIC) 7.5 MG tablet    HYDROcodone-acetaminophen (NORCO) 5-325 mg per tablet    cyclobenzaprine (FLEXERIL) 5 MG tablet      2. Retrocalcaneal bursitis (back of heel), right        3. Equinus deformity of foot        4. Retrocalcaneal exostosis - Right Foot            Plan:     Procedures  1. Achilles tendinitis of right lower extremity  -     cephALEXin (KEFLEX) 500 MG capsule; Take 1 capsule (500 mg total) by mouth every 6 (six) hours. for 7 days  Dispense: 28 capsule; Refill: 0  -     gabapentin (NEURONTIN) 100 MG capsule; Take 1 capsule (100 mg total) by mouth 2 (two) times daily.  Dispense: 60 capsule; Refill: 1  -     meloxicam (MOBIC) 7.5 MG tablet; Take 1 tablet (7.5 mg total) by mouth once daily.  Dispense: 30 tablet; Refill: 1  -     HYDROcodone-acetaminophen (NORCO) 5-325 mg per tablet; Take 1 tablet by mouth every 6 (six) hours as needed for Pain.  Dispense: 25 tablet; Refill: 0  -     cyclobenzaprine (FLEXERIL) 5 MG tablet; Take 1 tablet (5 mg total) by mouth 3 (three) times daily as needed for Muscle spasms.  Dispense: 30 tablet; Refill: 0    2. Retrocalcaneal bursitis (back of heel), right    3. Equinus deformity of  foot    4. Retrocalcaneal exostosis - Right Foot      Mehreen was seen today for foot pain.    Diagnoses and all orders for this visit:    Achilles tendinitis of right lower extremity  -     cephALEXin (KEFLEX) 500 MG capsule; Take 1 capsule (500 mg total) by mouth every 6 (six) hours. for 7 days  -     gabapentin (NEURONTIN) 100 MG capsule; Take 1 capsule (100 mg total) by mouth 2 (two) times daily.  -     meloxicam (MOBIC) 7.5 MG tablet; Take 1 tablet (7.5 mg total) by mouth once daily.  -     HYDROcodone-acetaminophen (NORCO) 5-325 mg per tablet; Take 1 tablet by mouth every 6 (six) hours as needed for Pain.  -     cyclobenzaprine (FLEXERIL) 5 MG tablet; Take 1 tablet (5 mg total) by mouth 3 (three) times daily as needed for Muscle spasms.    Retrocalcaneal bursitis (back of heel), right    Equinus deformity of foot    Retrocalcaneal exostosis - Right Foot        -pt seen, evaluated, and managed  -dx discussed in detail. All questions/concerns addressed  -all tx options discussed. All alternatives, risks, benefits of all txs discussed  -the patient was educated about the diagnosis  -XR/imaging reviewed by me: agree with read  -given failure of conservative measures, we discussed surgical intervention  -pt would benefit from operative intervention: we discussed the following procedures: R OGR+ secondary repair achilles tendon+removal of retrocalc bursa and exostosis+ BMAC  -Decision regarding elective surgery was made and the patient opts for surgical intervention: yes  -Decision regarding emergent or urgent/ non-elective surgery was made the patient opts for surgical intervention: no  -We discussed the patient risk factors and social determinants of health and their impacts including but not limited to: stress and/or mental health strain, social connections strain, family support strain, financial resource strain , severity of deformity / severity of injury, major medical co-morbidities, alcohol or tobacco  abuse, housing resource strain, transportation strain, food insecurity, lack of physical activity  -Long discussion with patient regarding the procedure in detail. Patient understands all risks, possible benefits, potential complications, and alternatives, including, but not limited to those listed on the consent form. Pt understands consequences of failing to proceed with recommended procedure(s). All questions were answered. No guarantees given or implied as to outcome. Patient is aware of the procedure specific complications including but not limited to infection, wound or bone healing problems, damage to surrounding structures, need for additional surgery, loss of toes/foot/leg/life, scar formation, nerve pain, gait issues. They agree and exhibit appropriate understanding of all discussion points. Patient understands post-operative course and agrees to be compliant with care. Teach back method used as part of informed consent process. Informed verbal and written consent was obtained. Consent forms read, signed, witnessed.  -we discussed postop course  -would be out pt, elective surgery  -would be GA + block, supine position  -PCP has cleared and is managing bloodthinner  -DOS: 8/25/23    -rxs dispensed: postop  -referrals: none  -WB: wbat      Follow up in 13 days (on 8/29/2023).    Subjective:      Patient ID: Mehreen Benites is a 62 y.o. female.    Chief Complaint:   Chief Complaint   Patient presents with    Foot Pain     Right Foot        CC - posterior heel pain: patient presents to the clinic complaining of heel pain in the right foot. The pain is described as sharp and aching. The onset of the pain was gradual and has worsened over the past several months. Patient rates the pain as 9/10. Patient denies a history of trauma. Prior treatments include conservative management with Dr. Van. After failing conservative measures, an MRI was obtained. She was referred here for surgical eval. Reports pain keeps  her from walking on foot. Essentially limbs with crutches now.      8/16/23:  Hx as above. Pt has obtained PCP clearance. Has failed conservative management. Requesting surgery.     Foot Pain        Last Podiatry Enc: Visit date not found  Last Enc w/ Me: Visit date not found    Outside reports reviewed: historical medical records.  Family hx: as below  Past Medical History:   Diagnosis Date    Hypertension     Other pulmonary embolism without acute cor pulmonale 2022    Tendonitis      Past Surgical History:   Procedure Laterality Date    COLONOSCOPY N/A 4/27/2023    Procedure: COLONOSCOPY;  Surgeon: Zachary Torres MD;  Location: [facility] ENDO;  Service: Endoscopy;  Laterality: N/A;    HEEL SPUR SURGERY Left 02/14/2013    HYSTERECTOMY  10/2009    WHITNEY    OOPHORECTOMY  10/2009    Bilateral    THROMBECTOMY N/A 6/1/2022    Procedure: THROMBECTOMY;  Surgeon: Kendall Fiore MD;  Location: [facility] CATH LAB/EP;  Service: Cardiology;  Laterality: N/A;     Family History   Problem Relation Age of Onset    Hypertension Mother     Breast cancer Maternal Cousin 51    Breast cancer Maternal Aunt     Breast cancer Paternal Aunt     Colon cancer Neg Hx     Ovarian cancer Neg Hx      Current Outpatient Medications   Medication Sig Dispense Refill    ascorbic acid, vitamin C, (VITAMIN C) 500 MG tablet Take 500 mg by mouth once daily.      ferrous sulfate (FEOSOL) 325 mg (65 mg iron) Tab tablet Take 1 tablet (325 mg total) by mouth once daily. 90 tablet 3    hydroCHLOROthiazide (HYDRODIURIL) 25 MG tablet Take 1 tablet (25 mg total) by mouth once daily. 90 tablet 3    rivaroxaban (XARELTO) 20 mg Tab Take 1 tablet (20 mg total) by mouth before dinner. 90 tablet 3    traMADoL (ULTRAM) 50 mg tablet Take 1 tablet (50 mg total) by mouth every 6 (six) hours. 15 tablet 0    cephALEXin (KEFLEX) 500 MG capsule Take 1 capsule (500 mg total) by mouth every 6 (six) hours. for 7 days 28 capsule 0    cyclobenzaprine (FLEXERIL) 5 MG tablet Take 1  tablet (5 mg total) by mouth 3 (three) times daily as needed for Muscle spasms. 30 tablet 0    gabapentin (NEURONTIN) 100 MG capsule Take 1 capsule (100 mg total) by mouth 2 (two) times daily. 60 capsule 1    HYDROcodone-acetaminophen (NORCO) 5-325 mg per tablet Take 1 tablet by mouth every 6 (six) hours as needed for Pain. 25 tablet 0    losartan (COZAAR) 25 MG tablet Take 1 tablet (25 mg total) by mouth once daily. 90 tablet 3    meloxicam (MOBIC) 7.5 MG tablet Take 1 tablet (7.5 mg total) by mouth once daily. 30 tablet 1     No current facility-administered medications for this visit.     Review of patient's allergies indicates:  No Known Allergies  Social History     Socioeconomic History    Marital status: Legally    Tobacco Use    Smoking status: Never    Smokeless tobacco: Never   Substance and Sexual Activity    Alcohol use: No    Drug use: No    Sexual activity: Yes     Partners: Male     Birth control/protection: Post-menopausal, See Surgical Hx     Comment:        ROS    REVIEW OF SYSTEMS: Negative as documented below as well as positive findings in bold.       Constitutional  Respiratory  Gastrointestinal  Skin   - Fever - Cough - Heartburn - Rash   - Chills - Spit blood - Nausea - Itching   - Weight Loss - Shortness of breath - Vomiting - Nail pain   - Malaise/Fatigue - Wheezing - Abdominal Pain  Wound/Ulcer   - Weight Gain   - Blood in Stool  Poor wound healing       - Diarrhea          Cardiovascular  Genitourinary  Neurological  HEENT   - Chest Pain - Dysuria - Burning Sensation of feet - Headache   - Palpitations - Hematuria - Tingling / Paresthesia - Congestion   - Pain at night in legs - Flank Pain - Dizziness - Sore Throat   - Cramping   - Tremor - Blurred Vision   - Leg Swelling   - Sensory Change - Double Vision   - Dizzy when standing   - Speech Change - Eye Redness       - Focal Weakness - Dry Eyes       - Loss of Consciousness          Endocrine  Musculoskeletal  Psychiatric    - Cold intolerance - Muscle Pain - Depression   - Heat intolerance - Neck Pain - Insomnia   - Anemia - Joint Pain - Memory Loss   -  Easy bruising, bleeding - Heel pain - Anxiety      Toe Pain        Leg/Ankle/Foot Pain         Objective:     /88   Pulse 85   Ht 5' (1.524 m)   LMP  (LMP Unknown)   BMI 36.98 kg/m²   Vitals:    08/16/23 1104   BP: 128/88   Pulse: 85   Height: 5' (1.524 m)   PainSc:   5   PainLoc: Foot       Physical Exam    General Appearance:   Patient appears well developed, well nourished  Patient appears stated age    Psychiatric:   Patient is oriented to time, place, and person.  Patient has appropriate mood and affect    Neck:  Trachea Midline  No visible masses    Respiratory/Ears:  No distress or labored breathing.  Able to differentiate between normal talking voice and whisper.  Able to follow commands    Eyes:  Visual Acuity intact  Lids and conjunctivae normal. No discoloration noted.    Foot Exam  Physical Exam  Ortho Exam  Ortho/SPM Exam  Physical Exam  Foot/Ankle Musculoskeletal Exam    R LE exam con't:  V:  DP 2/4, PT 2/4   CRT< 3s to all digits tested   Anterior tibial and popliteal lymph nodes are w/o abnormality    edema present, varicosities present    N: Patient displays normal ankle reflexes   SILT in SP/DP/T/Yesi/Saph distributions    Ortho: +Motor EHL/FHL/TA/GA   +TTP R posterior calcaneus in area of achilles insertion   Enlarged bony prominence is moderate and present   Compartments soft/compressible. No pain on passive stretch of big toe. No calf  Pain.   equinus deformity present    Derm: skin intact, skin warm and dry, skin without ulcers or lesions, skin without induration, nails normal    Imaging / Labs:      MRI Ankle Without Contrast Right    Result Date: 6/19/2023  EXAMINATION: MRI ANKLE WITHOUT CONTRAST RIGHT CLINICAL HISTORY: Ankle pain, tendon abnormality suspected, neg xray;achilles tendon pain;  Pain in right ankle and joints of right foot TECHNIQUE:  Routine MRI evaluation of the right ankle performed without contrast. COMPARISON: Radiograph 05/25/2023. FINDINGS: LIGAMENTS: Irregularity of the anterior talofibular ligament with associated osseous productive changes at the fibular insertion.  Irregularity and signal heterogeneity of the deep deltoid ligament with a questionable superimposed heterotopic ossific body.  Posterior talofibular, calcaneofibular, superomedial spring, superficial deltoid, anterior-inferior tibiofibular, posterior-inferior tibiofibular, dorsal talonavicular, bifurcate and dorsal calcaneocuboid ligaments are grossly intact.  Lisfranc ligament appears unremarkable. TENDONS: There is fusiform thickening of the midsubstance and insertional Achilles tendon with mild peritendinous edema, a large edematous enthesophyte, reactive calcaneal edema and a few intrasubstance linear areas of fluid signal.  Fusiform thickening and increased intrasubstance signal of the peroneus longus tendon extending from the peroneal tubercle to the level of the cuboid sulcus.  Flexor hallucis longus tendon sheath fluid at the level of the crossover point of Carmine's knot.  Posterior tibial, flexor digitorum longus, peroneus brevis and extensor tendons are normal. BONES: Subcortical edema at the posterior calcaneus, likely reactive.  No fractures.  No avascular necrosis.  No marrow infiltrative process. JOINTS/CARTILAGE: 1.4 x 0.7 cm focal area of full-thickness chondral fissuring with prominent associated subchondral cystic change and mild subchondral marrow edema the medial talar dome.  Partial to full-thickness chondral fissuring with subchondral cystic change at the lateral talus and proximal articular surface of the navicular.  Suspected partial-thickness chondral fissuring at the dorsal aspect of the 2nd TMT joint with associated osteophyte formation.  Remaining visualized chondral surfaces appear within normal limits.  No MR imaging findings to suggest a tarsal  coalition. MUSCLES: Mild fatty degeneration of the abductor digiti minimi.  No accessory muscles. MISCELLANEOUS: Plantar calcaneal enthesophyte with a normal appearance of the adjacent plantar aponeurosis.  Sinus tarsi demonstrates preserved signal intensity.  Tarsal tunnel is unremarkable.     1. 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 *Slightly prominent posterior-superior calcaneus/River's deformity with associated subcortical marrow edema. *Mild fluid distention of the retrocalcaneal bursa suggesting bursitis. 2.  Peroneus longus tendinosis extending from the peroneal tubercle to the level of the calcaneal sulcus. 3.  Flexor hallucis tenosynovitis. 4.  Medial talar dome osteochondral lesion. 5.  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 6.  Chronic sprains of the anterior talofibular and deep deltoid ligaments. Electronically signed by: Dm Alston MD Date:    06/19/2023 Time:    15:32        Note: This was dictated using a computer transcription program. Although proofread, it may contain computer transcription errors and phonetic errors. Other human proofreading errors may also exist. Corrections may be performed at a later time. Please contact us for any clarification if needed.    Axel Kapoor DPM  [facility]

## 2023-08-22 ENCOUNTER — HOSPITAL ENCOUNTER (OUTPATIENT)
Dept: RADIOLOGY | Facility: HOSPITAL | Age: 63
Discharge: HOME | End: 2023-08-22
Attending: PODIATRIST
Payer: MEDICAID

## 2023-08-22 ENCOUNTER — HOSPITAL ENCOUNTER (OUTPATIENT)
Dept: CARDIOLOGY | Facility: HOSPITAL | Age: 63
Discharge: HOME | End: 2023-08-22
Attending: PODIATRIST
Payer: MEDICAID

## 2023-08-22 ENCOUNTER — TELEPHONE (OUTPATIENT)
Dept: PODIATRY | Facility: CLINIC | Age: 63
End: 2023-08-22
Payer: MEDICAID

## 2023-08-22 DIAGNOSIS — M76.61 ACHILLES TENDINITIS OF RIGHT LOWER EXTREMITY: Primary | ICD-10-CM

## 2023-08-22 DIAGNOSIS — M76.61 ACHILLES TENDINITIS OF RIGHT LOWER EXTREMITY: ICD-10-CM

## 2023-08-22 PROCEDURE — 93005 ELECTROCARDIOGRAM TRACING: CPT | Mod: PO

## 2023-08-22 PROCEDURE — 71046 X-RAY EXAM CHEST 2 VIEWS: CPT | Mod: TC,FY,PO

## 2023-08-22 PROCEDURE — 71046 XR CHEST PA AND LATERAL: ICD-10-PCS | Mod: 26,,, | Performed by: RADIOLOGY

## 2023-08-22 PROCEDURE — 93010 ELECTROCARDIOGRAM REPORT: CPT | Mod: ,,, | Performed by: INTERNAL MEDICINE

## 2023-08-22 PROCEDURE — 93010 EKG 12-LEAD: ICD-10-PCS | Mod: ,,, | Performed by: INTERNAL MEDICINE

## 2023-08-22 PROCEDURE — 71046 X-RAY EXAM CHEST 2 VIEWS: CPT | Mod: 26,,, | Performed by: RADIOLOGY

## 2023-08-22 NOTE — TELEPHONE ENCOUNTER
----- Message from Marilee Hall RN sent at 8/22/2023  8:16 AM CDT -----  Regarding: PT/DME/Equipment Needs  Good Morning Dr. Kapoor,    On your patient Ms. Benites, I did not find any orders for Physical therapy for patient but patient states that she is currently using crutches but she does not think they are sized correctly. She also states that she has almost fallen several times with them. If appropriate, can physical therapy be ordered so that she can be evaluated and given new crutches and/or crutch training or they can determine if a walker may be more appropriate prior to her surgery. This will also require an order. She is also requesting a bedside commode if possible. Please advise and thanks in advance.    Marilee, Pre-Admit RN @[facility]

## 2023-08-25 PROBLEM — M76.61 TENDONITIS, ACHILLES, RIGHT: Status: ACTIVE | Noted: 2023-08-25

## 2023-08-25 PROBLEM — M21.861 ACQUIRED POSTERIOR EQUINUS OF RIGHT LOWER EXTREMITY: Status: ACTIVE | Noted: 2023-08-25

## 2023-08-25 PROBLEM — M77.51 RETROCALCANEAL BURSITIS (BACK OF HEEL), RIGHT: Status: ACTIVE | Noted: 2023-08-25

## 2023-08-25 PROBLEM — M67.88 ACHILLES TENDONOSIS OF RIGHT LOWER EXTREMITY: Status: ACTIVE | Noted: 2023-08-25

## 2023-08-25 PROBLEM — M77.30: Status: ACTIVE | Noted: 2023-08-25

## 2023-08-28 ENCOUNTER — OFFICE VISIT (OUTPATIENT)
Dept: PODIATRY | Facility: CLINIC | Age: 63
End: 2023-08-28
Payer: MEDICAID

## 2023-08-28 VITALS — BODY MASS INDEX: 33.78 KG/M2 | HEIGHT: 61 IN

## 2023-08-28 DIAGNOSIS — M76.61 ACHILLES TENDINITIS OF RIGHT LOWER EXTREMITY: ICD-10-CM

## 2023-08-28 DIAGNOSIS — Z47.89 AFTERCARE FOLLOWING SURGERY OF THE MUSCULOSKELETAL SYSTEM: Primary | ICD-10-CM

## 2023-08-28 PROCEDURE — 3008F PR BODY MASS INDEX (BMI) DOCUMENTED: ICD-10-PCS | Mod: CPTII,,, | Performed by: PODIATRIST

## 2023-08-28 PROCEDURE — 99999 PR PBB SHADOW E&M-EST. PATIENT-LVL III: ICD-10-PCS | Mod: PBBFAC,,, | Performed by: PODIATRIST

## 2023-08-28 PROCEDURE — 3008F BODY MASS INDEX DOCD: CPT | Mod: CPTII,,, | Performed by: PODIATRIST

## 2023-08-28 PROCEDURE — 1159F PR MEDICATION LIST DOCUMENTED IN MEDICAL RECORD: ICD-10-PCS | Mod: CPTII,,, | Performed by: PODIATRIST

## 2023-08-28 PROCEDURE — 4010F ACE/ARB THERAPY RXD/TAKEN: CPT | Mod: CPTII,,, | Performed by: PODIATRIST

## 2023-08-28 PROCEDURE — 4010F PR ACE/ARB THEARPY RXD/TAKEN: ICD-10-PCS | Mod: CPTII,,, | Performed by: PODIATRIST

## 2023-08-28 PROCEDURE — 1159F MED LIST DOCD IN RCRD: CPT | Mod: CPTII,,, | Performed by: PODIATRIST

## 2023-08-28 PROCEDURE — 3044F PR MOST RECENT HEMOGLOBIN A1C LEVEL <7.0%: ICD-10-PCS | Mod: CPTII,,, | Performed by: PODIATRIST

## 2023-08-28 PROCEDURE — 99213 OFFICE O/P EST LOW 20 MIN: CPT | Mod: PBBFAC,PN | Performed by: PODIATRIST

## 2023-08-28 PROCEDURE — 99024 PR POST-OP FOLLOW-UP VISIT: ICD-10-PCS | Mod: ,,, | Performed by: PODIATRIST

## 2023-08-28 PROCEDURE — 1160F RVW MEDS BY RX/DR IN RCRD: CPT | Mod: CPTII,,, | Performed by: PODIATRIST

## 2023-08-28 PROCEDURE — 3044F HG A1C LEVEL LT 7.0%: CPT | Mod: CPTII,,, | Performed by: PODIATRIST

## 2023-08-28 PROCEDURE — 1160F PR REVIEW ALL MEDS BY PRESCRIBER/CLIN PHARMACIST DOCUMENTED: ICD-10-PCS | Mod: CPTII,,, | Performed by: PODIATRIST

## 2023-08-28 PROCEDURE — 99999 PR PBB SHADOW E&M-EST. PATIENT-LVL III: CPT | Mod: PBBFAC,,, | Performed by: PODIATRIST

## 2023-08-28 PROCEDURE — 99024 POSTOP FOLLOW-UP VISIT: CPT | Mod: ,,, | Performed by: PODIATRIST

## 2023-08-28 RX ORDER — HYDROCODONE BITARTRATE AND ACETAMINOPHEN 5; 325 MG/1; MG/1
1 TABLET ORAL EVERY 6 HOURS PRN
Qty: 25 TABLET | Refills: 0 | Status: SHIPPED | OUTPATIENT
Start: 2023-08-28 | End: 2023-10-02 | Stop reason: ALTCHOICE

## 2023-08-28 NOTE — PROGRESS NOTES
[facility] - PODIATRY  [address], [address]  [place] [address]  Dept: [phone]  Dept Fax: [phone]    WYATT Peterson Jr., Jr.     Post-Operative Visit  Assessment:     1. Aftercare following surgery of the musculoskeletal system  POST-SURGICAL BOOT/SHOE FOR HOME USE      2. Achilles tendinitis of right lower extremity  HYDROcodone-acetaminophen (NORCO) 5-325 mg per tablet    POST-SURGICAL BOOT/SHOE FOR HOME USE          Plan:   MDM    Coding    - pt seen evaluated and managed  - skin not ready for suture removal  - dressings re-applied  - wb: nwb RLE in CAM  - rx dispensed: norco renewed  - referrals: none    Follow up in about 2 weeks (around 9/11/2023).      Subjective:      Patient ID: Mehreen Benites is a 62 y.o. female.    Chief Complaint:   Chief Complaint   Patient presents with    Post-op Evaluation     1. PO surgery, 8/25/23     There were no vitals filed for this visit.    DOS: 8/25/23  Procedure: R retrocalc recon    Mehreen Benites returns to the clinic today for the 1st postop visit. Pt is s/p above procedure. Mehreen Benites rates pain at a 8/10 on visual analog scale. Denies n/v/f/c.    HPI      Outside reports reviewed: historical medical records.    Past Medical History:   Diagnosis Date    Hypertension     Other pulmonary embolism without acute cor pulmonale 2022    Tendonitis      Past Surgical History:   Procedure Laterality Date    BONE MARROW ASPIRATION Right 8/25/2023    Procedure: ASPIRATION, BONE MARROW;  Surgeon: Axel Kapoor Jr., DPM;  Location: [facility] OR;  Service: Podiatry;  Laterality: Right;    CATARACT EXTRACTION, BILATERAL      COLONOSCOPY N/A 04/27/2023    Procedure: COLONOSCOPY;  Surgeon: Zachary Torres MD;  Location: [facility];  Service: Endoscopy;  Laterality: N/A;    HEEL SPUR SURGERY Left 02/14/2013    HYSTERECTOMY  10/2009    WHITNEY    OOPHORECTOMY  10/2009    Bilateral    REPAIR OF  ACHILLES TENDON Right 8/25/2023    Procedure: REPAIR, TENDON, ACHILLES;  Surgeon: Axel Kapoor Jr., DPM;  Location: [facility] OR;  Service: Podiatry;  Laterality: Right;  pop saph    RESECTION OF GASTROCNEMIUS MUSCLE Right 8/25/2023    Procedure: RESECTION, MUSCLE, GASTROCNEMIUS;  Surgeon: Axel Kapoor Jr., DPM;  Location: [facility] OR;  Service: Podiatry;  Laterality: Right;  pop saph    SURGICAL REMOVAL OF RETROCALCANEAL EXOSTOSIS Right 8/25/2023    Procedure: EXCISION, EXOSTOSIS, RETROCALCANEAL;  Surgeon: Axel Kapoor Jr., DPM;  Location: [facility] OR;  Service: Podiatry;  Laterality: Right;    THROMBECTOMY N/A 06/01/2022    Procedure: THROMBECTOMY;  Surgeon: Kendall Fiore MD;  [facility] CATH LAB/EP;  Service: Cardiology;  Laterality: N/A;     Family History   Problem Relation Age of Onset    Hypertension Mother     Breast cancer Maternal Cousin 51    Breast cancer Maternal Aunt     Breast cancer Paternal Aunt     Colon cancer Neg Hx     Ovarian cancer Neg Hx      Current Outpatient Medications   Medication Sig Dispense Refill    ascorbic acid, vitamin C, (VITAMIN C) 500 MG tablet Take 500 mg by mouth once daily.      ferrous sulfate (FEOSOL) 325 mg (65 mg iron) Tab tablet Take 1 tablet (325 mg total) by mouth once daily. 90 tablet 3    gabapentin (NEURONTIN) 100 MG capsule Take 1 capsule (100 mg total) by mouth 2 (two) times daily. 60 capsule 1    hydroCHLOROthiazide (HYDRODIURIL) 25 MG tablet Take 1 tablet (25 mg total) by mouth once daily. 90 tablet 3    HYDROcodone-acetaminophen (NORCO) 5-325 mg per tablet Take 1 tablet by mouth every 6 (six) hours as needed for Pain. 25 tablet 0    meloxicam (MOBIC) 7.5 MG tablet Take 1 tablet (7.5 mg total) by mouth once daily. 30 tablet 1    rivaroxaban (XARELTO) 20 mg Tab Take 1 tablet (20 mg total) by mouth before dinner. 90 tablet 3    losartan (COZAAR) 25 MG tablet Take 1 tablet (25 mg total) by mouth once daily. 90 tablet 3     No current  "facility-administered medications for this visit.     Review of patient's allergies indicates:  No Known Allergies  Social History     Socioeconomic History    Marital status: Legally    Tobacco Use    Smoking status: Never    Smokeless tobacco: Never   Substance and Sexual Activity    Alcohol use: Yes     Comment: occasionally    Drug use: No    Sexual activity: Not Currently     Partners: Male     Birth control/protection: Post-menopausal, See Surgical Hx     Comment:        ROS  REVIEW OF SYSTEMS: Negative as documented below as well as positive findings in bold.       Constitutional  Respiratory  Gastrointestinal  Skin   - Fever - Cough - Heartburn - Rash   - Chills - Spit blood - Nausea - Itching   - Weight Loss - Shortness of breath - Vomiting - Nail pain   - Malaise/Fatigue - Wheezing - Abdominal Pain  Wound/Ulcer   - Weight Gain   - Blood in Stool         - Diarrhea          Cardiovascular  Genitourinary  Neurological  HEENT   - Chest Pain - Dysuria - Dizziness - Headache   - Palpitations - Hematuria - Tingling - Congestion   - Pain at night in legs - Flank Pain - Tremor - Sore Throat   - Cramping   - Sensory Change - Blurred Vision   - Leg Swelling   - Speech Change - Double Vision   - Dizzy when standing   - Focal Weakness - Eye Redness       - Seizures - Dry Eyes       - Loss of Consciousness          Endocrine  Musculoskeletal  Psychiatric   - Cold intolerance - Muscle Pain - Depression   - Heat intolerance - Neck Pain - Insomnia   - Anemia - Joint Pain - Memory Loss   -  Easy bruising, bleeding - Heel pain - Anxiety      Toe Pain        Leg/Ankle/Foot Pain         Objective:     Ht 5' 1" (1.549 m)   LMP  (LMP Unknown)   BMI 33.78 kg/m²     Physical Exam    Neck:  Trachea Midline  No visible masses    Respiratory/Ears:  No distress or labored breathing.  Able to differentiate between normal talking voice and whisper.  Able to follow commands    Eyes:  Visual Acuity intact  No " discoloration noted.    Physical Exam  Ortho Exam  Foot Exam    R LE exam con't:  V: DP 2/4, PT 2/4, CRT< 3s to all digits tested.    N: SILT in SP/DP/T/Yesi/Saph distributions    Ortho: +Motor EHL/FHL/TA/GA   Surgical site pain present as expected   Surgical site swelling present and moderate   Compartments soft/compressible. No pain on passive stretch of big toe. No calf  Pain.     Derm: Skin intact. Sutures/staples: intact. Signs of infection: none.     Imaging / Labs:    Lab Results   Component Value Date    WBC 6.57 08/22/2023    WBC 6.17 04/24/2023    WBC 6.43 01/24/2023    WBC 6.32 09/27/2022    WBC 7.51 08/05/2022    PREALBUMIN 26 08/22/2023       Lab Results   Component Value Date    PREALBUMIN 26 08/22/2023       X-Ray Foot Complete Right    Result Date: 8/25/2023  EXAMINATION: XR FOOT COMPLETE 3 VIEW RIGHT CLINICAL HISTORY: postop;. TECHNIQUE: AP, lateral, and oblique views of the right foot were performed. FINDINGS: There is postop change involving the calcaneus.  There is osseous spur at the plantar fascia attachment to the calcaneus.  There is degenerative change within the midfoot.     As above. Electronically signed by: Kory Knight MD Date:    08/25/2023 Time:    11:55    SURG [place] Surgery Fluoro Usage    Result Date: 8/25/2023  See OP Notes for results. IMPRESSION: See OP Notes for results. This procedure was auto-finalized by: Virtual Radiologist

## 2023-08-28 NOTE — PATIENT INSTRUCTIONS
"R.I.C.E.    R.I.C.E. stands for Rest, Ice, Compression, and Elevation. Doing these things helps limit pain and swelling after an injury. R.I.C.E. also helps injuries heal faster. Use R.I.C.E. for sprains, strains, and severe bruises or bumps. Follow the tips on this handout and begin R.I.C.E. as soon as possible after an injury.     Rest  Pain is your body's way of telling you to rest an injured area. Whether you have hurt an elbow, hand, foot, or knee, limiting its use will prevent further injury and help you heal.     Ice  Applying ice right after an injury helps prevent swelling and reduce pain. Don't place ice directly on your skin.  Wrap a cold pack or bag of ice in a thin cloth. Place it over the injured area.  Ice for 10 minutes every 3 hours. Don't ice for more than 20 minutes at a time.     Compression  Putting pressure (compression) on an injury helps prevent swelling and provides support.  Wrap the injured area firmly with an elastic bandage. If your hand or foot tingles, becomes discolored, or feels cold to the touch, the bandage may be too tight. Rewrap it more loosely.  If your bandage becomes too loose, rewrap it.  Do not wear an elastic bandage overnight.    Elevation  Keeping an injury elevated helps reduce swelling, pain, and throbbing. Elevation is most effective when the injury is kept elevated higher than the heart.     Call your healthcare provider if you notice any of the following:  Fingers or toes feel numb, are cold to the touch, or change color  Skin looks shiny or tight  Pain, swelling, or bruising worsens and is not improved with elevation         Crutch Use    Sizing Crutches    Even if you have already been fitted for crutches, make sure your crutch pads and handgrips are set at the proper distance, as follows:          Crutch pad distance from armpits: The crutch pads (tops of crutches) should be 1½" to 2" (about two finger widths) below the armpits, with the shoulders " "relaxed.  Handgrip: Place it so your elbow is slightly bent--enough so you can fully extend your elbow when you take a step.  Crutch length (top to bottom): The total crutch length should equal the distance from your armpit to about 6" in front of a shoe.     Begin in the Tripod Position  The tripod position is the position in which you stand when using crutches. It is also the position in which you begin walking. To get into the tripod position, place the crutch tips about 4" to 6" to the side and in front of each foot. Stand on your good foot (the one that is weightbearing).        Walking with Crutches  (Nonweightbearing)    If your foot and ankle surgeon has told you to avoid all weightbearing, you will need sufficient upper-body strength to support all your weight with just your arms and shoulders.    Begin in the tripod position, remembering to keep all your weight on your good (weightbearing) foot.  Advance both crutches and the affected foot/leg.  Move the good weightbearing foot/leg forward (beyond the crutches).  Advance both crutches and then the affected foot/leg.  Repeat Steps 3 and 4.     Managing Chairs with Crutches  To get into and out of a chair safely:    Make sure the chair is stable and will not roll or slide. It must have arms and back support.  Stand with the backs of your legs touching the front of the seat.  Place both crutches in one hand, grasping them by the handgrips.  Hold on to the crutches (on one side) and the chair arm (on the other side) for balance and stability while lowering yourself to a seated position or raising yourself from the chair to stand up.     Managing Stairs without Crutches  The safest way to go up and down stairs is to use your seat, not your crutches.    To go up stairs:  Seat yourself on a low step.  Move your crutches upstairs by one of these methods:  If distance and reach allow, place the crutches at the top of the staircase.  If this is not possible, place " "crutches as far up the stairs as you can, and then move them to the top as you progress up the stairs. In the seated position, reach behind you with both arms. Use your arms and weightbearing foot/leg to lift yourself up one step. Repeat this process one step at a time. (Remember to move the crutches to the top of the staircase if you have not already done so.)     To go down stairs:  Seat yourself on the top step. Move your crutches downstairs by sliding them to the lowest possible point on the stairway. Then continue to move them down as you progress down the stairs. In the seated position, reach behind you with both arms. Use your arms and weightbearing foot/leg to lift yourself down one step. Repeat this process one step at a time. (Remember to move the crutches to the bottom of the staircase if you have not already done so.)          IMPORTANT!  Follow These Rules for Safety and Comfort  Dont look down. Look straight ahead as you normally do when you walk.  Dont use crutches if you feel dizzy or drowsy.  Dont walk on slippery surfaces. Avoid snowy, icy or rainy conditions.   Dont put any weight on the affected foot if your doctor has so advised.  Do make sure your crutches have rubber tips.  Do wear well-fitting, low-heel shoes (or shoe).  Do position the crutch hand  correctly (see Sizing Your Crutches)  Do keep the crutch pads 1½" to 2" below your armpits.  Do call your foot and ankle surgeon if you have any questions or difficulties.    "

## 2023-09-11 ENCOUNTER — OFFICE VISIT (OUTPATIENT)
Dept: PODIATRY | Facility: CLINIC | Age: 63
End: 2023-09-11
Payer: MEDICAID

## 2023-09-11 VITALS — WEIGHT: 178 LBS | BODY MASS INDEX: 33.61 KG/M2 | HEIGHT: 61 IN

## 2023-09-11 DIAGNOSIS — Z47.89 AFTERCARE FOLLOWING SURGERY OF THE MUSCULOSKELETAL SYSTEM: Primary | ICD-10-CM

## 2023-09-11 PROCEDURE — 1160F RVW MEDS BY RX/DR IN RCRD: CPT | Mod: CPTII,,, | Performed by: PODIATRIST

## 2023-09-11 PROCEDURE — 4010F PR ACE/ARB THEARPY RXD/TAKEN: ICD-10-PCS | Mod: CPTII,,, | Performed by: PODIATRIST

## 2023-09-11 PROCEDURE — 1160F PR REVIEW ALL MEDS BY PRESCRIBER/CLIN PHARMACIST DOCUMENTED: ICD-10-PCS | Mod: CPTII,,, | Performed by: PODIATRIST

## 2023-09-11 PROCEDURE — 99024 PR POST-OP FOLLOW-UP VISIT: ICD-10-PCS | Mod: ,,, | Performed by: PODIATRIST

## 2023-09-11 PROCEDURE — 3008F PR BODY MASS INDEX (BMI) DOCUMENTED: ICD-10-PCS | Mod: CPTII,,, | Performed by: PODIATRIST

## 2023-09-11 PROCEDURE — 1159F MED LIST DOCD IN RCRD: CPT | Mod: CPTII,,, | Performed by: PODIATRIST

## 2023-09-11 PROCEDURE — 99999 PR PBB SHADOW E&M-EST. PATIENT-LVL III: CPT | Mod: PBBFAC,,, | Performed by: PODIATRIST

## 2023-09-11 PROCEDURE — 3044F HG A1C LEVEL LT 7.0%: CPT | Mod: CPTII,,, | Performed by: PODIATRIST

## 2023-09-11 PROCEDURE — 4010F ACE/ARB THERAPY RXD/TAKEN: CPT | Mod: CPTII,,, | Performed by: PODIATRIST

## 2023-09-11 PROCEDURE — 3044F PR MOST RECENT HEMOGLOBIN A1C LEVEL <7.0%: ICD-10-PCS | Mod: CPTII,,, | Performed by: PODIATRIST

## 2023-09-11 PROCEDURE — 1159F PR MEDICATION LIST DOCUMENTED IN MEDICAL RECORD: ICD-10-PCS | Mod: CPTII,,, | Performed by: PODIATRIST

## 2023-09-11 PROCEDURE — 3008F BODY MASS INDEX DOCD: CPT | Mod: CPTII,,, | Performed by: PODIATRIST

## 2023-09-11 PROCEDURE — 99213 OFFICE O/P EST LOW 20 MIN: CPT | Mod: PBBFAC,PN | Performed by: PODIATRIST

## 2023-09-11 PROCEDURE — 99999 PR PBB SHADOW E&M-EST. PATIENT-LVL III: ICD-10-PCS | Mod: PBBFAC,,, | Performed by: PODIATRIST

## 2023-09-11 PROCEDURE — 99024 POSTOP FOLLOW-UP VISIT: CPT | Mod: ,,, | Performed by: PODIATRIST

## 2023-09-11 RX ORDER — TRAMADOL HYDROCHLORIDE AND ACETAMINOPHEN 37.5; 325 MG/1; MG/1
1 TABLET, FILM COATED ORAL EVERY 4 HOURS
Qty: 30 TABLET | Refills: 0 | Status: SHIPPED | OUTPATIENT
Start: 2023-09-11 | End: 2023-11-17

## 2023-09-11 NOTE — PROGRESS NOTES
[facility] - PODIATRY  [address], [address]  [place] [address]  Dept: [phone]  Dept Fax: [phone]    WYATT Peterson Jr., Jr.     Post-Operative Visit  Assessment:     1. Aftercare following surgery of the musculoskeletal system  tramadol-acetaminophen 37.5-325 mg (ULTRACET) 37.5-325 mg Tab          Plan:   MDM    Coding  3 wk s/p  - pt seen evaluated and managed  -cont dvt prophy per pcp  - skin healed. Sutures removed. Steris appllied  - dressings re-applied - keep c/d/I x 1 wk then ok to remove at home and shower like normal  - wb: nwb RLE in [facility]  - rx dispensed: narcotic stepdown to ultracet today  - referrals: none    Follow up in about 3 weeks (around 10/2/2023).      Subjective:      Patient ID: Mehreen Benites is a 62 y.o. female.    Chief Complaint:   Chief Complaint   Patient presents with    Post-op Evaluation     Sx:08/25/2023     There were no vitals filed for this visit.    DOS: 8/25/23  Procedure: R retrocalc recon    Mehreen Benites returns to the clinic today for a postop visit. Pt is s/p above procedure. Mehreen Benites rates pain at a 6/10 on visual analog scale. Denies n/v/f/c.    HPI      Outside reports reviewed: historical medical records.    Past Medical History:   Diagnosis Date    Hypertension     Other pulmonary embolism without acute cor pulmonale 2022    Tendonitis      Past Surgical History:   Procedure Laterality Date    BONE MARROW ASPIRATION Right 8/25/2023    Procedure: ASPIRATION, BONE MARROW;  Surgeon: Axel Kapoor Jr., DPM;  Location: [facility] OR;  Service: Podiatry;  Laterality: Right;    CATARACT EXTRACTION, BILATERAL      COLONOSCOPY N/A 04/27/2023    Procedure: COLONOSCOPY;  Surgeon: Zachary Torres MD;  Location: [facility];  Service: Endoscopy;  Laterality: N/A;    HEEL SPUR SURGERY Left 02/14/2013    HYSTERECTOMY  10/2009    WHITNEY    OOPHORECTOMY  10/2009    Bilateral    REPAIR  OF ACHILLES TENDON Right 8/25/2023    Procedure: REPAIR, TENDON, ACHILLES;  Surgeon: Axel Kapoor Jr., DPM;  Location: [facility] OR;  Service: Podiatry;  Laterality: Right;  pop saph    RESECTION OF GASTROCNEMIUS MUSCLE Right 8/25/2023    Procedure: RESECTION, MUSCLE, GASTROCNEMIUS;  Surgeon: Axel Kapoor Jr., DPM;  Location: [facility] OR;  Service: Podiatry;  Laterality: Right;  pop saph    SURGICAL REMOVAL OF RETROCALCANEAL EXOSTOSIS Right 8/25/2023    Procedure: EXCISION, EXOSTOSIS, RETROCALCANEAL;  Surgeon: Axel Kapoor Jr., DPM;  Location: [facility] OR;  Service: Podiatry;  Laterality: Right;    THROMBECTOMY N/A 06/01/2022    Procedure: THROMBECTOMY;  Surgeon: Kendall Fiore MD;  Location: [facility] CATH LAB/EP;  Service: Cardiology;  Laterality: N/A;     Family History   Problem Relation Age of Onset    Hypertension Mother     Breast cancer Maternal Cousin 51    Breast cancer Maternal Aunt     Breast cancer Paternal Aunt     Colon cancer Neg Hx     Ovarian cancer Neg Hx      Current Outpatient Medications   Medication Sig Dispense Refill    ascorbic acid, vitamin C, (VITAMIN C) 500 MG tablet Take 500 mg by mouth once daily.      ferrous sulfate (FEOSOL) 325 mg (65 mg iron) Tab tablet Take 1 tablet (325 mg total) by mouth once daily. 90 tablet 3    gabapentin (NEURONTIN) 100 MG capsule Take 1 capsule (100 mg total) by mouth 2 (two) times daily. 60 capsule 1    hydroCHLOROthiazide (HYDRODIURIL) 25 MG tablet Take 1 tablet (25 mg total) by mouth once daily. 90 tablet 3    HYDROcodone-acetaminophen (NORCO) 5-325 mg per tablet Take 1 tablet by mouth every 6 (six) hours as needed for Pain. 25 tablet 0    losartan (COZAAR) 25 MG tablet Take 1 tablet (25 mg total) by mouth once daily. 90 tablet 3    meloxicam (MOBIC) 7.5 MG tablet Take 1 tablet (7.5 mg total) by mouth once daily. 30 tablet 1    rivaroxaban (XARELTO) 20 mg Tab Take 1 tablet (20 mg total) by mouth before dinner. 90 tablet 3    tramadol-acetaminophen  "37.5-325 mg (ULTRACET) 37.5-325 mg Tab Take 1 tablet by mouth every 4 (four) hours. 30 tablet 0     No current facility-administered medications for this visit.     Review of patient's allergies indicates:  No Known Allergies  Social History     Socioeconomic History    Marital status: Legally    Tobacco Use    Smoking status: Never    Smokeless tobacco: Never   Substance and Sexual Activity    Alcohol use: Yes     Comment: occasionally    Drug use: No    Sexual activity: Not Currently     Partners: Male     Birth control/protection: Post-menopausal, See Surgical Hx     Comment:        ROS  REVIEW OF SYSTEMS: Negative as documented below as well as positive findings in bold.       Constitutional  Respiratory  Gastrointestinal  Skin   - Fever - Cough - Heartburn - Rash   - Chills - Spit blood - Nausea - Itching   - Weight Loss - Shortness of breath - Vomiting - Nail pain   - Malaise/Fatigue - Wheezing - Abdominal Pain  Wound/Ulcer   - Weight Gain   - Blood in Stool         - Diarrhea          Cardiovascular  Genitourinary  Neurological  HEENT   - Chest Pain - Dysuria - Dizziness - Headache   - Palpitations - Hematuria - Tingling - Congestion   - Pain at night in legs - Flank Pain - Tremor - Sore Throat   - Cramping   - Sensory Change - Blurred Vision   - Leg Swelling   - Speech Change - Double Vision   - Dizzy when standing   - Focal Weakness - Eye Redness       - Seizures - Dry Eyes       - Loss of Consciousness          Endocrine  Musculoskeletal  Psychiatric   - Cold intolerance - Muscle Pain - Depression   - Heat intolerance - Neck Pain - Insomnia   - Anemia - Joint Pain - Memory Loss   -  Easy bruising, bleeding - Heel pain - Anxiety      Toe Pain        Leg/Ankle/Foot Pain         Objective:     Ht 5' 1" (1.549 m)   Wt 80.7 kg (178 lb)   LMP  (LMP Unknown)   BMI 33.63 kg/m²     Physical Exam    Neck:  Trachea Midline  No visible masses    Respiratory/Ears:  No distress or labored " breathing.  Able to differentiate between normal talking voice and whisper.  Able to follow commands    Eyes:  Visual Acuity intact  No discoloration noted.    Physical Exam  Ortho Exam  Foot Exam    R LE exam con't:  V: DP 2/4, PT 2/4, CRT< 3s to all digits tested.    N: SILT in SP/DP/T/Yesi/Saph distributions    Ortho: +Motor EHL/FHL/TA/GA   Surgical site pain present as expected   Surgical site swelling present and mild   Compartments soft/compressible. No pain on passive stretch of big toe. No calf  Pain.     Derm: Skin intact. Sutures/staples: intact. Signs of infection: none.     Imaging / Labs:    Lab Results   Component Value Date    WBC 6.57 08/22/2023    WBC 6.17 04/24/2023    WBC 6.43 01/24/2023    WBC 6.32 09/27/2022    WBC 7.51 08/05/2022    PREALBUMIN 26 08/22/2023       Lab Results   Component Value Date    PREALBUMIN 26 08/22/2023       X-Ray Foot Complete Right    Result Date: 8/25/2023  EXAMINATION: XR FOOT COMPLETE 3 VIEW RIGHT CLINICAL HISTORY: postop;. TECHNIQUE: AP, lateral, and oblique views of the right foot were performed. FINDINGS: There is postop change involving the calcaneus.  There is osseous spur at the plantar fascia attachment to the calcaneus.  There is degenerative change within the midfoot.     As above. Electronically signed by: Kory Knight MD Date:    08/25/2023 Time:    11:55    SURG [place] Surgery Fluoro Usage    Result Date: 8/25/2023  See OP Notes for results. IMPRESSION: See OP Notes for results. This procedure was auto-finalized by: Virtual Radiologist

## 2023-09-28 DIAGNOSIS — Z12.31 ENCOUNTER FOR SCREENING MAMMOGRAM FOR MALIGNANT NEOPLASM OF BREAST: Primary | ICD-10-CM

## 2023-10-02 ENCOUNTER — OFFICE VISIT (OUTPATIENT)
Dept: PODIATRY | Facility: CLINIC | Age: 63
End: 2023-10-02
Payer: MEDICAID

## 2023-10-02 VITALS — WEIGHT: 176.38 LBS | HEIGHT: 61 IN | BODY MASS INDEX: 33.3 KG/M2

## 2023-10-02 DIAGNOSIS — M76.61 ACHILLES TENDINITIS OF RIGHT LOWER EXTREMITY: ICD-10-CM

## 2023-10-02 DIAGNOSIS — Z47.89 AFTERCARE FOLLOWING SURGERY OF THE MUSCULOSKELETAL SYSTEM: Primary | ICD-10-CM

## 2023-10-02 PROCEDURE — 3008F BODY MASS INDEX DOCD: CPT | Mod: CPTII,,, | Performed by: PODIATRIST

## 2023-10-02 PROCEDURE — 1160F PR REVIEW ALL MEDS BY PRESCRIBER/CLIN PHARMACIST DOCUMENTED: ICD-10-PCS | Mod: CPTII,,, | Performed by: PODIATRIST

## 2023-10-02 PROCEDURE — 99024 PR POST-OP FOLLOW-UP VISIT: ICD-10-PCS | Mod: ,,, | Performed by: PODIATRIST

## 2023-10-02 PROCEDURE — 3044F PR MOST RECENT HEMOGLOBIN A1C LEVEL <7.0%: ICD-10-PCS | Mod: CPTII,,, | Performed by: PODIATRIST

## 2023-10-02 PROCEDURE — 1159F PR MEDICATION LIST DOCUMENTED IN MEDICAL RECORD: ICD-10-PCS | Mod: CPTII,,, | Performed by: PODIATRIST

## 2023-10-02 PROCEDURE — 3008F PR BODY MASS INDEX (BMI) DOCUMENTED: ICD-10-PCS | Mod: CPTII,,, | Performed by: PODIATRIST

## 2023-10-02 PROCEDURE — 99024 POSTOP FOLLOW-UP VISIT: CPT | Mod: ,,, | Performed by: PODIATRIST

## 2023-10-02 PROCEDURE — 1160F RVW MEDS BY RX/DR IN RCRD: CPT | Mod: CPTII,,, | Performed by: PODIATRIST

## 2023-10-02 PROCEDURE — 3044F HG A1C LEVEL LT 7.0%: CPT | Mod: CPTII,,, | Performed by: PODIATRIST

## 2023-10-02 PROCEDURE — 99999 PR PBB SHADOW E&M-EST. PATIENT-LVL III: CPT | Mod: PBBFAC,,, | Performed by: PODIATRIST

## 2023-10-02 PROCEDURE — 99213 OFFICE O/P EST LOW 20 MIN: CPT | Mod: PBBFAC,PN | Performed by: PODIATRIST

## 2023-10-02 PROCEDURE — 1159F MED LIST DOCD IN RCRD: CPT | Mod: CPTII,,, | Performed by: PODIATRIST

## 2023-10-02 PROCEDURE — 99999 PR PBB SHADOW E&M-EST. PATIENT-LVL III: ICD-10-PCS | Mod: PBBFAC,,, | Performed by: PODIATRIST

## 2023-10-02 PROCEDURE — 4010F ACE/ARB THERAPY RXD/TAKEN: CPT | Mod: CPTII,,, | Performed by: PODIATRIST

## 2023-10-02 PROCEDURE — 4010F PR ACE/ARB THEARPY RXD/TAKEN: ICD-10-PCS | Mod: CPTII,,, | Performed by: PODIATRIST

## 2023-10-02 RX ORDER — MELOXICAM 7.5 MG/1
7.5 TABLET ORAL DAILY
Qty: 30 TABLET | Refills: 1 | Status: SHIPPED | OUTPATIENT
Start: 2023-10-02 | End: 2023-11-17

## 2023-10-02 RX ORDER — GABAPENTIN 100 MG/1
100 CAPSULE ORAL 2 TIMES DAILY
Qty: 60 CAPSULE | Refills: 1 | Status: SHIPPED | OUTPATIENT
Start: 2023-10-02 | End: 2023-11-17

## 2023-10-02 RX ORDER — MELOXICAM 7.5 MG/1
7.5 TABLET ORAL DAILY
Qty: 30 TABLET | Refills: 1 | Status: SHIPPED | OUTPATIENT
Start: 2023-10-02 | End: 2023-10-02

## 2023-10-02 NOTE — PROGRESS NOTES
[facility] - PODIATRY  [address], [address]  [place] [address]  Dept: [phone]  Dept Fax: [phone]    WYATT Peterson Jr., Jr.     Post-Operative Visit  Assessment:     1. Aftercare following surgery of the musculoskeletal system  Ambulatory referral/consult to Physical/Occupational Therapy      2. Achilles tendinitis of right lower extremity  meloxicam (MOBIC) 7.5 MG tablet    gabapentin (NEURONTIN) 100 MG capsule    Ambulatory referral/consult to Physical/Occupational Therapy    DISCONTINUED: meloxicam (MOBIC) 7.5 MG tablet          Plan:   MDM    Coding  6 wk s/p    - pt seen evaluated and managed  -cont dvt prophy per pcp  -healing normal for postop  - wb: nwb RLE in [facility] x 2wks, then wbat in [facility] x 2 wks  - rx dispensed: mobic and gabapentin renewed  - referrals: PT    Follow up in about 3 weeks (around 10/23/2023).      Subjective:      Patient ID: Mehreen Benites is a 63 y.o. female.    Chief Complaint:   Chief Complaint   Patient presents with    Post-op Evaluation     Right foot post op #3      There were no vitals filed for this visit.    DOS: 8/25/23  Procedure: R retrocalc recon      Mehreen Benites returns to the clinic today for a postop visit. Pt is s/p above procedure. Mehreen Benites rates pain at a 4/10 on visual analog scale. Denies n/v/f/c.    HPI      Outside reports reviewed: historical medical records.    Past Medical History:   Diagnosis Date    Hypertension     Other pulmonary embolism without acute cor pulmonale 2022    Tendonitis      Past Surgical History:   Procedure Laterality Date    BONE MARROW ASPIRATION Right 8/25/2023    Procedure: ASPIRATION, BONE MARROW;  Surgeon: Axel Kapoor Jr., DPM;  Location: [facility] OR;  Service: Podiatry;  Laterality: Right;    CATARACT EXTRACTION, BILATERAL      COLONOSCOPY N/A 04/27/2023    Procedure: COLONOSCOPY;  Surgeon: Zachary Torres MD;  Location:  [facility] ENDO;  Service: Endoscopy;  Laterality: N/A;    HEEL SPUR SURGERY Left 02/14/2013    HYSTERECTOMY  10/2009    WHITNEY    OOPHORECTOMY  10/2009    Bilateral    REPAIR OF ACHILLES TENDON Right 8/25/2023    Procedure: REPAIR, TENDON, ACHILLES;  Surgeon: Axel Kapoor Jr., DPM;  Location: [facility] OR;  Service: Podiatry;  Laterality: Right;  pop saph    RESECTION OF GASTROCNEMIUS MUSCLE Right 8/25/2023    Procedure: RESECTION, MUSCLE, GASTROCNEMIUS;  Surgeon: Axel Kapoor Jr., DPM;  Location: [facility];  Service: Podiatry;  Laterality: Right;  pop saph    SURGICAL REMOVAL OF RETROCALCANEAL EXOSTOSIS Right 8/25/2023    Procedure: EXCISION, EXOSTOSIS, RETROCALCANEAL;  Surgeon: Axel Kapoor Jr., DPM;  Location: [facility];  Service: Podiatry;  Laterality: Right;    THROMBECTOMY N/A 06/01/2022    Procedure: THROMBECTOMY;  Surgeon: Kendall Fiore MD;  Location: [facility] CATH LAB/EP;  Service: Cardiology;  Laterality: N/A;     Family History   Problem Relation Age of Onset    Hypertension Mother     Breast cancer Maternal Cousin 51    Breast cancer Maternal Aunt     Breast cancer Paternal Aunt     Colon cancer Neg Hx     Ovarian cancer Neg Hx      Current Outpatient Medications   Medication Sig Dispense Refill    ascorbic acid, vitamin C, (VITAMIN C) 500 MG tablet Take 500 mg by mouth once daily.      ferrous sulfate (FEOSOL) 325 mg (65 mg iron) Tab tablet Take 1 tablet (325 mg total) by mouth once daily. 90 tablet 3    gabapentin (NEURONTIN) 100 MG capsule Take 1 capsule (100 mg total) by mouth 2 (two) times daily. 60 capsule 1    hydroCHLOROthiazide (HYDRODIURIL) 25 MG tablet Take 1 tablet (25 mg total) by mouth once daily. 90 tablet 3    meloxicam (MOBIC) 7.5 MG tablet Take 1 tablet (7.5 mg total) by mouth once daily. 30 tablet 1    rivaroxaban (XARELTO) 20 mg Tab Take 1 tablet (20 mg total) by mouth before dinner. 90 tablet 3    tramadol-acetaminophen 37.5-325 mg (ULTRACET) 37.5-325 mg Tab Take 1 tablet by mouth  "every 4 (four) hours. 30 tablet 0    losartan (COZAAR) 25 MG tablet Take 1 tablet (25 mg total) by mouth once daily. 90 tablet 3     No current facility-administered medications for this visit.     Review of patient's allergies indicates:  No Known Allergies  Social History     Socioeconomic History    Marital status: Legally    Tobacco Use    Smoking status: Never    Smokeless tobacco: Never   Substance and Sexual Activity    Alcohol use: Yes     Comment: occasionally    Drug use: No    Sexual activity: Not Currently     Partners: Male     Birth control/protection: Post-menopausal, See Surgical Hx     Comment:        ROS  REVIEW OF SYSTEMS: Negative as documented below as well as positive findings in bold.       Constitutional  Respiratory  Gastrointestinal  Skin   - Fever - Cough - Heartburn - Rash   - Chills - Spit blood - Nausea - Itching   - Weight Loss - Shortness of breath - Vomiting - Nail pain   - Malaise/Fatigue - Wheezing - Abdominal Pain  Wound/Ulcer   - Weight Gain   - Blood in Stool         - Diarrhea          Cardiovascular  Genitourinary  Neurological  HEENT   - Chest Pain - Dysuria - Dizziness - Headache   - Palpitations - Hematuria - Tingling - Congestion   - Pain at night in legs - Flank Pain - Tremor - Sore Throat   - Cramping   - Sensory Change - Blurred Vision   - Leg Swelling   - Speech Change - Double Vision   - Dizzy when standing   - Focal Weakness - Eye Redness       - Seizures - Dry Eyes       - Loss of Consciousness          Endocrine  Musculoskeletal  Psychiatric   - Cold intolerance - Muscle Pain - Depression   - Heat intolerance - Neck Pain - Insomnia   - Anemia - Joint Pain - Memory Loss   -  Easy bruising, bleeding - Heel pain - Anxiety      Toe Pain        Leg/Ankle/Foot Pain         Objective:     Ht 5' 1" (1.549 m)   Wt 80 kg (176 lb 5.9 oz)   LMP  (LMP Unknown)   BMI 33.32 kg/m²     Physical Exam    Neck:  Trachea Midline  No visible " masses    Respiratory/Ears:  No distress or labored breathing.  Able to differentiate between normal talking voice and whisper.  Able to follow commands    Eyes:  Visual Acuity intact  No discoloration noted.    Physical Exam  Ortho Exam  Foot Exam    R LE exam con't:  V: DP 2/4, PT 2/4, CRT< 3s to all digits tested.    N: SILT in SP/DP/T/Yesi/Saph distributions    Ortho: +Motor EHL/FHL/TA/GA   Surgical site pain present and mild   Surgical site swelling absent   Compartments soft/compressible. No pain on passive stretch of big toe. No calf  Pain.     Derm: Skin intact. Sutures/staples: removed. Signs of infection: none.     Imaging / Labs:    Lab Results   Component Value Date    WBC 6.57 08/22/2023    WBC 6.17 04/24/2023    WBC 6.43 01/24/2023    WBC 6.32 09/27/2022    WBC 7.51 08/05/2022    PREALBUMIN 26 08/22/2023       Lab Results   Component Value Date    PREALBUMIN 26 08/22/2023       X-Ray Foot Complete Right    Result Date: 8/25/2023  EXAMINATION: XR FOOT COMPLETE 3 VIEW RIGHT CLINICAL HISTORY: postop;. TECHNIQUE: AP, lateral, and oblique views of the right foot were performed. FINDINGS: There is postop change involving the calcaneus.  There is osseous spur at the plantar fascia attachment to the calcaneus.  There is degenerative change within the midfoot.     As above. Electronically signed by: Kory Knight MD Date:    08/25/2023 Time:    11:55    SURG [place] Surgery Fluoro Usage    Result Date: 8/25/2023  See OP Notes for results. IMPRESSION: See OP Notes for results. This procedure was auto-finalized by: Virtual Radiologist

## 2023-10-02 NOTE — PATIENT INSTRUCTIONS
R.I.C.E.    R.I.C.E. stands for Rest, Ice, Compression, and Elevation. Doing these things helps limit pain and swelling after an injury. R.I.C.E. also helps injuries heal faster. Use R.I.C.E. for sprains, strains, and severe bruises or bumps. Follow the tips on this handout and begin R.I.C.E. as soon as possible after an injury.     Rest  Pain is your body's way of telling you to rest an injured area. Whether you have hurt an elbow, hand, foot, or knee, limiting its use will prevent further injury and help you heal.     Ice  Applying ice right after an injury helps prevent swelling and reduce pain. Don't place ice directly on your skin.  Wrap a cold pack or bag of ice in a thin cloth. Place it over the injured area.  Ice for 10 minutes every 3 hours. Don't ice for more than 20 minutes at a time.     Compression  Putting pressure (compression) on an injury helps prevent swelling and provides support.  Wrap the injured area firmly with an elastic bandage. If your hand or foot tingles, becomes discolored, or feels cold to the touch, the bandage may be too tight. Rewrap it more loosely.  If your bandage becomes too loose, rewrap it.  Do not wear an elastic bandage overnight.    Elevation  Keeping an injury elevated helps reduce swelling, pain, and throbbing. Elevation is most effective when the injury is kept elevated higher than the heart.     Call your healthcare provider if you notice any of the following:  Fingers or toes feel numb, are cold to the touch, or change color  Skin looks shiny or tight  Pain, swelling, or bruising worsens and is not improved with elevation

## 2023-10-09 ENCOUNTER — CLINICAL SUPPORT (OUTPATIENT)
Dept: REHABILITATION | Facility: HOSPITAL | Age: 63
End: 2023-10-09
Payer: MEDICAID

## 2023-10-09 DIAGNOSIS — M25.671 DECREASED RANGE OF MOTION OF RIGHT ANKLE: ICD-10-CM

## 2023-10-09 DIAGNOSIS — M76.61 ACHILLES TENDINITIS OF RIGHT LOWER EXTREMITY: ICD-10-CM

## 2023-10-09 DIAGNOSIS — Z47.89 AFTERCARE FOLLOWING SURGERY OF THE MUSCULOSKELETAL SYSTEM: ICD-10-CM

## 2023-10-09 DIAGNOSIS — R26.9 ABNORMALITY OF GAIT AND MOBILITY: ICD-10-CM

## 2023-10-09 PROCEDURE — 97110 THERAPEUTIC EXERCISES: CPT | Mod: PO

## 2023-10-09 PROCEDURE — 97161 PT EVAL LOW COMPLEX 20 MIN: CPT | Mod: PO

## 2023-10-09 NOTE — PLAN OF CARE
[facility] (PT) Initial Evaluation- [facility]       Name: Mehreen Benites  Clinic Number: [id number]  YOB: 1960    Therapy Diagnosis:   Encounter Diagnoses   Name Primary?    Aftercare following surgery of the musculoskeletal system     Achilles tendinitis of right lower extremity     Decreased range of motion of right ankle     Abnormality of gait and mobility      Physician: Axel Kapoor Jr., *    Physician Orders: Eval and Treat: - wb: nwb RLE in CAM x 2wks - passive ROM only, then wbat in CAM x 2 wks add active ROM, eval and treat after that  Medical Diagnosis: Z47.89 (ICD-10-CM) - Aftercare following surgery of the musculoskeletal system; M76.61 (ICD-10-CM) - Achilles tendinitis of right lower extremity  Surgical Procedure and Date: 8/25/2023: REPAIR, TENDON, ACHILLES (Right); RESECTION, MUSCLE, GASTROCNEMIUS (Right); EXCISION, EXOSTOSIS, RETROCALCANEAL (Right); ASPIRATION, BONE MARROW (Right)   Evaluation Date: 10/9/2023  Insurance Authorization Period Expiration: 10/01/2024  Plan of Care Certification Period: 12/04/2023  Date of Return to MD: 10/23/2023   Visit # / Visits authorized: 1 / 1    Precautions:  Standard, Fall, and Weightbearing    Time In: 10:00 AM   Time Out: 10:55 AM   Total Appointment Time (timed & untimed codes): 55 minutes    Subjective     Date of onset: 8/25/2023     History of current condition: Mehreen Benites presents s/p REPAIR, TENDON, ACHILLES (Right); RESECTION, MUSCLE, GASTROCNEMIUS (Right); EXCISION, EXOSTOSIS, RETROCALCANEAL (Right); ASPIRATION, BONE MARROW (Right) on 8/25/2023. Pt ambulating into clinic with CAM boot donned, no crutches. Pt states she has been ambulating without crutches since she left the doctor last Monday. Pt with complaints of discomfort in medial portion of ankle (burning). Pt denies aching, throbbing and excessive swelling. Pt incision healing, no signs/symptoms consistent with infection. Pt reports lumbar and bilateral  hip pain (every time she walks) - 10/10 pain.     Past medical history:   Past Medical History:   Diagnosis Date    Hypertension     Other pulmonary embolism without acute cor pulmonale 2022    Tendonitis        Past Surgical history:  has a past surgical history that includes Hysterectomy (10/2009); Oophorectomy (10/2009); Heel spur surgery (Left, 02/14/2013); Thrombectomy (N/A, 06/01/2022); Colonoscopy (N/A, 04/27/2023); Cataract extraction, bilateral; Repair of Achilles tendon (Right, 8/25/2023); Resection of gastrocnemius muscle (Right, 8/25/2023); Surgical removal of retrocalcaneal exostosis (Right, 8/25/2023); and Bone marrow aspiration (Right, 8/25/2023).    Medications: Mehreen has a current medication list which includes the following prescription(s): ascorbic acid (vitamin c), ferrous sulfate, gabapentin, hydrochlorothiazide, losartan, meloxicam, rivaroxaban, and tramadol-acetaminophen 37.5-325 mg.    Allergies: Review of patient's allergies indicates:  No Known Allergies    Prior Therapy: None before   Social: Lives with family   Occupation: Retired    Prior Level of Function: I ADLs   Current Level of Function: Ambulating with CAM boot   Patient's goals: Return to activities without limitations     Imaging:  X-Ray: 8/25/2023: FINDINGS: There is postop change involving the calcaneus.  There is osseous spur at the plantar fascia attachment to the calcaneus.  There is degenerative change within the midfoot.    Environmental/Abuse/Neglect/Sensory concerns: None  The patient's spiritual, cultural, social and education needs were considered with no evidence of barriers noted.     Pain: Current 0/10, worst 10/10, best 0/10   Location: right ankle  Description: Burning  Aggravating Factors: Standing, Bending, Walking, Night Time, Flexing, Lifting, and Getting out of bed/chair  Easing Factors: pain medication    Intake Outcome Measure for FOTO  Survey    Therapist reviewed FOTO scores for Mehreen ALSTON Benites on  10/9/2023.   FOTO report- see Media section or FOTO account episode details.    Intake Score: 54%  Predicted Score: 43%          Objective   Gait: Ambulating with CAM boot donned on right lower extremity     Balance: Single Leg Stance (SLS): Left: 10 seconds Right: unable to tolerate     Passive Range of Motion (PROM):    RIGHT   Hip flexion 75 degrees    Knee extension 0 degrees   Ankle dorsiflexion (DF) knee bent 5 degrees   Ankle dorsiflexion (DF) knee straight 10 degrees   Ankle plantarflexion (PF) 15 degrees   Ankle inversion (IV) 5 degrees   Ankle eversion (EV) 0 degrees     Manual Muscle Test (MMT):   LEFT RIGHT   Hip flexion 4/5 4-/5   Knee extension 4/5 4-/5   Ankle DF 4+/5 3/5   Ankle PF 4-/5 3/5   Ankle IV 4/5 3/5   Ankle EV 4/5 3/5     GIRTH: (cm): moderate swelling in lateral ankle       Palpation: TTP around both incisions     Special tests:     TUG -> CAM boot donned   16 seconds   2.   16 seconds   3    15 seconds     30 sec STS: 6 reps with use of hands and CAM boot donned     Treatment   Treatment Time In: 10:40 AM  Treatment Time Out: 11:00 AM   Total Treatment time (time-based codes) separate from Evaluation: 20 minutes    Mehreen received therapeutic exercises to develop strength, endurance, ROM, and flexibility for 20 minutes including:  Ankle ABCs   Ankle circles (CW/CCW)   Toe flexion/extension   Towel grabs     Home Exercises and Patient Education   Education provided:   Yes - see media section  Compression stockings with boot and ambulation   Ice and Compression 2-3x/day   Only ambulating without crutches in house, outside of home should be NWB at this time   Education provided:   PURPOSE: Patient educated on the impairments noted above and the effects of physical therapy intervention to improve overall condition and QOL.   EXERCISE: Patient was educated on all the above exercise prior/during/after for proper posture, positioning, and execution for safe performance with home exercise  program.   STRENGTH: Patient educated on the importance of improved core and extremity strength in order to improve alignment of the spine and extremities with static positions and dynamic movement.   GAIT & BALANCE: Patient educated on the importance of strong core and lower extremity musculature in order to improve both static and dynamic balance, improve gait mechanics, reduce fall risk and improve household and community mobility.   POSTURE: Patient educated on postural awareness to reduce stress and maintain optimal alignment of the spine with static positions and dynamic movement   TRANSFERS & TRANSITIONS: Patient educated on proper technique for bed mobility, transitions and transfers to improve body mechanics and decrease risk of injury.   Role of PT/PTA   Importance of compliance with HEP     Written Home Exercise Program (HEP) Provided: Patient instructed to continue prior home exercise program.  Exercises were reviewed and Mehreen was able to demonstrate them prior to the end of the session.  Mehreen demonstrated good  understanding of the education provided.     See Electronic Medical Record under Media for exercises provided 10/9/2023.    Assessment   Mehreen is a 63 y.o. female referred to outpatient Physical Therapy with a medical diagnosis of Z47.89 (ICD-10-CM) - Aftercare following surgery of the musculoskeletal system; M76.61 (ICD-10-CM) - Achilles tendinitis of right lower extremity. Patient presents to PT with pain, decreased right hip/knee/ankle ROM, decreased strength, poor posture, impaired balance and gait, and functional deficits with all activities at this time due to surgical precautions.     Patient prognosis is Good.   Patient will benefit from skilled outpatient Physical Therapy to address the deficits stated above and in the chart below, provide patient/family education, and to maximize patient's level of independence.     Plan of care discussed with patient: Yes  Patient's  spiritual, cultural and educational needs considered and patient is agreeable to the plan of care and goals as stated below:     Anticipated Barriers for therapy: None    Medical Necessity is demonstrated by the following  History  Co-morbidities and personal factors that may impact the plan of care [] LOW: no personal factors / co-morbidities  [x] MODERATE: 1-2 personal factors / co-morbidities  [] HIGH: 3+ personal factors / co-morbidities    Moderate / High Support Documentation:   Past Medical History:   Diagnosis Date    Hypertension     Other pulmonary embolism without acute cor pulmonale 2022    Tendonitis         Examination  Body Structures and Functions, activity limitations and participation restrictions that may impact the plan of care [x] LOW: addressing 1-2 elements  [] MODERATE: 3+ elements  [] HIGH: 4+ elements (please support below)    Moderate / High Support Documentation: right ankle      Clinical Presentation [x] LOW: stable  [] MODERATE: Evolving  [] HIGH: Unstable     Decision Making/ Complexity Score: low       GOALS  Short Term Goals (4 weeks):  1. Patient will have improved PROM of the right ankle to WFLs.  2. Patient will have decreased complaints of pain to 3/10.  3. Patient will be independent and compliant with issued HEP.  4. Patient will maintain right SLS for at least 15 seconds.     Long Term Goals (8 weeks):   Patient will have improved AROM of the right ankle to WFLs.  2. Patient will have improved strength of the right ankle to 4+/5.  3. Patient will be able to resume prior functional level with no deficits.   4. Patient will have overall improvement in condition to have decreased score on FOTO to <40%.     Plan   Plan of Care Certification: 10/9/2023 to 12/04/2023     Outpatient Physical Therapy 2 times weekly for 8 weeks to include the following interventions: Cervical/Lumbar Traction, Electrical Stimulation IFC/VMS, Gait Training, Manual Therapy, Moist Heat/ Ice, Neuromuscular  Re-ed, Patient Education, Self Care, Therapeutic Activities, and Therapeutic Exercise.     Plan Next Visit   Date     Visit     Therapeutic Exercise    Nustep     Seated gastroc stretch     Supine hamstring stretch     Piriformis stretch     LTR    SLR     SL clams     SL hip abduction     Ankle circles/ABCs    Toe yoga    Dome raises                 Manual Therapy IASTM, PROM in all direction        [facility] Josué, PT  10/9/2023

## 2023-10-13 NOTE — PROGRESS NOTES
PATIENT: Mehreen Benites  MRN: [id number]  DATE: 10/23/2023    Chief Complaint: PE, DVT    Subjective:     History of Present Illness:   HPI as per Dr Brandt's 8/8/22 office visit, reviewed and verified with pt    She presented to the ED 05/31/2022 with sudden onset lightheadedness, weakness and dyspnea - found to have massive saddle pulmonary embolism with right heart strain and partially occlusive thrombus in the right popliteal vein.  No recent surgeries, immobilizations, long trips or prior history of DVT.    She underwent mechanical thrombectomy because of massive PE causing right heart strain.    Family hx significant for son (40 yo) with DVT/PE and maternal first cousin (39 yo) with DVT/PE.    doing well on xarelto    INTERVAL  - pt here for dvt/pe follow up   - 8/25/23 surgery achilles tendon repair, surgery on Friday and restarted Xarelto on the Sunday  - states she is feeling well with no chest pain, sob/ortiz, lightheadedness or dizziness, easy bleeding or bruising, abdominal pain, n/v/d, hematemesis, melena, hematuria. She is dealing with pain re right ankle post surgery but has greatly improved, has mild swelling right ankle to foot and some intermittent cramping.   - re SALINA history, she is eating more beets  - she is continuing to tolerate xarelto well at 20mg daily. Denies bleeding issues.  -5/1/23 RLE US negative dvt  - colonoscopy 4/27/23 with Dr Torres, held xarelto 2 days prior. Colonoscopy with hemorrhoids and x1 polyp 4mm in descending colon removed, repeat in 10 yrs.      Past Medical, Surgical, Family and Social History Reviewed.    Medications and Allergies reviewed    Review of Systems   Constitutional:  Negative for fatigue, fever and unexpected weight change.   HENT:  Negative for mouth sores and nosebleeds.    Respiratory:  Negative for chest tightness and shortness of breath.    Cardiovascular:  Negative for chest pain, palpitations and leg swelling.   Gastrointestinal:  Negative for  "abdominal pain, blood in stool, constipation, diarrhea, nausea and vomiting.   Genitourinary:  Negative for hematuria.   Musculoskeletal:  Positive for back pain and joint swelling (right ankle to foot, improved). Negative for arthralgias.   Skin:  Negative for pallor.   Neurological:  Negative for dizziness, weakness and light-headedness.   Hematological:  Negative for adenopathy. Does not bruise/bleed easily.   Psychiatric/Behavioral:  Negative for suicidal ideas.    All other systems reviewed and are negative.      Objective:     Vitals:    10/23/23 0825   BP: (!) 153/89   BP Location: Left arm   Patient Position: Sitting   BP Method: Medium (Automatic)   Pulse: 69   Resp: 18   Temp: 97.5 °F (36.4 °C)   TempSrc: Oral   SpO2: 98%   Weight: 85.2 kg (187 lb 13.3 oz)   Height: 5' 1" (1.549 m)           BMI: Body mass index is 35.49 kg/m².    Physical Exam  Vitals and nursing note reviewed.   Constitutional:       General: She is not in acute distress.  HENT:      Head: Normocephalic and atraumatic.      Right Ear: External ear normal.      Left Ear: External ear normal.   Eyes:      General: No scleral icterus.     Pupils: Pupils are equal, round, and reactive to light.   Cardiovascular:      Rate and Rhythm: Normal rate and regular rhythm.      Pulses: Normal pulses.      Heart sounds: Normal heart sounds. No murmur heard.  Pulmonary:      Effort: Pulmonary effort is normal. No respiratory distress.      Breath sounds: Normal breath sounds.   Abdominal:      General: Bowel sounds are normal. There is no distension.      Palpations: Abdomen is soft.      Tenderness: There is no abdominal tenderness. There is no guarding.   Musculoskeletal:         General: Normal range of motion.      Cervical back: Normal range of motion and neck supple. No rigidity.      Right lower leg: No edema.      Left lower leg: No edema.      Comments: RLE ortho boot   Lymphadenopathy:      Cervical: No cervical adenopathy.   Skin:     " General: Skin is warm and dry.      Coloration: Skin is not jaundiced or pale.      Findings: No bruising or erythema.   Neurological:      Mental Status: She is alert and oriented to person, place, and time. Mental status is at baseline.      Gait: Gait normal.   Psychiatric:         Attention and Perception: Attention normal.         Mood and Affect: Mood normal. Mood is not depressed.         Speech: Speech normal.         Behavior: Behavior normal. Behavior is cooperative.         Thought Content: Thought content normal.       ECOG SCORE    1 - Restricted in strenuous activity-ambulatory and able to carry out work of a light nature       LABS  Lab Results   Component Value Date    WBC 5.62 10/20/2023    HGB 14.0 10/20/2023    HCT 43.0 10/20/2023    MCV 91 10/20/2023     10/20/2023       Lab Results   Component Value Date    DDIMER 0.31 09/27/2022     IMAGING    US Lower Extremity Veins Bilat (2/13/23)  Impression:     No evidence of deep venous thrombosis in either lower extremity.       US Lower Extremity Veins Right 9/27/22  Impression:  No evidence of deep venous thrombosis in the right lower extremity.   Interval resolution of previously identified thrombus within the right popliteal vein.    CTA Chest Non-Coronary(PE Studies) 9/27/22  Impression:     1. Interval improvement of prior bilateral pulmonary arterial thromboemboli.  No residual central pulmonary thrombus identified.  Few small filling defects within segmental arteries potentially representing mild residual thrombus.  2. Mosaic attenuation of the bilateral pulmonary parenchyma which may represent increased pulmonary vascular resistance or small airways disease.  3. Mild cardiomegaly.  4. Additional findings as above.             Assessment:       1. Pulmonary embolism, bilateral    2. History of DVT (deep vein thrombosis)    3. Essential hypertension    4. Stage 3a chronic kidney disease    5. SEDRICK (acute kidney injury)    6. Bilateral leg  "pain    7. Status post surgery    8. Chronic anticoagulation                  Plan:   Past records including clinic and ED visits, labs, imaging, medications reviewed as available in epic    Massive unprovoked pulmonary embolism  -1st episode, unprovoked, required mechanical thrombectomy  -positive family history blood clots  -she clearly has a hypercoagulable state based on her clinical presentation  -no need hypercoagulable workup  -continue Xarelto at full dose  -indefinite anticoagulation needed  -9/29/22 follow up with repeat US RLE (9/27/22) showing resolution of right popliteal vein thrombus, CTA chest (9/27/22) "Interval improvement of prior bilateral pulmonary arterial thromboemboli.  No residual central pulmonary thrombus identified.  Few small filling defects within segmental arteries potentially representing mild residual thrombus."  -9/27/22 d-dimer neg  -reports she has adequate xarelto at home and her pcp will prescribe, advised to call if any of this changes or pcp unavailable  -CTA chest 1/17/23 "No acute cardiopulmonary disease.  Specifically, no residual filling defects are seen within the pulmonary arteries."  -Negative BLE US 2/13/23  -5/1/23 RLE US negative dvt  - held Xarelto 2 days prior 8/25/23 surgery and started 2 days later with early mobilization, adequate hydration.   - Advised to reach out any questions or concerns. Discussed s/s of pe, dvt and when to seek emergent care.Questions answered.  - will update ble US and call with results  - rtc 4-5 months for follow up    HTN  -bp 153/89 today, no associated symptoms  -takes losartan 12 noon daily  -management deferred to pcp    CKD stage 3a, SEDRICK history  -GFR 41.4 (10/20/23) which is decreased  -drinking 4-16oz water bottles most days, to push fluids  -encouraged adequate fluids and pcp follow up          LENA ToledoC  [facility]  Hematology/Oncology  [address]  [place]  [address] [phone]              "

## 2023-10-17 ENCOUNTER — CLINICAL SUPPORT (OUTPATIENT)
Dept: REHABILITATION | Facility: HOSPITAL | Age: 63
End: 2023-10-17
Payer: MEDICAID

## 2023-10-17 DIAGNOSIS — R26.9 ABNORMALITY OF GAIT AND MOBILITY: ICD-10-CM

## 2023-10-17 DIAGNOSIS — M25.671 DECREASED RANGE OF MOTION OF RIGHT ANKLE: Primary | ICD-10-CM

## 2023-10-17 PROCEDURE — 97110 THERAPEUTIC EXERCISES: CPT | Mod: PO

## 2023-10-17 NOTE — PROGRESS NOTES
Physical Therapy (PT) Daily Treatment Note     Name: Mehreen Benites  Clinic Number: [id number]    Therapy Diagnosis:   Encounter Diagnoses   Name Primary?    Decreased range of motion of right ankle Yes    Abnormality of gait and mobility      Physician: Axel Kapoor Jr., *    Visit Date: 10/17/2023    Physician Orders: Eval and Treat: - wb: nwb RLE in CAM x 2wks - passive ROM only, then wbat in CAM x 2 wks add active ROM, eval and treat after that  Medical Diagnosis: Z47.89 (ICD-10-CM) - Aftercare following surgery of the musculoskeletal system; M76.61 (ICD-10-CM) - Achilles tendinitis of right lower extremity  Surgical Procedure and Date: 8/25/2023: REPAIR, TENDON, ACHILLES (Right); RESECTION, MUSCLE, GASTROCNEMIUS (Right); EXCISION, EXOSTOSIS, RETROCALCANEAL (Right); ASPIRATION, BONE MARROW (Right)   Evaluation Date: 10/9/2023  Insurance Authorization Period Expiration: 12/31/2023  Plan of Care Certification Period: 12/04/2023  Date of Return to MD: 10/23/2023   Visit # / Visits authorized: 1 / 20    FOTO  -Intake: 54%   -Status: Incomplete  -Discharge: Incomplete    Time In: 10:55 AM   Time Out: 11:55 AM  Total Billable Time: 60 minutes     Precautions: Standard    Subjective     Patient reports: she has been walking around with her CAM boot on.   She was compliant with home exercise program.  Response to previous treatment: initial evaluation   Functional change: ongoing assessment     Pain: 5/10  Location: right ankles     Objective     Mehreen received therapeutic exercises to develop strength, endurance, ROM, and flexibility for 55 minutes including:    Date   10/17/2023    Visit   1/20   Therapeutic Exercise     Nustep   10 minutes Lvl 2    Piriformis stretch   3x30 seconds    LTR Not performed    SLR   3x10 (right only)    Bridging   3x10 RTB    SL clams   3x10 each RTB   SL hip abduction   Not performed    Ankle circles/ABCs  30x (CW/CCW) - uppercase only    Toe yoga  30x    Dome raises   30x     MOBO (odd/even taps)   30x each                Manual Therapy IASTM, light PROM in all direction - YES        Home Exercise Program (HEP) Provided and Patient Education Provided     Patient was provided education on 10/09/2023.     Written Home Exercises Provided: Patient instructed to cont prior home program.  Exercises were reviewed and Mhereen was able to demonstrate them prior to the end of the session.  Mehreen demonstrated good  understanding of the education provided.     Assessment   Patient with marked strength deficits focused in quadriceps, hip, anterior and posterior lower leg causing patient to be challenged with initiation of all exercises today. Good movement noted with ankle passive range of motion but due to swelling and poor coordination, minimal active movement noted and patient requiring cueing to avoid compensations. Will continue to progress patient in accordance with protocol and symptoms.   Mehreen Is progressing well towards her goals.     Patient will continue to benefit from skilled outpatient physical therapy to address the deficits listed in the problem list box on initial evaluation, provide patient/family education and to maximize patient's level of independence in the home and community environment.     Patient prognosis is Good.     Patient's spiritual, cultural and educational needs considered and patient agreeable to plan of care and goals.    Anticipated barriers to physical therapy: None    GOALS  Short Term Goals (4 weeks):  1. Patient will have improved PROM of the right ankle to WFLs.  2. Patient will have decreased complaints of pain to 3/10.  3. Patient will be independent and compliant with issued HEP.  4. Patient will maintain right SLS for at least 15 seconds.      Long Term Goals (8 weeks):   Patient will have improved AROM of the right ankle to WFLs.  2. Patient will have improved strength of the right ankle to 4+/5.  3. Patient will be able to resume prior  functional level with no deficits.   4. Patient will have overall improvement in condition to have decreased score on FOTO to <40%.     Plan   Continue PT per POC.     [facility]maril[facility] Josué, PT

## 2023-10-19 ENCOUNTER — CLINICAL SUPPORT (OUTPATIENT)
Dept: REHABILITATION | Facility: HOSPITAL | Age: 63
End: 2023-10-19
Payer: MEDICAID

## 2023-10-19 DIAGNOSIS — R26.9 ABNORMALITY OF GAIT AND MOBILITY: ICD-10-CM

## 2023-10-19 DIAGNOSIS — M25.671 DECREASED RANGE OF MOTION OF RIGHT ANKLE: Primary | ICD-10-CM

## 2023-10-19 PROCEDURE — 97110 THERAPEUTIC EXERCISES: CPT | Mod: PO

## 2023-10-19 NOTE — PROGRESS NOTES
Physical Therapy (PT) Daily Treatment Note     Name: Mehreen Benites  Clinic Number: [id number]    Therapy Diagnosis:   Encounter Diagnoses   Name Primary?    Decreased range of motion of right ankle Yes    Abnormality of gait and mobility      Physician: Axel Kapoor Jr., *    Visit Date: 10/19/2023    Physician Orders: Eval and Treat: - wb: nwb RLE in CAM x 2wks - passive ROM only, then wbat in CAM x 2 wks add active ROM, eval and treat after that  Medical Diagnosis: Z47.89 (ICD-10-CM) - Aftercare following surgery of the musculoskeletal system; M76.61 (ICD-10-CM) - Achilles tendinitis of right lower extremity  Surgical Procedure and Date: 8/25/2023: REPAIR, TENDON, ACHILLES (Right); RESECTION, MUSCLE, GASTROCNEMIUS (Right); EXCISION, EXOSTOSIS, RETROCALCANEAL (Right); ASPIRATION, BONE MARROW (Right)   Evaluation Date: 10/9/2023  Insurance Authorization Period Expiration: 12/31/2023  Plan of Care Certification Period: 12/04/2023  Date of Return to MD: 10/23/2023   Visit # / Visits authorized: 2 / 20    FOTO  -Intake: 54%   -Status: Incomplete  -Discharge: Incomplete    Time In: 8:00 AM   Time Out: 8:55 AM  Total Billable Time: 55 minutes     Precautions: Standard    Subjective     Patient reports: she experienced increased muscle soreness after last visit.   She was compliant with home exercise program.  Response to previous treatment: initial evaluation   Functional change: ongoing assessment     Pain: 5/10  Location: right ankles     Objective     Mehreen received therapeutic exercises to develop strength, endurance, ROM, and flexibility for 55 minutes including:    Date   10/17/2023  10/19/2023   Visit   1/20 2/20   Therapeutic Exercise      Nustep   10 minutes Lvl 2  10 minutes Lvl 2.5    Piriformis stretch   3x30 seconds  Not performed    SLR   3x10 (right only)  3x10 (right only)    Bridging   3x10 RTB   3x10 RTB    SL clams   3x10 each RTB 3x10 each RTB   SL hip abduction   Not performed  3x10 each   "  Ankle circles/ABCs  30x (CW/CCW) - uppercase only  30x (CW/CCW) - uppercase/lowercase   Toe yoga  30x  3' 5" hold    Dome raises   30x  30x 5" hold    MOBO (odd/even taps)   30x each  30x each way                  Manual Therapy IASTM, light PROM in all direction - YES  YES - ankle isometrics and IASTM to posterior lower leg        Home Exercise Program (HEP) Provided and Patient Education Provided     Patient was provided education on 10/09/2023.     Written Home Exercises Provided: Patient instructed to cont prior home program.  Exercises were reviewed and Mehreen was able to demonstrate them prior to the end of the session.  Mehreen demonstrated good  understanding of the education provided.     Assessment   Patient continues to require marked cueing with ankle active range of motion to avoid hip/knee compensations and to improve active movement of toes for intrinsic strengthening for standing tolerance. Pt continues to require hip strengthening at this time due to deficits. Will continue to progress patient in accordance with protocol and symptoms.   Mehreen Is progressing well towards her goals.     Patient will continue to benefit from skilled outpatient physical therapy to address the deficits listed in the problem list box on initial evaluation, provide patient/family education and to maximize patient's level of independence in the home and community environment.     Patient prognosis is Good.     Patient's spiritual, cultural and educational needs considered and patient agreeable to plan of care and goals.    Anticipated barriers to physical therapy: None    GOALS  Short Term Goals (4 weeks):  1. Patient will have improved PROM of the right ankle to WFLs.  2. Patient will have decreased complaints of pain to 3/10.  3. Patient will be independent and compliant with issued HEP.  4. Patient will maintain right SLS for at least 15 seconds.      Long Term Goals (8 weeks):   Patient will have improved " AROM of the right ankle to WFLs.  2. Patient will have improved strength of the right ankle to 4+/5.  3. Patient will be able to resume prior functional level with no deficits.   4. Patient will have overall improvement in condition to have decreased score on FOTO to <40%.     Plan   Continue PT per POC.     Amarilys Moses PT

## 2023-10-20 ENCOUNTER — HOSPITAL ENCOUNTER (OUTPATIENT)
Dept: RADIOLOGY | Facility: HOSPITAL | Age: 63
Discharge: HOME | End: 2023-10-20
Attending: STUDENT IN AN ORGANIZED HEALTH CARE EDUCATION/TRAINING PROGRAM
Payer: MEDICAID

## 2023-10-20 DIAGNOSIS — Z12.31 ENCOUNTER FOR SCREENING MAMMOGRAM FOR MALIGNANT NEOPLASM OF BREAST: ICD-10-CM

## 2023-10-20 PROCEDURE — 77067 MAMMO DIGITAL SCREENING BILAT: ICD-10-PCS | Mod: 26,,, | Performed by: RADIOLOGY

## 2023-10-20 PROCEDURE — 77067 SCR MAMMO BI INCL CAD: CPT | Mod: 26,,, | Performed by: RADIOLOGY

## 2023-10-20 PROCEDURE — 77067 SCR MAMMO BI INCL CAD: CPT | Mod: TC,PN

## 2023-10-22 DIAGNOSIS — I10 PRIMARY HYPERTENSION: ICD-10-CM

## 2023-10-23 ENCOUNTER — OFFICE VISIT (OUTPATIENT)
Dept: PODIATRY | Facility: CLINIC | Age: 63
End: 2023-10-23
Payer: MEDICAID

## 2023-10-23 ENCOUNTER — OFFICE VISIT (OUTPATIENT)
Dept: HEMATOLOGY/ONCOLOGY | Facility: CLINIC | Age: 63
End: 2023-10-23
Payer: MEDICAID

## 2023-10-23 ENCOUNTER — HOSPITAL ENCOUNTER (OUTPATIENT)
Dept: RADIOLOGY | Facility: HOSPITAL | Age: 63
Discharge: HOME | End: 2023-10-23
Attending: NURSE PRACTITIONER
Payer: MEDICAID

## 2023-10-23 VITALS
HEIGHT: 61 IN | DIASTOLIC BLOOD PRESSURE: 89 MMHG | OXYGEN SATURATION: 98 % | WEIGHT: 187.81 LBS | TEMPERATURE: 98 F | BODY MASS INDEX: 35.46 KG/M2 | HEART RATE: 69 BPM | RESPIRATION RATE: 18 BRPM | SYSTOLIC BLOOD PRESSURE: 153 MMHG

## 2023-10-23 VITALS — HEIGHT: 61 IN | BODY MASS INDEX: 35.3 KG/M2 | WEIGHT: 187 LBS

## 2023-10-23 DIAGNOSIS — I10 ESSENTIAL HYPERTENSION: ICD-10-CM

## 2023-10-23 DIAGNOSIS — I10 PRIMARY HYPERTENSION: ICD-10-CM

## 2023-10-23 DIAGNOSIS — Z98.890 STATUS POST SURGERY: ICD-10-CM

## 2023-10-23 DIAGNOSIS — Z86.718 HISTORY OF DVT (DEEP VEIN THROMBOSIS): ICD-10-CM

## 2023-10-23 DIAGNOSIS — N18.31 STAGE 3A CHRONIC KIDNEY DISEASE: ICD-10-CM

## 2023-10-23 DIAGNOSIS — M79.605 BILATERAL LEG PAIN: ICD-10-CM

## 2023-10-23 DIAGNOSIS — Z79.01 CHRONIC ANTICOAGULATION: ICD-10-CM

## 2023-10-23 DIAGNOSIS — M79.604 BILATERAL LEG PAIN: ICD-10-CM

## 2023-10-23 DIAGNOSIS — I26.99 PULMONARY EMBOLISM, BILATERAL: Primary | ICD-10-CM

## 2023-10-23 DIAGNOSIS — N17.9 AKI (ACUTE KIDNEY INJURY): ICD-10-CM

## 2023-10-23 DIAGNOSIS — Z47.89 AFTERCARE FOLLOWING SURGERY OF THE MUSCULOSKELETAL SYSTEM: Primary | ICD-10-CM

## 2023-10-23 PROCEDURE — 99024 POSTOP FOLLOW-UP VISIT: CPT | Mod: ,,, | Performed by: PODIATRIST

## 2023-10-23 PROCEDURE — 99024 PR POST-OP FOLLOW-UP VISIT: ICD-10-PCS | Mod: ,,, | Performed by: PODIATRIST

## 2023-10-23 PROCEDURE — 99214 PR OFFICE/OUTPT VISIT, EST, LEVL IV, 30-39 MIN: ICD-10-PCS | Mod: S$PBB,,, | Performed by: NURSE PRACTITIONER

## 2023-10-23 PROCEDURE — 1160F PR REVIEW ALL MEDS BY PRESCRIBER/CLIN PHARMACIST DOCUMENTED: ICD-10-PCS | Mod: CPTII,,, | Performed by: PODIATRIST

## 2023-10-23 PROCEDURE — 4010F ACE/ARB THERAPY RXD/TAKEN: CPT | Mod: CPTII,,, | Performed by: PODIATRIST

## 2023-10-23 PROCEDURE — 99213 OFFICE O/P EST LOW 20 MIN: CPT | Mod: PBBFAC,27,PN,25 | Performed by: PODIATRIST

## 2023-10-23 PROCEDURE — 93970 US LOWER EXTREMITY VEINS BILATERAL: ICD-10-PCS | Mod: 26,,, | Performed by: RADIOLOGY

## 2023-10-23 PROCEDURE — 3008F PR BODY MASS INDEX (BMI) DOCUMENTED: ICD-10-PCS | Mod: CPTII,,, | Performed by: NURSE PRACTITIONER

## 2023-10-23 PROCEDURE — 99214 OFFICE O/P EST MOD 30 MIN: CPT | Mod: S$PBB,,, | Performed by: NURSE PRACTITIONER

## 2023-10-23 PROCEDURE — 99999 PR PBB SHADOW E&M-EST. PATIENT-LVL IV: CPT | Mod: PBBFAC,,, | Performed by: NURSE PRACTITIONER

## 2023-10-23 PROCEDURE — 93970 EXTREMITY STUDY: CPT | Mod: TC,PN

## 2023-10-23 PROCEDURE — 3044F PR MOST RECENT HEMOGLOBIN A1C LEVEL <7.0%: ICD-10-PCS | Mod: CPTII,,, | Performed by: NURSE PRACTITIONER

## 2023-10-23 PROCEDURE — 3079F DIAST BP 80-89 MM HG: CPT | Mod: CPTII,,, | Performed by: NURSE PRACTITIONER

## 2023-10-23 PROCEDURE — 3077F SYST BP >= 140 MM HG: CPT | Mod: CPTII,,, | Performed by: NURSE PRACTITIONER

## 2023-10-23 PROCEDURE — 99999 PR PBB SHADOW E&M-EST. PATIENT-LVL IV: ICD-10-PCS | Mod: PBBFAC,,, | Performed by: NURSE PRACTITIONER

## 2023-10-23 PROCEDURE — 3044F HG A1C LEVEL LT 7.0%: CPT | Mod: CPTII,,, | Performed by: PODIATRIST

## 2023-10-23 PROCEDURE — 3079F PR MOST RECENT DIASTOLIC BLOOD PRESSURE 80-89 MM HG: ICD-10-PCS | Mod: CPTII,,, | Performed by: NURSE PRACTITIONER

## 2023-10-23 PROCEDURE — 1159F PR MEDICATION LIST DOCUMENTED IN MEDICAL RECORD: ICD-10-PCS | Mod: CPTII,,, | Performed by: PODIATRIST

## 2023-10-23 PROCEDURE — 99999 PR PBB SHADOW E&M-EST. PATIENT-LVL III: ICD-10-PCS | Mod: PBBFAC,,, | Performed by: PODIATRIST

## 2023-10-23 PROCEDURE — 99999 PR PBB SHADOW E&M-EST. PATIENT-LVL III: CPT | Mod: PBBFAC,,, | Performed by: PODIATRIST

## 2023-10-23 PROCEDURE — 3044F HG A1C LEVEL LT 7.0%: CPT | Mod: CPTII,,, | Performed by: NURSE PRACTITIONER

## 2023-10-23 PROCEDURE — 3077F PR MOST RECENT SYSTOLIC BLOOD PRESSURE >= 140 MM HG: ICD-10-PCS | Mod: CPTII,,, | Performed by: NURSE PRACTITIONER

## 2023-10-23 PROCEDURE — 1159F MED LIST DOCD IN RCRD: CPT | Mod: CPTII,,, | Performed by: NURSE PRACTITIONER

## 2023-10-23 PROCEDURE — 1159F MED LIST DOCD IN RCRD: CPT | Mod: CPTII,,, | Performed by: PODIATRIST

## 2023-10-23 PROCEDURE — 3044F PR MOST RECENT HEMOGLOBIN A1C LEVEL <7.0%: ICD-10-PCS | Mod: CPTII,,, | Performed by: PODIATRIST

## 2023-10-23 PROCEDURE — 1160F RVW MEDS BY RX/DR IN RCRD: CPT | Mod: CPTII,,, | Performed by: NURSE PRACTITIONER

## 2023-10-23 PROCEDURE — 1160F RVW MEDS BY RX/DR IN RCRD: CPT | Mod: CPTII,,, | Performed by: PODIATRIST

## 2023-10-23 PROCEDURE — 1160F PR REVIEW ALL MEDS BY PRESCRIBER/CLIN PHARMACIST DOCUMENTED: ICD-10-PCS | Mod: CPTII,,, | Performed by: NURSE PRACTITIONER

## 2023-10-23 PROCEDURE — 4010F PR ACE/ARB THEARPY RXD/TAKEN: ICD-10-PCS | Mod: CPTII,,, | Performed by: PODIATRIST

## 2023-10-23 PROCEDURE — 99214 OFFICE O/P EST MOD 30 MIN: CPT | Mod: PBBFAC,PO,25 | Performed by: NURSE PRACTITIONER

## 2023-10-23 PROCEDURE — 4010F ACE/ARB THERAPY RXD/TAKEN: CPT | Mod: CPTII,,, | Performed by: NURSE PRACTITIONER

## 2023-10-23 PROCEDURE — 4010F PR ACE/ARB THEARPY RXD/TAKEN: ICD-10-PCS | Mod: CPTII,,, | Performed by: NURSE PRACTITIONER

## 2023-10-23 PROCEDURE — 93970 EXTREMITY STUDY: CPT | Mod: 26,,, | Performed by: RADIOLOGY

## 2023-10-23 PROCEDURE — 3008F BODY MASS INDEX DOCD: CPT | Mod: CPTII,,, | Performed by: NURSE PRACTITIONER

## 2023-10-23 PROCEDURE — 1159F PR MEDICATION LIST DOCUMENTED IN MEDICAL RECORD: ICD-10-PCS | Mod: CPTII,,, | Performed by: NURSE PRACTITIONER

## 2023-10-23 RX ORDER — LOSARTAN POTASSIUM 25 MG/1
25 TABLET ORAL
Qty: 90 TABLET | Refills: 0 | Status: SHIPPED | OUTPATIENT
Start: 2023-10-23 | End: 2023-11-17 | Stop reason: SDUPTHER

## 2023-10-23 RX ORDER — LOSARTAN POTASSIUM 25 MG/1
25 TABLET ORAL DAILY
Qty: 90 TABLET | Refills: 3 | Status: SHIPPED | OUTPATIENT
Start: 2023-10-23 | End: 2024-03-12

## 2023-10-23 NOTE — PROGRESS NOTES
[facility] - PODIATRY  [address], [address]  [place] [address]  Dept: [phone]  Dept Fax: [phone]    WYATT Peterson Jr., Jr.     Post-Operative Visit  Assessment:     1. Aftercare following surgery of the musculoskeletal system            Plan:   MDM    Coding  9 wk s/p    - pt seen evaluated and managed  -cont dvt prophy per pcp  -healing normal for postop  - wb: wbat in CAM x 2wk, then wean from CAM  - rx dispensed: none  mobic and gabapentin renewed last visit  - referrals: none  Cont PT    Follow up in about 3 weeks (around 11/13/2023).      Subjective:      Patient ID: Mehreen Benites is a 63 y.o. female.    Chief Complaint:   Chief Complaint   Patient presents with    Post-op Evaluation     Post op #4      There were no vitals filed for this visit.    DOS: 8/25/23  Procedure: R retrocalc recon      Mehreen Benites returns to the clinic today for a postop visit. Pt is s/p above procedure. Mehreen Benites rates pain at a 2/10 on visual analog scale. Denies n/v/f/c.    HPI      Outside reports reviewed: historical medical records.    Past Medical History:   Diagnosis Date    Hypertension     Other pulmonary embolism without acute cor pulmonale 2022    Tendonitis      Past Surgical History:   Procedure Laterality Date    BONE MARROW ASPIRATION Right 8/25/2023    Procedure: ASPIRATION, BONE MARROW;  Surgeon: Axel Kapoor Jr., DPM;  Location: [facility] OR;  Service: Podiatry;  Laterality: Right;    CATARACT EXTRACTION, BILATERAL      COLONOSCOPY N/A 04/27/2023    Procedure: COLONOSCOPY;  Surgeon: Zachary Torres MD;  Location: [facility] ENDO;  Service: Endoscopy;  Laterality: N/A;    HEEL SPUR SURGERY Left 02/14/2013    HYSTERECTOMY  10/2009    WHITNEY    OOPHORECTOMY  10/2009    Bilateral    REPAIR OF ACHILLES TENDON Right 8/25/2023    Procedure: REPAIR, TENDON, ACHILLES;  Surgeon: Axel Kapoor Jr., DPM;  Location: [facility]  OR;  Service: Podiatry;  Laterality: Right;  pop saph    RESECTION OF GASTROCNEMIUS MUSCLE Right 8/25/2023    Procedure: RESECTION, MUSCLE, GASTROCNEMIUS;  Surgeon: Axel Kapoor Jr., DPM;  Location: [facility] OR;  Service: Podiatry;  Laterality: Right;  pop saph    SURGICAL REMOVAL OF RETROCALCANEAL EXOSTOSIS Right 8/25/2023    Procedure: EXCISION, EXOSTOSIS, RETROCALCANEAL;  Surgeon: Axel Kapoor Jr., DPM;  Location: [facility] OR;  Service: Podiatry;  Laterality: Right;    THROMBECTOMY N/A 06/01/2022    Procedure: THROMBECTOMY;  Surgeon: Kendall Fiore MD;  Location: [facility] CATH LAB/EP;  Service: Cardiology;  Laterality: N/A;     Family History   Problem Relation Age of Onset    Hypertension Mother     Breast cancer Maternal Cousin 51    Breast cancer Maternal Aunt     Breast cancer Paternal Aunt     Colon cancer Neg Hx     Ovarian cancer Neg Hx      Current Outpatient Medications   Medication Sig Dispense Refill    ascorbic acid, vitamin C, (VITAMIN C) 500 MG tablet Take 500 mg by mouth once daily.      ferrous sulfate (FEOSOL) 325 mg (65 mg iron) Tab tablet Take 1 tablet (325 mg total) by mouth once daily. 90 tablet 3    gabapentin (NEURONTIN) 100 MG capsule Take 1 capsule (100 mg total) by mouth 2 (two) times daily. 60 capsule 1    hydroCHLOROthiazide (HYDRODIURIL) 25 MG tablet Take 1 tablet (25 mg total) by mouth once daily. 90 tablet 3    losartan (COZAAR) 25 MG tablet Take 1 tablet by mouth once daily 90 tablet 0    losartan (COZAAR) 25 MG tablet Take 1 tablet (25 mg total) by mouth once daily. 90 tablet 3    meloxicam (MOBIC) 7.5 MG tablet Take 1 tablet (7.5 mg total) by mouth once daily. 30 tablet 1    rivaroxaban (XARELTO) 20 mg Tab Take 1 tablet (20 mg total) by mouth before dinner. 90 tablet 3    tramadol-acetaminophen 37.5-325 mg (ULTRACET) 37.5-325 mg Tab Take 1 tablet by mouth every 4 (four) hours. 30 tablet 0     No current facility-administered medications for this visit.     Review of patient's  "allergies indicates:  No Known Allergies  Social History     Socioeconomic History    Marital status: Legally    Tobacco Use    Smoking status: Never    Smokeless tobacco: Never   Substance and Sexual Activity    Alcohol use: Yes     Comment: occasionally    Drug use: No    Sexual activity: Not Currently     Partners: Male     Birth control/protection: Post-menopausal, See Surgical Hx     Comment:        ROS  REVIEW OF SYSTEMS: Negative as documented below as well as positive findings in bold.       Constitutional  Respiratory  Gastrointestinal  Skin   - Fever - Cough - Heartburn - Rash   - Chills - Spit blood - Nausea - Itching   - Weight Loss - Shortness of breath - Vomiting - Nail pain   - Malaise/Fatigue - Wheezing - Abdominal Pain  Wound/Ulcer   - Weight Gain   - Blood in Stool         - Diarrhea          Cardiovascular  Genitourinary  Neurological  HEENT   - Chest Pain - Dysuria - Dizziness - Headache   - Palpitations - Hematuria - Tingling - Congestion   - Pain at night in legs - Flank Pain - Tremor - Sore Throat   - Cramping   - Sensory Change - Blurred Vision   - Leg Swelling   - Speech Change - Double Vision   - Dizzy when standing   - Focal Weakness - Eye Redness       - Seizures - Dry Eyes       - Loss of Consciousness          Endocrine  Musculoskeletal  Psychiatric   - Cold intolerance - Muscle Pain - Depression   - Heat intolerance - Neck Pain - Insomnia   - Anemia - Joint Pain - Memory Loss   -  Easy bruising, bleeding - Heel pain - Anxiety      Toe Pain        Leg/Ankle/Foot Pain         Objective:     Ht 5' 1" (1.549 m)   Wt 84.8 kg (187 lb)   LMP  (LMP Unknown)   BMI 35.33 kg/m²     Physical Exam    Neck:  Trachea Midline  No visible masses    Respiratory/Ears:  No distress or labored breathing.  Able to differentiate between normal talking voice and whisper.  Able to follow commands    Eyes:  Visual Acuity intact  No discoloration noted.    Physical Exam  Ortho Exam  Foot " Exam    R LE exam con't:  V: DP 2/4, PT 2/4, CRT< 3s to all digits tested.    N: SILT in SP/DP/T/Yesi/Saph distributions    Ortho: +Motor EHL/FHL/TA/GA   Surgical site pain absent   Surgical site swelling absent   No equinus deformity or retrocal exostosis present   Compartments soft/compressible. No pain on passive stretch of big toe. No calf  Pain.     Derm: Skin intact. Sutures/staples: removed. Signs of infection: none.     Imaging / Labs:    Lab Results   Component Value Date    WBC 5.62 10/20/2023    WBC 6.57 08/22/2023    WBC 6.17 04/24/2023    WBC 6.43 01/24/2023    WBC 6.32 09/27/2022    PREALBUMIN 26 08/22/2023       Lab Results   Component Value Date    PREALBUMIN 26 08/22/2023       X-Ray Foot Complete Right    Result Date: 8/25/2023  EXAMINATION: XR FOOT COMPLETE 3 VIEW RIGHT CLINICAL HISTORY: postop;. TECHNIQUE: AP, lateral, and oblique views of the right foot were performed. FINDINGS: There is postop change involving the calcaneus.  There is osseous spur at the plantar fascia attachment to the calcaneus.  There is degenerative change within the midfoot.     As above. Electronically signed by: Kory Knight MD Date:    08/25/2023 Time:    11:55    SURG [place] Surgery Fluoro Usage    Result Date: 8/25/2023  See OP Notes for results. IMPRESSION: See OP Notes for results. This procedure was auto-finalized by: Virtual Radiologist

## 2023-10-25 ENCOUNTER — CLINICAL SUPPORT (OUTPATIENT)
Dept: REHABILITATION | Facility: HOSPITAL | Age: 63
End: 2023-10-25
Payer: MEDICAID

## 2023-10-25 ENCOUNTER — TELEPHONE (OUTPATIENT)
Dept: HEMATOLOGY/ONCOLOGY | Facility: CLINIC | Age: 63
End: 2023-10-25
Payer: MEDICAID

## 2023-10-25 DIAGNOSIS — M25.671 DECREASED RANGE OF MOTION OF RIGHT ANKLE: Primary | ICD-10-CM

## 2023-10-25 DIAGNOSIS — R26.9 ABNORMALITY OF GAIT AND MOBILITY: ICD-10-CM

## 2023-10-25 PROCEDURE — 97110 THERAPEUTIC EXERCISES: CPT | Mod: PO,CQ

## 2023-10-25 NOTE — TELEPHONE ENCOUNTER
----- Message from Ceci Herrera NP sent at 10/25/2023  1:15 PM CDT -----  Call pt with normal US results, I put a mychart response on ultrasound reading but she has not read it.   Thanks      ----- Message -----  From: Interface, Rad Results In  Sent: 10/23/2023   3:30 PM CDT  To: Ceci Herrera NP

## 2023-10-25 NOTE — PROGRESS NOTES
Physical Therapy (PT) Daily Treatment Note     Name: Mehreen Benites  Clinic Number: [id number]    Therapy Diagnosis:   Encounter Diagnoses   Name Primary?    Decreased range of motion of right ankle Yes    Abnormality of gait and mobility      Physician: Axel Kapoor Jr., *    Visit Date: 10/25/2023    Physician Orders: Eval and Treat: - wb: nwb RLE in CAM x 2wks - passive ROM only, then wbat in CAM x 2 wks add active ROM, eval and treat after that  Medical Diagnosis: Z47.89 (ICD-10-CM) - Aftercare following surgery of the musculoskeletal system; M76.61 (ICD-10-CM) - Achilles tendinitis of right lower extremity  Surgical Procedure and Date: 8/25/2023: REPAIR, TENDON, ACHILLES (Right); RESECTION, MUSCLE, GASTROCNEMIUS (Right); EXCISION, EXOSTOSIS, RETROCALCANEAL (Right); ASPIRATION, BONE MARROW (Right)   Evaluation Date: 10/9/2023  Insurance Authorization Period Expiration: 12/31/2023  Plan of Care Certification Period: 12/04/2023  Date of Return to MD: 10/23/2023   Visit # / Visits authorized: 3 / 20    FOTO  -Intake: 54%   -Status: Incomplete  -Discharge: Incomplete    Time In: 9:00 AM   Time Out: 10:00 AM  Total Billable Time: 60 minutes     Precautions: Standard    Subjective     Patient reports: no new issues or concerns at this time.   She was compliant with home exercise program.  Response to previous treatment: initial evaluation   Functional change: ongoing assessment     Pain: 5/10  Location: right ankles     Objective     Mehreen received therapeutic exercises to develop strength, endurance, ROM, and flexibility for 55 minutes including:    Date  10/25/2023  10/17/2023  10/19/2023   Visit  4/20 1/20 2/20   Therapeutic Exercise       Nustep  10' lvl 2  10 minutes Lvl 2  10 minutes Lvl 2.5    Piriformis stretch  3 x 30 seconds   3x30 seconds  Not performed    SLR  3 x 10 R   3x10 (right only)  3x10 (right only)    Bridging  3 x 10 RTB   3x10 RTB   3x10 RTB    SL clams  3 x 10 ea RTB   3x10 each RTB  "3x10 each RTB   SL hip abduction  3 x 10 ea   Not performed  3x10 each    Ankle circles/ABCs  30x (CW/CCW) - uppercase only    30x (CW/CCW) - uppercase only  30x (CW/CCW) - uppercase/lowercase   Toe yoga   30x  3' 5" hold    Dome raises  X 30    30x  30x 5" hold    MOBO (odd/even taps)  X 30 ea   30x each  30x each way                    Manual Therapy  IASTM, light PROM in all direction - YES  YES - ankle isometrics and IASTM to posterior lower leg        Home Exercise Program (HEP) Provided and Patient Education Provided     Patient was provided education on 10/09/2023.     Written Home Exercises Provided: Patient instructed to cont prior home program.  Exercises were reviewed and Mehreen was able to demonstrate them prior to the end of the session.  Mehreen demonstrated good  understanding of the education provided.     Assessment     Pt with no pain post treatment session.  Pt performed the above exercises with good tolerance requiring verbal as well as tactile cueing on proper exercise form during side lying hip abduction to target desired muscle group.  Will continue to monitor and progress pt as tolerated.       Mehreen Is progressing well towards her goals.     Patient will continue to benefit from skilled outpatient physical therapy to address the deficits listed in the problem list box on initial evaluation, provide patient/family education and to maximize patient's level of independence in the home and community environment.     Patient prognosis is Good.     Patient's spiritual, cultural and educational needs considered and patient agreeable to plan of care and goals.    Anticipated barriers to physical therapy: None    GOALS  Short Term Goals (4 weeks):  1. Patient will have improved PROM of the right ankle to WFLs.  2. Patient will have decreased complaints of pain to 3/10.  3. Patient will be independent and compliant with issued HEP.  4. Patient will maintain right SLS for at least 15 " seconds.      Long Term Goals (8 weeks):   Patient will have improved AROM of the right ankle to WFLs.  2. Patient will have improved strength of the right ankle to 4+/5.  3. Patient will be able to resume prior functional level with no deficits.   4. Patient will have overall improvement in condition to have decreased score on FOTO to <40%.     Plan   Continue PT per POC.     Maury Subramanian, PTA

## 2023-10-27 ENCOUNTER — CLINICAL SUPPORT (OUTPATIENT)
Dept: REHABILITATION | Facility: HOSPITAL | Age: 63
End: 2023-10-27
Payer: MEDICAID

## 2023-10-27 DIAGNOSIS — M25.671 DECREASED RANGE OF MOTION OF RIGHT ANKLE: Primary | ICD-10-CM

## 2023-10-27 DIAGNOSIS — R26.9 ABNORMALITY OF GAIT AND MOBILITY: ICD-10-CM

## 2023-10-27 PROCEDURE — 97110 THERAPEUTIC EXERCISES: CPT | Mod: PO

## 2023-10-27 NOTE — PROGRESS NOTES
Physical Therapy (PT) Daily Treatment Note     Name: Mehreen Benites  Clinic Number: [id number]    Therapy Diagnosis:   Encounter Diagnoses   Name Primary?    Decreased range of motion of right ankle Yes    Abnormality of gait and mobility      Physician: Axel Kapoor Jr., *    Visit Date: 10/27/2023    Physician Orders: Eval and Treat: - wb: nwb RLE in CAM x 2wks - passive ROM only, then wbat in CAM x 2 wks add active ROM, eval and treat after that  Medical Diagnosis: Z47.89 (ICD-10-CM) - Aftercare following surgery of the musculoskeletal system; M76.61 (ICD-10-CM) - Achilles tendinitis of right lower extremity  Surgical Procedure and Date: 8/25/2023: REPAIR, TENDON, ACHILLES (Right); RESECTION, MUSCLE, GASTROCNEMIUS (Right); EXCISION, EXOSTOSIS, RETROCALCANEAL (Right); ASPIRATION, BONE MARROW (Right)   Evaluation Date: 10/9/2023  Insurance Authorization Period Expiration: 12/31/2023  Plan of Care Certification Period: 12/04/2023  Date of Return to MD: 10/23/2023   Visit # / Visits authorized: 4 / 20    FOTO  -Intake: 54%   -Status: Incomplete  -Discharge: Incomplete    Time In: 9:00 AM   Time Out: 10:00 AM  Total Billable Time: 60 minutes     Precautions: Standard    Subjective     Patient reports: no new issues or concerns at this time.   She was compliant with home exercise program.  Response to previous treatment: initial evaluation   Functional change: ongoing assessment     Pain: 5/10  Location: right ankles     Objective     Mehreen received therapeutic exercises to develop strength, endurance, ROM, and flexibility for 55 minutes including:    Date  10/27/2023 10/25/2023  10/17/2023  10/19/2023   Visit  4/20 3/20  1/20 2/20   Therapeutic Exercise        Nustep  10' Lvl 3 10' lvl 2  10 minutes Lvl 2  10 minutes Lvl 2.5    Piriformis stretch   3 x 30 seconds   3x30 seconds  Not performed    SLR  3 x 10 2# right only  3 x 10 R   3x10 (right only)  3x10 (right only)    Bridging  3 X 10 RTB 3 x 10 RTB    "3x10 RTB   3x10 RTB    SL clams  3 X 10 EA RTB 3 x 10 ea RTB   3x10 each RTB 3x10 each RTB   SL hip abduction  3 x 10 2# each  3 x 10 ea   Not performed  3x10 each    Ankle circles/ABCs   30x (CW/CCW) - uppercase only    30x (CW/CCW) - uppercase only  30x (CW/CCW) - uppercase/lowercase   Toe yoga 3 x 10    30x  3' 5" hold    Dome raises  3 x 10  X 30    30x  30x 5" hold    MOBO (odd/even taps)  30x each  X 30 ea   30x each  30x each way    Seated calf raises   3 x 10                 Manual Therapy IASTM and massage for anterior ankle swelling - YES   I YES - ankle isometrics and IASTM to posterior lower leg        Home Exercise Program (HEP) Provided and Patient Education Provided     Patient was provided education on 10/09/2023.     Written Home Exercises Provided: Patient instructed to cont prior home program.  Exercises were reviewed and Mehreen was able to demonstrate them prior to the end of the session.  Mehreen demonstrated good  understanding of the education provided.     Assessment     Pt with moderate muscle soreness post treatment and some complaints of discomfort with seated calf raises. Pt continues to demonstrate poor ankle stability and hip strength with exercises, and requires frequent rest breaks due to muscle fatigue. Pt to progress to full weight bearing outside of CAM boot within the next week according to MD orders. Will continue to monitor and progress pt as tolerated.       Mehreen Is progressing well towards her goals.     Patient will continue to benefit from skilled outpatient physical therapy to address the deficits listed in the problem list box on initial evaluation, provide patient/family education and to maximize patient's level of independence in the home and community environment.     Patient prognosis is Good.     Patient's spiritual, cultural and educational needs considered and patient agreeable to plan of care and goals.    Anticipated barriers to physical therapy: " None    GOALS  Short Term Goals (4 weeks):  1. Patient will have improved PROM of the right ankle to WFLs.  2. Patient will have decreased complaints of pain to 3/10.  3. Patient will be independent and compliant with issued HEP.  4. Patient will maintain right SLS for at least 15 seconds.      Long Term Goals (8 weeks):   Patient will have improved AROM of the right ankle to WFLs.  2. Patient will have improved strength of the right ankle to 4+/5.  3. Patient will be able to resume prior functional level with no deficits.   4. Patient will have overall improvement in condition to have decreased score on FOTO to <40%.     Plan   Continue PT per POC.     Amarilys Moses, PT

## 2023-10-31 ENCOUNTER — CLINICAL SUPPORT (OUTPATIENT)
Dept: REHABILITATION | Facility: HOSPITAL | Age: 63
End: 2023-10-31
Payer: MEDICAID

## 2023-10-31 DIAGNOSIS — M25.671 DECREASED RANGE OF MOTION OF RIGHT ANKLE: Primary | ICD-10-CM

## 2023-10-31 DIAGNOSIS — R26.9 ABNORMALITY OF GAIT AND MOBILITY: ICD-10-CM

## 2023-10-31 PROCEDURE — 97110 THERAPEUTIC EXERCISES: CPT | Mod: PO

## 2023-10-31 NOTE — PROGRESS NOTES
Physical Therapy (PT) Daily Treatment Note     Name: Mehreen Benites  Clinic Number: [id number]    Therapy Diagnosis:   Encounter Diagnoses   Name Primary?    Decreased range of motion of right ankle Yes    Abnormality of gait and mobility      Physician: Axel Kapoor Jr., *    Visit Date: 10/31/2023    Physician Orders: Eval and Treat: - wb: nwb RLE in CAM x 2wks - passive ROM only, then wbat in CAM x 2 wks add active ROM, eval and treat after that  Medical Diagnosis: Z47.89 (ICD-10-CM) - Aftercare following surgery of the musculoskeletal system; M76.61 (ICD-10-CM) - Achilles tendinitis of right lower extremity  Surgical Procedure and Date: 8/25/2023: REPAIR, TENDON, ACHILLES (Right); RESECTION, MUSCLE, GASTROCNEMIUS (Right); EXCISION, EXOSTOSIS, RETROCALCANEAL (Right); ASPIRATION, BONE MARROW (Right)   Evaluation Date: 10/9/2023  Insurance Authorization Period Expiration: 12/31/2023  Plan of Care Certification Period: 12/04/2023  Date of Return to MD: 10/23/2023   Visit # / Visits authorized: 5 / 20    FOTO  -Intake: 54%   -Status: Incomplete  -Discharge: Incomplete    Time In: 9:00 AM   Time Out: 9:55 AM  Total Billable Time: 55 minutes     Precautions: Standard    Subjective     Patient reports: no new issues or concerns at this time, Patient ambulating into clinic today wearing closed toe shoes on both feet; per MD follow up on 10/23: - pt seen evaluated and managed  -cont dvt prophy per pcp  -healing normal for postop  - wb: wbat in CAM x 2wk, then wean from CAM  - rx dispensed: none  mobic and gabapentin renewed last visit  - referrals: none  Cont PT  Follow up in about 3 weeks (around 11/13/2023).    She was compliant with home exercise program.  Response to previous treatment: initial evaluation   Functional change: ongoing assessment     Pain: 5/10  Location: right ankles     Objective     Mehreen received therapeutic exercises to develop strength, endurance, ROM, and flexibility for 55 minutes  "including:    Date  10/31/2023  10/27/2023 10/25/2023  10/17/2023  10/19/2023   Visit  5/20 4/20 3/20  1/20 2/20   Therapeutic Exercise         Nustep  10' L3  10' Lvl 3 10' lvl 2  10 minutes Lvl 2  10 minutes Lvl 2.5    Piriformis stretch    3 x 30 seconds   3x30 seconds  Not performed    SLR  3 x 10 2# right only  3 x 10 2# right only  3 x 10 R   3x10 (right only)  3x10 (right only)    Bridging  3 X 10 GTB 3 X 10 RTB 3 x 10 RTB   3x10 RTB   3x10 RTB    SL clams  3 X 10 EA GTB 3 X 10 EA RTB 3 x 10 ea RTB   3x10 each RTB 3x10 each RTB   SL hip abduction   x 10 2# each  3 x 10 2# each  3 x 10 ea   Not performed  3x10 each    Ankle circles/ABCs Discharged    30x (CW/CCW) - uppercase only    30x (CW/CCW) - uppercase only  30x (CW/CCW) - uppercase/lowercase   Toe yoga  3 x 10    30x  3' 5" hold    Dome raises  30x  3 x 10  X 30    30x  30x 5" hold    MOBO (odd/even taps)  30x each with blue resistance 30x each  X 30 ea   30x each  30x each way    Seated calf raises   30x (barefoot)  3 x 10        Seated ankle DF  3x10 (barefoot)         Ankle AROM inversion/eversion  3x10 each        Shuttle squats DBL/SGL 3 bands/2 bands 3x10 each       Manual Therapy No  IASTM and massage for anterior ankle swelling - YES   I YES - ankle isometrics and IASTM to posterior lower leg        Home Exercise Program (HEP) Provided and Patient Education Provided     Patient was provided education on 10/09/2023.     Written Home Exercises Provided: Patient instructed to cont prior home program.  Exercises were reviewed and Mehreen was able to demonstrate them prior to the end of the session.  Mehreen demonstrated good  understanding of the education provided.     Assessment     Pt ambulating into clinic with regular shoe wear but still demonstrating gait deviations at this time due to hip weakness and limitations in ankle range of motion. Patient continues to be challenged with current exercise program due to strength and range of " motion. Will continue to monitor and progress pt as tolerated.       Mehreen Is progressing well towards her goals.     Patient will continue to benefit from skilled outpatient physical therapy to address the deficits listed in the problem list box on initial evaluation, provide patient/family education and to maximize patient's level of independence in the home and community environment.     Patient prognosis is Good.     Patient's spiritual, cultural and educational needs considered and patient agreeable to plan of care and goals.    Anticipated barriers to physical therapy: None    GOALS  Short Term Goals (4 weeks):  1. Patient will have improved PROM of the right ankle to WFLs.  2. Patient will have decreased complaints of pain to 3/10.  3. Patient will be independent and compliant with issued HEP.  4. Patient will maintain right SLS for at least 15 seconds.      Long Term Goals (8 weeks):   Patient will have improved AROM of the right ankle to WFLs.  2. Patient will have improved strength of the right ankle to 4+/5.  3. Patient will be able to resume prior functional level with no deficits.   4. Patient will have overall improvement in condition to have decreased score on FOTO to <40%.     Plan   Continue PT per POC.     Amarilys Moses PT

## 2023-11-03 ENCOUNTER — CLINICAL SUPPORT (OUTPATIENT)
Dept: REHABILITATION | Facility: HOSPITAL | Age: 63
End: 2023-11-03
Payer: MEDICAID

## 2023-11-03 DIAGNOSIS — R26.9 ABNORMALITY OF GAIT AND MOBILITY: ICD-10-CM

## 2023-11-03 DIAGNOSIS — M25.671 DECREASED RANGE OF MOTION OF RIGHT ANKLE: Primary | ICD-10-CM

## 2023-11-03 PROCEDURE — 97110 THERAPEUTIC EXERCISES: CPT | Mod: PO

## 2023-11-03 NOTE — PROGRESS NOTES
Physical Therapy (PT) Daily Treatment Note     Name: Mehreen Benites  Clinic Number: [id number]    Therapy Diagnosis:   Encounter Diagnoses   Name Primary?    Decreased range of motion of right ankle Yes    Abnormality of gait and mobility      Physician: Axel Kapoor Jr., *    Visit Date: 11/3/2023    Physician Orders: Eval and Treat: - wb: nwb RLE in CAM x 2wks - passive ROM only, then wbat in CAM x 2 wks add active ROM, eval and treat after that  Medical Diagnosis: Z47.89 (ICD-10-CM) - Aftercare following surgery of the musculoskeletal system; M76.61 (ICD-10-CM) - Achilles tendinitis of right lower extremity  Surgical Procedure and Date: 8/25/2023: REPAIR, TENDON, ACHILLES (Right); RESECTION, MUSCLE, GASTROCNEMIUS (Right); EXCISION, EXOSTOSIS, RETROCALCANEAL (Right); ASPIRATION, BONE MARROW (Right)   Evaluation Date: 10/9/2023  Insurance Authorization Period Expiration: 12/31/2023  Plan of Care Certification Period: 12/04/2023  Date of Return to MD: 10/23/2023   Visit # / Visits authorized: 6 / 20    FOTO  -Intake: 54%   -Status: Incomplete  -Discharge: Incomplete    Time In: 7:55 AM   Time Out: 8:55 AM  Total Billable Time: 60 minutes     Precautions: Standard    Subjective     Patient reports: no new issues or concerns at this time, patient states she was not very sore after her last visit.     She was compliant with home exercise program.  Response to previous treatment: initial evaluation   Functional change: ongoing assessment     Pain: 5/10  Location: right ankles     Objective     Mehreen received therapeutic exercises to develop strength, endurance, ROM, and flexibility for 60 minutes including:    Date  11/03/2023 10/31/2023    Visit  6/20 5/20   Therapeutic Exercise     Nustep  10' L3  10' L3    Standing gastroc stretch on wedge 30 sec x 3 times     Piriformis stretch      SLR  3 x 10 2# AW each leg  3 x 10 2# right only    Bridging  3 X 10 GTB 3 X 10 GTB   SL clams  3 X 10 EA GTB 3 X 10 EA GTB    SL hip abduction  3 x 10 2# AW each leg   x 10 2# each    Ankle circles/ABCs  Discharged    Toe yoga     Dome raises   30x    MOBO (odd/even taps)  3' each with blue resistance 30x each with blue resistance   Seated calf raises   3x10 (barefoot)  30x (barefoot)    Seated ankle DF  3x10 (barefoot)  3x10 (barefoot)    Ankle AROM inversion/eversion   3x10 each    Shuttle squats DBL/SGL 3.5 bands 3x15 / 2 bands 3x10  3 bands/2 bands 3x10 each   Standing hip abduction  3 x 10 each YTB    Manual Therapy No  No        Home Exercise Program (HEP) Provided and Patient Education Provided     Patient was provided education on 10/09/2023.     Written Home Exercises Provided: Patient instructed to cont prior home program.  Exercises were reviewed and Mehreen was able to demonstrate them prior to the end of the session.  Mehreen demonstrated good  understanding of the education provided.     Assessment     Pt progressing to standing hip strengthening with no complaints of discomfort, requiring upper extremity assistance due to balance and strength. Pt still demonstrating gait deviations at this time due to hip weakness and limitations in ankle range of motion. Patient continues to be challenged with current exercise program due to strength and range of motion. Will continue to monitor and progress pt as tolerated.     Mehreen Is progressing well towards her goals.     Patient will continue to benefit from skilled outpatient physical therapy to address the deficits listed in the problem list box on initial evaluation, provide patient/family education and to maximize patient's level of independence in the home and community environment.     Patient prognosis is Good.     Patient's spiritual, cultural and educational needs considered and patient agreeable to plan of care and goals.    Anticipated barriers to physical therapy: None    GOALS  Short Term Goals (4 weeks):  1. Patient will have improved PROM of the right ankle  to WFLs.  2. Patient will have decreased complaints of pain to 3/10.  3. Patient will be independent and compliant with issued HEP.  4. Patient will maintain right SLS for at least 15 seconds.      Long Term Goals (8 weeks):   Patient will have improved AROM of the right ankle to WFLs.  2. Patient will have improved strength of the right ankle to 4+/5.  3. Patient will be able to resume prior functional level with no deficits.   4. Patient will have overall improvement in condition to have decreased score on FOTO to <40%.     Plan   Continue PT per POC.     Amarilys Moses PT

## 2023-11-07 ENCOUNTER — CLINICAL SUPPORT (OUTPATIENT)
Dept: REHABILITATION | Facility: HOSPITAL | Age: 63
End: 2023-11-07
Payer: MEDICAID

## 2023-11-07 DIAGNOSIS — M25.671 DECREASED RANGE OF MOTION OF RIGHT ANKLE: Primary | ICD-10-CM

## 2023-11-07 DIAGNOSIS — R26.9 ABNORMALITY OF GAIT AND MOBILITY: ICD-10-CM

## 2023-11-07 PROCEDURE — 97110 THERAPEUTIC EXERCISES: CPT | Mod: PO,CQ

## 2023-11-10 ENCOUNTER — CLINICAL SUPPORT (OUTPATIENT)
Dept: REHABILITATION | Facility: HOSPITAL | Age: 63
End: 2023-11-10
Payer: MEDICAID

## 2023-11-10 DIAGNOSIS — M25.671 DECREASED RANGE OF MOTION OF RIGHT ANKLE: Primary | ICD-10-CM

## 2023-11-10 DIAGNOSIS — R26.9 ABNORMALITY OF GAIT AND MOBILITY: ICD-10-CM

## 2023-11-10 PROCEDURE — 97110 THERAPEUTIC EXERCISES: CPT | Mod: PO

## 2023-11-10 NOTE — PROGRESS NOTES
"Physical Therapy (PT) Daily Treatment Note     Name: Mehreen Benites  Clinic Number: [id number]    Therapy Diagnosis:   Encounter Diagnoses   Name Primary?    Decreased range of motion of right ankle Yes    Abnormality of gait and mobility      Physician: Axel Kapoor Jr., *    Visit Date: 11/10/2023    Physician Orders: Eval and Treat: - wb: nwb RLE in CAM x 2wks - passive ROM only, then wbat in CAM x 2 wks add active ROM, eval and treat after that  Medical Diagnosis: Z47.89 (ICD-10-CM) - Aftercare following surgery of the musculoskeletal system; M76.61 (ICD-10-CM) - Achilles tendinitis of right lower extremity  Surgical Procedure and Date: 8/25/2023: REPAIR, TENDON, ACHILLES (Right); RESECTION, MUSCLE, GASTROCNEMIUS (Right); EXCISION, EXOSTOSIS, RETROCALCANEAL (Right); ASPIRATION, BONE MARROW (Right)   Evaluation Date: 10/9/2023  Insurance Authorization Period Expiration: 12/31/2023  Plan of Care Certification Period: 12/04/2023  Date of Return to MD: 10/23/2023   Visit # / Visits authorized: 8 / 20    FOTO  -Intake: 54%   -Status: Incomplete  -Discharge: Incomplete    Time In: 8:00 AM   Time Out: 8:55 AM  Total Billable Time: 55 minutes     Precautions: Standard    Subjective     Patient reports: patient reports she hit her leg last night and it is a little sore today.   She was compliant with home exercise program.  Response to previous treatment: initial evaluation   Functional change: ongoing assessment     Pain: 5/10  Location: right ankles     Objective     Mehreen received therapeutic exercises to develop strength, endurance, ROM, and flexibility for 60 minutes including:    Date  11/10/2023 11/7/2023 11/03/2023 10/31/2023    Visit  8/20 7/20 6/20 5/20   Therapeutic Exercise       Nustep / Recumbent bike  8' Lvl 4  10'  10' L3  10' L3    Standing gastroc stretch on wedge 30" x 3   30 sec x 3 times     Piriformis stretch        SLR   3 x 10 2# AW ea  3 x 10 2# AW each leg  3 x 10 2# right only  "   Bridging   3 x 10 GTB  3 X 10 GTB 3 X 10 GTB   SL clams   3 x 10 GTB ea  3 X 10 EA GTB 3 X 10 EA GTB   SL hip abduction   3 x 10 2# AW each leg  3 x 10 2# AW each leg   x 10 2# each    Ankle circles/ABCs    Discharged    Toe yoga       Dome raises     30x    MOBO (odd/even taps)   3' each with blue resistance  3' each with blue resistance 30x each with blue resistance   Seated calf raises    3x10 (barefoot)  3x10 (barefoot)  30x (barefoot)    Seated ankle DF  Standing 3x10  3x10 (barefoot)  3x10 (barefoot)  3x10 (barefoot)    Standing ankle DF with ball squeeze 3x10       Ankle AROM inversion/eversion  3x10 each    3x10 each    Shuttle squats DBL/SGL 3.5 bands 3x15   3.5 bands 3x15 / 2 bands 3x10  3 bands/2 bands 3x10 each   Standing hip abduction  3 x 10 each YTB 3 x 10 each YTB 3 x 10 each YTB    Standing hip extension  3 x 10 each YTB      Sit to stand  3 x 10 YTB  No  No        Home Exercise Program (HEP) Provided and Patient Education Provided     Patient was provided education on 10/09/2023.     Written Home Exercises Provided: Patient instructed to cont prior home program.  Exercises were reviewed and Mehreen was able to demonstrate them prior to the end of the session.  Mehreen demonstrated good  understanding of the education provided.     Assessment     Pt with no reports of pain with progression in exercises. Pt to benefit from addition of step-ups and single leg balance next visit. Will continue to monitor and progress pt as tolerated.     Mehreen Is progressing well towards her goals.     Patient will continue to benefit from skilled outpatient physical therapy to address the deficits listed in the problem list box on initial evaluation, provide patient/family education and to maximize patient's level of independence in the home and community environment.     Patient prognosis is Good.     Patient's spiritual, cultural and educational needs considered and patient agreeable to plan of care and  goals.    Anticipated barriers to physical therapy: None    GOALS  Short Term Goals (4 weeks):  1. Patient will have improved PROM of the right ankle to WFLs.  2. Patient will have decreased complaints of pain to 3/10.  3. Patient will be independent and compliant with issued HEP.  4. Patient will maintain right SLS for at least 15 seconds.      Long Term Goals (8 weeks):   Patient will have improved AROM of the right ankle to WFLs.  2. Patient will have improved strength of the right ankle to 4+/5.  3. Patient will be able to resume prior functional level with no deficits.   4. Patient will have overall improvement in condition to have decreased score on FOTO to <40%.     Plan   Continue PT per POC.     Amarilys Moses PT

## 2023-11-13 ENCOUNTER — OFFICE VISIT (OUTPATIENT)
Dept: PODIATRY | Facility: CLINIC | Age: 63
End: 2023-11-13
Payer: MEDICAID

## 2023-11-13 VITALS — WEIGHT: 187 LBS | HEIGHT: 61 IN | BODY MASS INDEX: 35.3 KG/M2

## 2023-11-13 DIAGNOSIS — Z47.89 AFTERCARE FOLLOWING SURGERY OF THE MUSCULOSKELETAL SYSTEM: Primary | ICD-10-CM

## 2023-11-13 PROCEDURE — 99024 POSTOP FOLLOW-UP VISIT: CPT | Mod: ,,, | Performed by: PODIATRIST

## 2023-11-13 PROCEDURE — 99024 PR POST-OP FOLLOW-UP VISIT: ICD-10-PCS | Mod: ,,, | Performed by: PODIATRIST

## 2023-11-13 PROCEDURE — 99999 PR PBB SHADOW E&M-EST. PATIENT-LVL III: ICD-10-PCS | Mod: PBBFAC,,, | Performed by: PODIATRIST

## 2023-11-13 PROCEDURE — 3044F PR MOST RECENT HEMOGLOBIN A1C LEVEL <7.0%: ICD-10-PCS | Mod: CPTII,,, | Performed by: PODIATRIST

## 2023-11-13 PROCEDURE — 1159F PR MEDICATION LIST DOCUMENTED IN MEDICAL RECORD: ICD-10-PCS | Mod: CPTII,,, | Performed by: PODIATRIST

## 2023-11-13 PROCEDURE — 1159F MED LIST DOCD IN RCRD: CPT | Mod: CPTII,,, | Performed by: PODIATRIST

## 2023-11-13 PROCEDURE — 4010F ACE/ARB THERAPY RXD/TAKEN: CPT | Mod: CPTII,,, | Performed by: PODIATRIST

## 2023-11-13 PROCEDURE — 3044F HG A1C LEVEL LT 7.0%: CPT | Mod: CPTII,,, | Performed by: PODIATRIST

## 2023-11-13 PROCEDURE — 4010F PR ACE/ARB THEARPY RXD/TAKEN: ICD-10-PCS | Mod: CPTII,,, | Performed by: PODIATRIST

## 2023-11-13 PROCEDURE — 99999 PR PBB SHADOW E&M-EST. PATIENT-LVL III: CPT | Mod: PBBFAC,,, | Performed by: PODIATRIST

## 2023-11-13 PROCEDURE — 1160F PR REVIEW ALL MEDS BY PRESCRIBER/CLIN PHARMACIST DOCUMENTED: ICD-10-PCS | Mod: CPTII,,, | Performed by: PODIATRIST

## 2023-11-13 PROCEDURE — 1160F RVW MEDS BY RX/DR IN RCRD: CPT | Mod: CPTII,,, | Performed by: PODIATRIST

## 2023-11-13 PROCEDURE — 99213 OFFICE O/P EST LOW 20 MIN: CPT | Mod: PBBFAC,PN | Performed by: PODIATRIST

## 2023-11-13 NOTE — PROGRESS NOTES
[facility] - PODIATRY  [address], [address]  [place] [address]  Dept: [phone]  Dept Fax: [phone]    WYATT Peterson Jr., Jr.     Post-Operative Visit  Assessment:     1. Aftercare following surgery of the musculoskeletal system            Plan:   MDM    Coding  12 wk s/p    - pt seen evaluated and managed  -pt at end of healing  -deformity improved compared to preop  -pt happy and satisfied w/ result  -some postop swelling/edema/discomfort expected up to 1 yr postop    - wb: wbat  - rx dispensed: none  - referrals: none      Follow up if symptoms worsen or fail to improve.      Subjective:      Patient ID: Mehreen Benites is a 63 y.o. female.    Chief Complaint:   Postop  There were no vitals filed for this visit.    DOS: 8/25/23  Procedure: R retrocalc recon      Mehreen Benites returns to the clinic today for a postop visit. Pt is s/p above procedure. Mehreen Benites rates pain at a 0/10 on visual analog scale. Denies n/v/f/c. Has mostly finished with PT.     HPI      Outside reports reviewed: historical medical records.    Past Medical History:   Diagnosis Date    Hypertension     Other pulmonary embolism without acute cor pulmonale 2022    Tendonitis      Past Surgical History:   Procedure Laterality Date    BONE MARROW ASPIRATION Right 8/25/2023    Procedure: ASPIRATION, BONE MARROW;  Surgeon: Axel Kapoor Jr., DPM;  Location: [facility] OR;  Service: Podiatry;  Laterality: Right;    CATARACT EXTRACTION, BILATERAL      COLONOSCOPY N/A 04/27/2023    Procedure: COLONOSCOPY;  Surgeon: Zachary Torres MD;  Location: [facility] ENDO;  Service: Endoscopy;  Laterality: N/A;    HEEL SPUR SURGERY Left 02/14/2013    HYSTERECTOMY  10/2009    WHITNEY    OOPHORECTOMY  10/2009    Bilateral    REPAIR OF ACHILLES TENDON Right 8/25/2023    Procedure: REPAIR, TENDON, ACHILLES;  Surgeon: Axel Kapoor Jr., DPM;  Location: [facility] OR;   Service: Podiatry;  Laterality: Right;  pop saph    RESECTION OF GASTROCNEMIUS MUSCLE Right 8/25/2023    Procedure: RESECTION, MUSCLE, GASTROCNEMIUS;  Surgeon: Axel Kapoor Jr., DPM;  Location: [facility] OR;  Service: Podiatry;  Laterality: Right;  pop saph    SURGICAL REMOVAL OF RETROCALCANEAL EXOSTOSIS Right 8/25/2023    Procedure: EXCISION, EXOSTOSIS, RETROCALCANEAL;  Surgeon: Axel Kapoor Jr., DPM;  Location: [facility] OR;  Service: Podiatry;  Laterality: Right;    THROMBECTOMY N/A 06/01/2022    Procedure: THROMBECTOMY;  Surgeon: Kendall Fiore MD;  Location: [facility] CATH LAB/EP;  Service: Cardiology;  Laterality: N/A;     Family History   Problem Relation Age of Onset    Hypertension Mother     Breast cancer Maternal Cousin 51    Breast cancer Maternal Aunt     Breast cancer Paternal Aunt     Colon cancer Neg Hx     Ovarian cancer Neg Hx      Current Outpatient Medications   Medication Sig Dispense Refill    ascorbic acid, vitamin C, (VITAMIN C) 500 MG tablet Take 500 mg by mouth once daily.      ferrous sulfate (FEOSOL) 325 mg (65 mg iron) Tab tablet Take 1 tablet (325 mg total) by mouth once daily. 90 tablet 3    gabapentin (NEURONTIN) 100 MG capsule Take 1 capsule (100 mg total) by mouth 2 (two) times daily. 60 capsule 1    hydroCHLOROthiazide (HYDRODIURIL) 25 MG tablet Take 1 tablet (25 mg total) by mouth once daily. 90 tablet 3    losartan (COZAAR) 25 MG tablet Take 1 tablet by mouth once daily 90 tablet 0    losartan (COZAAR) 25 MG tablet Take 1 tablet (25 mg total) by mouth once daily. 90 tablet 3    meloxicam (MOBIC) 7.5 MG tablet Take 1 tablet (7.5 mg total) by mouth once daily. 30 tablet 1    rivaroxaban (XARELTO) 20 mg Tab Take 1 tablet (20 mg total) by mouth before dinner. 90 tablet 3    tramadol-acetaminophen 37.5-325 mg (ULTRACET) 37.5-325 mg Tab Take 1 tablet by mouth every 4 (four) hours. 30 tablet 0     No current facility-administered medications for this visit.     Review of patient's  "allergies indicates:  No Known Allergies  Social History     Socioeconomic History    Marital status: Legally    Tobacco Use    Smoking status: Never    Smokeless tobacco: Never   Substance and Sexual Activity    Alcohol use: Yes     Comment: occasionally    Drug use: No    Sexual activity: Not Currently     Partners: Male     Birth control/protection: Post-menopausal, See Surgical Hx     Comment:        ROS  REVIEW OF SYSTEMS: Negative as documented below as well as positive findings in bold.       Constitutional  Respiratory  Gastrointestinal  Skin   - Fever - Cough - Heartburn - Rash   - Chills - Spit blood - Nausea - Itching   - Weight Loss - Shortness of breath - Vomiting - Nail pain   - Malaise/Fatigue - Wheezing - Abdominal Pain  Wound/Ulcer   - Weight Gain   - Blood in Stool         - Diarrhea          Cardiovascular  Genitourinary  Neurological  HEENT   - Chest Pain - Dysuria - Dizziness - Headache   - Palpitations - Hematuria - Tingling - Congestion   - Pain at night in legs - Flank Pain - Tremor - Sore Throat   - Cramping   - Sensory Change - Blurred Vision   - Leg Swelling   - Speech Change - Double Vision   - Dizzy when standing   - Focal Weakness - Eye Redness       - Seizures - Dry Eyes       - Loss of Consciousness          Endocrine  Musculoskeletal  Psychiatric   - Cold intolerance - Muscle Pain - Depression   - Heat intolerance - Neck Pain - Insomnia   - Anemia - Joint Pain - Memory Loss   -  Easy bruising, bleeding - Heel pain - Anxiety      Toe Pain        Leg/Ankle/Foot Pain         Objective:     Ht 5' 1" (1.549 m)   Wt 84.8 kg (187 lb)   LMP  (LMP Unknown)   BMI 35.33 kg/m²     Physical Exam    Neck:  Trachea Midline  No visible masses    Respiratory/Ears:  No distress or labored breathing.  Able to differentiate between normal talking voice and whisper.  Able to follow commands    Eyes:  Visual Acuity intact  No discoloration noted.    Physical Exam  Ortho Exam  Foot " Exam    R LE exam con't:  V: DP 2/4, PT 2/4, CRT< 3s to all digits tested.    N: SILT in SP/DP/T/Yesi/Saph distributions    Ortho: +Motor EHL/FHL/TA/GA   Surgical site pain absent   Surgical site swelling absent   No equinus deformity or retrocal exostosis present   Compartments soft/compressible. No pain on passive stretch of big toe. No calf  Pain.     Derm: Skin intact. Sutures/staples: removed. Signs of infection: none.     Imaging / Labs:    Lab Results   Component Value Date    WBC 5.62 10/20/2023    WBC 6.57 08/22/2023    WBC 6.17 04/24/2023    WBC 6.43 01/24/2023    WBC 6.32 09/27/2022    PREALBUMIN 26 08/22/2023       Lab Results   Component Value Date    PREALBUMIN 26 08/22/2023       X-Ray Foot Complete Right    Result Date: 8/25/2023  EXAMINATION: XR FOOT COMPLETE 3 VIEW RIGHT CLINICAL HISTORY: postop;. TECHNIQUE: AP, lateral, and oblique views of the right foot were performed. FINDINGS: There is postop change involving the calcaneus.  There is osseous spur at the plantar fascia attachment to the calcaneus.  There is degenerative change within the midfoot.     As above. Electronically signed by: Kory Knight MD Date:    08/25/2023 Time:    11:55    SURG [place] Surgery Fluoro Usage    Result Date: 8/25/2023  See OP Notes for results. IMPRESSION: See OP Notes for results. This procedure was auto-finalized by: Virtual Radiologist

## 2023-11-14 ENCOUNTER — CLINICAL SUPPORT (OUTPATIENT)
Dept: REHABILITATION | Facility: HOSPITAL | Age: 63
End: 2023-11-14
Payer: MEDICAID

## 2023-11-14 DIAGNOSIS — R26.9 ABNORMALITY OF GAIT AND MOBILITY: ICD-10-CM

## 2023-11-14 DIAGNOSIS — M25.671 DECREASED RANGE OF MOTION OF RIGHT ANKLE: Primary | ICD-10-CM

## 2023-11-14 PROCEDURE — 97110 THERAPEUTIC EXERCISES: CPT | Mod: PO,CQ

## 2023-11-14 NOTE — PROGRESS NOTES
"Physical Therapy (PT) Daily Treatment Note     Name: Mehreen Benites  Clinic Number: [id number]    Therapy Diagnosis:   Encounter Diagnoses   Name Primary?    Decreased range of motion of right ankle Yes    Abnormality of gait and mobility      Physician: Axel Kapoor Jr., *    Visit Date: 11/14/2023    Physician Orders: Eval and Treat: - wb: nwb RLE in CAM x 2wks - passive ROM only, then wbat in CAM x 2 wks add active ROM, eval and treat after that  Medical Diagnosis: Z47.89 (ICD-10-CM) - Aftercare following surgery of the musculoskeletal system; M76.61 (ICD-10-CM) - Achilles tendinitis of right lower extremity  Surgical Procedure and Date: 8/25/2023: REPAIR, TENDON, ACHILLES (Right); RESECTION, MUSCLE, GASTROCNEMIUS (Right); EXCISION, EXOSTOSIS, RETROCALCANEAL (Right); ASPIRATION, BONE MARROW (Right)   Evaluation Date: 10/9/2023  Insurance Authorization Period Expiration: 12/31/2023  Plan of Care Certification Period: 12/04/2023  Date of Return to MD: 10/23/2023   Visit # / Visits authorized: 9 / 20    FOTO  -Intake: 54%   -Status: Incomplete  -Discharge: Incomplete    Time In: 8:00 AM   Time Out: 8:55 AM  Total Billable Time: 55 minutes     Precautions: Standard    Subjective     Patient reports: she went to the doctor in which he told her she no longer needed to come to PT.  Pt however reports that she told him she would like to continue for another 2 weeks.   She was compliant with home exercise program.  Response to previous treatment: initial evaluation   Functional change: ongoing assessment     Pain: /10  Location: right ankles     Objective     Mehreen received therapeutic exercises to develop strength, endurance, ROM, and flexibility for 55 minutes including:    Date  11/14/2023 11/10/2023 11/7/2023 11/03/2023 10/31/2023    Visit  9/20 8/20 7/20 6/20 5/20   Therapeutic Exercise        Nustep / Recumbent bike  10' 8' Lvl 4  10'  10' L3  10' L3    Standing gastroc stretch on wedge  30" x 3   30 sec x " 3 times     Piriformis stretch         SLR    3 x 10 2# AW ea  3 x 10 2# AW each leg  3 x 10 2# right only    Bridging    3 x 10 GTB  3 X 10 GTB 3 X 10 GTB   SL clams    3 x 10 GTB ea  3 X 10 EA GTB 3 X 10 EA GTB   SL hip abduction    3 x 10 2# AW each leg  3 x 10 2# AW each leg   x 10 2# each    Ankle circles/ABCs     Discharged    Toe yoga        Dome raises      30x    MOBO (odd/even taps)    3' each with blue resistance  3' each with blue resistance 30x each with blue resistance   Seated calf raises     3x10 (barefoot)  3x10 (barefoot)  30x (barefoot)    Seated ankle DF  Standing 3 x 10  Standing 3x10  3x10 (barefoot)  3x10 (barefoot)  3x10 (barefoot)    Standing ankle DF with ball squeeze 3 x 10 3x10       Ankle AROM inversion/eversion  3 x 10 ea  3x10 each    3x10 each    Shuttle squats DBL/SGL 3.5 bands 3x15  DL  2 bands 3 x 10 SL  3.5 bands 3x15   3.5 bands 3x15 / 2 bands 3x10  3 bands/2 bands 3x10 each   Standing hip abduction  3 x 10 YTB  3 x 10 each YTB 3 x 10 each YTB 3 x 10 each YTB    Standing hip extension  3 x 10 YTB  3 x 10 each YTB      Sit to stand  3 x 10 YTB 3 x 10 YTB  No  No        Home Exercise Program (HEP) Provided and Patient Education Provided     Patient was provided education on 10/09/2023.     Written Home Exercises Provided: Patient instructed to cont prior home program.  Exercises were reviewed and Mehreen was able to demonstrate them prior to the end of the session.  Mehreen demonstrated good  understanding of the education provided.     Assessment      Pt continues to do well with her post op rehab at this time.  Pt with no pain post session.  Will continue to monitor and progress pt as tolerated.     Mehreen Is progressing well towards her goals.     Patient will continue to benefit from skilled outpatient physical therapy to address the deficits listed in the problem list box on initial evaluation, provide patient/family education and to maximize patient's level of  independence in the home and community environment.     Patient prognosis is Good.     Patient's spiritual, cultural and educational needs considered and patient agreeable to plan of care and goals.    Anticipated barriers to physical therapy: None    GOALS  Short Term Goals (4 weeks):  1. Patient will have improved PROM of the right ankle to WFLs.  2. Patient will have decreased complaints of pain to 3/10.  3. Patient will be independent and compliant with issued HEP.  4. Patient will maintain right SLS for at least 15 seconds.      Long Term Goals (8 weeks):   Patient will have improved AROM of the right ankle to WFLs.  2. Patient will have improved strength of the right ankle to 4+/5.  3. Patient will be able to resume prior functional level with no deficits.   4. Patient will have overall improvement in condition to have decreased score on FOTO to <40%.     Plan   Continue PT per POC.     Maury Subramanian, PTA

## 2023-11-17 ENCOUNTER — CLINICAL SUPPORT (OUTPATIENT)
Dept: REHABILITATION | Facility: HOSPITAL | Age: 63
End: 2023-11-17
Payer: MEDICAID

## 2023-11-17 ENCOUNTER — OFFICE VISIT (OUTPATIENT)
Dept: FAMILY MEDICINE | Facility: HOSPITAL | Age: 63
End: 2023-11-17
Payer: MEDICAID

## 2023-11-17 VITALS
HEIGHT: 61 IN | BODY MASS INDEX: 35.09 KG/M2 | WEIGHT: 185.88 LBS | HEART RATE: 77 BPM | SYSTOLIC BLOOD PRESSURE: 136 MMHG | DIASTOLIC BLOOD PRESSURE: 86 MMHG

## 2023-11-17 DIAGNOSIS — R26.9 ABNORMALITY OF GAIT AND MOBILITY: ICD-10-CM

## 2023-11-17 DIAGNOSIS — I10 PRIMARY HYPERTENSION: Primary | ICD-10-CM

## 2023-11-17 DIAGNOSIS — R73.03 PRE-DIABETES: ICD-10-CM

## 2023-11-17 DIAGNOSIS — F41.1 GAD (GENERALIZED ANXIETY DISORDER): ICD-10-CM

## 2023-11-17 DIAGNOSIS — M25.671 DECREASED RANGE OF MOTION OF RIGHT ANKLE: Primary | ICD-10-CM

## 2023-11-17 DIAGNOSIS — Z11.3 SCREEN FOR STD (SEXUALLY TRANSMITTED DISEASE): ICD-10-CM

## 2023-11-17 DIAGNOSIS — E66.9 CLASS 2 OBESITY WITH BODY MASS INDEX (BMI) OF 35.0 TO 35.9 IN ADULT, UNSPECIFIED OBESITY TYPE, UNSPECIFIED WHETHER SERIOUS COMORBIDITY PRESENT: ICD-10-CM

## 2023-11-17 PROCEDURE — 97110 THERAPEUTIC EXERCISES: CPT | Mod: PO | Performed by: PHYSICAL THERAPIST

## 2023-11-17 PROCEDURE — 99213 OFFICE O/P EST LOW 20 MIN: CPT

## 2023-11-17 RX ORDER — HYDROXYZINE HYDROCHLORIDE 25 MG/1
25 TABLET, FILM COATED ORAL 3 TIMES DAILY PRN
Qty: 90 TABLET | Refills: 2 | Status: SHIPPED | OUTPATIENT
Start: 2023-11-17 | End: 2024-02-15

## 2023-11-17 RX ORDER — HYDROXYZINE HYDROCHLORIDE 25 MG/1
25 TABLET, FILM COATED ORAL 3 TIMES DAILY
Qty: 90 TABLET | Refills: 2 | Status: SHIPPED | OUTPATIENT
Start: 2023-11-17 | End: 2023-11-17 | Stop reason: SDUPTHER

## 2023-11-17 NOTE — PROGRESS NOTES
[facility]    Subjective:     Mehreen Benites is a 63 y.o. year old female with PMHx of PMHx of HTN, unprovoked PE 05/2022 on xarelto, pre-DM who presents to clinic for follow up.    Since last visit, patient is s/p Achilles tendon repair with Dr. Kapoor on 8/25. She states she has no idea how that happened. Able to walk on it.     Depression and anxiety: Attributes it to being in the house, not working, and having no income. Has not been working since her PE last year. Patient reports she is not sure if she is interested in therapy/counseling. She is not able to drive. She has a  who supports her. Has a grown children who live out of [facility], [place]. Has not tried any medications before. States sometimes she has anxiety attacks. Has trouble sleeping, feeling down, decreased appetite sometimes and over-eats other times, decreased energy, does not want to socialize or go out and do things. Used to like to skate and go bowling, but does not due that anymore. She states her Podiatrist told her she is not able to go to work yet as she is not able to stand for prolonged periods of time without pain after her Achilles tendon repair.   GAD7-18. PHQ9-19    HTN: BP today 136/86. Well-controlled. Patient denies any SOB, lightheadedness, dizziness, palpitations, chest pain, or vision changes. Patient endorses compliance with medication regimen, including Losartan 25 and HCTZ 25 mg.     Mammogram done, due again 10/2024  Colonoscopy done earlier this year, due again in 04/2033  Pap smear - has had hysterectomy and states she was told does not need pap smears  Declined vaccinations today    Patient Active Problem List    Diagnosis Date Noted    Decreased range of motion of right ankle 10/09/2023    Abnormality of gait and mobility 10/09/2023    Acquired posterior equinus of right lower extremity 08/25/2023    Bone spur of posterior portion of calcaneus 08/25/2023    Tendonitis, Achilles,  right 08/25/2023    Achilles tendonosis of right lower extremity 08/25/2023    Retrocalcaneal bursitis (back of heel), right 08/25/2023    History of colon polyps 05/07/2023    Colon polyps 04/27/2023    Pulmonary embolism, bilateral 06/07/2022    Acute deep vein thrombosis (DVT) of right popliteal vein 06/07/2022    Pre-diabetes 06/07/2022    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    SEDRICK (acute kidney injury) 05/31/2022    Hypertension         Review of patient's allergies indicates:  No Known Allergies     Past Medical History:   Diagnosis Date    Hypertension     Other pulmonary embolism without acute cor pulmonale 2022    Tendonitis       Past Surgical History:   Procedure Laterality Date    BONE MARROW ASPIRATION Right 8/25/2023    Procedure: ASPIRATION, BONE MARROW;  Surgeon: Axel Kapoor Jr., DPM;  Location: [facility] OR;  Service: Podiatry;  Laterality: Right;    CATARACT EXTRACTION, BILATERAL      COLONOSCOPY N/A 04/27/2023    Procedure: COLONOSCOPY;  Surgeon: Zachary Torres MD;  Location: [facility] ENDO;  Service: Endoscopy;  Laterality: N/A;    HEEL SPUR SURGERY Left 02/14/2013    HYSTERECTOMY  10/2009    WHITNEY    OOPHORECTOMY  10/2009    Bilateral    REPAIR OF ACHILLES TENDON Right 8/25/2023    Procedure: REPAIR, TENDON, ACHILLES;  Surgeon: Axel Kapoor Jr., DPM;  Location: [facility] OR;  Service: Podiatry;  Laterality: Right;  pop saph    RESECTION OF GASTROCNEMIUS MUSCLE Right 8/25/2023    Procedure: RESECTION, MUSCLE, GASTROCNEMIUS;  Surgeon: Axel Kapoor Jr., DPM;  Location: [facility] OR;  Service: Podiatry;  Laterality: Right;  pop saph    SURGICAL REMOVAL OF RETROCALCANEAL EXOSTOSIS Right 8/25/2023    Procedure: EXCISION, EXOSTOSIS, RETROCALCANEAL;  Surgeon: Axel Kapoor Jr., DPM;  Location: [facility] OR;  Service: Podiatry;  Laterality: Right;    THROMBECTOMY N/A 06/01/2022    Procedure: THROMBECTOMY;  Surgeon: Kendall Fiore MD;  Location: [facility] CATH LAB/EP;  Service: Cardiology;   "Laterality: N/A;      Family History   Problem Relation Age of Onset    Hypertension Mother     Breast cancer Maternal Cousin 51    Breast cancer Maternal Aunt     Breast cancer Paternal Aunt     Colon cancer Neg Hx     Ovarian cancer Neg Hx       Social History     Tobacco Use    Smoking status: Never    Smokeless tobacco: Never   Substance Use Topics    Alcohol use: Yes     Comment: occasionally        Objective:     Vitals:    11/17/23 0942   BP: 136/86   Pulse: 77   Weight: 84.3 kg (185 lb 13.6 oz)   Height: 5' 1" (1.549 m)     Body mass index is 35.12 kg/m².    Gen: No apparent distress, well nourished and developed, appears stated age  CV: RRR, S1 and S2 present, no LE edema  Resp: CTAB, normal respiratory effort    Assessment/Plan:     Mehreen Benites is a 63 y.o. year old female with PMH HTN, unprovoked PE 05/2022 on xarelto, pre-DM who presents to clinic for follow up.    Primary hypertension  - Controlled, continue Losartan and HCTZ  - Lipid Panel as suspect she may need statin given pre-DM and HTN  - ASCVD risk 23.2% HIGH, will need to be started on a statin, discuss next visit or over phone as patient mentioned she does not like taking medications    Pre-diabetes  - Hemoglobin A1C; Future    JUAN MANUEL (generalized anxiety disorder)  Depression  - Denies wanting to take a daily medication such as an SSRI as she does not like taking medications  - Declined referral for therapy/counseling  - Started patient on hydrOXYzine HCL (ATARAX) 25 MG tablet; Take 1 tablet (25 mg total) by mouth 3 (three) times daily PRN. Discussed risks and benefits of medication. Educational handout given on medication.  - Discussed importance of finding hobbies and getting back into her personal interests  - Advised to discuss return back to work with her surgeon/podiatrist who did the Achilles repair. Continue PT/OT  - Breathing and relaxing techniques discussed. Handout given    Screen for STD (sexually transmitted disease)  - " Hepatitis C Antibody; Future  - HIV 1/2 Ag/Ab (4th Gen); Future    Follow-up: 1 month for mood    A total of 30 minutes was spent on patient care during this encounter which included chart review, examining the patient, formulating a treatment plan and documentation.     Medical decision making straight forward and not complex during this visit.     Case discussed with staff: Dr. Edi Gray MD  [facility], PGY-2  11/17/2023

## 2023-11-17 NOTE — PROGRESS NOTES
"Physical Therapy (PT) Daily Treatment Note     Name: Mehreen Benites  Clinic Number: [id number]    Therapy Diagnosis:   Encounter Diagnoses   Name Primary?    Decreased range of motion of right ankle Yes    Abnormality of gait and mobility      Physician: Axel Kapoor Jr., *    Visit Date: 11/17/2023    Physician Orders: Eval and Treat: - wb: nwb RLE in CAM x 2wks - passive ROM only, then wbat in CAM x 2 wks add active ROM, eval and treat after that  Medical Diagnosis: Z47.89 (ICD-10-CM) - Aftercare following surgery of the musculoskeletal system; M76.61 (ICD-10-CM) - Achilles tendinitis of right lower extremity  Surgical Procedure and Date: 8/25/2023: REPAIR, TENDON, ACHILLES (Right); RESECTION, MUSCLE, GASTROCNEMIUS (Right); EXCISION, EXOSTOSIS, RETROCALCANEAL (Right); ASPIRATION, BONE MARROW (Right)   Evaluation Date: 10/9/2023  Insurance Authorization Period Expiration: 12/31/2023  Plan of Care Certification Period: 12/04/2023  Date of Return to MD: 10/23/2023   Visit # / Visits authorized: 10 / 20    FOTO  -Intake: 54%   -Status: Incomplete  -Discharge: Incomplete    Time In: 8:00 AM   Time Out: 8:55 AM  Total Billable Time: 55 minutes     Precautions: Standard    Subjective     Patient reports:  some medial ankle burning and swelling but not consistent.    She was compliant with home exercise program.  Response to previous treatment: initial evaluation   Functional change: ongoing assessment     Pain: /10  Location: right ankles     Objective     Mehreen received therapeutic exercises to develop strength, endurance, ROM, and flexibility for 55 minutes including:    Date  11/17/2023 11/14/2023 11/10/2023 11/7/2023 11/03/2023 10/31/2023    Visit  10/20 9/20 8/20 7/20 6/20 5/20   Therapeutic Exercise         Nustep / Recumbent bike  8' 10' 8' Lvl 4  10'  10' L3  10' L3    Standing gastroc stretch on wedge   30" x 3   30 sec x 3 times     Piriformis stretch          SLR     3 x 10 2# AW ea  3 x 10 2# AW " "each leg  3 x 10 2# right only    Bridging     3 x 10 GTB  3 X 10 GTB 3 X 10 GTB   SL clams     3 x 10 GTB ea  3 X 10 EA GTB 3 X 10 EA GTB   SL hip abduction     3 x 10 2# AW each leg  3 x 10 2# AW each leg   x 10 2# each    Ankle circles/ABCs      Discharged    Toe yoga         Dome raises       30x    MOBO (odd/even taps)     3' each with blue resistance  3' each with blue resistance 30x each with blue resistance   Seated calf raises   10x no weight  3x8 with 10# KB   3x10 (barefoot)  3x10 (barefoot)  30x (barefoot)    Seated ankle DF   Standing 3 x 10  Standing 3x10  3x10 (barefoot)  3x10 (barefoot)  3x10 (barefoot)    Standing ankle PF with ball squeeze 30x 3 x 10 3x10       Ankle AROM inversion/eversion   3 x 10 ea  3x10 each    3x10 each    Shuttle squats DBL/SGL 3.5 bands 3x12 DL  2 bands 25x SL 3.5 bands 3x15  DL  2 bands 3 x 10 SL  3.5 bands 3x15   3.5 bands 3x15 / 2 bands 3x10  3 bands/2 bands 3x10 each   Standing hip abduction   3 x 10 YTB  3 x 10 each YTB 3 x 10 each YTB 3 x 10 each YTB    Standing hip extension   3 x 10 YTB  3 x 10 each YTB      Sit to stand   3 x 10 YTB 3 x 10 YTB  No  No    Lateral squat walks / Monster walks RTB 5 laps at bar        Lateral step downs 4" box 4x6        Millie stepping Single millie, only LLE over millie. 25x                              Home Exercise Program (HEP) Provided and Patient Education Provided     Patient was provided education on 10/09/2023.     Written Home Exercises Provided: Patient instructed to cont prior home program.  Exercises were reviewed and Mehreen was able to demonstrate them prior to the end of the session.  Mehreen demonstrated good  understanding of the education provided.     Assessment     Pt presents with stiffness upon arrival during push off. Improved symptoms throughout session. Educated to ice and elevate if swelling occurs later in day.    Mehreen Is progressing well towards her goals.     Patient will continue to benefit " from skilled outpatient physical therapy to address the deficits listed in the problem list box on initial evaluation, provide patient/family education and to maximize patient's level of independence in the home and community environment.     Patient prognosis is Good.     Patient's spiritual, cultural and educational needs considered and patient agreeable to plan of care and goals.    Anticipated barriers to physical therapy: None    GOALS  Short Term Goals (4 weeks):  1. Patient will have improved PROM of the right ankle to WFLs.  2. Patient will have decreased complaints of pain to 3/10.  3. Patient will be independent and compliant with issued HEP.  4. Patient will maintain right SLS for at least 15 seconds.      Long Term Goals (8 weeks):   Patient will have improved AROM of the right ankle to WFLs.  2. Patient will have improved strength of the right ankle to 4+/5.  3. Patient will be able to resume prior functional level with no deficits.   4. Patient will have overall improvement in condition to have decreased score on FOTO to <40%.     Plan   Continue PT per POC.     Rigo Deshpande, PT

## 2023-11-21 ENCOUNTER — CLINICAL SUPPORT (OUTPATIENT)
Dept: REHABILITATION | Facility: HOSPITAL | Age: 63
End: 2023-11-21
Payer: MEDICAID

## 2023-11-21 DIAGNOSIS — M25.671 DECREASED RANGE OF MOTION OF RIGHT ANKLE: Primary | ICD-10-CM

## 2023-11-21 DIAGNOSIS — R26.9 ABNORMALITY OF GAIT AND MOBILITY: ICD-10-CM

## 2023-11-21 PROCEDURE — 97110 THERAPEUTIC EXERCISES: CPT | Mod: PO,CQ

## 2023-11-21 NOTE — PROGRESS NOTES
"Physical Therapy (PT) Daily Treatment Note     Name: Mehreen Benites  Clinic Number: [id number]    Therapy Diagnosis:   Encounter Diagnoses   Name Primary?    Decreased range of motion of right ankle Yes    Abnormality of gait and mobility      Physician: Axel Kapoor Jr., *    Visit Date: 11/21/2023    Physician Orders: Eval and Treat: - wb: nwb RLE in CAM x 2wks - passive ROM only, then wbat in CAM x 2 wks add active ROM, eval and treat after that  Medical Diagnosis: Z47.89 (ICD-10-CM) - Aftercare following surgery of the musculoskeletal system; M76.61 (ICD-10-CM) - Achilles tendinitis of right lower extremity  Surgical Procedure and Date: 8/25/2023: REPAIR, TENDON, ACHILLES (Right); RESECTION, MUSCLE, GASTROCNEMIUS (Right); EXCISION, EXOSTOSIS, RETROCALCANEAL (Right); ASPIRATION, BONE MARROW (Right)   Evaluation Date: 10/9/2023  Insurance Authorization Period Expiration: 12/31/2023  Plan of Care Certification Period: 12/04/2023  Date of Return to MD: 10/23/2023   Visit # / Visits authorized: 11 / 20    FOTO  -Intake: 54%   -Status: Incomplete  -Discharge: Incomplete    Time In: 8:00 AM   Time Out: 8:53 AM  Total Billable Time: 53 minutes     Precautions: Standard    Subjective     Patient reports: her R foot is coming along.     She was compliant with home exercise program.  Response to previous treatment: initial evaluation   Functional change: ongoing assessment     Pain: /10  Location: right ankles     Objective     Mehreen received therapeutic exercises to develop strength, endurance, ROM, and flexibility for 55 minutes including:    Date  11/21/2023 11/17/2023 11/14/2023 11/10/2023 11/7/2023 11/03/2023 10/31/2023    Visit  11/20 10/20 9/20 8/20 7/20 6/20 5/20   Therapeutic Exercise          Nustep / Recumbent bike  10'  8' 10' 8' Lvl 4  10'  10' L3  10' L3    Standing gastroc stretch on wedge    30" x 3   30 sec x 3 times     Piriformis stretch           SLR      3 x 10 2# AW ea  3 x 10 2# AW each leg " " 3 x 10 2# right only    Bridging      3 x 10 GTB  3 X 10 GTB 3 X 10 GTB   SL clams      3 x 10 GTB ea  3 X 10 EA GTB 3 X 10 EA GTB   SL hip abduction      3 x 10 2# AW each leg  3 x 10 2# AW each leg   x 10 2# each    Ankle circles/ABCs       Discharged    Toe yoga          Dome raises        30x    MOBO (odd/even taps)      3' each with blue resistance  3' each with blue resistance 30x each with blue resistance   Seated calf raises   10x no weight  3x8 with 10# KB  10x no weight  3x8 with 10# KB   3x10 (barefoot)  3x10 (barefoot)  30x (barefoot)    Seated ankle DF    Standing 3 x 10  Standing 3x10  3x10 (barefoot)  3x10 (barefoot)  3x10 (barefoot)    Standing ankle PF with ball squeeze X 30 30x 3 x 10 3x10       Ankle AROM inversion/eversion    3 x 10 ea  3x10 each    3x10 each    Shuttle squats DBL/SGL 3.5 bands 3x12 DL   2 bands 30 x SL 3.5 bands 3x12 DL  2 bands 25x SL 3.5 bands 3x15  DL  2 bands 3 x 10 SL  3.5 bands 3x15   3.5 bands 3x15 / 2 bands 3x10  3 bands/2 bands 3x10 each   Standing hip abduction    3 x 10 YTB  3 x 10 each YTB 3 x 10 each YTB 3 x 10 each YTB    Standing hip extension    3 x 10 YTB  3 x 10 each YTB      Sit to stand    3 x 10 YTB 3 x 10 YTB  No  No    Lateral squat walks / Monster walks RTB 5 laps at bar RTB 5 laps at bar        Lateral step downs 4" box 4 x 6  4" box 4x6        Millie stepping Single millie, only LLE over millie. 30 x  Single millie, only LLE over milile. 25x                                Home Exercise Program (HEP) Provided and Patient Education Provided     Patient was provided education on 10/09/2023.     Written Home Exercises Provided: Patient instructed to cont prior home program.  Exercises were reviewed and Mehreen was able to demonstrate them prior to the end of the session.  Mehreen demonstrated good  understanding of the education provided.     Assessment     Pt with no reports of increased pain post session.  Pt required verbal cueing on proper " eccentric control during lateral step downs.  Will continue to monitor and progress pt as tolerated.     Mehreen Is progressing well towards her goals.     Patient will continue to benefit from skilled outpatient physical therapy to address the deficits listed in the problem list box on initial evaluation, provide patient/family education and to maximize patient's level of independence in the home and community environment.     Patient prognosis is Good.     Patient's spiritual, cultural and educational needs considered and patient agreeable to plan of care and goals.    Anticipated barriers to physical therapy: None    GOALS  Short Term Goals (4 weeks):  1. Patient will have improved PROM of the right ankle to WFLs.  2. Patient will have decreased complaints of pain to 3/10.  3. Patient will be independent and compliant with issued HEP.  4. Patient will maintain right SLS for at least 15 seconds.      Long Term Goals (8 weeks):   Patient will have improved AROM of the right ankle to WFLs.  2. Patient will have improved strength of the right ankle to 4+/5.  3. Patient will be able to resume prior functional level with no deficits.   4. Patient will have overall improvement in condition to have decreased score on FOTO to <40%.     Plan   Continue PT per POC.     Maury Subramanian, PTA

## 2023-11-22 ENCOUNTER — TELEPHONE (OUTPATIENT)
Dept: FAMILY MEDICINE | Facility: HOSPITAL | Age: 63
End: 2023-11-22
Payer: MEDICAID

## 2023-11-22 NOTE — TELEPHONE ENCOUNTER
Called patient to discuss her lab results. A1c has improved from 6.0 to 5.8.  Patient's cholesterol is high with .4. Would benefit from a statin given ASCVD risk high 23.2%. Patient did not answer x2. The initiation of statin can be discussed next visit in 1 month.    Erendira Gray MD  [facility], PGY-2  11/22/2023

## 2023-11-24 ENCOUNTER — CLINICAL SUPPORT (OUTPATIENT)
Dept: REHABILITATION | Facility: HOSPITAL | Age: 63
End: 2023-11-24
Payer: MEDICAID

## 2023-11-24 DIAGNOSIS — R26.9 ABNORMALITY OF GAIT AND MOBILITY: ICD-10-CM

## 2023-11-24 DIAGNOSIS — M25.671 DECREASED RANGE OF MOTION OF RIGHT ANKLE: Primary | ICD-10-CM

## 2023-11-24 PROCEDURE — 97110 THERAPEUTIC EXERCISES: CPT | Mod: PO | Performed by: PHYSICAL THERAPIST

## 2023-11-24 NOTE — PROGRESS NOTES
"Physical Therapy (PT) Daily Treatment Note     Name: Mehreen eBnites  Clinic Number: [id number]    Therapy Diagnosis:   Encounter Diagnoses   Name Primary?    Decreased range of motion of right ankle Yes    Abnormality of gait and mobility      Physician: Axel Kapoor Jr., *    Visit Date: 11/24/2023    Physician Orders: Eval and Treat: - wb: nwb RLE in CAM x 2wks - passive ROM only, then wbat in CAM x 2 wks add active ROM, eval and treat after that  Medical Diagnosis: Z47.89 (ICD-10-CM) - Aftercare following surgery of the musculoskeletal system; M76.61 (ICD-10-CM) - Achilles tendinitis of right lower extremity  Surgical Procedure and Date: 8/25/2023: REPAIR, TENDON, ACHILLES (Right); RESECTION, MUSCLE, GASTROCNEMIUS (Right); EXCISION, EXOSTOSIS, RETROCALCANEAL (Right); ASPIRATION, BONE MARROW (Right)   Evaluation Date: 10/9/2023  Insurance Authorization Period Expiration: 12/31/2023  Plan of Care Certification Period: 12/04/2023  Date of Return to MD: 10/23/2023   Visit # / Visits authorized: 12/20    FOTO  -Intake: 54%   -Status: Incomplete (Next visit please)  -Discharge: Incomplete    Time In: 8:00 AM   Time Out: 8:53 AM  Total Billable Time: 53 minutes     Precautions: Standard    Subjective     Patient reports: swelling in right ankle yesterday. Didn't elevate it as much. Better today    She was compliant with home exercise program.  Response to previous treatment: initial evaluation   Functional change: ongoing assessment     Pain: /10  Location: right ankles     Objective     Mehreen received therapeutic exercises to develop strength, endurance, ROM, and flexibility for 55 minutes including:    Date  11/24/2023 11/21/2023 11/17/2023 11/14/2023 11/10/2023 11/7/2023 11/03/2023 10/31/2023    Visit   11/20 10/20 9/20 8/20 7/20 6/20 5/20   Therapeutic Exercise           Nustep / Recumbent bike  8' nustep L2 10'  8' 10' 8' Lvl 4  10'  10' L3  10' L3    Standing gastroc stretch on wedge     30" x 3   30 sec " "x 3 times     Piriformis stretch            SLR       3 x 10 2# AW ea  3 x 10 2# AW each leg  3 x 10 2# right only    Bridging       3 x 10 GTB  3 X 10 GTB 3 X 10 GTB   SL clams       3 x 10 GTB ea  3 X 10 EA GTB 3 X 10 EA GTB   SL hip abduction       3 x 10 2# AW each leg  3 x 10 2# AW each leg   x 10 2# each    Ankle circles/ABCs        Discharged    Toe yoga           Dome raises         30x    MOBO (odd/even taps)  30x each way  Resisted: 20x     3' each with blue resistance  3' each with blue resistance 30x each with blue resistance   Seated calf raises   5# weight 30x 10x no weight  3x8 with 10# KB  10x no weight  3x8 with 10# KB   3x10 (barefoot)  3x10 (barefoot)  30x (barefoot)    Seated ankle DF     Standing 3 x 10  Standing 3x10  3x10 (barefoot)  3x10 (barefoot)  3x10 (barefoot)    Standing ankle PF with ball squeeze X 30 X 30 30x 3 x 10 3x10       Ankle AROM inversion/eversion     3 x 10 ea  3x10 each    3x10 each    Shuttle squats DBL/SGL 3.5 bands 3x12 DL  2 bands 30x SL 3.5 bands 3x12 DL   2 bands 30 x SL 3.5 bands 3x12 DL  2 bands 25x SL 3.5 bands 3x15  DL  2 bands 3 x 10 SL  3.5 bands 3x15   3.5 bands 3x15 / 2 bands 3x10  3 bands/2 bands 3x10 each   Standing hip abduction     3 x 10 YTB  3 x 10 each YTB 3 x 10 each YTB 3 x 10 each YTB    Standing hip extension     3 x 10 YTB  3 x 10 each YTB      Sit to stand     3 x 10 YTB 3 x 10 YTB  No  No    Lateral squat walks / Monster walks 5 laps at bar RTB 5 laps at bar RTB 5 laps at bar        Lateral step downs  4" box 4 x 6  4" box 4x6        Millie stepping  Single millie, only LLE over millie. 30 x  Single millie, only LLE over millie. 25x        Step ups 4" 30x, lateral 20x                         Home Exercise Program (HEP) Provided and Patient Education Provided     Patient was provided education on 10/09/2023.     Written Home Exercises Provided: Patient instructed to cont prior home program.  Exercises were reviewed and Mehreen was able to " demonstrate them prior to the end of the session.  Mehreen demonstrated good  understanding of the education provided.     Assessment     Pt continues to show improvement in ankle strength and stability. Decreased step downs today due to swelling.     Mehreen Is progressing well towards her goals.     Patient will continue to benefit from skilled outpatient physical therapy to address the deficits listed in the problem list box on initial evaluation, provide patient/family education and to maximize patient's level of independence in the home and community environment.     Patient prognosis is Good.     Patient's spiritual, cultural and educational needs considered and patient agreeable to plan of care and goals.    Anticipated barriers to physical therapy: None    GOALS  Short Term Goals (4 weeks):  1. Patient will have improved PROM of the right ankle to WFLs.  2. Patient will have decreased complaints of pain to 3/10.  3. Patient will be independent and compliant with issued HEP.  4. Patient will maintain right SLS for at least 15 seconds.      Long Term Goals (8 weeks):   Patient will have improved AROM of the right ankle to WFLs.  2. Patient will have improved strength of the right ankle to 4+/5.  3. Patient will be able to resume prior functional level with no deficits.   4. Patient will have overall improvement in condition to have decreased score on FOTO to <40%.     Plan   Continue PT per POC.     Rigo Deshpande, PT

## 2023-11-28 ENCOUNTER — CLINICAL SUPPORT (OUTPATIENT)
Dept: REHABILITATION | Facility: HOSPITAL | Age: 63
End: 2023-11-28
Payer: MEDICAID

## 2023-11-28 DIAGNOSIS — R26.9 ABNORMALITY OF GAIT AND MOBILITY: ICD-10-CM

## 2023-11-28 DIAGNOSIS — M25.671 DECREASED RANGE OF MOTION OF RIGHT ANKLE: Primary | ICD-10-CM

## 2023-11-28 PROCEDURE — 97110 THERAPEUTIC EXERCISES: CPT | Mod: PO,CQ

## 2023-11-28 NOTE — PROGRESS NOTES
Physical Therapy (PT) Daily Treatment Note     Name: Mehreen Benites  Clinic Number: [id number]    Therapy Diagnosis:   Encounter Diagnoses   Name Primary?    Decreased range of motion of right ankle Yes    Abnormality of gait and mobility      Physician: Axel Kapoor Jr., *    Visit Date: 11/28/2023    Physician Orders: Eval and Treat: - wb: nwb RLE in CAM x 2wks - passive ROM only, then wbat in CAM x 2 wks add active ROM, eval and treat after that  Medical Diagnosis: Z47.89 (ICD-10-CM) - Aftercare following surgery of the musculoskeletal system; M76.61 (ICD-10-CM) - Achilles tendinitis of right lower extremity  Surgical Procedure and Date: 8/25/2023: REPAIR, TENDON, ACHILLES (Right); RESECTION, MUSCLE, GASTROCNEMIUS (Right); EXCISION, EXOSTOSIS, RETROCALCANEAL (Right); ASPIRATION, BONE MARROW (Right)   Evaluation Date: 10/9/2023  Insurance Authorization Period Expiration: 12/31/2023  Plan of Care Certification Period: 12/04/2023  Date of Return to MD: 10/23/2023   Visit # / Visits authorized: 13/20    FOTO  -Intake: 54%   -Status: Incomplete (Next visit please)  -Discharge: Incomplete    Time In: 7:45 AM   Time Out: 8:40 AM  Total Billable Time: 55 minutes     Precautions: Standard    Subjective     Patient reports: she experiencing some pain as well as numbness in her heel over the weekend in which caused her to lose her balance and almost fall.     She was compliant with home exercise program.  Response to previous treatment: initial evaluation   Functional change: ongoing assessment     Pain: /10  Location: right ankles     Objective     Mehreen received therapeutic exercises to develop strength, endurance, ROM, and flexibility for 55 minutes including:    Date  11/28/2023 11/24/2023 11/21/2023 11/17/2023 11/14/2023 11/10/2023 11/7/2023 11/03/2023 10/31/2023    Visit    11/20 10/20 9/20 8/20 7/20 6/20 5/20   Therapeutic Exercise            Nustep / Recumbent bike  10' Lvl 4  8' nustep L2 10'  8' 10' 8'  "Lvl 4  10'  10' L3  10' L3    Standing gastroc stretch on wedge      30" x 3   30 sec x 3 times     Piriformis stretch             SLR        3 x 10 2# AW ea  3 x 10 2# AW each leg  3 x 10 2# right only    Bridging        3 x 10 GTB  3 X 10 GTB 3 X 10 GTB   SL clams        3 x 10 GTB ea  3 X 10 EA GTB 3 X 10 EA GTB   SL hip abduction        3 x 10 2# AW each leg  3 x 10 2# AW each leg   x 10 2# each    Ankle circles/ABCs         Discharged    Toe yoga            Dome raises          30x    MOBO (odd/even taps)  30 x each way  Resisted: 30 x 30x each way  Resisted: 20x     3' each with blue resistance  3' each with blue resistance 30x each with blue resistance   Seated calf raises   7# 3 x 10  5# weight 30x 10x no weight  3x8 with 10# KB  10x no weight  3x8 with 10# KB   3x10 (barefoot)  3x10 (barefoot)  30x (barefoot)    Seated ankle DF      Standing 3 x 10  Standing 3x10  3x10 (barefoot)  3x10 (barefoot)  3x10 (barefoot)    Standing ankle PF with ball squeeze X 30 X 30 X 30 30x 3 x 10 3x10       Ankle AROM inversion/eversion      3 x 10 ea  3x10 each    3x10 each    Shuttle squats DBL/SGL 3.5 bands 3x12 DL  2 bands 30x SL 3.5 bands 3x12 DL  2 bands 30x SL 3.5 bands 3x12 DL   2 bands 30 x SL 3.5 bands 3x12 DL  2 bands 25x SL 3.5 bands 3x15  DL  2 bands 3 x 10 SL  3.5 bands 3x15   3.5 bands 3x15 / 2 bands 3x10  3 bands/2 bands 3x10 each   Standing hip abduction      3 x 10 YTB  3 x 10 each YTB 3 x 10 each YTB 3 x 10 each YTB    Standing hip extension      3 x 10 YTB  3 x 10 each YTB      Sit to stand      3 x 10 YTB 3 x 10 YTB  No  No    Lateral squat walks / Monster walks 5 laps  5 laps at bar RTB 5 laps at bar RTB 5 laps at bar        Lateral step downs   4" box 4 x 6  4" box 4x6        Millie stepping   Single millie, only LLE over millie. 30 x  Single millie, only LLE over millie. 25x        Step ups 4" x 30, x 30  4" 30x, lateral 20x                          Home Exercise Program (HEP) Provided and Patient " Education Provided     Patient was provided education on 10/09/2023.     Written Home Exercises Provided: Patient instructed to cont prior home program.  Exercises were reviewed and Mehreen was able to demonstrate them prior to the end of the session.  Mehreen demonstrated good  understanding of the education provided.     Assessment     Pt was able to tolerate increased weight on seated HR's.  Pt with no pain post session.  Will continue to monitor and progress pt as tolerated.     Mehreen Is progressing well towards her goals.     Patient will continue to benefit from skilled outpatient physical therapy to address the deficits listed in the problem list box on initial evaluation, provide patient/family education and to maximize patient's level of independence in the home and community environment.     Patient prognosis is Good.     Patient's spiritual, cultural and educational needs considered and patient agreeable to plan of care and goals.    Anticipated barriers to physical therapy: None    GOALS  Short Term Goals (4 weeks):  1. Patient will have improved PROM of the right ankle to WFLs.  2. Patient will have decreased complaints of pain to 3/10.  3. Patient will be independent and compliant with issued HEP.  4. Patient will maintain right SLS for at least 15 seconds.      Long Term Goals (8 weeks):   Patient will have improved AROM of the right ankle to WFLs.  2. Patient will have improved strength of the right ankle to 4+/5.  3. Patient will be able to resume prior functional level with no deficits.   4. Patient will have overall improvement in condition to have decreased score on FOTO to <40%.     Plan   Continue PT per POC.     Maury Subramanian, PTA

## 2023-12-01 ENCOUNTER — CLINICAL SUPPORT (OUTPATIENT)
Dept: REHABILITATION | Facility: HOSPITAL | Age: 63
End: 2023-12-01
Payer: MEDICAID

## 2023-12-01 DIAGNOSIS — R26.9 ABNORMALITY OF GAIT AND MOBILITY: ICD-10-CM

## 2023-12-01 DIAGNOSIS — M25.671 DECREASED RANGE OF MOTION OF RIGHT ANKLE: Primary | ICD-10-CM

## 2023-12-01 PROCEDURE — 97110 THERAPEUTIC EXERCISES: CPT | Mod: PO,CQ

## 2023-12-01 NOTE — PROGRESS NOTES
"Physical Therapy (PT) Daily Treatment Note     Name: Mehreen Benites  Clinic Number: [id number]    Therapy Diagnosis:   Encounter Diagnoses   Name Primary?    Decreased range of motion of right ankle Yes    Abnormality of gait and mobility      Physician: Axel Kapoor Jr., *    Visit Date: 12/1/2023    Physician Orders: Eval and Treat: - wb: nwb RLE in CAM x 2wks - passive ROM only, then wbat in CAM x 2 wks add active ROM, eval and treat after that  Medical Diagnosis: Z47.89 (ICD-10-CM) - Aftercare following surgery of the musculoskeletal system; M76.61 (ICD-10-CM) - Achilles tendinitis of right lower extremity  Surgical Procedure and Date: 8/25/2023: REPAIR, TENDON, ACHILLES (Right); RESECTION, MUSCLE, GASTROCNEMIUS (Right); EXCISION, EXOSTOSIS, RETROCALCANEAL (Right); ASPIRATION, BONE MARROW (Right)   Evaluation Date: 10/9/2023  Insurance Authorization Period Expiration: 12/31/2023  Plan of Care Certification Period: 12/04/2023  Date of Return to MD: 10/23/2023   Visit # / Visits authorized: 14/20    FOTO  -Intake: 54%   -Status: Incomplete (Next visit please)  -Discharge: Incomplete    Time In: 8:00 AM   Time Out: 8:55 AM  Total Billable Time: 55 minutes     Precautions: Standard    Subjective     Patient reports: no new issues or concerns at this time.   She was compliant with home exercise program.  Response to previous treatment: initial evaluation   Functional change: ongoing assessment     Pain: /10  Location: right ankles     Objective     Mehreen received therapeutic exercises to develop strength, endurance, ROM, and flexibility for 55 minutes including:    Date  12/1/2023 11/28/2023 11/24/2023 11/21/2023 11/17/2023 11/14/2023 11/10/2023 11/7/2023 11/03/2023 10/31/2023    Visit     11/20 10/20 9/20 8/20 7/20 6/20 5/20   Therapeutic Exercise             Nustep / Recumbent bike  10' lvl 4  10' Lvl 4  8' nustep L2 10'  8' 10' 8' Lvl 4  10'  10' L3  10' L3    Standing gastroc stretch on wedge       30" " "x 3   30 sec x 3 times     Piriformis stretch              SLR         3 x 10 2# AW ea  3 x 10 2# AW each leg  3 x 10 2# right only    Bridging         3 x 10 GTB  3 X 10 GTB 3 X 10 GTB   SL clams         3 x 10 GTB ea  3 X 10 EA GTB 3 X 10 EA GTB   SL hip abduction         3 x 10 2# AW each leg  3 x 10 2# AW each leg   x 10 2# each    Ankle circles/ABCs          Discharged    Toe yoga             Dome raises           30x    MOBO (odd/even taps)  30 x each way  Resisted: 30 x  30 x each way  Resisted: 30 x 30x each way  Resisted: 20x     3' each with blue resistance  3' each with blue resistance 30x each with blue resistance   Seated calf raises   7# 3 x 10 7# 3 x 10  5# weight 30x 10x no weight  3x8 with 10# KB  10x no weight  3x8 with 10# KB   3x10 (barefoot)  3x10 (barefoot)  30x (barefoot)    Seated ankle DF       Standing 3 x 10  Standing 3x10  3x10 (barefoot)  3x10 (barefoot)  3x10 (barefoot)    Standing ankle PF with ball squeeze x45 X 30 X 30 X 30 30x 3 x 10 3x10       Ankle AROM inversion/eversion       3 x 10 ea  3x10 each    3x10 each    Shuttle squats DBL/SGL 3.5 bands 3x12 DL  2 bands 30x SL  3.5 bands 3x12 DL  2 bands 30x SL 3.5 bands 3x12 DL  2 bands 30x SL 3.5 bands 3x12 DL   2 bands 30 x SL 3.5 bands 3x12 DL  2 bands 25x SL 3.5 bands 3x15  DL  2 bands 3 x 10 SL  3.5 bands 3x15   3.5 bands 3x15 / 2 bands 3x10  3 bands/2 bands 3x10 each   Standing hip abduction       3 x 10 YTB  3 x 10 each YTB 3 x 10 each YTB 3 x 10 each YTB    Standing hip extension       3 x 10 YTB  3 x 10 each YTB      Sit to stand       3 x 10 YTB 3 x 10 YTB  No  No    Lateral squat walks / Monster walks 5L  5 laps  5 laps at bar RTB 5 laps at bar RTB 5 laps at bar        Lateral step downs    4" box 4 x 6  4" box 4x6        Millie stepping    Single millie, only LLE over millie. 30 x  Single millie, only LLE over millie. 25x        Step ups 4' x 30 fwd/lat 4" x 30, x 30  4" 30x, lateral 20x                           Home " Exercise Program (HEP) Provided and Patient Education Provided     Patient was provided education on 10/09/2023.     Written Home Exercises Provided: Patient instructed to cont prior home program.  Exercises were reviewed and Mehreen was able to demonstrate them prior to the end of the session.  Mehreen demonstrated good  understanding of the education provided.     Assessment     Pt with no issues pre or post treatment.  Pt continues to work towards improving her R ankle stability as well as R LE strength.  Will continue to monitor and progress pt as tolerated.     Mehreen Is progressing well towards her goals.     Patient will continue to benefit from skilled outpatient physical therapy to address the deficits listed in the problem list box on initial evaluation, provide patient/family education and to maximize patient's level of independence in the home and community environment.     Patient prognosis is Good.     Patient's spiritual, cultural and educational needs considered and patient agreeable to plan of care and goals.    Anticipated barriers to physical therapy: None    GOALS  Short Term Goals (4 weeks):  1. Patient will have improved PROM of the right ankle to WFLs.  2. Patient will have decreased complaints of pain to 3/10.  3. Patient will be independent and compliant with issued HEP.  4. Patient will maintain right SLS for at least 15 seconds.      Long Term Goals (8 weeks):   Patient will have improved AROM of the right ankle to WFLs.  2. Patient will have improved strength of the right ankle to 4+/5.  3. Patient will be able to resume prior functional level with no deficits.   4. Patient will have overall improvement in condition to have decreased score on FOTO to <40%.     Plan   Continue PT per POC.     Maury Subramanian, PTA

## 2023-12-04 ENCOUNTER — TELEPHONE (OUTPATIENT)
Dept: FAMILY MEDICINE | Facility: HOSPITAL | Age: 63
End: 2023-12-04
Payer: MEDICAID

## 2023-12-04 DIAGNOSIS — E78.5 HYPERLIPIDEMIA, UNSPECIFIED HYPERLIPIDEMIA TYPE: Primary | ICD-10-CM

## 2023-12-04 RX ORDER — ATORVASTATIN CALCIUM 40 MG/1
40 TABLET, FILM COATED ORAL DAILY
Qty: 90 TABLET | Refills: 3 | Status: SHIPPED | OUTPATIENT
Start: 2023-12-04 | End: 2023-12-12 | Stop reason: SINTOL

## 2023-12-04 NOTE — TELEPHONE ENCOUNTER
Called patient to discuss her lab results. Given ASCVD risk of 23.2%, would benefit from initiation of a statin. Discussed risks and benefits of medication. Discussed common side effects. Patient agreeable to starting the statin today. Will send Atorvastatin 40 mg to her pharmacy. All questions answered. Return precautions discussed. Recommended follow up in 2 weeks with me. Clinic number provided to schedule an appointment.     Erendira Gray MD  [facility], PGY-2  12/04/2023

## 2023-12-05 ENCOUNTER — CLINICAL SUPPORT (OUTPATIENT)
Dept: REHABILITATION | Facility: HOSPITAL | Age: 63
End: 2023-12-05
Payer: MEDICAID

## 2023-12-05 DIAGNOSIS — M25.671 DECREASED RANGE OF MOTION OF RIGHT ANKLE: Primary | ICD-10-CM

## 2023-12-05 DIAGNOSIS — R26.9 ABNORMALITY OF GAIT AND MOBILITY: ICD-10-CM

## 2023-12-05 PROCEDURE — 97110 THERAPEUTIC EXERCISES: CPT | Mod: PO,CQ

## 2023-12-05 NOTE — PROGRESS NOTES
Physical Therapy (PT) Daily Treatment Note     Name: Mehreen Benites  Clinic Number: [id number]    Therapy Diagnosis:   Encounter Diagnoses   Name Primary?    Decreased range of motion of right ankle Yes    Abnormality of gait and mobility      Physician: Axel Kapoor Jr., *    Visit Date: 12/5/2023    Physician Orders: Eval and Treat: - wb: nwb RLE in CAM x 2wks - passive ROM only, then wbat in CAM x 2 wks add active ROM, eval and treat after that  Medical Diagnosis: Z47.89 (ICD-10-CM) - Aftercare following surgery of the musculoskeletal system; M76.61 (ICD-10-CM) - Achilles tendinitis of right lower extremity  Surgical Procedure and Date: 8/25/2023: REPAIR, TENDON, ACHILLES (Right); RESECTION, MUSCLE, GASTROCNEMIUS (Right); EXCISION, EXOSTOSIS, RETROCALCANEAL (Right); ASPIRATION, BONE MARROW (Right)   Evaluation Date: 10/9/2023  Insurance Authorization Period Expiration: 12/31/2023  Plan of Care Certification Period: 12/04/2023  Date of Return to MD: 10/23/2023   Visit # / Visits authorized: 15/20    FOTO  -Intake: 54%   -Status: Incomplete (Next visit please)  -Discharge: Incomplete    Time In: 8:00 AM   Time Out: 8:53 AM  Total Billable Time: 53 minutes     Precautions: Standard    Subjective     Patient reports:  no new issues or concerns at this time.   She was compliant with home exercise program.  Response to previous treatment: initial evaluation   Functional change: ongoing assessment     Pain: /10  Location: right ankles     Objective     Mehreen received therapeutic exercises to develop strength, endurance, ROM, and flexibility for 55 minutes including:    Date  12/5/2023 12/1/2023 11/28/2023 11/24/2023 11/21/2023 11/17/2023 11/14/2023 11/10/2023 11/7/2023 11/03/2023 10/31/2023    Visit      11/20 10/20 9/20 8/20 7/20 6/20 5/20   Therapeutic Exercise              Nustep / Recumbent bike  10'  10' lvl 4  10' Lvl 4  8' nustep L2 10'  8' 10' 8' Lvl 4  10'  10' L3  10' L3    Standing gastroc stretch  "on wedge        30" x 3   30 sec x 3 times     Piriformis stretch               SLR          3 x 10 2# AW ea  3 x 10 2# AW each leg  3 x 10 2# right only    Bridging          3 x 10 GTB  3 X 10 GTB 3 X 10 GTB   SL clams          3 x 10 GTB ea  3 X 10 EA GTB 3 X 10 EA GTB   SL hip abduction          3 x 10 2# AW each leg  3 x 10 2# AW each leg   x 10 2# each    Ankle circles/ABCs           Discharged    Toe yoga              Dome raises            30x    MOBO (odd/even taps)  X 30  30 x each way  Resisted: 30 x  30 x each way  Resisted: 30 x 30x each way  Resisted: 20x     3' each with blue resistance  3' each with blue resistance 30x each with blue resistance   Seated calf raises   7# 3 x 15  7# 3 x 10 7# 3 x 10  5# weight 30x 10x no weight  3x8 with 10# KB  10x no weight  3x8 with 10# KB   3x10 (barefoot)  3x10 (barefoot)  30x (barefoot)    Seated ankle DF        Standing 3 x 10  Standing 3x10  3x10 (barefoot)  3x10 (barefoot)  3x10 (barefoot)    Standing ankle PF with ball squeeze  3 x 15  x45 X 30 X 30 X 30 30x 3 x 10 3x10       Ankle AROM inversion/eversion        3 x 10 ea  3x10 each    3x10 each    Shuttle squats DBL/SGL 3.5 bands 3x12 DL  2 bands 30x SL  3.5 bands 3x12 DL  2 bands 30x SL  3.5 bands 3x12 DL  2 bands 30x SL 3.5 bands 3x12 DL  2 bands 30x SL 3.5 bands 3x12 DL   2 bands 30 x SL 3.5 bands 3x12 DL  2 bands 25x SL 3.5 bands 3x15  DL  2 bands 3 x 10 SL  3.5 bands 3x15   3.5 bands 3x15 / 2 bands 3x10  3 bands/2 bands 3x10 each   Standing hip abduction        3 x 10 YTB  3 x 10 each YTB 3 x 10 each YTB 3 x 10 each YTB    Standing hip extension        3 x 10 YTB  3 x 10 each YTB      Sit to stand        3 x 10 YTB 3 x 10 YTB  No  No    Lateral squat walks / Monster walks 5L 5L  5 laps  5 laps at bar RTB 5 laps at bar RTB 5 laps at bar        Lateral step downs     4" box 4 x 6  4" box 4x6        Millie stepping     Single millie, only LLE over millie. 30 x  Single millie, only LLE over millie. 25x      " "  Step ups 4' x 30 fwd/lat  4' x 30 fwd/lat 4" x 30, x 30  4" 30x, lateral 20x                            Home Exercise Program (HEP) Provided and Patient Education Provided     Patient was provided education on 10/09/2023.     Written Home Exercises Provided: Patient instructed to cont prior home program.  Exercises were reviewed and Mehreen was able to demonstrate them prior to the end of the session.  Mehreen demonstrated good  understanding of the education provided.     Assessment     Pt was able to increased her repetition on seated calf raises without reports of pain.  Pt performed all other exercises with good tolerance.  Will continue to monitor and progress pt as tolerated.     Mehreen Is progressing well towards her goals.     Patient will continue to benefit from skilled outpatient physical therapy to address the deficits listed in the problem list box on initial evaluation, provide patient/family education and to maximize patient's level of independence in the home and community environment.     Patient prognosis is Good.     Patient's spiritual, cultural and educational needs considered and patient agreeable to plan of care and goals.    Anticipated barriers to physical therapy: None    GOALS  Short Term Goals (4 weeks):  1. Patient will have improved PROM of the right ankle to WFLs.  2. Patient will have decreased complaints of pain to 3/10.  3. Patient will be independent and compliant with issued HEP.  4. Patient will maintain right SLS for at least 15 seconds.      Long Term Goals (8 weeks):   Patient will have improved AROM of the right ankle to WFLs.  2. Patient will have improved strength of the right ankle to 4+/5.  3. Patient will be able to resume prior functional level with no deficits.   4. Patient will have overall improvement in condition to have decreased score on FOTO to <40%.     Plan   Continue PT per POC.     Maury Subramanian, PTA     "

## 2023-12-08 ENCOUNTER — CLINICAL SUPPORT (OUTPATIENT)
Dept: REHABILITATION | Facility: HOSPITAL | Age: 63
End: 2023-12-08
Payer: MEDICAID

## 2023-12-08 DIAGNOSIS — R26.9 ABNORMALITY OF GAIT AND MOBILITY: ICD-10-CM

## 2023-12-08 DIAGNOSIS — M25.671 DECREASED RANGE OF MOTION OF RIGHT ANKLE: Primary | ICD-10-CM

## 2023-12-08 PROCEDURE — 97110 THERAPEUTIC EXERCISES: CPT | Mod: PO,CQ

## 2023-12-08 NOTE — PROGRESS NOTES
Physical Therapy (PT) Daily Treatment Note     Name: Mehreen Benites  Clinic Number: [id number]    Therapy Diagnosis:   Encounter Diagnoses   Name Primary?    Decreased range of motion of right ankle Yes    Abnormality of gait and mobility      Physician: Axel Kapoor Jr., *    Visit Date: 12/8/2023    Physician Orders: Eval and Treat: - wb: nwb RLE in CAM x 2wks - passive ROM only, then wbat in CAM x 2 wks add active ROM, eval and treat after that  Medical Diagnosis: Z47.89 (ICD-10-CM) - Aftercare following surgery of the musculoskeletal system; M76.61 (ICD-10-CM) - Achilles tendinitis of right lower extremity  Surgical Procedure and Date: 8/25/2023: REPAIR, TENDON, ACHILLES (Right); RESECTION, MUSCLE, GASTROCNEMIUS (Right); EXCISION, EXOSTOSIS, RETROCALCANEAL (Right); ASPIRATION, BONE MARROW (Right)   Evaluation Date: 10/9/2023  Insurance Authorization Period Expiration: 12/31/2023  Plan of Care Certification Period: 12/04/2023  Date of Return to MD: 10/23/2023   Visit # / Visits authorized: 15/20    FOTO  -Intake: 54%   -Status: Incomplete (Next visit please)  -Discharge: Incomplete    Time In: 8:00 AM   Time Out: 8:55 AM  Total Billable Time: 55 minutes     Precautions: Standard    Subjective     Patient reports:  no new issues or concerns at this time.   She was compliant with home exercise program.  Response to previous treatment: initial evaluation   Functional change: ongoing assessment     Pain: /10  Location: right ankles     Objective     Mehreen received therapeutic exercises to develop strength, endurance, ROM, and flexibility for 55 minutes including:    Date  12/8/2023 12/5/2023 12/1/2023 11/28/2023 11/24/2023 11/21/2023 11/17/2023 11/14/2023 11/10/2023 11/7/2023 11/03/2023 10/31/2023    Visit       11/20 10/20 9/20 8/20 7/20 6/20 5/20   Therapeutic Exercise               Nustep / Recumbent bike  10'  10'  10' lvl 4  10' Lvl 4  8' nustep L2 10'  8' 10' 8' Lvl 4  10'  10' L3  10' L3    Standing  "gastroc stretch on wedge         30" x 3   30 sec x 3 times     Piriformis stretch                SLR           3 x 10 2# AW ea  3 x 10 2# AW each leg  3 x 10 2# right only    Bridging           3 x 10 GTB  3 X 10 GTB 3 X 10 GTB   SL clams           3 x 10 GTB ea  3 X 10 EA GTB 3 X 10 EA GTB   SL hip abduction           3 x 10 2# AW each leg  3 x 10 2# AW each leg   x 10 2# each    Ankle circles/ABCs            Discharged    Toe yoga               Dome raises             30x    MOBO (odd/even taps)  X 30  X 30  30 x each way  Resisted: 30 x  30 x each way  Resisted: 30 x 30x each way  Resisted: 20x     3' each with blue resistance  3' each with blue resistance 30x each with blue resistance   Seated calf raises   7# 3 x 15  7# 3 x 15  7# 3 x 10 7# 3 x 10  5# weight 30x 10x no weight  3x8 with 10# KB  10x no weight  3x8 with 10# KB   3x10 (barefoot)  3x10 (barefoot)  30x (barefoot)    Seated ankle DF         Standing 3 x 10  Standing 3x10  3x10 (barefoot)  3x10 (barefoot)  3x10 (barefoot)    Standing ankle PF with ball squeeze 3 x 15   3 x 15  x45 X 30 X 30 X 30 30x 3 x 10 3x10       Ankle AROM inversion/eversion         3 x 10 ea  3x10 each    3x10 each    Shuttle squats DBL/SGL 3.5 bands 3x12 DL  2 bands 30x SL  3.5 bands 3x12 DL  2 bands 30x SL  3.5 bands 3x12 DL  2 bands 30x SL  3.5 bands 3x12 DL  2 bands 30x SL 3.5 bands 3x12 DL  2 bands 30x SL 3.5 bands 3x12 DL   2 bands 30 x SL 3.5 bands 3x12 DL  2 bands 25x SL 3.5 bands 3x15  DL  2 bands 3 x 10 SL  3.5 bands 3x15   3.5 bands 3x15 / 2 bands 3x10  3 bands/2 bands 3x10 each   Standing hip abduction         3 x 10 YTB  3 x 10 each YTB 3 x 10 each YTB 3 x 10 each YTB    Standing hip extension         3 x 10 YTB  3 x 10 each YTB      Sit to stand         3 x 10 YTB 3 x 10 YTB  No  No    Lateral squat walks / Monster walks 5L 5L 5L  5 laps  5 laps at bar RTB 5 laps at bar RTB 5 laps at bar        Lateral step downs      4" box 4 x 6  4" box 4x6        El stepping " "     Single millie, only LLE over millie. 30 x  Single millie, only LLE over millie. 25x        Step ups 4' x 30 fwd/lat  4' x 30 fwd/lat  4' x 30 fwd/lat 4" x 30, x 30  4" 30x, lateral 20x                             Home Exercise Program (HEP) Provided and Patient Education Provided     Patient was provided education on 10/09/2023.     Written Home Exercises Provided: Patient instructed to cont prior home program.  Exercises were reviewed and Mehreen was able to demonstrate them prior to the end of the session.  Mehreen demonstrated good  understanding of the education provided.     Assessment     Pt continues to do well with her post op rehab at this time.  Pt with no pain post session.  Will continue to monitor and progress pt as tolerated.     Mehreen Is progressing well towards her goals.     Patient will continue to benefit from skilled outpatient physical therapy to address the deficits listed in the problem list box on initial evaluation, provide patient/family education and to maximize patient's level of independence in the home and community environment.     Patient prognosis is Good.     Patient's spiritual, cultural and educational needs considered and patient agreeable to plan of care and goals.    Anticipated barriers to physical therapy: None    GOALS  Short Term Goals (4 weeks):  1. Patient will have improved PROM of the right ankle to WFLs.  2. Patient will have decreased complaints of pain to 3/10.  3. Patient will be independent and compliant with issued HEP.  4. Patient will maintain right SLS for at least 15 seconds.      Long Term Goals (8 weeks):   Patient will have improved AROM of the right ankle to WFLs.  2. Patient will have improved strength of the right ankle to 4+/5.  3. Patient will be able to resume prior functional level with no deficits.   4. Patient will have overall improvement in condition to have decreased score on FOTO to <40%.     Plan   Continue PT per POC. "     Maury Subramanian, PTA

## 2023-12-12 ENCOUNTER — OFFICE VISIT (OUTPATIENT)
Dept: FAMILY MEDICINE | Facility: HOSPITAL | Age: 63
End: 2023-12-12
Payer: MEDICAID

## 2023-12-12 VITALS
WEIGHT: 188.94 LBS | DIASTOLIC BLOOD PRESSURE: 88 MMHG | SYSTOLIC BLOOD PRESSURE: 125 MMHG | HEIGHT: 61 IN | BODY MASS INDEX: 35.67 KG/M2 | HEART RATE: 73 BPM

## 2023-12-12 DIAGNOSIS — I10 PRIMARY HYPERTENSION: ICD-10-CM

## 2023-12-12 DIAGNOSIS — M67.88 ACHILLES TENDINOSIS: ICD-10-CM

## 2023-12-12 DIAGNOSIS — E78.5 HYPERLIPIDEMIA, UNSPECIFIED HYPERLIPIDEMIA TYPE: Primary | ICD-10-CM

## 2023-12-12 PROBLEM — N17.9 AKI (ACUTE KIDNEY INJURY): Status: RESOLVED | Noted: 2022-05-31 | Resolved: 2023-12-12

## 2023-12-12 PROCEDURE — 99213 OFFICE O/P EST LOW 20 MIN: CPT

## 2023-12-12 RX ORDER — CAPSAICIN 0 G/G
0.25 CREAM TOPICAL 3 TIMES DAILY
Qty: 42.5 G | Refills: 0 | Status: SHIPPED | OUTPATIENT
Start: 2023-12-12 | End: 2024-01-11

## 2023-12-12 RX ORDER — PRAVASTATIN SODIUM 20 MG/1
20 TABLET ORAL DAILY
Qty: 90 TABLET | Refills: 3 | Status: SHIPPED | OUTPATIENT
Start: 2023-12-12 | End: 2024-12-11

## 2023-12-12 NOTE — PROGRESS NOTES
\[facility]\"" Family Medicine    Subjective:     Mehreen Benites is a 63 y.o. year old female with PMHx of HTN, unprovoked PE 05/2022 on xarelto, pre-DM who presents to clinic for follow up    HTN: BP today 156/97, re-check at end of visit was 125/88. She states it is controlled at home, 130s-160s/80s. She took her medications this morning. Patient denies any SOB, lightheadedness, dizziness, palpitations, chest pain, or vision changes. Patient endorses compliance with medication regimen, including Losartan 25 mg and HCTZ 25 mg.     C/o R ankle pain s/p R retrocalc reconstruction: States pain and swelling is getting worse. She reports seeing her Podiatrist who said it was normal for her. She is currently undergoing PT but limited due to the pain. She is now saying she wishes she did not have the surgery as the pain is worse than before. Has been using Voltaren gel and another medication that she is unsure of the name. May be Tramadol-Acetaminophen? Was filled per [place]  a few months ago. She does not think it is helping.    HLD: States the Atorvastatin started caused her to have constipation and muscle cramps.     Patient Active Problem List    Diagnosis Date Noted    Decreased range of motion of right ankle 10/09/2023    Abnormality of gait and mobility 10/09/2023    Acquired posterior equinus of right lower extremity 08/25/2023    Bone spur of posterior portion of calcaneus 08/25/2023    Tendonitis, Achilles, right 08/25/2023    Achilles tendonosis of right lower extremity 08/25/2023    Retrocalcaneal bursitis (back of heel), right 08/25/2023    History of colon polyps 05/07/2023    Colon polyps 04/27/2023    Pulmonary embolism, bilateral 06/07/2022    Acute deep vein thrombosis (DVT) of right popliteal vein 06/07/2022    Pre-diabetes 06/07/2022    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    Hypertension         Review of patient's allergies indicates:  No Known Allergies     Past Medical History:   Diagnosis Date     "Hypertension     Other pulmonary embolism without acute cor pulmonale 2022    Tendonitis       Past Surgical History:   Procedure Laterality Date    BONE MARROW ASPIRATION Right 8/25/2023    Procedure: ASPIRATION, BONE MARROW;  Surgeon: Axel Kapoor Jr., DPM;  Location: Replaced by [facility] OR;  Service: Podiatry;  Laterality: Right;    CATARACT EXTRACTION, BILATERAL      COLONOSCOPY N/A 04/27/2023    Procedure: COLONOSCOPY;  Surgeon: Zachary Torres MD;  Location: [facility] ENDO;  Service: Endoscopy;  Laterality: N/A;    HEEL SPUR SURGERY Left 02/14/2013    HYSTERECTOMY  10/2009    WHITNEY    OOPHORECTOMY  10/2009    Bilateral    REPAIR OF ACHILLES TENDON Right 8/25/2023    Procedure: REPAIR, TENDON, ACHILLES;  Surgeon: Axel Kapoor Jr., DPM;  Location: Replaced by [facility] OR;  Service: Podiatry;  Laterality: Right;  pop saph    RESECTION OF GASTROCNEMIUS MUSCLE Right 8/25/2023    Procedure: RESECTION, MUSCLE, GASTROCNEMIUS;  Surgeon: Axel Kapoor Jr., DPM;  Location: Replaced by [facility] OR;  Service: Podiatry;  Laterality: Right;  pop saph    SURGICAL REMOVAL OF RETROCALCANEAL EXOSTOSIS Right 8/25/2023    Procedure: EXCISION, EXOSTOSIS, RETROCALCANEAL;  Surgeon: Axel Kapoor Jr., DPM;  Location: Replaced by [facility] OR;  Service: Podiatry;  Laterality: Right;    THROMBECTOMY N/A 06/01/2022    Procedure: THROMBECTOMY;  Surgeon: Kendall Fiore MD;  Location: [facility] CATH LAB/EP;  Service: Cardiology;  Laterality: N/A;      Family History   Problem Relation Age of Onset    Hypertension Mother     Breast cancer Maternal Cousin 51    Breast cancer Maternal Aunt     Breast cancer Paternal Aunt     Colon cancer Neg Hx     Ovarian cancer Neg Hx       Social History     Tobacco Use    Smoking status: Never    Smokeless tobacco: Never   Substance Use Topics    Alcohol use: Yes     Comment: occasionally        Objective:     Vitals:    12/12/23 1007   BP: (!) 156/97   Pulse: 73   Weight: 85.7 kg (188 lb 15 oz)   Height: 5' 1" (1.549 m)     Body mass index is 35.7 " kg/m².    Physical Exam  Vitals reviewed.   Constitutional:       Appearance: She is not toxic-appearing.   Cardiovascular:      Rate and Rhythm: Normal rate and regular rhythm.   Pulmonary:      Effort: Pulmonary effort is normal. No respiratory distress.   Musculoskeletal:      Right ankle: Swelling present. No ecchymosis. Tenderness present over the medial malleolus. Normal range of motion. Normal pulse.      Right Achilles Tendon: Tenderness present.   Skin:     General: Skin is warm.   Neurological:      Mental Status: She is alert and oriented to person, place, and time. Mental status is at baseline.        Assessment/Plan:     Mehreen Benites is a 63 y.o. year old female who presents to clinic for follow up    1. Hyperlipidemia, unspecified hyperlipidemia type  - Discontinue atorvastatin due to muscle cramps  - Will start patient on pravastatin (PRAVACHOL) 20 MG daily and up-titrate as tolerated     2. Achilles tendinosis  - Discussed with patient ultimately her pain control should be managed by her Podiatrist as I am not sure what the typical recovery time is for patients with this surgery  - We are unable to do NSAIDs as she is taking Xarelto   - Will start patient on capsaicin 0.1 % Crea; Apply 0.25 g topically 3 (three) times daily   - Continue voltaren gel and tylenol     3. HTN  - Well controlled  - Continue Losartan and HCTZ    Follow-up: 3 months for HTN and pre-DM or sooner if needed    A total of  minutes was spent on patient care during this encounter which included chart review, examining the patient, formulating a treatment plan and documentation.     Medical decision making straight forward and not complex during this visit.     Case discussed with staff: Dr. Kimi Gray MD  [facility] Family Medicine, PGY-2  12/12/2023

## 2023-12-15 ENCOUNTER — CLINICAL SUPPORT (OUTPATIENT)
Dept: REHABILITATION | Facility: HOSPITAL | Age: 63
End: 2023-12-15
Payer: MEDICAID

## 2023-12-15 DIAGNOSIS — R26.9 ABNORMALITY OF GAIT AND MOBILITY: ICD-10-CM

## 2023-12-15 DIAGNOSIS — M25.671 DECREASED RANGE OF MOTION OF RIGHT ANKLE: Primary | ICD-10-CM

## 2023-12-15 PROCEDURE — 97110 THERAPEUTIC EXERCISES: CPT | Mod: PO | Performed by: PHYSICAL THERAPIST

## 2023-12-15 NOTE — PROGRESS NOTES
Physical Therapy (PT) Daily Treatment Note     Name: eMhreen Benites  Clinic Number: [id number]    Therapy Diagnosis:   Encounter Diagnoses   Name Primary?    Decreased range of motion of right ankle Yes    Abnormality of gait and mobility      Physician: Axel Kapoor Jr., *    Visit Date: 12/15/2023    Physician Orders: Eval and Treat: - wb: nwb RLE in CAM x 2wks - passive ROM only, then wbat in CAM x 2 wks add active ROM, eval and treat after that  Medical Diagnosis: Z47.89 (ICD-10-CM) - Aftercare following surgery of the musculoskeletal system; M76.61 (ICD-10-CM) - Achilles tendinitis of right lower extremity  Surgical Procedure and Date: 8/25/2023: REPAIR, TENDON, ACHILLES (Right); RESECTION, MUSCLE, GASTROCNEMIUS (Right); EXCISION, EXOSTOSIS, RETROCALCANEAL (Right); ASPIRATION, BONE MARROW (Right)   Evaluation Date: 10/9/2023  Insurance Authorization Period Expiration: 12/31/2023  Plan of Care Certification Period: 12/04/2023 - update today.  Visit # / Visits authorized: 17/20    FOTO  -Intake: 54%   -Status: Incomplete  -Discharge: Incomplete    Time In: 8:00 AM   Time Out: 8:55 AM  Total Billable Time: 55 minutes     Precautions: Standard    Subjective     Patient reports:  doing well, still has stiffness in AM and will have some sharper pains come about when walking or standing for long period of time.   She was compliant with home exercise program.  Response to previous treatment: initial evaluation   Functional change: ongoing assessment     Pain: /10  Location: right ankles     Objective     Mehreen received therapeutic exercises to develop strength, endurance, ROM, and flexibility for 55 minutes including:    Date  12/15/2023 12/8/2023 12/5/2023 12/1/2023 11/28/2023 11/24/2023 11/21/2023 11/17/2023 11/14/2023 11/10/2023 11/7/2023 11/03/2023 10/31/2023    Visit        11/20 10/20 9/20 8/20 7/20 6/20 5/20   Therapeutic Exercise                Nustep / Recumbent bike  8' 10'  10'  10' lvl 4  10' Lvl  "4  8' nustep L2 10'  8' 10' 8' Lvl 4  10'  10' L3  10' L3    Standing gastroc stretch on wedge          30" x 3   30 sec x 3 times     Piriformis stretch                 SLR            3 x 10 2# AW ea  3 x 10 2# AW each leg  3 x 10 2# right only    Bridging            3 x 10 GTB  3 X 10 GTB 3 X 10 GTB   SL clams            3 x 10 GTB ea  3 X 10 EA GTB 3 X 10 EA GTB   SL hip abduction            3 x 10 2# AW each leg  3 x 10 2# AW each leg   x 10 2# each    Ankle circles/ABCs             Discharged    Toe yoga 20x each way               Dome raises              30x    MOBO (odd/even taps)  40x each way seated X 30  X 30  30 x each way  Resisted: 30 x  30 x each way  Resisted: 30 x 30x each way  Resisted: 20x     3' each with blue resistance  3' each with blue resistance 30x each with blue resistance   Seated calf raises   10x no weight, 20x with 20# weight 7# 3 x 15  7# 3 x 15  7# 3 x 10 7# 3 x 10  5# weight 30x 10x no weight  3x8 with 10# KB  10x no weight  3x8 with 10# KB   3x10 (barefoot)  3x10 (barefoot)  30x (barefoot)    Seated ankle DF          Standing 3 x 10  Standing 3x10  3x10 (barefoot)  3x10 (barefoot)  3x10 (barefoot)    Standing ankle PF with ball squeeze 3x12 3 x 15   3 x 15  x45 X 30 X 30 X 30 30x 3 x 10 3x10       Ankle AROM inversion/eversion          3 x 10 ea  3x10 each    3x10 each    Shuttle squats DBL/SGL Eccentric. Back with 2 down with 1. 2 bands, 30x 3.5 bands 3x12 DL  2 bands 30x SL  3.5 bands 3x12 DL  2 bands 30x SL  3.5 bands 3x12 DL  2 bands 30x SL  3.5 bands 3x12 DL  2 bands 30x SL 3.5 bands 3x12 DL  2 bands 30x SL 3.5 bands 3x12 DL   2 bands 30 x SL 3.5 bands 3x12 DL  2 bands 25x SL 3.5 bands 3x15  DL  2 bands 3 x 10 SL  3.5 bands 3x15   3.5 bands 3x15 / 2 bands 3x10  3 bands/2 bands 3x10 each   Standing hip abduction          3 x 10 YTB  3 x 10 each YTB 3 x 10 each YTB 3 x 10 each YTB    Standing hip extension          3 x 10 YTB  3 x 10 each YTB      Sit to stand          3 x 10 YTB " "3 x 10 YTB  No  No    Lateral squat walks / Monster walks 5 laps, GTB 5L 5L 5L  5 laps  5 laps at bar RTB 5 laps at bar RTB 5 laps at bar        Lateral step downs 20x 4" box      4" box 4 x 6  4" box 4x6        Millie stepping       Single millie, only LLE over millie. 30 x  Single millie, only LLE over millie. 25x        Step ups 20x each way 4' x 30 fwd/lat  4' x 30 fwd/lat  4' x 30 fwd/lat 4" x 30, x 30  4" 30x, lateral 20x          Millie step Fwd, 1 leg, RL on ground  20x                   Home Exercise Program (HEP) Provided and Patient Education Provided     Patient was provided education on 10/09/2023.     Written Home Exercises Provided: Patient instructed to cont prior home program.  Exercises were reviewed and Mehreen was able to demonstrate them prior to the end of the session.  Mehreen demonstrated good  understanding of the education provided.     Assessment     Pt doing well and getting stronger with strengthening progressions. No increase in pain or stiffness during session. Will continue working on strengthening to improve gait mechanics.     Mehreen Is progressing well towards her goals.     Patient will continue to benefit from skilled outpatient physical therapy to address the deficits listed in the problem list box on initial evaluation, provide patient/family education and to maximize patient's level of independence in the home and community environment.     Patient prognosis is Good.     Patient's spiritual, cultural and educational needs considered and patient agreeable to plan of care and goals.    Anticipated barriers to physical therapy: None    GOALS  Short Term Goals (4 weeks):  1. Patient will have improved PROM of the right ankle to WFLs.  2. Patient will have decreased complaints of pain to 3/10.  3. Patient will be independent and compliant with issued HEP.  4. Patient will maintain right SLS for at least 15 seconds.      Long Term Goals (8 weeks):   Patient will have " improved AROM of the right ankle to WFLs.  2. Patient will have improved strength of the right ankle to 4+/5.  3. Patient will be able to resume prior functional level with no deficits.   4. Patient will have overall improvement in condition to have decreased score on FOTO to <40%.     Plan   Continue PT per POC.     Rigo Deshpande, PT

## 2023-12-18 NOTE — PLAN OF CARE
[facility]  Physical Therapy Plan of Care Note     Name: Mehreen Benites  Clinic Number: [id number]    Therapy Diagnosis:   Encounter Diagnoses   Name Primary?    Decreased range of motion of right ankle Yes    Abnormality of gait and mobility      Physician: Axel Kapoor Jr., *    Visit Date: 12/15/2023    Physician Orders: Eval and Treat: - wb: nwb RLE in CAM x 2wks - passive ROM only, then wbat in CAM x 2 wks add active ROM, eval and treat after that  Medical Diagnosis: Z47.89 (ICD-10-CM) - Aftercare following surgery of the musculoskeletal system; M76.61 (ICD-10-CM) - Achilles tendinitis of right lower extremity  Surgical Procedure and Date: 8/25/2023: REPAIR, TENDON, ACHILLES (Right); RESECTION, MUSCLE, GASTROCNEMIUS (Right); EXCISION, EXOSTOSIS, RETROCALCANEAL (Right); ASPIRATION, BONE MARROW (Right)   Evaluation Date: 10/9/2023  Insurance Authorization Period Expiration: 12/31/2023  Plan of Care Certification Period: 12/04/2023 - update today.  Visit # / Visits authorized: 17/20    SUBJECTIVE     Update:    doing well, still has stiffness in AM and will have some sharper pains come about when walking or standing for long period of time.   She was compliant with home exercise program    OBJECTIVE     Update:                Objective   Gait: minimal antalgic gait but due to apprehension with push off, not pain.      Balance: Single Leg Stance (SLS): Left: 10 seconds Right: 15 seconds     Passive Range of Motion (PROM):     RIGHT   Hip flexion 75 degrees    Knee extension 0 degrees   Ankle dorsiflexion (DF) knee bent 20 degrees   Ankle dorsiflexion (DF) knee straight 25 degrees   Ankle plantarflexion (PF) 35 degrees   Ankle inversion (IV) 30 degrees   Ankle eversion (EV) 15 degrees      Manual Muscle Test (MMT):    LEFT RIGHT   Hip flexion 4/5 4-/5   Knee extension 4/5 4-/5   Ankle DF 4+/5 4+/5   Ankle PF 4/5 3+/5   Ankle IV 4+/5 4+/5   Ankle EV 4+/5 4+/5             ASSESSMENT      Update:   Pt doing well and getting stronger with strengthening progressions. No increase in pain or stiffness during session. Will continue working on strengthening to improve gait mechanics.      Mehreen Is progressing well towards her goals.      Patient will continue to benefit from skilled outpatient physical therapy to address the deficits listed in the problem list box on initial evaluation, provide patient/family education and to maximize patient's level of independence in the home and community environment.      Patient prognosis is Good.      Patient's spiritual, cultural and educational needs considered and patient agreeable to plan of care and goals.    Previous Short Term Goals Status:   Not met  New Short Term Goals Status:   Met  Long Term Goal Status: continue per initial plan of care.  Reasons for Recertification of Therapy:   to improve calf and ankle strength to improve gait mechanics.       PLAN     Updated Certification Period: 12/5/2023 to 1/19/2023   Recommended Treatment Plan: 1-2 times per week for 6 weeks:  Electrical Stimulation IFC, Gait Training, Manual Therapy, Moist Heat/ Ice, Neuromuscular Re-ed, Patient Education, Self Care, Therapeutic Activities, Therapeutic Exercise, and Ultrasound  Other Recommendations:     Rigo Deshpande, PT

## 2023-12-19 ENCOUNTER — CLINICAL SUPPORT (OUTPATIENT)
Dept: REHABILITATION | Facility: HOSPITAL | Age: 63
End: 2023-12-19
Payer: MEDICAID

## 2023-12-19 DIAGNOSIS — R26.9 ABNORMALITY OF GAIT AND MOBILITY: ICD-10-CM

## 2023-12-19 DIAGNOSIS — M25.671 DECREASED RANGE OF MOTION OF RIGHT ANKLE: Primary | ICD-10-CM

## 2023-12-19 PROCEDURE — 97110 THERAPEUTIC EXERCISES: CPT | Mod: PO,CQ

## 2023-12-19 NOTE — PROGRESS NOTES
Physical Therapy (PT) Daily Treatment Note     Name: Mehreen Benites  Clinic Number: [id number]    Therapy Diagnosis:   Encounter Diagnoses   Name Primary?    Decreased range of motion of right ankle Yes    Abnormality of gait and mobility      Physician: Axel Kapoor Jr., *    Visit Date: 12/19/2023    Physician Orders: Eval and Treat: - wb: nwb RLE in CAM x 2wks - passive ROM only, then wbat in CAM x 2 wks add active ROM, eval and treat after that  Medical Diagnosis: Z47.89 (ICD-10-CM) - Aftercare following surgery of the musculoskeletal system; M76.61 (ICD-10-CM) - Achilles tendinitis of right lower extremity  Surgical Procedure and Date: 8/25/2023: REPAIR, TENDON, ACHILLES (Right); RESECTION, MUSCLE, GASTROCNEMIUS (Right); EXCISION, EXOSTOSIS, RETROCALCANEAL (Right); ASPIRATION, BONE MARROW (Right)   Evaluation Date: 10/9/2023  Insurance Authorization Period Expiration: 12/31/2023  Plan of Care Certification Period: 12/04/2023 - update today.  Visit # / Visits authorized: 17/20    FOTO  -Intake: 54%   -Status: Incomplete  -Discharge: Incomplete    Time In: 7:50 AM   Time Out: 8:45 AM  Total Billable Time: 55 minutes     Precautions: Standard    Subjective     Patient reports:  she is having some pain at the top of her foot this morning.   She was compliant with home exercise program.  Response to previous treatment: initial evaluation   Functional change: ongoing assessment     Pain: /10  Location: right ankles     Objective     Mehreen received therapeutic exercises to develop strength, endurance, ROM, and flexibility for 55 minutes including:    Date  12/19/2023 12/15/2023 12/8/2023 12/5/2023 12/1/2023 11/28/2023 11/24/2023 11/21/2023 11/17/2023 11/14/2023 11/10/2023 11/7/2023 11/03/2023 10/31/2023    Visit         11/20 10/20 9/20 8/20 7/20 6/20 5/20   Therapeutic Exercise                 Nustep / Recumbent bike  10 8' 10'  10'  10' lvl 4  10' Lvl 4  8' nustep L2 10'  8' 10' 8' Lvl 4  10'  10' L3  10'  "L3    Standing gastroc stretch on wedge           30" x 3   30 sec x 3 times     Piriformis stretch                  SLR             3 x 10 2# AW ea  3 x 10 2# AW each leg  3 x 10 2# right only    Bridging             3 x 10 GTB  3 X 10 GTB 3 X 10 GTB   SL clams             3 x 10 GTB ea  3 X 10 EA GTB 3 X 10 EA GTB   SL hip abduction             3 x 10 2# AW each leg  3 x 10 2# AW each leg   x 10 2# each    Ankle circles/ABCs              Discharged    Toe yoga  20x each way               Dome raises               30x    MOBO (odd/even taps)  40 x each way seated 40x each way seated X 30  X 30  30 x each way  Resisted: 30 x  30 x each way  Resisted: 30 x 30x each way  Resisted: 20x     3' each with blue resistance  3' each with blue resistance 30x each with blue resistance   Seated calf raises   10x no weight, 20x with 20# weight 10x no weight, 20x with 20# weight 7# 3 x 15  7# 3 x 15  7# 3 x 10 7# 3 x 10  5# weight 30x 10x no weight  3x8 with 10# KB  10x no weight  3x8 with 10# KB   3x10 (barefoot)  3x10 (barefoot)  30x (barefoot)    Seated ankle DF           Standing 3 x 10  Standing 3x10  3x10 (barefoot)  3x10 (barefoot)  3x10 (barefoot)    Standing ankle PF with ball squeeze 3 x 12 3x12 3 x 15   3 x 15  x45 X 30 X 30 X 30 30x 3 x 10 3x10       Ankle AROM inversion/eversion           3 x 10 ea  3x10 each    3x10 each    Shuttle squats DBL/SGL Eccentric. Back with 2 down with 1. 2 bands, 30x Eccentric. Back with 2 down with 1. 2 bands, 30x 3.5 bands 3x12 DL  2 bands 30x SL  3.5 bands 3x12 DL  2 bands 30x SL  3.5 bands 3x12 DL  2 bands 30x SL  3.5 bands 3x12 DL  2 bands 30x SL 3.5 bands 3x12 DL  2 bands 30x SL 3.5 bands 3x12 DL   2 bands 30 x SL 3.5 bands 3x12 DL  2 bands 25x SL 3.5 bands 3x15  DL  2 bands 3 x 10 SL  3.5 bands 3x15   3.5 bands 3x15 / 2 bands 3x10  3 bands/2 bands 3x10 each   Standing hip abduction           3 x 10 YTB  3 x 10 each YTB 3 x 10 each YTB 3 x 10 each YTB    Standing hip extension       " "    3 x 10 YTB  3 x 10 each YTB      Sit to stand           3 x 10 YTB 3 x 10 YTB  No  No    Lateral squat walks / Monster walks 5L GTB  5 laps, GTB 5L 5L 5L  5 laps  5 laps at bar RTB 5 laps at bar RTB 5 laps at bar        Lateral step downs X 20 4" box 20x 4" box      4" box 4 x 6  4" box 4x6        Millie stepping        Single millie, only LLE over millie. 30 x  Single millie, only LLE over millie. 25x        Step ups X 30 20x each way 4' x 30 fwd/lat  4' x 30 fwd/lat  4' x 30 fwd/lat 4" x 30, x 30  4" 30x, lateral 20x          Millie step Fwd, 1 leg, RL on ground  30x Fwd, 1 leg, RL on ground  20x                 Manual Therapy ( billed as TE): 10'     TC distraction   AP down glides to improve DF       Home Exercise Program (HEP) Provided and Patient Education Provided     Patient was provided education on 10/09/2023.     Written Home Exercises Provided: Patient instructed to cont prior home program.  Exercises were reviewed and Mehreen was able to demonstrate them prior to the end of the session.  Mehreen demonstrated good  understanding of the education provided.     Assessment     Pt with reports of discomfort on the dorsal aspect of her foot this morning prior to beginning PT treatment.  Joint mobilizations were performed in which pt noted that she had some mild relief after.  Pt performed the above exercises with good tolerance.  Will continue to monitor and progress pt as tolerated.     Mehreen Is progressing well towards her goals.     Patient will continue to benefit from skilled outpatient physical therapy to address the deficits listed in the problem list box on initial evaluation, provide patient/family education and to maximize patient's level of independence in the home and community environment.     Patient prognosis is Good.     Patient's spiritual, cultural and educational needs considered and patient agreeable to plan of care and goals.    Anticipated barriers to physical therapy: " None    GOALS  Short Term Goals (4 weeks):  1. Patient will have improved PROM of the right ankle to WFLs.  2. Patient will have decreased complaints of pain to 3/10.  3. Patient will be independent and compliant with issued HEP.  4. Patient will maintain right SLS for at least 15 seconds.      Long Term Goals (8 weeks):   Patient will have improved AROM of the right ankle to WFLs.  2. Patient will have improved strength of the right ankle to 4+/5.  3. Patient will be able to resume prior functional level with no deficits.   4. Patient will have overall improvement in condition to have decreased score on FOTO to <40%.     Plan   Continue PT per POC.     Maury Subramanian, PTA

## 2023-12-21 NOTE — PROGRESS NOTES
I assume primary medical responsibility for this patient. I have reviewed the history, physical, and assessement & treatment plan with the resident and agree that the care is reasonable and necessary. This service has been performed by a resident without the presence of a teaching physician under the primary care exception. If necessary, an addendum of additional findings or evaluation beyond the resident documentation will be noted below.     Dawna Daley MD

## 2023-12-22 ENCOUNTER — CLINICAL SUPPORT (OUTPATIENT)
Dept: REHABILITATION | Facility: HOSPITAL | Age: 63
End: 2023-12-22
Payer: MEDICAID

## 2023-12-22 DIAGNOSIS — M25.671 DECREASED RANGE OF MOTION OF RIGHT ANKLE: Primary | ICD-10-CM

## 2023-12-22 DIAGNOSIS — R26.9 ABNORMALITY OF GAIT AND MOBILITY: ICD-10-CM

## 2023-12-22 PROCEDURE — 97110 THERAPEUTIC EXERCISES: CPT | Mod: PO | Performed by: PHYSICAL THERAPIST

## 2023-12-22 NOTE — PROGRESS NOTES
Physical Therapy (PT) Daily Treatment Note     Name: Mehreen Benites  Clinic Number: [id number]    Therapy Diagnosis:   Encounter Diagnoses   Name Primary?    Decreased range of motion of right ankle Yes    Abnormality of gait and mobility      Physician: Axel Kapoor Jr., *    Visit Date: 12/22/2023    Physician Orders: Eval and Treat: - wb: nwb RLE in CAM x 2wks - passive ROM only, then wbat in CAM x 2 wks add active ROM, eval and treat after that  Medical Diagnosis: Z47.89 (ICD-10-CM) - Aftercare following surgery of the musculoskeletal system; M76.61 (ICD-10-CM) - Achilles tendinitis of right lower extremity  Surgical Procedure and Date: 8/25/2023: REPAIR, TENDON, ACHILLES (Right); RESECTION, MUSCLE, GASTROCNEMIUS (Right); EXCISION, EXOSTOSIS, RETROCALCANEAL (Right); ASPIRATION, BONE MARROW (Right)   Evaluation Date: 10/9/2023  Insurance Authorization Period Expiration: 12/31/2023  Plan of Care Certification Period: 1/19/2024  Visit # / Visits authorized: 19/20    FOTO  -Intake: 54%   -Status: Incomplete  -Discharge: Incomplete    Time In: 800 AM   Time Out: 900 AM  Total Billable Time: 55 minutes     Precautions: Standard    Subjective     Patient reports:  increase in swelling and pain in last few days.     She was compliant with home exercise program.  Response to previous treatment: initial evaluation   Functional change: ongoing assessment     Pain: /10  Location: right ankles     Objective     Mehreen received therapeutic exercises to develop strength, endurance, ROM, and flexibility for 40 minutes including:    Date  12/22/2023 12/19/2023 12/15/2023 12/8/2023 12/5/2023 12/1/2023 11/28/2023 11/24/2023 11/21/2023 11/17/2023 11/14/2023 11/10/2023 11/7/2023 11/03/2023 10/31/2023    Visit          11/20 10/20 9/20 8/20 7/20 6/20 5/20   Therapeutic Exercise                  Nustep / Recumbent bike  8' L3 10 8' 10'  10'  10' lvl 4  10' Lvl 4  8' nustep L2 10'  8' 10' 8' Lvl 4  10'  10' L3  10' L3   "  Standing gastroc stretch on wedge            30" x 3   30 sec x 3 times     Piriformis stretch                   SLR              3 x 10 2# AW ea  3 x 10 2# AW each leg  3 x 10 2# right only    Bridging              3 x 10 GTB  3 X 10 GTB 3 X 10 GTB   SL clams              3 x 10 GTB ea  3 X 10 EA GTB 3 X 10 EA GTB   SL hip abduction              3 x 10 2# AW each leg  3 x 10 2# AW each leg   x 10 2# each    Ankle circles/ABCs               Discharged    Toe yoga 20x each way  20x each way               Dome raises                30x    MOBO (odd/even taps)  40x each way 40 x each way seated 40x each way seated X 30  X 30  30 x each way  Resisted: 30 x  30 x each way  Resisted: 30 x 30x each way  Resisted: 20x     3' each with blue resistance  3' each with blue resistance 30x each with blue resistance   Seated calf raises   (Iso's) 10" x 12 10x no weight, 20x with 20# weight 10x no weight, 20x with 20# weight 7# 3 x 15  7# 3 x 15  7# 3 x 10 7# 3 x 10  5# weight 30x 10x no weight  3x8 with 10# KB  10x no weight  3x8 with 10# KB   3x10 (barefoot)  3x10 (barefoot)  30x (barefoot)    Seated ankle DF            Standing 3 x 10  Standing 3x10  3x10 (barefoot)  3x10 (barefoot)  3x10 (barefoot)    Standing ankle PF with ball squeeze  3 x 12 3x12 3 x 15   3 x 15  x45 X 30 X 30 X 30 30x 3 x 10 3x10       Ankle AROM inversion/eversion            3 x 10 ea  3x10 each    3x10 each    Shuttle squats DBL/SGL 2 bands. 40x  1 band 30x Eccentric. Back with 2 down with 1. 2 bands, 30x Eccentric. Back with 2 down with 1. 2 bands, 30x 3.5 bands 3x12 DL  2 bands 30x SL  3.5 bands 3x12 DL  2 bands 30x SL  3.5 bands 3x12 DL  2 bands 30x SL  3.5 bands 3x12 DL  2 bands 30x SL 3.5 bands 3x12 DL  2 bands 30x SL 3.5 bands 3x12 DL   2 bands 30 x SL 3.5 bands 3x12 DL  2 bands 25x SL 3.5 bands 3x15  DL  2 bands 3 x 10 SL  3.5 bands 3x15   3.5 bands 3x15 / 2 bands 3x10  3 bands/2 bands 3x10 each   Standing hip abduction  3x8 RTB          3 x 10 " "YTB  3 x 10 each YTB 3 x 10 each YTB 3 x 10 each YTB    Standing hip extension            3 x 10 YTB  3 x 10 each YTB      Sit to stand            3 x 10 YTB 3 x 10 YTB  No  No    Lateral squat walks / Monster walks  5L GTB  5 laps, GTB 5L 5L 5L  5 laps  5 laps at bar RTB 5 laps at bar RTB 5 laps at bar        Lateral step downs  X 20 4" box 20x 4" box      4" box 4 x 6  4" box 4x6        Millie stepping         Single millie, only LLE over millie. 30 x  Single millie, only LLE over millie. 25x        Step ups  X 30 20x each way 4' x 30 fwd/lat  4' x 30 fwd/lat  4' x 30 fwd/lat 4" x 30, x 30  4" 30x, lateral 20x          Millie step  Fwd, 1 leg, RL on ground  30x Fwd, 1 leg, RL on ground  20x                 Manual Therapy ( billed as TE): 15'    TC distraction   AP down glides to improve DF   ASTYM to anterior and posterior calf.       Home Exercise Program (HEP) Provided and Patient Education Provided     Patient was provided education on 10/09/2023.     Written Home Exercises Provided: Patient instructed to cont prior home program.  Exercises were reviewed and Mehreen was able to demonstrate them prior to the end of the session.  Mehreen demonstrated good  understanding of the education provided.     Assessment     Pt presents with increase in swelling and tenderness. Pt did well with manual treatments and lower load to ankle today. Pt needs further strengthening and swelling control to improve function.     Mehreen Is progressing well towards her goals.     Patient will continue to benefit from skilled outpatient physical therapy to address the deficits listed in the problem list box on initial evaluation, provide patient/family education and to maximize patient's level of independence in the home and community environment.     Patient prognosis is Good.     Patient's spiritual, cultural and educational needs considered and patient agreeable to plan of care and goals.    Anticipated barriers to physical " therapy: None    GOALS  Short Term Goals (4 weeks):  1. Patient will have improved PROM of the right ankle to WFLs.  2. Patient will have decreased complaints of pain to 3/10.  3. Patient will be independent and compliant with issued HEP.  4. Patient will maintain right SLS for at least 15 seconds.      Long Term Goals (8 weeks):   Patient will have improved AROM of the right ankle to WFLs.  2. Patient will have improved strength of the right ankle to 4+/5.  3. Patient will be able to resume prior functional level with no deficits.   4. Patient will have overall improvement in condition to have decreased score on FOTO to <40%.     Plan   Continue PT per POC.     Rigo Deshpande, PT

## 2023-12-27 ENCOUNTER — CLINICAL SUPPORT (OUTPATIENT)
Dept: REHABILITATION | Facility: HOSPITAL | Age: 63
End: 2023-12-27
Payer: MEDICAID

## 2023-12-27 DIAGNOSIS — R26.9 ABNORMALITY OF GAIT AND MOBILITY: ICD-10-CM

## 2023-12-27 DIAGNOSIS — M25.671 DECREASED RANGE OF MOTION OF RIGHT ANKLE: Primary | ICD-10-CM

## 2023-12-27 PROCEDURE — 97110 THERAPEUTIC EXERCISES: CPT | Mod: PO,CQ

## 2023-12-27 NOTE — PROGRESS NOTES
Physical Therapy (PT) Daily Treatment Note     Name: Mehreen Benites  Clinic Number: [id number]    Therapy Diagnosis:   Encounter Diagnoses   Name Primary?    Decreased range of motion of right ankle Yes    Abnormality of gait and mobility      Physician: Axel Kapoor Jr., *    Visit Date: 12/27/2023    Physician Orders: Eval and Treat: - wb: nwb RLE in CAM x 2wks - passive ROM only, then wbat in CAM x 2 wks add active ROM, eval and treat after that  Medical Diagnosis: Z47.89 (ICD-10-CM) - Aftercare following surgery of the musculoskeletal system; M76.61 (ICD-10-CM) - Achilles tendinitis of right lower extremity  Surgical Procedure and Date: 8/25/2023: REPAIR, TENDON, ACHILLES (Right); RESECTION, MUSCLE, GASTROCNEMIUS (Right); EXCISION, EXOSTOSIS, RETROCALCANEAL (Right); ASPIRATION, BONE MARROW (Right)   Evaluation Date: 10/9/2023  Insurance Authorization Period Expiration: 12/31/2023  Plan of Care Certification Period: 1/19/2024  Visit # / Visits authorized: 20/20    FOTO  -Intake: 54%   -Status: Incomplete  -Discharge: Incomplete    Time In: 800 AM   Time Out:  AM  Total Billable Time:  minutes     Precautions: Standard    Subjective     Patient reports:    She was compliant with home exercise program.  Response to previous treatment: initial evaluation   Functional change: ongoing assessment     Pain: /10  Location: right ankles     Objective     Mehreen received therapeutic exercises to develop strength, endurance, ROM, and flexibility for 40 minutes including:    Date  12/27/2023 12/22/2023 12/19/2023 12/15/2023 12/8/2023 12/5/2023 12/1/2023 11/28/2023 11/24/2023 11/21/2023 11/17/2023 11/14/2023 11/10/2023 11/7/2023 11/03/2023 10/31/2023    Visit           11/20 10/20 9/20 8/20 7/20 6/20 5/20   Therapeutic Exercise                   Nustep / Recumbent bike  10' L3 8' L3 10 8' 10'  10'  10' lvl 4  10' Lvl 4  8' nustep L2 10'  8' 10' 8' Lvl 4  10'  10' L3  10' L3    Standing gastroc stretch on wedge         "     30" x 3   30 sec x 3 times     Piriformis stretch                    SLR               3 x 10 2# AW ea  3 x 10 2# AW each leg  3 x 10 2# right only    Bridging               3 x 10 GTB  3 X 10 GTB 3 X 10 GTB   SL clams               3 x 10 GTB ea  3 X 10 EA GTB 3 X 10 EA GTB   SL hip abduction               3 x 10 2# AW each leg  3 x 10 2# AW each leg   x 10 2# each    Ankle circles/ABCs                Discharged    Toe yoga 20 x each way  20x each way  20x each way               Dome raises                 30x    MOBO (odd/even taps)  45x each way 45x each way 40 x each way seated 40x each way seated X 30  X 30  30 x each way  Resisted: 30 x  30 x each way  Resisted: 30 x 30x each way  Resisted: 20x     3' each with blue resistance  3' each with blue resistance 30x each with blue resistance   Seated calf raises   10" x 12 (Iso's) 10" x 12 10x no weight, 20x with 20# weight 10x no weight, 20x with 20# weight 7# 3 x 15  7# 3 x 15  7# 3 x 10 7# 3 x 10  5# weight 30x 10x no weight  3x8 with 10# KB  10x no weight  3x8 with 10# KB   3x10 (barefoot)  3x10 (barefoot)  30x (barefoot)    Seated ankle DF             Standing 3 x 10  Standing 3x10  3x10 (barefoot)  3x10 (barefoot)  3x10 (barefoot)    Standing ankle PF with ball squeeze   3 x 12 3x12 3 x 15   3 x 15  x45 X 30 X 30 X 30 30x 3 x 10 3x10       Ankle AROM inversion/eversion             3 x 10 ea  3x10 each    3x10 each    Shuttle squats DBL/SGL 2 bands. 40x  1 band 30x 2 bands. 40x  1 band 30x Eccentric. Back with 2 down with 1. 2 bands, 30x Eccentric. Back with 2 down with 1. 2 bands, 30x 3.5 bands 3x12 DL  2 bands 30x SL  3.5 bands 3x12 DL  2 bands 30x SL  3.5 bands 3x12 DL  2 bands 30x SL  3.5 bands 3x12 DL  2 bands 30x SL 3.5 bands 3x12 DL  2 bands 30x SL 3.5 bands 3x12 DL   2 bands 30 x SL 3.5 bands 3x12 DL  2 bands 25x SL 3.5 bands 3x15  DL  2 bands 3 x 10 SL  3.5 bands 3x15   3.5 bands 3x15 / 2 bands 3x10  3 bands/2 bands 3x10 each   Standing hip " "abduction  3 x 8 RTB 3x8 RTB          3 x 10 YTB  3 x 10 each YTB 3 x 10 each YTB 3 x 10 each YTB    Standing hip extension             3 x 10 YTB  3 x 10 each YTB      Sit to stand             3 x 10 YTB 3 x 10 YTB  No  No    Lateral squat walks / Monster walks   5L GTB  5 laps, GTB 5L 5L 5L  5 laps  5 laps at bar RTB 5 laps at bar RTB 5 laps at bar        Lateral step downs   X 20 4" box 20x 4" box      4" box 4 x 6  4" box 4x6        Millie stepping          Single millie, only LLE over millie. 30 x  Single millie, only LLE over millie. 25x        Step ups   X 30 20x each way 4' x 30 fwd/lat  4' x 30 fwd/lat  4' x 30 fwd/lat 4" x 30, x 30  4" 30x, lateral 20x          Millie step   Fwd, 1 leg, RL on ground  30x Fwd, 1 leg, RL on ground  20x                 Manual Therapy ( billed as TE): 15'    TC distraction   AP down glides to improve DF   ASTYM to anterior and posterior calf.       Home Exercise Program (HEP) Provided and Patient Education Provided     Patient was provided education on 10/09/2023.     Written Home Exercises Provided: Patient instructed to cont prior home program.  Exercises were reviewed and Mehreen was able to demonstrate them prior to the end of the session.  Mehreen demonstrated good  understanding of the education provided.     Assessment     Pt continues to do well with her outpatient PT at this time.  Pt with good exercise tolerance throughout treatment session.  Will continue to monitor and progress pt as tolerated.     Mehreen Is progressing well towards her goals.     Patient will continue to benefit from skilled outpatient physical therapy to address the deficits listed in the problem list box on initial evaluation, provide patient/family education and to maximize patient's level of independence in the home and community environment.     Patient prognosis is Good.     Patient's spiritual, cultural and educational needs considered and patient agreeable to plan of care and " goals.    Anticipated barriers to physical therapy: None    GOALS  Short Term Goals (4 weeks):  1. Patient will have improved PROM of the right ankle to WFLs.  2. Patient will have decreased complaints of pain to 3/10.  3. Patient will be independent and compliant with issued HEP.  4. Patient will maintain right SLS for at least 15 seconds.      Long Term Goals (8 weeks):   Patient will have improved AROM of the right ankle to WFLs.  2. Patient will have improved strength of the right ankle to 4+/5.  3. Patient will be able to resume prior functional level with no deficits.   4. Patient will have overall improvement in condition to have decreased score on FOTO to <40%.     Plan   Continue PT per POC.     Maury Subramanian, PTA

## 2023-12-29 ENCOUNTER — CLINICAL SUPPORT (OUTPATIENT)
Dept: REHABILITATION | Facility: HOSPITAL | Age: 63
End: 2023-12-29
Payer: MEDICAID

## 2023-12-29 DIAGNOSIS — M25.671 DECREASED RANGE OF MOTION OF RIGHT ANKLE: Primary | ICD-10-CM

## 2023-12-29 DIAGNOSIS — R26.9 ABNORMALITY OF GAIT AND MOBILITY: ICD-10-CM

## 2023-12-29 PROCEDURE — 97110 THERAPEUTIC EXERCISES: CPT | Mod: PO | Performed by: PHYSICAL THERAPIST

## 2023-12-29 NOTE — PROGRESS NOTES
Physical Therapy (PT) Daily Treatment Note     Name: Mehreen Benites  Clinic Number: [id number]    Therapy Diagnosis:   Encounter Diagnoses   Name Primary?    Decreased range of motion of right ankle Yes    Abnormality of gait and mobility        Physician: Axel Kapoor Jr., *    Visit Date: 12/29/2023    Physician Orders: Eval and Treat: - wb: nwb RLE in CAM x 2wks - passive ROM only, then wbat in CAM x 2 wks add active ROM, eval and treat after that  Medical Diagnosis: Z47.89 (ICD-10-CM) - Aftercare following surgery of the musculoskeletal system; M76.61 (ICD-10-CM) - Achilles tendinitis of right lower extremity  Surgical Procedure and Date: 8/25/2023: REPAIR, TENDON, ACHILLES (Right); RESECTION, MUSCLE, GASTROCNEMIUS (Right); EXCISION, EXOSTOSIS, RETROCALCANEAL (Right); ASPIRATION, BONE MARROW (Right)   Evaluation Date: 10/9/2023  Insurance Authorization Period Expiration: 12/31/2023  Plan of Care Certification Period: 1/19/2024  Visit # / Visits authorized: 21/21    FOTO  -Intake: 54%   -Status: Incomplete  -Discharge: Incomplete    Time In: 800 AM   Time Out: 900 AM  Total Billable Time: 60 minutes     Precautions: Standard    Subjective     Patient reports:doing a little better, the pain has moved from calf to anterior ankle. Increase in pain in anterior joint line of ankle with walking.     She was compliant with home exercise program.  Response to previous treatment: initial evaluation   Functional change: ongoing assessment     Pain: /10  Location: right ankles     Objective     Mehreen received therapeutic exercises to develop strength, endurance, ROM, and flexibility for 45 minutes including:    Date  12/29/2023 12/27/2023 12/22/2023 12/19/2023 12/15/2023 12/8/2023 12/5/2023 12/1/2023 11/28/2023 11/24/2023 11/21/2023 11/17/2023 11/14/2023 11/10/2023 11/7/2023 11/03/2023 10/31/2023    Visit            11/20 10/20 9/20 8/20 7/20 6/20 5/20   Therapeutic Exercise                    Nustep / Recumbent  "bike  8' L4, nustep 10' L3 8' L3 10 8' 10'  10'  10' lvl 4  10' Lvl 4  8' nustep L2 10'  8' 10' 8' Lvl 4  10'  10' L3  10' L3    Standing gastroc stretch on wedge              30" x 3   30 sec x 3 times     Piriformis stretch                     SLR                3 x 10 2# AW ea  3 x 10 2# AW each leg  3 x 10 2# right only    Bridging                3 x 10 GTB  3 X 10 GTB 3 X 10 GTB   SL clams                3 x 10 GTB ea  3 X 10 EA GTB 3 X 10 EA GTB   SL hip abduction                3 x 10 2# AW each leg  3 x 10 2# AW each leg   x 10 2# each    Ankle circles/ABCs                 Discharged    Toe yoga  20 x each way  20x each way  20x each way               Dome raises                  30x    MOBO (odd/even taps)  30x each way  30x resisted on evens 45x each way 45x each way 40 x each way seated 40x each way seated X 30  X 30  30 x each way  Resisted: 30 x  30 x each way  Resisted: 30 x 30x each way  Resisted: 20x     3' each with blue resistance  3' each with blue resistance 30x each with blue resistance   Seated calf raises    10" x 12 (Iso's) 10" x 12 10x no weight, 20x with 20# weight 10x no weight, 20x with 20# weight 7# 3 x 15  7# 3 x 15  7# 3 x 10 7# 3 x 10  5# weight 30x 10x no weight  3x8 with 10# KB  10x no weight  3x8 with 10# KB   3x10 (barefoot)  3x10 (barefoot)  30x (barefoot)    Seated ankle DF              Standing 3 x 10  Standing 3x10  3x10 (barefoot)  3x10 (barefoot)  3x10 (barefoot)    Standing ankle PF with ball squeeze    3 x 12 3x12 3 x 15   3 x 15  x45 X 30 X 30 X 30 30x 3 x 10 3x10       Ankle AROM inversion/eversion              3 x 10 ea  3x10 each    3x10 each    Shuttle squats DBL/SGL 2.5 bands 3x12  1.5 bands 2x12 2 bands. 40x  1 band 30x 2 bands. 40x  1 band 30x Eccentric. Back with 2 down with 1. 2 bands, 30x Eccentric. Back with 2 down with 1. 2 bands, 30x 3.5 bands 3x12 DL  2 bands 30x SL  3.5 bands 3x12 DL  2 bands 30x SL  3.5 bands 3x12 DL  2 bands 30x SL  3.5 bands 3x12 DL  2 " "bands 30x SL 3.5 bands 3x12 DL  2 bands 30x SL 3.5 bands 3x12 DL   2 bands 30 x SL 3.5 bands 3x12 DL  2 bands 25x SL 3.5 bands 3x15  DL  2 bands 3 x 10 SL  3.5 bands 3x15   3.5 bands 3x15 / 2 bands 3x10  3 bands/2 bands 3x10 each   Standing hip abduction   3 x 8 RTB 3x8 RTB          3 x 10 YTB  3 x 10 each YTB 3 x 10 each YTB 3 x 10 each YTB    Standing hip extension              3 x 10 YTB  3 x 10 each YTB      Sit to stand              3 x 10 YTB 3 x 10 YTB  No  No    Lateral squat walks / Monster walks 5 laps GTB    5L GTB  5 laps, GTB 5L 5L 5L  5 laps  5 laps at bar RTB 5 laps at bar RTB 5 laps at bar        Lateral step downs 4" box. 3x8   X 20 4" box 20x 4" box      4" box 4 x 6  4" box 4x6        Millie stepping Fwd and lateral, 20x each          Single millie, only LLE over millie. 30 x  Single millie, only LLE over millie. 25x        Step ups 30x 4" box   X 30 20x each way 4' x 30 fwd/lat  4' x 30 fwd/lat  4' x 30 fwd/lat 4" x 30, x 30  4" 30x, lateral 20x          Millie step    Fwd, 1 leg, RL on ground  30x Fwd, 1 leg, RL on ground  20x                 Manual Therapy ( billed as TE): 15'    TC distraction   AP down glides to improve DF   ASTYM to anterior and posterior calf.       Home Exercise Program (HEP) Provided and Patient Education Provided     Patient was provided education on 10/09/2023.     Written Home Exercises Provided: Patient instructed to cont prior home program.  Exercises were reviewed and Mehreen was able to demonstrate them prior to the end of the session.  Mehreen demonstrated good  understanding of the education provided.     Assessment     Pt does well with strengthening progressions. Needs further millie exercise to improve push off activity with gait mechanics.     Mehreen Is progressing well towards her goals.     Patient will continue to benefit from skilled outpatient physical therapy to address the deficits listed in the problem list box on initial evaluation, provide " patient/family education and to maximize patient's level of independence in the home and community environment.     Patient prognosis is Good.     Patient's spiritual, cultural and educational needs considered and patient agreeable to plan of care and goals.    Anticipated barriers to physical therapy: None    GOALS  Short Term Goals (4 weeks):  1. Patient will have improved PROM of the right ankle to WFLs.  2. Patient will have decreased complaints of pain to 3/10.  3. Patient will be independent and compliant with issued HEP.  4. Patient will maintain right SLS for at least 15 seconds.      Long Term Goals (8 weeks):   Patient will have improved AROM of the right ankle to WFLs.  2. Patient will have improved strength of the right ankle to 4+/5.  3. Patient will be able to resume prior functional level with no deficits.   4. Patient will have overall improvement in condition to have decreased score on FOTO to <40%.     Plan   Continue PT per POC.     Rigo Deshpande, PT

## 2024-01-02 ENCOUNTER — CLINICAL SUPPORT (OUTPATIENT)
Dept: REHABILITATION | Facility: HOSPITAL | Age: 64
End: 2024-01-02
Payer: MEDICAID

## 2024-01-02 DIAGNOSIS — M25.671 DECREASED RANGE OF MOTION OF RIGHT ANKLE: Primary | ICD-10-CM

## 2024-01-02 DIAGNOSIS — R26.9 ABNORMALITY OF GAIT AND MOBILITY: ICD-10-CM

## 2024-01-02 PROCEDURE — 97110 THERAPEUTIC EXERCISES: CPT | Mod: PO,CQ

## 2024-01-02 NOTE — PROGRESS NOTES
Physical Therapy (PT) Daily Treatment Note     Name: Mehreen Benites  Clinic Number: [id number]    Therapy Diagnosis:   Encounter Diagnoses   Name Primary?    Decreased range of motion of right ankle Yes    Abnormality of gait and mobility        Physician: Axel Kapoor Jr., *    Visit Date: 1/2/2024    Physician Orders: Eval and Treat: - wb: nwb RLE in CAM x 2wks - passive ROM only, then wbat in CAM x 2 wks add active ROM, eval and treat after that  Medical Diagnosis: Z47.89 (ICD-10-CM) - Aftercare following surgery of the musculoskeletal system; M76.61 (ICD-10-CM) - Achilles tendinitis of right lower extremity  Surgical Procedure and Date: 8/25/2023: REPAIR, TENDON, ACHILLES (Right); RESECTION, MUSCLE, GASTROCNEMIUS (Right); EXCISION, EXOSTOSIS, RETROCALCANEAL (Right); ASPIRATION, BONE MARROW (Right)   Evaluation Date: 10/9/2023  Insurance Authorization Period Expiration: 12/31/2023  Plan of Care Certification Period: 1/19/2024  Visit # / Visits authorized: 23/21    FOTO  -Intake: 54%   -Status: Incomplete  -Discharge: Incomplete    Time In: 800 AM   Time Out: 8:55 AM  Total Billable Time: 55 minutes     Precautions: Standard    Subjective     Patient reports: she didn't have any pain over the weekend.     She was compliant with home exercise program.  Response to previous treatment: initial evaluation   Functional change: ongoing assessment     Pain: /10  Location: right ankles     Objective     Mehreen received therapeutic exercises to develop strength, endurance, ROM, and flexibility for 40 minutes including:    Date  1/2/2024 12/29/2023 12/27/2023 12/22/2023 12/19/2023 12/15/2023 12/8/2023 12/5/2023 12/1/2023 11/28/2023 11/24/2023 11/21/2023 11/17/2023 11/14/2023 11/10/2023 11/7/2023 11/03/2023 10/31/2023    Visit             11/20 10/20 9/20 8/20 7/20 6/20 5/20   Therapeutic Exercise                     Nustep / Recumbent bike  10 8' L4, nustep 10' L3 8' L3 10 8' 10'  10'  10' lvl 4  10' Lvl 4  8'  "nustep L2 10'  8' 10' 8' Lvl 4  10'  10' L3  10' L3    Standing gastroc stretch on wedge               30" x 3   30 sec x 3 times     Piriformis stretch                      SLR                 3 x 10 2# AW ea  3 x 10 2# AW each leg  3 x 10 2# right only    Bridging                 3 x 10 GTB  3 X 10 GTB 3 X 10 GTB   SL clams                 3 x 10 GTB ea  3 X 10 EA GTB 3 X 10 EA GTB   SL hip abduction                 3 x 10 2# AW each leg  3 x 10 2# AW each leg   x 10 2# each    Ankle circles/ABCs                  Discharged    Toe yoga   20 x each way  20x each way  20x each way               Dome raises                   30x    MOBO (odd/even taps)   30x each way  30x resisted on evens 45x each way 45x each way 40 x each way seated 40x each way seated X 30  X 30  30 x each way  Resisted: 30 x  30 x each way  Resisted: 30 x 30x each way  Resisted: 20x     3' each with blue resistance  3' each with blue resistance 30x each with blue resistance   Seated calf raises     10" x 12 (Iso's) 10" x 12 10x no weight, 20x with 20# weight 10x no weight, 20x with 20# weight 7# 3 x 15  7# 3 x 15  7# 3 x 10 7# 3 x 10  5# weight 30x 10x no weight  3x8 with 10# KB  10x no weight  3x8 with 10# KB   3x10 (barefoot)  3x10 (barefoot)  30x (barefoot)    Seated ankle DF               Standing 3 x 10  Standing 3x10  3x10 (barefoot)  3x10 (barefoot)  3x10 (barefoot)    Standing ankle PF with ball squeeze     3 x 12 3x12 3 x 15   3 x 15  x45 X 30 X 30 X 30 30x 3 x 10 3x10       Ankle AROM inversion/eversion               3 x 10 ea  3x10 each    3x10 each    Shuttle squats DBL/SGL 2.5 bands 3x12  1.5 bands 2x12 2.5 bands 3x12  1.5 bands 2x12 2 bands. 40x  1 band 30x 2 bands. 40x  1 band 30x Eccentric. Back with 2 down with 1. 2 bands, 30x Eccentric. Back with 2 down with 1. 2 bands, 30x 3.5 bands 3x12 DL  2 bands 30x SL  3.5 bands 3x12 DL  2 bands 30x SL  3.5 bands 3x12 DL  2 bands 30x SL  3.5 bands 3x12 DL  2 bands 30x SL 3.5 bands 3x12 " "DL  2 bands 30x SL 3.5 bands 3x12 DL   2 bands 30 x SL 3.5 bands 3x12 DL  2 bands 25x SL 3.5 bands 3x15  DL  2 bands 3 x 10 SL  3.5 bands 3x15   3.5 bands 3x15 / 2 bands 3x10  3 bands/2 bands 3x10 each   Standing hip abduction    3 x 8 RTB 3x8 RTB          3 x 10 YTB  3 x 10 each YTB 3 x 10 each YTB 3 x 10 each YTB    Standing hip extension               3 x 10 YTB  3 x 10 each YTB      Sit to stand               3 x 10 YTB 3 x 10 YTB  No  No    Lateral squat walks / Monster walks 5 laps GTB  5 laps GTB    5L GTB  5 laps, GTB 5L 5L 5L  5 laps  5 laps at bar RTB 5 laps at bar RTB 5 laps at bar        Lateral step downs 4" box. 3x8  4" box. 3x8   X 20 4" box 20x 4" box      4" box 4 x 6  4" box 4x6        Millie stepping Fwd and lateral, 20x each Fwd and lateral, 20x each          Single millie, only LLE over millie. 30 x  Single millie, only LLE over millie. 25x        Step ups 30x 4" box  30x 4" box   X 30 20x each way 4' x 30 fwd/lat  4' x 30 fwd/lat  4' x 30 fwd/lat 4" x 30, x 30  4" 30x, lateral 20x          Millie step     Fwd, 1 leg, RL on ground  30x Fwd, 1 leg, RL on ground  20x                 Manual Therapy ( billed as TE): 15'    TC distraction   AP down glides to improve DF   ASTYM to anterior and posterior calf.       Home Exercise Program (HEP) Provided and Patient Education Provided     Patient was provided education on 10/09/2023.     Written Home Exercises Provided: Patient instructed to cont prior home program.  Exercises were reviewed and Mehreen was able to demonstrate them prior to the end of the session.  Mehreen demonstrated good  understanding of the education provided.     Assessment     Pt with no pain post session.  Pt performed the above exercises with good exercise tolerance.  Will continue to monitor and progress pt as tolerated.     Mehreen Is progressing well towards her goals.     Patient will continue to benefit from skilled outpatient physical therapy to address the " deficits listed in the problem list box on initial evaluation, provide patient/family education and to maximize patient's level of independence in the home and community environment.     Patient prognosis is Good.     Patient's spiritual, cultural and educational needs considered and patient agreeable to plan of care and goals.    Anticipated barriers to physical therapy: None    GOALS  Short Term Goals (4 weeks):  1. Patient will have improved PROM of the right ankle to WFLs.  2. Patient will have decreased complaints of pain to 3/10.  3. Patient will be independent and compliant with issued HEP.  4. Patient will maintain right SLS for at least 15 seconds.      Long Term Goals (8 weeks):   Patient will have improved AROM of the right ankle to WFLs.  2. Patient will have improved strength of the right ankle to 4+/5.  3. Patient will be able to resume prior functional level with no deficits.   4. Patient will have overall improvement in condition to have decreased score on FOTO to <40%.     Plan   Continue PT per POC.     Maury Subramanian, PTA

## 2024-01-04 ENCOUNTER — TELEPHONE (OUTPATIENT)
Dept: HEMATOLOGY/ONCOLOGY | Facility: CLINIC | Age: 64
End: 2024-01-04
Payer: MEDICAID

## 2024-01-05 ENCOUNTER — CLINICAL SUPPORT (OUTPATIENT)
Dept: REHABILITATION | Facility: HOSPITAL | Age: 64
End: 2024-01-05
Payer: MEDICAID

## 2024-01-05 DIAGNOSIS — R26.9 ABNORMALITY OF GAIT AND MOBILITY: ICD-10-CM

## 2024-01-05 DIAGNOSIS — M25.671 DECREASED RANGE OF MOTION OF RIGHT ANKLE: Primary | ICD-10-CM

## 2024-01-05 PROCEDURE — 97110 THERAPEUTIC EXERCISES: CPT | Mod: PO,CQ

## 2024-01-05 NOTE — PROGRESS NOTES
Physical Therapy (PT) Daily Treatment Note     Name: Mhereen Benites  Clinic Number: [id number]    Therapy Diagnosis:   Encounter Diagnoses   Name Primary?    Decreased range of motion of right ankle Yes    Abnormality of gait and mobility        Physician: Axel Kapoor Jr., *    Visit Date: 1/5/2024    Physician Orders: Eval and Treat: - wb: nwb RLE in CAM x 2wks - passive ROM only, then wbat in CAM x 2 wks add active ROM, eval and treat after that  Medical Diagnosis: Z47.89 (ICD-10-CM) - Aftercare following surgery of the musculoskeletal system; M76.61 (ICD-10-CM) - Achilles tendinitis of right lower extremity  Surgical Procedure and Date: 8/25/2023: REPAIR, TENDON, ACHILLES (Right); RESECTION, MUSCLE, GASTROCNEMIUS (Right); EXCISION, EXOSTOSIS, RETROCALCANEAL (Right); ASPIRATION, BONE MARROW (Right)   Evaluation Date: 10/9/2023  Insurance Authorization Period Expiration: 12/31/2023  Plan of Care Certification Period: 1/19/2024  Visit # / Visits authorized: 23/21    FOTO  -Intake: 54%   -Status: Incomplete  -Discharge: Incomplete    Time In: 8:00 AM   Time Out: 8:55 AM  Total Billable Time: 55 minutes     Precautions: Standard    Subjective     Patient reports: her foot continues to feel better.      She was compliant with home exercise program.  Response to previous treatment: did well    Functional change: ongoing assessment     Pain: /10  Location: right ankles     Objective     Mehreen received therapeutic exercises to develop strength, endurance, ROM, and flexibility for 40 minutes including:    Date  1/5/2024 1/2/2024 12/29/2023 12/27/2023 12/22/2023 12/19/2023 12/15/2023 12/8/2023 12/5/2023 12/1/2023 11/28/2023 11/24/2023 11/21/2023 11/17/2023 11/14/2023 11/10/2023 11/7/2023 11/03/2023 10/31/2023    Visit              11/20 10/20 9/20 8/20 7/20 6/20 5/20   Therapeutic Exercise                      Nustep / Recumbent bike  10'  10 8' L4, nustep 10' L3 8' L3 10 8' 10'  10'  10' lvl 4  10' Lvl 4  8'  "nustep L2 10'  8' 10' 8' Lvl 4  10'  10' L3  10' L3    Standing gastroc stretch on wedge                30" x 3   30 sec x 3 times     Piriformis stretch                       SLR                  3 x 10 2# AW ea  3 x 10 2# AW each leg  3 x 10 2# right only    Bridging                  3 x 10 GTB  3 X 10 GTB 3 X 10 GTB   SL clams                  3 x 10 GTB ea  3 X 10 EA GTB 3 X 10 EA GTB   SL hip abduction                  3 x 10 2# AW each leg  3 x 10 2# AW each leg   x 10 2# each    Ankle circles/ABCs                   Discharged    Toe yoga    20 x each way  20x each way  20x each way               Dome raises                    30x    MOBO (odd/even taps)    30x each way  30x resisted on evens 45x each way 45x each way 40 x each way seated 40x each way seated X 30  X 30  30 x each way  Resisted: 30 x  30 x each way  Resisted: 30 x 30x each way  Resisted: 20x     3' each with blue resistance  3' each with blue resistance 30x each with blue resistance   Seated calf raises      10" x 12 (Iso's) 10" x 12 10x no weight, 20x with 20# weight 10x no weight, 20x with 20# weight 7# 3 x 15  7# 3 x 15  7# 3 x 10 7# 3 x 10  5# weight 30x 10x no weight  3x8 with 10# KB  10x no weight  3x8 with 10# KB   3x10 (barefoot)  3x10 (barefoot)  30x (barefoot)    Seated ankle DF                Standing 3 x 10  Standing 3x10  3x10 (barefoot)  3x10 (barefoot)  3x10 (barefoot)    Standing ankle PF with ball squeeze      3 x 12 3x12 3 x 15   3 x 15  x45 X 30 X 30 X 30 30x 3 x 10 3x10       Ankle AROM inversion/eversion                3 x 10 ea  3x10 each    3x10 each    Shuttle squats DBL/SGL 3 bands 3 x 12   2 bands 3 x 10  2.5 bands 3x12  1.5 bands 2x12 2.5 bands 3x12  1.5 bands 2x12 2 bands. 40x  1 band 30x 2 bands. 40x  1 band 30x Eccentric. Back with 2 down with 1. 2 bands, 30x Eccentric. Back with 2 down with 1. 2 bands, 30x 3.5 bands 3x12 DL  2 bands 30x SL  3.5 bands 3x12 DL  2 bands 30x SL  3.5 bands 3x12 DL  2 bands 30x SL  3.5 " "bands 3x12 DL  2 bands 30x SL 3.5 bands 3x12 DL  2 bands 30x SL 3.5 bands 3x12 DL   2 bands 30 x SL 3.5 bands 3x12 DL  2 bands 25x SL 3.5 bands 3x15  DL  2 bands 3 x 10 SL  3.5 bands 3x15   3.5 bands 3x15 / 2 bands 3x10  3 bands/2 bands 3x10 each   Standing hip abduction     3 x 8 RTB 3x8 RTB          3 x 10 YTB  3 x 10 each YTB 3 x 10 each YTB 3 x 10 each YTB    Standing hip extension                3 x 10 YTB  3 x 10 each YTB      Sit to stand                3 x 10 YTB 3 x 10 YTB  No  No    Lateral squat walks / Monster walks 5 laps GTB  5 laps GTB  5 laps GTB    5L GTB  5 laps, GTB 5L 5L 5L  5 laps  5 laps at bar RTB 5 laps at bar RTB 5 laps at bar        Lateral step downs 4" 3 x 8 4" box. 3x8  4" box. 3x8   X 20 4" box 20x 4" box      4" box 4 x 6  4" box 4x6        Millie stepping Fwd and lateral, 20x each Fwd and lateral, 20x each Fwd and lateral, 20x each          Single millie, only LLE over milile. 30 x  Single millie, only LLE over millie. 25x        Step ups X 30 4" box 30x 4" box  30x 4" box   X 30 20x each way 4' x 30 fwd/lat  4' x 30 fwd/lat  4' x 30 fwd/lat 4" x 30, x 30  4" 30x, lateral 20x          Millie step      Fwd, 1 leg, RL on ground  30x Fwd, 1 leg, RL on ground  20x                 Manual Therapy ( billed as TE): 15'    TC distraction   AP down glides to improve DF   ASTYM to anterior and posterior calf.       Home Exercise Program (HEP) Provided and Patient Education Provided     Patient was provided education on 10/09/2023.     Written Home Exercises Provided: Patient instructed to cont prior home program.  Exercises were reviewed and Mehreen was able to demonstrate them prior to the end of the session.  Mehreen demonstrated good  understanding of the education provided.     Assessment     Pt continues to report no pain and that her R foot is doing much better.  Pt performed the above exercises with good tolerance.  Will continue to monitor and progress pt as tolerated. "     Mehreen Is progressing well towards her goals.     Patient will continue to benefit from skilled outpatient physical therapy to address the deficits listed in the problem list box on initial evaluation, provide patient/family education and to maximize patient's level of independence in the home and community environment.     Patient prognosis is Good.     Patient's spiritual, cultural and educational needs considered and patient agreeable to plan of care and goals.    Anticipated barriers to physical therapy: None    GOALS  Short Term Goals (4 weeks):  1. Patient will have improved PROM of the right ankle to WFLs.  2. Patient will have decreased complaints of pain to 3/10.  3. Patient will be independent and compliant with issued HEP.  4. Patient will maintain right SLS for at least 15 seconds.      Long Term Goals (8 weeks):   Patient will have improved AROM of the right ankle to WFLs.  2. Patient will have improved strength of the right ankle to 4+/5.  3. Patient will be able to resume prior functional level with no deficits.   4. Patient will have overall improvement in condition to have decreased score on FOTO to <40%.     Plan   Continue PT per POC.     Maury Subramanian, PTA

## 2024-01-09 ENCOUNTER — CLINICAL SUPPORT (OUTPATIENT)
Dept: REHABILITATION | Facility: HOSPITAL | Age: 64
End: 2024-01-09
Payer: MEDICAID

## 2024-01-09 DIAGNOSIS — M25.671 DECREASED RANGE OF MOTION OF RIGHT ANKLE: Primary | ICD-10-CM

## 2024-01-09 DIAGNOSIS — R26.9 ABNORMALITY OF GAIT AND MOBILITY: ICD-10-CM

## 2024-01-09 PROCEDURE — 97110 THERAPEUTIC EXERCISES: CPT | Mod: PO

## 2024-01-09 NOTE — PROGRESS NOTES
Physical Therapy (PT) Daily Treatment Note     Name: Mehreen Benites  Clinic Number: [id number]    Therapy Diagnosis:   Encounter Diagnoses   Name Primary?    Decreased range of motion of right ankle Yes    Abnormality of gait and mobility        Physician: Axel Kapoor Jr., *    Visit Date: 1/9/2024    Physician Orders: Eval and Treat: - wb: nwb RLE in CAM x 2wks - passive ROM only, then wbat in CAM x 2 wks add active ROM, eval and treat after that  Medical Diagnosis: Z47.89 (ICD-10-CM) - Aftercare following surgery of the musculoskeletal system; M76.61 (ICD-10-CM) - Achilles tendinitis of right lower extremity  Surgical Procedure and Date: 8/25/2023: REPAIR, TENDON, ACHILLES (Right); RESECTION, MUSCLE, GASTROCNEMIUS (Right); EXCISION, EXOSTOSIS, RETROCALCANEAL (Right); ASPIRATION, BONE MARROW (Right)   Evaluation Date: 10/9/2023  Insurance Authorization Period Expiration: 12/31/2023  Plan of Care Certification Period: 1/19/2024  Visit # / Visits authorized: 24/21    FOTO  -Intake: 54%   -Status: Incomplete  -Discharge: Incomplete    Time In: 10:00 AM   Time Out: 10:40 AM  Total Billable Time: 40 minutes     Precautions: Standard    Subjective     Patient reports: no new symptoms to concern at this moment.     She was compliant with home exercise program.  Response to previous treatment: did well    Functional change: ongoing assessment     Pain: /10  Location: right ankles     Objective     Mehreen received therapeutic exercises to develop strength, endurance, ROM, and flexibility for 40 minutes including:    Date  1/9/2024 1/5/2024 1/2/2024 12/29/2023 12/27/2023 12/22/2023 12/19/2023 12/15/2023 12/8/2023 12/5/2023 12/1/2023 11/28/2023 11/24/2023 11/21/2023 11/17/2023 11/14/2023 11/10/2023 11/7/2023 11/03/2023 10/31/2023    Visit               11/20 10/20 9/20 8/20 7/20 6/20 5/20   Therapeutic Exercise                       Nustep / Recumbent bike  10' 10'  10 8' L4, nustep 10' L3 8' L3 10 8' 10'  10'  10'  "lvl 4  10' Lvl 4  8' nustep L2 10'  8' 10' 8' Lvl 4  10'  10' L3  10' L3    Standing gastroc stretch on wedge 30sec x3                 30" x 3   30 sec x 3 times     Piriformis stretch                        SLR                   3 x 10 2# AW ea  3 x 10 2# AW each leg  3 x 10 2# right only    Bridging                   3 x 10 GTB  3 X 10 GTB 3 X 10 GTB   SL clams                   3 x 10 GTB ea  3 X 10 EA GTB 3 X 10 EA GTB   SL hip abduction                   3 x 10 2# AW each leg  3 x 10 2# AW each leg   x 10 2# each    Ankle circles/ABCs                    Discharged    Toe yoga     20 x each way  20x each way  20x each way               Dome raises                     30x    MOBO (odd/even taps)     30x each way  30x resisted on evens 45x each way 45x each way 40 x each way seated 40x each way seated X 30  X 30  30 x each way  Resisted: 30 x  30 x each way  Resisted: 30 x 30x each way  Resisted: 20x     3' each with blue resistance  3' each with blue resistance 30x each with blue resistance   Seated calf raises       10" x 12 (Iso's) 10" x 12 10x no weight, 20x with 20# weight 10x no weight, 20x with 20# weight 7# 3 x 15  7# 3 x 15  7# 3 x 10 7# 3 x 10  5# weight 30x 10x no weight  3x8 with 10# KB  10x no weight  3x8 with 10# KB   3x10 (barefoot)  3x10 (barefoot)  30x (barefoot)    Seated ankle DF                 Standing 3 x 10  Standing 3x10  3x10 (barefoot)  3x10 (barefoot)  3x10 (barefoot)    Standing ankle PF with ball squeeze 3x10       3 x 12 3x12 3 x 15   3 x 15  x45 X 30 X 30 X 30 30x 3 x 10 3x10       Ankle AROM inversion/eversion                 3 x 10 ea  3x10 each    3x10 each    Shuttle squats DBL/SGL 3 bands 3 x 12   2 bands 3 x 10  3 bands 3 x 12   2 bands 3 x 10  2.5 bands 3x12  1.5 bands 2x12 2.5 bands 3x12  1.5 bands 2x12 2 bands. 40x  1 band 30x 2 bands. 40x  1 band 30x Eccentric. Back with 2 down with 1. 2 bands, 30x Eccentric. Back with 2 down with 1. 2 bands, 30x 3.5 bands 3x12 DL  2 bands " "30x SL  3.5 bands 3x12 DL  2 bands 30x SL  3.5 bands 3x12 DL  2 bands 30x SL  3.5 bands 3x12 DL  2 bands 30x SL 3.5 bands 3x12 DL  2 bands 30x SL 3.5 bands 3x12 DL   2 bands 30 x SL 3.5 bands 3x12 DL  2 bands 25x SL 3.5 bands 3x15  DL  2 bands 3 x 10 SL  3.5 bands 3x15   3.5 bands 3x15 / 2 bands 3x10  3 bands/2 bands 3x10 each   Standing hip abduction      3 x 8 RTB 3x8 RTB          3 x 10 YTB  3 x 10 each YTB 3 x 10 each YTB 3 x 10 each YTB    Standing hip extension                 3 x 10 YTB  3 x 10 each YTB      Sit to stand                 3 x 10 YTB 3 x 10 YTB  No  No    Lateral squat walks / Monster walks 5 laps GTB  5 laps GTB  5 laps GTB  5 laps GTB    5L GTB  5 laps, GTB 5L 5L 5L  5 laps  5 laps at bar RTB 5 laps at bar RTB 5 laps at bar        Lateral step downs  4" 3 x 8 4" box. 3x8  4" box. 3x8   X 20 4" box 20x 4" box      4" box 4 x 6  4" box 4x6        Millie stepping Fwd and lateral, 20x each Fwd and lateral, 20x each Fwd and lateral, 20x each Fwd and lateral, 20x each          Single millie, only LLE over millie. 30 x  Single millie, only LLE over millie. 25x        Step ups  X 30 4" box 30x 4" box  30x 4" box   X 30 20x each way 4' x 30 fwd/lat  4' x 30 fwd/lat  4' x 30 fwd/lat 4" x 30, x 30  4" 30x, lateral 20x          Millie step       Fwd, 1 leg, RL on ground  30x Fwd, 1 leg, RL on ground  20x                 Manual Therapy ( billed as TE): 0'    TC distraction   AP down glides to improve DF   ASTYM to anterior and posterior calf.       Home Exercise Program (HEP) Provided and Patient Education Provided     Patient was provided education on 10/09/2023.     Written Home Exercises Provided: Patient instructed to cont prior home program.  Exercises were reviewed and Mehreen was able to demonstrate them prior to the end of the session.  Mehreen demonstrated good  understanding of the education provided.     Assessment     Pt performed all exercises good today with fatigue at the end of " session. No pain was reported post treatment.  Will continue to monitor and progress pt as tolerated.     Mehreen Is progressing well towards her goals.     Patient will continue to benefit from skilled outpatient physical therapy to address the deficits listed in the problem list box on initial evaluation, provide patient/family education and to maximize patient's level of independence in the home and community environment.     Patient prognosis is Good.     Patient's spiritual, cultural and educational needs considered and patient agreeable to plan of care and goals.    Anticipated barriers to physical therapy: None    GOALS  Short Term Goals (4 weeks):  1. Patient will have improved PROM of the right ankle to WFLs.  2. Patient will have decreased complaints of pain to 3/10.  3. Patient will be independent and compliant with issued HEP.  4. Patient will maintain right SLS for at least 15 seconds.      Long Term Goals (8 weeks):   Patient will have improved AROM of the right ankle to WFLs.  2. Patient will have improved strength of the right ankle to 4+/5.  3. Patient will be able to resume prior functional level with no deficits.   4. Patient will have overall improvement in condition to have decreased score on FOTO to <40%.     Plan   Continue PT per POC.     Salma Devries, PT,DPT

## 2024-01-11 DIAGNOSIS — I10 PRIMARY HYPERTENSION: ICD-10-CM

## 2024-01-11 NOTE — TELEPHONE ENCOUNTER
----- Message from Ramin Boucher sent at 1/10/2024  9:40 AM CST -----  Contact: Pt  .Type:  Needs Medical Advice    Who Called: pt    Pharmacy name and phone #:  [facility] - [place] [address]   Phone: [phone]  Fax: [phone]        Would the patient rather a call back or a response via [facility]?  Call back  Best Call Back Number:    [phone]  Additional Information: Pt. is requesting a refill on her     hydroCHLOROthiazide (HYDRODIURIL) 25 MG tablet   losartan (COZAAR) 25 MG tablet

## 2024-01-12 ENCOUNTER — CLINICAL SUPPORT (OUTPATIENT)
Dept: REHABILITATION | Facility: HOSPITAL | Age: 64
End: 2024-01-12
Payer: MEDICAID

## 2024-01-12 DIAGNOSIS — R26.9 ABNORMALITY OF GAIT AND MOBILITY: ICD-10-CM

## 2024-01-12 DIAGNOSIS — M25.671 DECREASED RANGE OF MOTION OF RIGHT ANKLE: Primary | ICD-10-CM

## 2024-01-12 PROCEDURE — 97110 THERAPEUTIC EXERCISES: CPT | Mod: PO,CQ

## 2024-01-12 RX ORDER — HYDROCHLOROTHIAZIDE 25 MG/1
25 TABLET ORAL DAILY
Qty: 90 TABLET | Refills: 3 | Status: SHIPPED | OUTPATIENT
Start: 2024-01-12

## 2024-01-12 NOTE — PROGRESS NOTES
Physical Therapy (PT) Daily Treatment Note     Name: Mehreen Benites  Clinic Number: [id number]    Therapy Diagnosis:   Encounter Diagnoses   Name Primary?    Decreased range of motion of right ankle Yes    Abnormality of gait and mobility        Physician: Axel Kapoor Jr., *    Visit Date: 1/12/2024    Physician Orders: Eval and Treat: - wb: nwb RLE in CAM x 2wks - passive ROM only, then wbat in CAM x 2 wks add active ROM, eval and treat after that  Medical Diagnosis: Z47.89 (ICD-10-CM) - Aftercare following surgery of the musculoskeletal system; M76.61 (ICD-10-CM) - Achilles tendinitis of right lower extremity  Surgical Procedure and Date: 8/25/2023: REPAIR, TENDON, ACHILLES (Right); RESECTION, MUSCLE, GASTROCNEMIUS (Right); EXCISION, EXOSTOSIS, RETROCALCANEAL (Right); ASPIRATION, BONE MARROW (Right)   Evaluation Date: 10/9/2023  Insurance Authorization Period Expiration: 12/31/2023  Plan of Care Certification Period: 1/19/2024  Visit # / Visits authorized: 26/21    FOTO  -Intake: 54%   -Status: Incomplete  -Discharge: Incomplete    Time In: 07:50 AM   Time Out: 08:43  AM  Total Billable Time: 53 minutes     Precautions: Standard    Subjective     Patient reports:  foot is doing good.     She was compliant with home exercise program.  Response to previous treatment: did well    Functional change: ongoing assessment     Pain: /10  Location: right ankles     Objective     Mehreen received therapeutic exercises to develop strength, endurance, ROM, and flexibility for 43 minutes including:    Date  1/12/2024 1/9/2024 1/5/2024 1/2/2024 12/29/2023 12/27/2023 12/22/2023 12/19/2023 12/15/2023 12/8/2023 12/5/2023 12/1/2023 11/28/2023 11/24/2023 11/21/2023 11/17/2023 11/14/2023 11/10/2023 11/7/2023 11/03/2023 10/31/2023    Visit                11/20 10/20 9/20 8/20 7/20 6/20 5/20   Therapeutic Exercise                        Nustep / Recumbent bike  10'  10' 10'  10 8' L4, nustep 10' L3 8' L3 10 8' 10'  10'  10' lvl  "4  10' Lvl 4  8' nustep L2 10'  8' 10' 8' Lvl 4  10'  10' L3  10' L3    Standing gastroc stretch on wedge  30sec x3                 30" x 3   30 sec x 3 times     Piriformis stretch                         SLR                    3 x 10 2# AW ea  3 x 10 2# AW each leg  3 x 10 2# right only    Bridging                    3 x 10 GTB  3 X 10 GTB 3 X 10 GTB   SL clams                    3 x 10 GTB ea  3 X 10 EA GTB 3 X 10 EA GTB   SL hip abduction                    3 x 10 2# AW each leg  3 x 10 2# AW each leg   x 10 2# each    Ankle circles/ABCs                     Discharged    Toe yoga      20 x each way  20x each way  20x each way               Dome raises                      30x    MOBO (odd/even taps)      30x each way  30x resisted on evens 45x each way 45x each way 40 x each way seated 40x each way seated X 30  X 30  30 x each way  Resisted: 30 x  30 x each way  Resisted: 30 x 30x each way  Resisted: 20x     3' each with blue resistance  3' each with blue resistance 30x each with blue resistance   Seated calf raises        10" x 12 (Iso's) 10" x 12 10x no weight, 20x with 20# weight 10x no weight, 20x with 20# weight 7# 3 x 15  7# 3 x 15  7# 3 x 10 7# 3 x 10  5# weight 30x 10x no weight  3x8 with 10# KB  10x no weight  3x8 with 10# KB   3x10 (barefoot)  3x10 (barefoot)  30x (barefoot)    Seated ankle DF                  Standing 3 x 10  Standing 3x10  3x10 (barefoot)  3x10 (barefoot)  3x10 (barefoot)    Standing ankle PF with ball squeeze 3 x 10  3x10       3 x 12 3x12 3 x 15   3 x 15  x45 X 30 X 30 X 30 30x 3 x 10 3x10       Ankle AROM inversion/eversion                  3 x 10 ea  3x10 each    3x10 each    Shuttle squats DBL/SGL 3 bands 3 x 12   2 bands 3 x 10  3 bands 3 x 12   2 bands 3 x 10  3 bands 3 x 12   2 bands 3 x 10  2.5 bands 3x12  1.5 bands 2x12 2.5 bands 3x12  1.5 bands 2x12 2 bands. 40x  1 band 30x 2 bands. 40x  1 band 30x Eccentric. Back with 2 down with 1. 2 bands, 30x Eccentric. Back with 2 " "down with 1. 2 bands, 30x 3.5 bands 3x12 DL  2 bands 30x SL  3.5 bands 3x12 DL  2 bands 30x SL  3.5 bands 3x12 DL  2 bands 30x SL  3.5 bands 3x12 DL  2 bands 30x SL 3.5 bands 3x12 DL  2 bands 30x SL 3.5 bands 3x12 DL   2 bands 30 x SL 3.5 bands 3x12 DL  2 bands 25x SL 3.5 bands 3x15  DL  2 bands 3 x 10 SL  3.5 bands 3x15   3.5 bands 3x15 / 2 bands 3x10  3 bands/2 bands 3x10 each   Standing hip abduction       3 x 8 RTB 3x8 RTB          3 x 10 YTB  3 x 10 each YTB 3 x 10 each YTB 3 x 10 each YTB    Standing hip extension                  3 x 10 YTB  3 x 10 each YTB      Sit to stand                  3 x 10 YTB 3 x 10 YTB  No  No    Lateral squat walks / Monster walks 5 laps GTB  5 laps GTB  5 laps GTB  5 laps GTB  5 laps GTB    5L GTB  5 laps, GTB 5L 5L 5L  5 laps  5 laps at bar RTB 5 laps at bar RTB 5 laps at bar        Lateral step downs   4" 3 x 8 4" box. 3x8  4" box. 3x8   X 20 4" box 20x 4" box      4" box 4 x 6  4" box 4x6        Millie stepping Fwd and lateral, 20x each Fwd and lateral, 20x each Fwd and lateral, 20x each Fwd and lateral, 20x each Fwd and lateral, 20x each          Single millie, only LLE over millie. 30 x  Single millie, only LLE over millie. 25x        Step ups   X 30 4" box 30x 4" box  30x 4" box   X 30 20x each way 4' x 30 fwd/lat  4' x 30 fwd/lat  4' x 30 fwd/lat 4" x 30, x 30  4" 30x, lateral 20x          Millie step        Fwd, 1 leg, RL on ground  30x Fwd, 1 leg, RL on ground  20x                 Manual Therapy ( billed as TE): 10'    TC distraction   AP down glides to improve DF   ASTYM to anterior and posterior calf.       Home Exercise Program (HEP) Provided and Patient Education Provided     Patient was provided education on 10/09/2023.     Written Home Exercises Provided: Patient instructed to cont prior home program.  Exercises were reviewed and Mehreen was able to demonstrate them prior to the end of the session.  Mehreen demonstrated good  understanding of the education " provided.     Assessment     Pt continues to do well with her post op rehab.  Pt with noted improvements in her mobility as well as decreases in her pain.  Will continue to monitor and progress pt as tolerated.       Mehreen Is progressing well towards her goals.     Patient will continue to benefit from skilled outpatient physical therapy to address the deficits listed in the problem list box on initial evaluation, provide patient/family education and to maximize patient's level of independence in the home and community environment.     Patient prognosis is Good.     Patient's spiritual, cultural and educational needs considered and patient agreeable to plan of care and goals.    Anticipated barriers to physical therapy: None    GOALS  Short Term Goals (4 weeks):  1. Patient will have improved PROM of the right ankle to WFLs.  2. Patient will have decreased complaints of pain to 3/10.  3. Patient will be independent and compliant with issued HEP.  4. Patient will maintain right SLS for at least 15 seconds.      Long Term Goals (8 weeks):   Patient will have improved AROM of the right ankle to WFLs.  2. Patient will have improved strength of the right ankle to 4+/5.  3. Patient will be able to resume prior functional level with no deficits.   4. Patient will have overall improvement in condition to have decreased score on FOTO to <40%.     Plan   Continue PT per POC.     Maury Subramanian, PTA

## 2024-01-16 ENCOUNTER — CLINICAL SUPPORT (OUTPATIENT)
Dept: REHABILITATION | Facility: HOSPITAL | Age: 64
End: 2024-01-16
Payer: MEDICAID

## 2024-01-16 DIAGNOSIS — M25.671 DECREASED RANGE OF MOTION OF RIGHT ANKLE: Primary | ICD-10-CM

## 2024-01-16 DIAGNOSIS — R26.9 ABNORMALITY OF GAIT AND MOBILITY: ICD-10-CM

## 2024-01-16 PROCEDURE — 97110 THERAPEUTIC EXERCISES: CPT | Mod: PO,CQ

## 2024-01-16 NOTE — PROGRESS NOTES
Physical Therapy (PT) Daily Treatment Note     Name: Mehreen Benites  Clinic Number: [id number]    Therapy Diagnosis:   Encounter Diagnoses   Name Primary?    Decreased range of motion of right ankle Yes    Abnormality of gait and mobility          Physician: Axel Kapoor Jr., *    Visit Date: 1/16/2024    Physician Orders: Eval and Treat: - wb: nwb RLE in CAM x 2wks - passive ROM only, then wbat in CAM x 2 wks add active ROM, eval and treat after that  Medical Diagnosis: Z47.89 (ICD-10-CM) - Aftercare following surgery of the musculoskeletal system; M76.61 (ICD-10-CM) - Achilles tendinitis of right lower extremity  Surgical Procedure and Date: 8/25/2023: REPAIR, TENDON, ACHILLES (Right); RESECTION, MUSCLE, GASTROCNEMIUS (Right); EXCISION, EXOSTOSIS, RETROCALCANEAL (Right); ASPIRATION, BONE MARROW (Right)   Evaluation Date: 10/9/2023  Insurance Authorization Period Expiration: 12/31/2023  Plan of Care Certification Period: 1/19/2024  Visit # / Visits authorized: 26/21    FOTO  -Intake: 54%   -Status: Incomplete  -Discharge: Incomplete    Time In: 10:55 AM   Time Out: 11:40  AM  Total Billable Time: 45 minutes     Precautions: Standard    Subjective     Patient reports: no new issues or concerns at this time.     She was compliant with home exercise program.  Response to previous treatment: did well    Functional change: ongoing assessment     Pain: /10  Location: right ankles     Objective     Mehreen received therapeutic exercises to develop strength, endurance, ROM, and flexibility for 35 minutes including:    Date  1/16/2024 1/12/2024 1/9/2024 1/5/2024 1/2/2024 12/29/2023 12/27/2023 12/22/2023 12/19/2023 12/15/2023 12/8/2023 12/5/2023 12/1/2023 11/28/2023 11/24/2023 11/21/2023 11/17/2023 11/14/2023 11/10/2023 11/7/2023 11/03/2023 10/31/2023    Visit                 11/20 10/20 9/20 8/20 7/20 6/20 5/20   Therapeutic Exercise                         Nustep / Recumbent bike  10' 10'  10' 10'  10 8' L4, joe  "10' L3 8' L3 10 8' 10'  10'  10' lvl 4  10' Lvl 4  8' nustep L2 10'  8' 10' 8' Lvl 4  10'  10' L3  10' L3    Standing gastroc stretch on wedge   30sec x3                 30" x 3   30 sec x 3 times     Piriformis stretch                          SLR                     3 x 10 2# AW ea  3 x 10 2# AW each leg  3 x 10 2# right only    Bridging                     3 x 10 GTB  3 X 10 GTB 3 X 10 GTB   SL clams                     3 x 10 GTB ea  3 X 10 EA GTB 3 X 10 EA GTB   SL hip abduction                     3 x 10 2# AW each leg  3 x 10 2# AW each leg   x 10 2# each    Ankle circles/ABCs                      Discharged    Toe yoga       20 x each way  20x each way  20x each way               Dome raises                       30x    MOBO (odd/even taps)       30x each way  30x resisted on evens 45x each way 45x each way 40 x each way seated 40x each way seated X 30  X 30  30 x each way  Resisted: 30 x  30 x each way  Resisted: 30 x 30x each way  Resisted: 20x     3' each with blue resistance  3' each with blue resistance 30x each with blue resistance   Seated calf raises         10" x 12 (Iso's) 10" x 12 10x no weight, 20x with 20# weight 10x no weight, 20x with 20# weight 7# 3 x 15  7# 3 x 15  7# 3 x 10 7# 3 x 10  5# weight 30x 10x no weight  3x8 with 10# KB  10x no weight  3x8 with 10# KB   3x10 (barefoot)  3x10 (barefoot)  30x (barefoot)    Seated ankle DF                   Standing 3 x 10  Standing 3x10  3x10 (barefoot)  3x10 (barefoot)  3x10 (barefoot)    Standing ankle PF with ball squeeze 3 x 10  3 x 10  3x10       3 x 12 3x12 3 x 15   3 x 15  x45 X 30 X 30 X 30 30x 3 x 10 3x10       Ankle AROM inversion/eversion                   3 x 10 ea  3x10 each    3x10 each    Shuttle squats DBL/SGL 3 bands 3 x 12   2 bands 3 x 10  3 bands 3 x 12   2 bands 3 x 10  3 bands 3 x 12   2 bands 3 x 10  3 bands 3 x 12   2 bands 3 x 10  2.5 bands 3x12  1.5 bands 2x12 2.5 bands 3x12  1.5 bands 2x12 2 bands. 40x  1 band 30x 2 bands. " "40x  1 band 30x Eccentric. Back with 2 down with 1. 2 bands, 30x Eccentric. Back with 2 down with 1. 2 bands, 30x 3.5 bands 3x12 DL  2 bands 30x SL  3.5 bands 3x12 DL  2 bands 30x SL  3.5 bands 3x12 DL  2 bands 30x SL  3.5 bands 3x12 DL  2 bands 30x SL 3.5 bands 3x12 DL  2 bands 30x SL 3.5 bands 3x12 DL   2 bands 30 x SL 3.5 bands 3x12 DL  2 bands 25x SL 3.5 bands 3x15  DL  2 bands 3 x 10 SL  3.5 bands 3x15   3.5 bands 3x15 / 2 bands 3x10  3 bands/2 bands 3x10 each   Standing hip abduction        3 x 8 RTB 3x8 RTB          3 x 10 YTB  3 x 10 each YTB 3 x 10 each YTB 3 x 10 each YTB    Standing hip extension                   3 x 10 YTB  3 x 10 each YTB      Sit to stand                   3 x 10 YTB 3 x 10 YTB  No  No    Lateral squat walks / Monster walks 5L GTB 5 laps GTB  5 laps GTB  5 laps GTB  5 laps GTB  5 laps GTB    5L GTB  5 laps, GTB 5L 5L 5L  5 laps  5 laps at bar RTB 5 laps at bar RTB 5 laps at bar        Lateral step downs    4" 3 x 8 4" box. 3x8  4" box. 3x8   X 20 4" box 20x 4" box      4" box 4 x 6  4" box 4x6        Millie stepping Fwd and lateral, 30x each Fwd and lateral, 30x each Fwd and lateral, 20x each Fwd and lateral, 20x each Fwd and lateral, 20x each Fwd and lateral, 20x each          Single millie, only LLE over millie. 30 x  Single millie, only LLE over millie. 25x        Step ups    X 30 4" box 30x 4" box  30x 4" box   X 30 20x each way 4' x 30 fwd/lat  4' x 30 fwd/lat  4' x 30 fwd/lat 4" x 30, x 30  4" 30x, lateral 20x          Millie step         Fwd, 1 leg, RL on ground  30x Fwd, 1 leg, RL on ground  20x                 Manual Therapy ( billed as TE): 10'    TC distraction   AP down glides to improve DF   ASTYM to anterior and posterior calf.       Home Exercise Program (HEP) Provided and Patient Education Provided     Patient was provided education on 10/09/2023.     Written Home Exercises Provided: Patient instructed to cont prior home program.  Exercises were reviewed and Mehreen " was able to demonstrate them prior to the end of the session.  Mehreen demonstrated good  understanding of the education provided.     Assessment     Pt with no pain post session. Pt with good exercise tolerance throughout treatment session.  Will continue to monitor and progress pt as tolerated.     Mehreen Is progressing well towards her goals.     Patient will continue to benefit from skilled outpatient physical therapy to address the deficits listed in the problem list box on initial evaluation, provide patient/family education and to maximize patient's level of independence in the home and community environment.     Patient prognosis is Good.     Patient's spiritual, cultural and educational needs considered and patient agreeable to plan of care and goals.    Anticipated barriers to physical therapy: None    GOALS  Short Term Goals (4 weeks):  1. Patient will have improved PROM of the right ankle to WFLs.  2. Patient will have decreased complaints of pain to 3/10.  3. Patient will be independent and compliant with issued HEP.  4. Patient will maintain right SLS for at least 15 seconds.      Long Term Goals (8 weeks):   Patient will have improved AROM of the right ankle to WFLs.  2. Patient will have improved strength of the right ankle to 4+/5.  3. Patient will be able to resume prior functional level with no deficits.   4. Patient will have overall improvement in condition to have decreased score on FOTO to <40%.     Plan   Continue PT per POC.     Maury Subramanian, PTA

## 2024-01-19 ENCOUNTER — CLINICAL SUPPORT (OUTPATIENT)
Dept: REHABILITATION | Facility: HOSPITAL | Age: 64
End: 2024-01-19
Payer: MEDICAID

## 2024-01-19 DIAGNOSIS — M25.671 DECREASED RANGE OF MOTION OF RIGHT ANKLE: Primary | ICD-10-CM

## 2024-01-19 DIAGNOSIS — R26.9 ABNORMALITY OF GAIT AND MOBILITY: ICD-10-CM

## 2024-01-19 PROCEDURE — 97110 THERAPEUTIC EXERCISES: CPT | Mod: PO,CQ

## 2024-01-19 NOTE — PROGRESS NOTES
Physical Therapy (PT) Daily Treatment Note     Name: Mehreen Benites  Clinic Number: [id number]    Therapy Diagnosis:   Encounter Diagnoses   Name Primary?    Decreased range of motion of right ankle Yes    Abnormality of gait and mobility          Physician: Axel Kapoor Jr., *    Visit Date: 1/19/2024    Physician Orders: Eval and Treat: - wb: nwb RLE in CAM x 2wks - passive ROM only, then wbat in CAM x 2 wks add active ROM, eval and treat after that  Medical Diagnosis: Z47.89 (ICD-10-CM) - Aftercare following surgery of the musculoskeletal system; M76.61 (ICD-10-CM) - Achilles tendinitis of right lower extremity  Surgical Procedure and Date: 8/25/2023: REPAIR, TENDON, ACHILLES (Right); RESECTION, MUSCLE, GASTROCNEMIUS (Right); EXCISION, EXOSTOSIS, RETROCALCANEAL (Right); ASPIRATION, BONE MARROW (Right)   Evaluation Date: 10/9/2023  Insurance Authorization Period Expiration: 12/31/2023  Plan of Care Certification Period: 1/19/2024  Visit # / Visits authorized: 28/21    FOTO  -Intake: 54%   -Status: Incomplete  -Discharge: Incomplete    Time In: 09:50 AM   Time Out: 10:40  AM  Total Billable Time: 50 minutes     Precautions: Standard    Subjective     Patient reports: no new issues or concerns at this time.     She was compliant with home exercise program.  Response to previous treatment: did well    Functional change: ongoing assessment     Pain: /10  Location: right ankles     Objective     Mehreen received therapeutic exercises to develop strength, endurance, ROM, and flexibility for 35 minutes including:    Date  1/19/2024 1/16/2024 1/12/2024 1/9/2024 1/5/2024 1/2/2024 12/29/2023 12/27/2023 12/22/2023 12/19/2023 12/15/2023 12/8/2023 12/5/2023 12/1/2023 11/28/2023 11/24/2023 11/21/2023 11/17/2023 11/14/2023 11/10/2023 11/7/2023 11/03/2023 10/31/2023    Visit                  11/20 10/20 9/20 8/20 7/20 6/20 5/20   Therapeutic Exercise                          Nustep / Recumbent bike  10'  10' 10'  10' 10'   "10 8' L4, nustep 10' L3 8' L3 10 8' 10'  10'  10' lvl 4  10' Lvl 4  8' nustep L2 10'  8' 10' 8' Lvl 4  10'  10' L3  10' L3    Standing gastroc stretch on wedge    30sec x3                 30" x 3   30 sec x 3 times     Piriformis stretch                           SLR                      3 x 10 2# AW ea  3 x 10 2# AW each leg  3 x 10 2# right only    Bridging                      3 x 10 GTB  3 X 10 GTB 3 X 10 GTB   SL clams                      3 x 10 GTB ea  3 X 10 EA GTB 3 X 10 EA GTB   SL hip abduction                      3 x 10 2# AW each leg  3 x 10 2# AW each leg   x 10 2# each    Ankle circles/ABCs                       Discharged    Toe yoga        20 x each way  20x each way  20x each way               Dome raises                        30x    MOBO (odd/even taps)        30x each way  30x resisted on evens 45x each way 45x each way 40 x each way seated 40x each way seated X 30  X 30  30 x each way  Resisted: 30 x  30 x each way  Resisted: 30 x 30x each way  Resisted: 20x     3' each with blue resistance  3' each with blue resistance 30x each with blue resistance   Seated calf raises          10" x 12 (Iso's) 10" x 12 10x no weight, 20x with 20# weight 10x no weight, 20x with 20# weight 7# 3 x 15  7# 3 x 15  7# 3 x 10 7# 3 x 10  5# weight 30x 10x no weight  3x8 with 10# KB  10x no weight  3x8 with 10# KB   3x10 (barefoot)  3x10 (barefoot)  30x (barefoot)    Seated ankle DF                    Standing 3 x 10  Standing 3x10  3x10 (barefoot)  3x10 (barefoot)  3x10 (barefoot)    Standing ankle PF with ball squeeze  3 x 10  3 x 10  3x10       3 x 12 3x12 3 x 15   3 x 15  x45 X 30 X 30 X 30 30x 3 x 10 3x10       Ankle AROM inversion/eversion                    3 x 10 ea  3x10 each    3x10 each    Shuttle squats DBL/SGL 4 bands 3 x 15   2 bands 3 x 15  3 bands 3 x 12   2 bands 3 x 10  3 bands 3 x 12   2 bands 3 x 10  3 bands 3 x 12   2 bands 3 x 10  3 bands 3 x 12   2 bands 3 x 10  2.5 bands 3x12  1.5 bands 2x12 " "2.5 bands 3x12  1.5 bands 2x12 2 bands. 40x  1 band 30x 2 bands. 40x  1 band 30x Eccentric. Back with 2 down with 1. 2 bands, 30x Eccentric. Back with 2 down with 1. 2 bands, 30x 3.5 bands 3x12 DL  2 bands 30x SL  3.5 bands 3x12 DL  2 bands 30x SL  3.5 bands 3x12 DL  2 bands 30x SL  3.5 bands 3x12 DL  2 bands 30x SL 3.5 bands 3x12 DL  2 bands 30x SL 3.5 bands 3x12 DL   2 bands 30 x SL 3.5 bands 3x12 DL  2 bands 25x SL 3.5 bands 3x15  DL  2 bands 3 x 10 SL  3.5 bands 3x15   3.5 bands 3x15 / 2 bands 3x10  3 bands/2 bands 3x10 each   Standing hip abduction         3 x 8 RTB 3x8 RTB          3 x 10 YTB  3 x 10 each YTB 3 x 10 each YTB 3 x 10 each YTB    Standing hip extension                    3 x 10 YTB  3 x 10 each YTB      Sit to stand                    3 x 10 YTB 3 x 10 YTB  No  No    Lateral squat walks / Monster walks 5L GTB 5L GTB 5 laps GTB  5 laps GTB  5 laps GTB  5 laps GTB  5 laps GTB    5L GTB  5 laps, GTB 5L 5L 5L  5 laps  5 laps at bar RTB 5 laps at bar RTB 5 laps at bar        Lateral step downs     4" 3 x 8 4" box. 3x8  4" box. 3x8   X 20 4" box 20x 4" box      4" box 4 x 6  4" box 4x6        Millie stepping X 30 ea  Fwd and lateral, 30x each Fwd and lateral, 30x each Fwd and lateral, 20x each Fwd and lateral, 20x each Fwd and lateral, 20x each Fwd and lateral, 20x each          Single millie, only LLE over millie. 30 x  Single millie, only LLE over millie. 25x        Step ups     X 30 4" box 30x 4" box  30x 4" box   X 30 20x each way 4' x 30 fwd/lat  4' x 30 fwd/lat  4' x 30 fwd/lat 4" x 30, x 30  4" 30x, lateral 20x          Millie step          Fwd, 1 leg, RL on ground  30x Fwd, 1 leg, RL on ground  20x                 Manual Therapy ( billed as TE): 10'    TC distraction   AP down glides to improve DF   ASTYM to anterior and posterior calf.       Home Exercise Program (HEP) Provided and Patient Education Provided     Patient was provided education on 10/09/2023.     Written Home Exercises Provided: " Patient instructed to cont prior home program.  Exercises were reviewed and Mehreen was able to demonstrate them prior to the end of the session.  Mehreen demonstrated good  understanding of the education provided.     Assessment     Pt continues to do well with her PT POC.  Pt with no pain post session.  Will continue to monitor and progress pt as tolerated.     Mehreen Is progressing well towards her goals.     Patient will continue to benefit from skilled outpatient physical therapy to address the deficits listed in the problem list box on initial evaluation, provide patient/family education and to maximize patient's level of independence in the home and community environment.     Patient prognosis is Good.     Patient's spiritual, cultural and educational needs considered and patient agreeable to plan of care and goals.    Anticipated barriers to physical therapy: None    GOALS  Short Term Goals (4 weeks):  1. Patient will have improved PROM of the right ankle to WFLs.  2. Patient will have decreased complaints of pain to 3/10.  3. Patient will be independent and compliant with issued HEP.  4. Patient will maintain right SLS for at least 15 seconds.      Long Term Goals (8 weeks):   Patient will have improved AROM of the right ankle to WFLs.  2. Patient will have improved strength of the right ankle to 4+/5.  3. Patient will be able to resume prior functional level with no deficits.   4. Patient will have overall improvement in condition to have decreased score on FOTO to <40%.     Plan   Continue PT per POC.     Maury Subramanian, PTA

## 2024-01-23 ENCOUNTER — CLINICAL SUPPORT (OUTPATIENT)
Dept: REHABILITATION | Facility: HOSPITAL | Age: 64
End: 2024-01-23
Payer: MEDICAID

## 2024-01-23 DIAGNOSIS — R26.9 ABNORMALITY OF GAIT AND MOBILITY: ICD-10-CM

## 2024-01-23 DIAGNOSIS — M25.671 DECREASED RANGE OF MOTION OF RIGHT ANKLE: Primary | ICD-10-CM

## 2024-01-23 PROCEDURE — 97110 THERAPEUTIC EXERCISES: CPT | Mod: PO | Performed by: PHYSICAL THERAPIST

## 2024-01-23 NOTE — PROGRESS NOTES
Physical Therapy (PT) Discharge Treatment Note     Name: Mehreen Benites  Clinic Number: [id number]    Therapy Diagnosis:   Encounter Diagnoses   Name Primary?    Decreased range of motion of right ankle Yes    Abnormality of gait and mobility        Physician: Axel Kaopor Jr., *    Visit Date: 1/23/2024    Physician Orders: Eval and Treat: - wb: nwb RLE in CAM x 2wks - passive ROM only, then wbat in CAM x 2 wks add active ROM, eval and treat after that  Medical Diagnosis: Z47.89 (ICD-10-CM) - Aftercare following surgery of the musculoskeletal system; M76.61 (ICD-10-CM) - Achilles tendinitis of right lower extremity  Surgical Procedure and Date: 8/25/2023: REPAIR, TENDON, ACHILLES (Right); RESECTION, MUSCLE, GASTROCNEMIUS (Right); EXCISION, EXOSTOSIS, RETROCALCANEAL (Right); ASPIRATION, BONE MARROW (Right)   Evaluation Date: 10/9/2023  Insurance Authorization Period Expiration: 12/31/2024  Plan of Care Certification Period: 1/19/2024 - discharge today  Visit # / Visits authorized: 7/20 (+22)      Time In: 100 PM  Time Out: 200 PM  Total Billable Time: 55 minutes     Precautions: Standard    Subjective     Patient reports: is doing well overall. Has some burning in heel but doing better.     She was compliant with home exercise program.  Response to previous treatment: did well    Functional change: ongoing assessment     Pain: /10  Location: right ankles     Objective     Mehreen received therapeutic exercises to develop strength, endurance, ROM, and flexibility for 35 minutes including:    Date  1/23/2024 1/19/2024 1/16/2024 1/12/2024 1/9/2024 1/5/2024 1/2/2024 12/29/2023 12/27/2023 12/22/2023 12/19/2023 12/15/2023 12/8/2023 12/5/2023 12/1/2023 11/28/2023 11/24/2023 11/21/2023 11/17/2023 11/14/2023 11/10/2023 11/7/2023 11/03/2023 10/31/2023    Visit                   11/20 10/20 9/20 8/20 7/20 6/20 5/20   Therapeutic Exercise                           Nustep / Recumbent bike  8', L4 10'  10' 10'  10' 10'   "10 8' L4, nustep 10' L3 8' L3 10 8' 10'  10'  10' lvl 4  10' Lvl 4  8' nustep L2 10'  8' 10' 8' Lvl 4  10'  10' L3  10' L3    Standing gastroc stretch on wedge     30sec x3                 30" x 3   30 sec x 3 times     Piriformis stretch                            SLR                       3 x 10 2# AW ea  3 x 10 2# AW each leg  3 x 10 2# right only    Bridging                       3 x 10 GTB  3 X 10 GTB 3 X 10 GTB   SL clams                       3 x 10 GTB ea  3 X 10 EA GTB 3 X 10 EA GTB   SL hip abduction                       3 x 10 2# AW each leg  3 x 10 2# AW each leg   x 10 2# each    Ankle circles/ABCs                        Discharged    Toe yoga         20 x each way  20x each way  20x each way               Dome raises                         30x    MOBO (odd/even taps)         30x each way  30x resisted on evens 45x each way 45x each way 40 x each way seated 40x each way seated X 30  X 30  30 x each way  Resisted: 30 x  30 x each way  Resisted: 30 x 30x each way  Resisted: 20x     3' each with blue resistance  3' each with blue resistance 30x each with blue resistance   Seated calf raises   30x each way        10" x 12 (Iso's) 10" x 12 10x no weight, 20x with 20# weight 10x no weight, 20x with 20# weight 7# 3 x 15  7# 3 x 15  7# 3 x 10 7# 3 x 10  5# weight 30x 10x no weight  3x8 with 10# KB  10x no weight  3x8 with 10# KB   3x10 (barefoot)  3x10 (barefoot)  30x (barefoot)    Seated ankle DF  5# 30x on 4" box                   Standing 3 x 10  Standing 3x10  3x10 (barefoot)  3x10 (barefoot)  3x10 (barefoot)    Standing ankle PF with ball squeeze 3x8  3 x 10  3 x 10  3x10       3 x 12 3x12 3 x 15   3 x 15  x45 X 30 X 30 X 30 30x 3 x 10 3x10       Ankle AROM inversion/eversion                     3 x 10 ea  3x10 each    3x10 each    Shuttle squats DBL/SGL 4 bands 3x12  2.5 bands 3x8 4 bands 3 x 15   2 bands 3 x 15  3 bands 3 x 12   2 bands 3 x 10  3 bands 3 x 12   2 bands 3 x 10  3 bands 3 x 12   2 bands " "3 x 10  3 bands 3 x 12   2 bands 3 x 10  2.5 bands 3x12  1.5 bands 2x12 2.5 bands 3x12  1.5 bands 2x12 2 bands. 40x  1 band 30x 2 bands. 40x  1 band 30x Eccentric. Back with 2 down with 1. 2 bands, 30x Eccentric. Back with 2 down with 1. 2 bands, 30x 3.5 bands 3x12 DL  2 bands 30x SL  3.5 bands 3x12 DL  2 bands 30x SL  3.5 bands 3x12 DL  2 bands 30x SL  3.5 bands 3x12 DL  2 bands 30x SL 3.5 bands 3x12 DL  2 bands 30x SL 3.5 bands 3x12 DL   2 bands 30 x SL 3.5 bands 3x12 DL  2 bands 25x SL 3.5 bands 3x15  DL  2 bands 3 x 10 SL  3.5 bands 3x15   3.5 bands 3x15 / 2 bands 3x10  3 bands/2 bands 3x10 each   Standing hip abduction          3 x 8 RTB 3x8 RTB          3 x 10 YTB  3 x 10 each YTB 3 x 10 each YTB 3 x 10 each YTB    Standing hip extension                     3 x 10 YTB  3 x 10 each YTB      Sit to stand                     3 x 10 YTB 3 x 10 YTB  No  No    Lateral squat walks / Monster walks 5 Laps with GTB 5L GTB 5L GTB 5 laps GTB  5 laps GTB  5 laps GTB  5 laps GTB  5 laps GTB    5L GTB  5 laps, GTB 5L 5L 5L  5 laps  5 laps at bar RTB 5 laps at bar RTB 5 laps at bar        Lateral step downs 4" box 2x8     4" 3 x 8 4" box. 3x8  4" box. 3x8   X 20 4" box 20x 4" box      4" box 4 x 6  4" box 4x6        Millie stepping X30 just fwd X 30 ea  Fwd and lateral, 30x each Fwd and lateral, 30x each Fwd and lateral, 20x each Fwd and lateral, 20x each Fwd and lateral, 20x each Fwd and lateral, 20x each          Single millie, only LLE over millie. 30 x  Single millie, only LLE over millie. 25x        Step ups 4" box 30x, forward and side      X 30 4" box 30x 4" box  30x 4" box   X 30 20x each way 4' x 30 fwd/lat  4' x 30 fwd/lat  4' x 30 fwd/lat 4" x 30, x 30  4" 30x, lateral 20x          Millie step           Fwd, 1 leg, RL on ground  30x Fwd, 1 leg, RL on ground  20x                     Home Exercise Program (HEP) Provided and Patient Education Provided     Patient was provided education on 10/09/2023.     Written Home " Exercises Provided: Patient instructed to cont prior home program.  Exercises were reviewed and Mehreen was able to demonstrate them prior to the end of the session.  Mehreen demonstrated good  understanding of the education provided.     Assessment     Pt progressed well with improving strength in heel . Does well with strengthening progressions. Will benefit from further improvements through HEP in calf strength along with improving gait mechanics with balance.     Mehreen Is progressing well towards her goals.     Patient will continue to benefit from skilled outpatient physical therapy to address the deficits listed in the problem list box on initial evaluation, provide patient/family education and to maximize patient's level of independence in the home and community environment.     Patient prognosis is Good.     Patient's spiritual, cultural and educational needs considered and patient agreeable to plan of care and goals.    Anticipated barriers to physical therapy: None    GOALS  Short Term Goals (4 weeks):  1. Patient will have improved PROM of the right ankle to WFLs. MET  2. Patient will have decreased complaints of pain to 3/10. MET  3. Patient will be independent and compliant with issued HEP. MET  4. Patient will maintain right SLS for at least 15 seconds. MET     Long Term Goals (8 weeks):   Patient will have improved AROM of the right ankle to WFLs. MET  2. Patient will have improved strength of the right ankle to 4+/5. MET  3. Patient will be able to resume prior functional level with no deficits. MET   4. Patient will have overall improvement in condition to have decreased score on FOTO to <40%. MET    Plan   Discharge today.    Rigo Deshpande, PT

## 2024-02-23 ENCOUNTER — TELEPHONE (OUTPATIENT)
Dept: HEMATOLOGY/ONCOLOGY | Facility: CLINIC | Age: 64
End: 2024-02-23
Payer: MEDICAID

## 2024-02-23 ENCOUNTER — LAB VISIT (OUTPATIENT)
Dept: LAB | Facility: HOSPITAL | Age: 64
End: 2024-02-23
Attending: NURSE PRACTITIONER
Payer: MEDICAID

## 2024-02-23 DIAGNOSIS — R53.83 FATIGUE, UNSPECIFIED TYPE: ICD-10-CM

## 2024-02-23 LAB
ALBUMIN SERPL BCP-MCNC: 4 G/DL (ref 3.5–5.2)
ALP SERPL-CCNC: 77 U/L (ref 38–126)
ALT SERPL W/O P-5'-P-CCNC: 25 U/L (ref 10–44)
ANION GAP SERPL CALC-SCNC: 9 MMOL/L (ref 8–16)
AST SERPL-CCNC: 26 U/L (ref 15–46)
BASOPHILS # BLD AUTO: 0.05 K/UL (ref 0–0.2)
BASOPHILS NFR BLD: 0.7 % (ref 0–1.9)
BILIRUB SERPL-MCNC: 0.8 MG/DL (ref 0.1–1)
CALCIUM SERPL-MCNC: 9.3 MG/DL (ref 8.7–10.5)
CHLORIDE SERPL-SCNC: 103 MMOL/L (ref 95–110)
CO2 SERPL-SCNC: 26 MMOL/L (ref 23–29)
CREAT SERPL-MCNC: 1.17 MG/DL (ref 0.5–1.4)
DIFFERENTIAL METHOD BLD: NORMAL
EOSINOPHIL # BLD AUTO: 0.1 K/UL (ref 0–0.5)
EOSINOPHIL NFR BLD: 2.1 % (ref 0–8)
ERYTHROCYTE [DISTWIDTH] IN BLOOD BY AUTOMATED COUNT: 12.5 % (ref 11.5–14.5)
EST. GFR  (NO RACE VARIABLE): 53.1 ML/MIN/1.73 M^2
FERRITIN SERPL-MCNC: 109 NG/ML (ref 20–300)
GLUCOSE SERPL-MCNC: 108 MG/DL (ref 70–110)
HCT VFR BLD AUTO: 40.4 % (ref 37–48.5)
HGB BLD-MCNC: 13.2 G/DL (ref 12–16)
IMM GRANULOCYTES # BLD AUTO: 0.02 K/UL (ref 0–0.04)
IMM GRANULOCYTES NFR BLD AUTO: 0.3 % (ref 0–0.5)
IRON SERPL-MCNC: 67 UG/DL (ref 30–160)
LYMPHOCYTES # BLD AUTO: 2.7 K/UL (ref 1–4.8)
LYMPHOCYTES NFR BLD: 40 % (ref 18–48)
MCH RBC QN AUTO: 29.6 PG (ref 27–31)
MCHC RBC AUTO-ENTMCNC: 32.7 G/DL (ref 32–36)
MCV RBC AUTO: 91 FL (ref 82–98)
MONOCYTES # BLD AUTO: 0.5 K/UL (ref 0.3–1)
MONOCYTES NFR BLD: 7.5 % (ref 4–15)
NEUTROPHILS # BLD AUTO: 3.3 K/UL (ref 1.8–7.7)
NEUTROPHILS NFR BLD: 49.4 % (ref 38–73)
NRBC BLD-RTO: 0 /100 WBC
PLATELET # BLD AUTO: 293 K/UL (ref 150–450)
PMV BLD AUTO: 10.8 FL (ref 9.2–12.9)
POTASSIUM SERPL-SCNC: 4.5 MMOL/L (ref 3.5–5.1)
PROT SERPL-MCNC: 7.9 G/DL (ref 6–8.4)
RBC # BLD AUTO: 4.46 M/UL (ref 4–5.4)
SATURATED IRON: 20 % (ref 20–50)
SODIUM SERPL-SCNC: 138 MMOL/L (ref 136–145)
TOTAL IRON BINDING CAPACITY: 329 UG/DL (ref 250–450)
TRANSFERRIN SERPL-MCNC: 222 MG/DL (ref 200–375)
UUN UR-MCNC: 13 MG/DL (ref 7–17)
WBC # BLD AUTO: 6.68 K/UL (ref 3.9–12.7)

## 2024-02-23 PROCEDURE — 85025 COMPLETE CBC W/AUTO DIFF WBC: CPT | Mod: PN | Performed by: NURSE PRACTITIONER

## 2024-02-23 PROCEDURE — 83540 ASSAY OF IRON: CPT | Mod: PN | Performed by: NURSE PRACTITIONER

## 2024-02-23 PROCEDURE — 80053 COMPREHEN METABOLIC PANEL: CPT | Mod: PN | Performed by: NURSE PRACTITIONER

## 2024-02-23 PROCEDURE — 82728 ASSAY OF FERRITIN: CPT | Performed by: NURSE PRACTITIONER

## 2024-02-23 PROCEDURE — 36415 COLL VENOUS BLD VENIPUNCTURE: CPT | Mod: PN | Performed by: NURSE PRACTITIONER

## 2024-02-27 ENCOUNTER — TELEPHONE (OUTPATIENT)
Dept: CARDIOLOGY | Facility: CLINIC | Age: 64
End: 2024-02-27
Payer: MEDICAID

## 2024-02-27 ENCOUNTER — OFFICE VISIT (OUTPATIENT)
Dept: HEMATOLOGY/ONCOLOGY | Facility: CLINIC | Age: 64
End: 2024-02-27
Payer: MEDICAID

## 2024-02-27 VITALS
DIASTOLIC BLOOD PRESSURE: 78 MMHG | RESPIRATION RATE: 16 BRPM | HEART RATE: 59 BPM | BODY MASS INDEX: 35.87 KG/M2 | OXYGEN SATURATION: 98 % | WEIGHT: 189.81 LBS | SYSTOLIC BLOOD PRESSURE: 169 MMHG

## 2024-02-27 DIAGNOSIS — I10 ESSENTIAL HYPERTENSION: ICD-10-CM

## 2024-02-27 DIAGNOSIS — Z86.718 HISTORY OF DVT (DEEP VEIN THROMBOSIS): ICD-10-CM

## 2024-02-27 DIAGNOSIS — N18.31 STAGE 3A CHRONIC KIDNEY DISEASE: ICD-10-CM

## 2024-02-27 DIAGNOSIS — I26.99 PULMONARY EMBOLISM, BILATERAL: Primary | ICD-10-CM

## 2024-02-27 DIAGNOSIS — Z79.01 CHRONIC ANTICOAGULATION: ICD-10-CM

## 2024-02-27 PROCEDURE — 4010F ACE/ARB THERAPY RXD/TAKEN: CPT | Mod: CPTII,,, | Performed by: NURSE PRACTITIONER

## 2024-02-27 PROCEDURE — 3078F DIAST BP <80 MM HG: CPT | Mod: CPTII,,, | Performed by: NURSE PRACTITIONER

## 2024-02-27 PROCEDURE — 3077F SYST BP >= 140 MM HG: CPT | Mod: CPTII,,, | Performed by: NURSE PRACTITIONER

## 2024-02-27 PROCEDURE — 1160F RVW MEDS BY RX/DR IN RCRD: CPT | Mod: CPTII,,, | Performed by: NURSE PRACTITIONER

## 2024-02-27 PROCEDURE — 99214 OFFICE O/P EST MOD 30 MIN: CPT | Mod: PBBFAC,PO | Performed by: NURSE PRACTITIONER

## 2024-02-27 PROCEDURE — 99214 OFFICE O/P EST MOD 30 MIN: CPT | Mod: S$PBB,,, | Performed by: NURSE PRACTITIONER

## 2024-02-27 PROCEDURE — 99999 PR PBB SHADOW E&M-EST. PATIENT-LVL IV: CPT | Mod: PBBFAC,,, | Performed by: NURSE PRACTITIONER

## 2024-02-27 PROCEDURE — 3008F BODY MASS INDEX DOCD: CPT | Mod: CPTII,,, | Performed by: NURSE PRACTITIONER

## 2024-02-27 PROCEDURE — 1159F MED LIST DOCD IN RCRD: CPT | Mod: CPTII,,, | Performed by: NURSE PRACTITIONER

## 2024-02-27 NOTE — PROGRESS NOTES
PATIENT: Mehreen eBnites  MRN: [id number]  DATE: 2/27/2024    Chief Complaint: PE, DVT    Subjective:     History of Present Illness:   HPI as per Dr Brandt's 8/8/22 office visit, reviewed and verified with pt    She presented to the ED 05/31/2022 with sudden onset lightheadedness, weakness and dyspnea - found to have massive saddle pulmonary embolism with right heart strain and partially occlusive thrombus in the right popliteal vein.  No recent surgeries, immobilizations, long trips or prior history of DVT.    She underwent mechanical thrombectomy because of massive PE causing right heart strain.    Family hx significant for son (40 yo) with DVT/PE and maternal first cousin (41 yo) with DVT/PE.    doing well on xarelto    INTERVAL  - pt here for dvt/pe follow up   - 8/25/23 surgery achilles tendon repair, surgery on Friday and restarted Xarelto on the Sunday  - states she is feeling well with no chest pain, sob/ortiz, lightheadedness or dizziness, easy bleeding or bruising, abdominal pain, n/v/d, hematemesis, melena, hematuria. She is dealing with pain re right ankle post surgery originally doing well but pain has slowly increased again and has ortho follow up tomorrow. Had epigastric pain 3 days ago after eating chicken, this resolved with no recurrence.   - re SALINA history, she is eating more beets  - she is continuing to tolerate xarelto well at 20mg daily. Denies bleeding issues.  - colonoscopy 4/27/23 with Dr Torres, held xarelto 2 days prior. Colonoscopy with hemorrhoids and x1 polyp 4mm in descending colon removed, repeat in 10 yrs.  - bp elevation today, took am hctz and losartan shortly before this appt, no associated symptoms and bp at home when checked pt states systolic in 140s/80s, 147/83 a few days ago.      Past Medical, Surgical, Family and Social History Reviewed.    Medications and Allergies reviewed    Review of Systems   Constitutional:  Negative for fatigue, fever and unexpected weight change.    HENT:  Negative for mouth sores and nosebleeds.    Respiratory:  Negative for chest tightness and shortness of breath.    Cardiovascular:  Negative for chest pain, palpitations and leg swelling.   Gastrointestinal:  Negative for abdominal pain, blood in stool, constipation, diarrhea, nausea and vomiting.   Genitourinary:  Negative for hematuria.   Musculoskeletal:  Positive for arthralgias, back pain and joint swelling (right ankle to foot, recurrent).   Skin:  Negative for pallor.   Neurological:  Negative for dizziness, weakness and light-headedness.   Hematological:  Negative for adenopathy. Does not bruise/bleed easily.   Psychiatric/Behavioral:  Negative for suicidal ideas.    All other systems reviewed and are negative.      Objective:     Vitals:    02/27/24 1011   BP: (!) 169/78   BP Location: Right arm   Patient Position: Sitting   BP Method: Medium (Automatic)   Pulse: (!) 59   Resp: 16   SpO2: 98%   Weight: 86.1 kg (189 lb 13.1 oz)       BMI: Body mass index is 35.87 kg/m².    Physical Exam  Vitals and nursing note reviewed.   Constitutional:       General: She is not in acute distress.  HENT:      Head: Normocephalic and atraumatic.      Right Ear: External ear normal.      Left Ear: External ear normal.   Eyes:      General: No scleral icterus.     Pupils: Pupils are equal, round, and reactive to light.   Cardiovascular:      Rate and Rhythm: Normal rate and regular rhythm.      Pulses: Normal pulses.      Heart sounds: Normal heart sounds. No murmur heard.     Comments: 2+ posterior tib ble  Pulmonary:      Effort: Pulmonary effort is normal. No respiratory distress.      Breath sounds: Normal breath sounds.   Abdominal:      General: Bowel sounds are normal. There is no distension.      Palpations: Abdomen is soft.      Tenderness: There is no abdominal tenderness. There is no guarding.   Musculoskeletal:         General: Normal range of motion.      Cervical back: Normal range of motion and neck  supple. No rigidity.      Right lower leg: No edema.      Left lower leg: No edema.      Comments: BLE negative homans, 2+ distal pulses. Mild swelling to right ankle, nonpitting, posterior ttp with surgical history.    Lymphadenopathy:      Cervical: No cervical adenopathy.   Skin:     General: Skin is warm and dry.      Coloration: Skin is not jaundiced or pale.      Findings: No bruising or erythema.   Neurological:      Mental Status: She is alert and oriented to person, place, and time. Mental status is at baseline.      Gait: Gait normal.   Psychiatric:         Attention and Perception: Attention normal.         Mood and Affect: Mood normal. Mood is not depressed.         Speech: Speech normal.         Behavior: Behavior normal. Behavior is cooperative.         Thought Content: Thought content normal.       ECOG SCORE    1 - Restricted in strenuous activity-ambulatory and able to carry out work of a light nature     Ecog 1 relates to current limitations with right ankle pain      LABS  Lab Results   Component Value Date    WBC 6.68 02/23/2024    HGB 13.2 02/23/2024    HCT 40.4 02/23/2024    MCV 91 02/23/2024     02/23/2024       Lab Results   Component Value Date    DDIMER 0.31 09/27/2022     IMAGING    US BLE 10/23/23  Impression:     No evidence of deep venous thrombosis in either lower extremity.       US Lower Extremity Veins Bilat (2/13/23)  Impression:     No evidence of deep venous thrombosis in either lower extremity.       US Lower Extremity Veins Right 9/27/22  Impression:  No evidence of deep venous thrombosis in the right lower extremity.   Interval resolution of previously identified thrombus within the right popliteal vein.    CTA Chest Non-Coronary(PE Studies) 9/27/22  Impression:     1. Interval improvement of prior bilateral pulmonary arterial thromboemboli.  No residual central pulmonary thrombus identified.  Few small filling defects within segmental arteries potentially representing  "mild residual thrombus.  2. Mosaic attenuation of the bilateral pulmonary parenchyma which may represent increased pulmonary vascular resistance or small airways disease.  3. Mild cardiomegaly.  4. Additional findings as above.             Assessment:       1. Pulmonary embolism, bilateral    2. Chronic anticoagulation    3. History of DVT (deep vein thrombosis)    4. Essential hypertension    5. Stage 3a chronic kidney disease        Plan:   Past records including clinic and ED visits, labs, imaging, medications reviewed as available in epic    Massive unprovoked pulmonary embolism  -1st episode, unprovoked, required mechanical thrombectomy  -positive family history blood clots  -she clearly has a hypercoagulable state based on her clinical presentation  -no need hypercoagulable workup  -continue Xarelto at full dose  -indefinite anticoagulation needed  -9/29/22 follow up with repeat US RLE (9/27/22) showing resolution of right popliteal vein thrombus, CTA chest (9/27/22) "Interval improvement of prior bilateral pulmonary arterial thromboemboli.  No residual central pulmonary thrombus identified.  Few small filling defects within segmental arteries potentially representing mild residual thrombus."  -9/27/22 d-dimer neg  -reports she has adequate xarelto at home, pcp will prescribe, advised to call if any of this changes or pcp unavailable  -CTA chest 1/17/23 "No acute cardiopulmonary disease.  Specifically, no residual filling defects are seen within the pulmonary arteries."  -Negative BLE US 2/13/23  -5/1/23 RLE US negative dvt  - held Xarelto 2 days prior 8/25/23 surgery and started 2 days later with early mobilization, adequate hydration.   -10/23/23 BLE US neg dvt  - 2.23.24 labs cbc normal, iron studies normal, cmp normal except as gfr noted with ckd 3a  - Advised to reach out any questions or concerns. Discussed s/s of pe, dvt and when to seek emergent care. Questions answered.  - rtc 6 months for follow " up    HTN  -bp 169/78 today, no associated symptoms  -took losartan and hctz today prior appt  -mother history AMI early 50s, , pt would like to see cardiology given family history  -management deferred to pcp    CKD stage 3a, SEDRICK history  -GFR 53.1 (24) which is improved  -drinking 4-16oz water bottles most days, to push fluids  -encouraged adequate fluids and pcp follow up          Ceci Herrera, NP-C  [facility]  Hematology/Oncology  [address]  [place]  [address] [phone]

## 2024-02-27 NOTE — TELEPHONE ENCOUNTER
----- Message from Earnestine Macias MA sent at 2/27/2024 10:50 AM CST -----  Regarding: Referral  Good Morning,     Ceci Sharon NP referred pt to cardiology. Can you let us know once pt has been scheduled. Thank you and have a great day.

## 2024-02-28 ENCOUNTER — OFFICE VISIT (OUTPATIENT)
Dept: PODIATRY | Facility: CLINIC | Age: 64
End: 2024-02-28
Payer: MEDICAID

## 2024-02-28 VITALS — HEIGHT: 61 IN | WEIGHT: 189.81 LBS | BODY MASS INDEX: 35.84 KG/M2

## 2024-02-28 DIAGNOSIS — Z47.89 AFTERCARE FOLLOWING SURGERY OF THE MUSCULOSKELETAL SYSTEM: Primary | ICD-10-CM

## 2024-02-28 PROCEDURE — 99999 PR PBB SHADOW E&M-EST. PATIENT-LVL III: CPT | Mod: PBBFAC,,, | Performed by: PODIATRIST

## 2024-02-28 PROCEDURE — 4010F ACE/ARB THERAPY RXD/TAKEN: CPT | Mod: CPTII,,, | Performed by: PODIATRIST

## 2024-02-28 PROCEDURE — 1160F RVW MEDS BY RX/DR IN RCRD: CPT | Mod: CPTII,,, | Performed by: PODIATRIST

## 2024-02-28 PROCEDURE — 1159F MED LIST DOCD IN RCRD: CPT | Mod: CPTII,,, | Performed by: PODIATRIST

## 2024-02-28 PROCEDURE — 3008F BODY MASS INDEX DOCD: CPT | Mod: CPTII,,, | Performed by: PODIATRIST

## 2024-02-28 PROCEDURE — 99213 OFFICE O/P EST LOW 20 MIN: CPT | Mod: PBBFAC,PN | Performed by: PODIATRIST

## 2024-02-28 PROCEDURE — 99213 OFFICE O/P EST LOW 20 MIN: CPT | Mod: S$PBB,,, | Performed by: PODIATRIST

## 2024-02-28 NOTE — PROGRESS NOTES
[facility] - PODIATRY  [address], [address]  [place] [address]  Dept: [phone]  Dept Fax: [phone]    WYATT Peterson Jr., Jr.     Established Patient Visit  Assessment:     1. Aftercare following surgery of the musculoskeletal system            Plan:   MDM    Coding  >12 wk s/p    - pt seen evaluated and managed  -deformity improved compared to preop. Swelling, pain, strength and ROM are improved  -prior to surgery pt was basically immobolized with crutch/CAM boot. Given this, overall pt is still happy and satisfied w/ result  -discussed she needs to cont to use compression for swelling due to h/o clotting  -some postop swelling/edema/discomfort expected up to 1 yr postop    - wb: wbat  - rx dispensed: none  - referrals: none      Follow up if symptoms worsen or fail to improve.      Subjective:      Patient ID: Mehreen Benites is a 63 y.o. female.    Chief Complaint:   Postop  There were no vitals filed for this visit.    DOS: 8/25/23  Procedure: R retrocalc recon      Mehreen Benites returns to the clinic today for a postop visit. Pt is s/p above procedure. Mehreen Benites rates pain at a 0/10 on visual analog scale. Denies n/v/f/c. Her operative and postop course were both uneventful. At last visit she had not completed physical therapy. Now Has finished with PT. Per the notes - she met ALL goals. Reports occasional cramping in the area of her surgical repair which bothers her with strenuous physical activity.    Follow-up          Outside reports reviewed: historical medical records.    Past Medical History:   Diagnosis Date    Hypertension     Other pulmonary embolism without acute cor pulmonale 2022    Tendonitis      Past Surgical History:   Procedure Laterality Date    BONE MARROW ASPIRATION Right 8/25/2023    Procedure: ASPIRATION, BONE MARROW;  Surgeon: Axel Kapoor Jr., DPM;  Location: [facility];  Service:  Podiatry;  Laterality: Right;    CATARACT EXTRACTION, BILATERAL      COLONOSCOPY N/A 2023    Procedure: COLONOSCOPY;  Surgeon: Zachary Torres MD;  Location: [facility] ENDO;  Service: Endoscopy;  Laterality: N/A;    HEEL SPUR SURGERY Left 2013    HYSTERECTOMY  10/2009    WHITNEY    OOPHORECTOMY  10/2009    Bilateral    REPAIR OF ACHILLES TENDON Right 2023    Procedure: REPAIR, TENDON, ACHILLES;  Surgeon: Axel Kapoor Jr., DPM;  Location: [facility] OR;  Service: Podiatry;  Laterality: Right;  pop saph    RESECTION OF GASTROCNEMIUS MUSCLE Right 2023    Procedure: RESECTION, MUSCLE, GASTROCNEMIUS;  Surgeon: Axel Kapoor Jr., DPM;  Location: [facility] OR;  Service: Podiatry;  Laterality: Right;  pop saph    SURGICAL REMOVAL OF RETROCALCANEAL EXOSTOSIS Right 2023    Procedure: EXCISION, EXOSTOSIS, RETROCALCANEAL;  Surgeon: Axel Kapoor Jr., DPM;  Location: [facility] OR;  Service: Podiatry;  Laterality: Right;    THROMBECTOMY N/A 2022    Procedure: THROMBECTOMY;  Surgeon: Kendall Fiore MD;  Location: [facility] CATH LAB/EP;  Service: Cardiology;  Laterality: N/A;     Family History   Problem Relation Age of Onset    Hypertension Mother         AMI,  age 50s    Lung cancer Father     Breast cancer Maternal Aunt     Breast cancer Paternal Aunt     Breast cancer Maternal Cousin 51    Colon cancer Neg Hx     Ovarian cancer Neg Hx      Current Outpatient Medications   Medication Sig Dispense Refill    ascorbic acid, vitamin C, (VITAMIN C) 500 MG tablet Take 500 mg by mouth once daily.      hydroCHLOROthiazide (HYDRODIURIL) 25 MG tablet Take 1 tablet (25 mg total) by mouth once daily. 90 tablet 3    losartan (COZAAR) 25 MG tablet Take 1 tablet (25 mg total) by mouth once daily. 90 tablet 3    pravastatin (PRAVACHOL) 20 MG tablet Take 1 tablet (20 mg total) by mouth once daily. 90 tablet 3    rivaroxaban (XARELTO) 20 mg Tab Take 1 tablet (20 mg total) by mouth before dinner. 90 tablet 3     No  "current facility-administered medications for this visit.     Review of patient's allergies indicates:  No Known Allergies  Social History     Socioeconomic History    Marital status: Legally    Tobacco Use    Smoking status: Never    Smokeless tobacco: Never   Substance and Sexual Activity    Alcohol use: Yes     Comment: occasionally    Drug use: No    Sexual activity: Not Currently     Partners: Male     Birth control/protection: Post-menopausal, See Surgical Hx     Comment:        ROS  REVIEW OF SYSTEMS: Negative as documented below as well as positive findings in bold.       Constitutional  Respiratory  Gastrointestinal  Skin   - Fever - Cough - Heartburn - Rash   - Chills - Spit blood - Nausea - Itching   - Weight Loss - Shortness of breath - Vomiting - Nail pain   - Malaise/Fatigue - Wheezing - Abdominal Pain  Wound/Ulcer   - Weight Gain   - Blood in Stool         - Diarrhea          Cardiovascular  Genitourinary  Neurological  HEENT   - Chest Pain - Dysuria - Dizziness - Headache   - Palpitations - Hematuria - Tingling - Congestion   - Pain at night in legs - Flank Pain - Tremor - Sore Throat   - Cramping   - Sensory Change - Blurred Vision   - Leg Swelling   - Speech Change - Double Vision   - Dizzy when standing   - Focal Weakness - Eye Redness       - Seizures - Dry Eyes       - Loss of Consciousness          Endocrine  Musculoskeletal  Psychiatric   - Cold intolerance - Muscle Pain - Depression   - Heat intolerance - Neck Pain - Insomnia   - Anemia - Joint Pain - Memory Loss   -  Easy bruising, bleeding - Heel pain - Anxiety      Toe Pain        Leg/Ankle/Foot Pain         Objective:     Ht 5' 1" (1.549 m)   Wt 86.1 kg (189 lb 13.1 oz)   LMP  (LMP Unknown)   BMI 35.87 kg/m²     Physical Exam    Neck:  Trachea Midline  No visible masses    Respiratory/Ears:  No distress or labored breathing.  Able to differentiate between normal talking voice and whisper.  Able to follow " commands    Eyes:  Visual Acuity intact  No discoloration noted.    Physical Exam  Ortho Exam  Foot Exam    R LE exam con't:  V: DP 2/4, PT 2/4, CRT< 3s to all digits tested.    N: SILT in SP/DP/T/Yesi/Saph distributions    Ortho: +Motor EHL/FHL/TA/GA   Surgical site pain absent   Surgical site swelling absent   No equinus deformity or retrocal exostosis present   Compartments soft/compressible. No pain on passive stretch of big toe. No calf  Pain.     Derm: Skin intact. Sutures/staples: removed. Signs of infection: none.     Imaging / Labs:    Lab Results   Component Value Date    WBC 6.68 02/23/2024    WBC 5.62 10/20/2023    WBC 6.57 08/22/2023    WBC 6.17 04/24/2023    WBC 6.43 01/24/2023    PREALBUMIN 26 08/22/2023       Lab Results   Component Value Date    PREALBUMIN 26 08/22/2023       X-Ray Foot Complete Right    Result Date: 8/25/2023  EXAMINATION: XR FOOT COMPLETE 3 VIEW RIGHT CLINICAL HISTORY: postop;. TECHNIQUE: AP, lateral, and oblique views of the right foot were performed. FINDINGS: There is postop change involving the calcaneus.  There is osseous spur at the plantar fascia attachment to the calcaneus.  There is degenerative change within the midfoot.     As above. Electronically signed by: Kory Knight MD Date:    08/25/2023 Time:    11:55    SURG [place] Surgery Fluoro Usage    Result Date: 8/25/2023  See OP Notes for results. IMPRESSION: See OP Notes for results. This procedure was auto-finalized by: Virtual Radiologist

## 2024-03-04 ENCOUNTER — TELEPHONE (OUTPATIENT)
Dept: CARDIOLOGY | Facility: CLINIC | Age: 64
End: 2024-03-04
Payer: MEDICAID

## 2024-03-12 ENCOUNTER — OFFICE VISIT (OUTPATIENT)
Dept: FAMILY MEDICINE | Facility: HOSPITAL | Age: 64
End: 2024-03-12
Payer: MEDICAID

## 2024-03-12 VITALS
OXYGEN SATURATION: 100 % | WEIGHT: 194.44 LBS | HEIGHT: 61 IN | SYSTOLIC BLOOD PRESSURE: 142 MMHG | BODY MASS INDEX: 36.71 KG/M2 | DIASTOLIC BLOOD PRESSURE: 89 MMHG | HEART RATE: 74 BPM

## 2024-03-12 DIAGNOSIS — F41.1 GAD (GENERALIZED ANXIETY DISORDER): Primary | ICD-10-CM

## 2024-03-12 DIAGNOSIS — Z28.20 VACCINE REFUSED BY PATIENT: ICD-10-CM

## 2024-03-12 DIAGNOSIS — I10 PRIMARY HYPERTENSION: ICD-10-CM

## 2024-03-12 PROCEDURE — 99213 OFFICE O/P EST LOW 20 MIN: CPT | Performed by: STUDENT IN AN ORGANIZED HEALTH CARE EDUCATION/TRAINING PROGRAM

## 2024-03-12 RX ORDER — LOSARTAN POTASSIUM 50 MG/1
50 TABLET ORAL DAILY
Qty: 90 TABLET | Refills: 3 | Status: SHIPPED | OUTPATIENT
Start: 2024-03-12 | End: 2024-04-25 | Stop reason: ALTCHOICE

## 2024-03-12 RX ORDER — SERTRALINE HYDROCHLORIDE 50 MG/1
50 TABLET, FILM COATED ORAL
COMMUNITY
Start: 2024-02-13 | End: 2024-03-12

## 2024-03-12 RX ORDER — SERTRALINE HYDROCHLORIDE 100 MG/1
100 TABLET, FILM COATED ORAL DAILY
Qty: 30 TABLET | Refills: 11 | Status: SHIPPED | OUTPATIENT
Start: 2024-03-12 | End: 2024-04-25

## 2024-03-12 NOTE — PROGRESS NOTES
[facility] Family Medicine  History & Physical    SUBJECTIVE:     Chief Complaint:   Chief Complaint   Patient presents with    check up       History of Present Illness:  63 y.o. female who  has a past medical history of Hypertension, Other pulmonary embolism without acute cor pulmonale (2022), and Tendonitis. presents to clinic today for anxiety and hypertension. Patient reports that she has been struggling with anxiety and depression. Patient was recently started on zoloft for these symptoms. Patient follows with a therapist and psychiatrist at this time. Patient reports worsening feelings of loneliness due to limited mobility following ankle surgery. Patient also feels sad due to being unable to fly to see here children given history of severe PE.      Allergies:  Review of patient's allergies indicates:  No Known Allergies    Home Medications:  Current Outpatient Medications on File Prior to Visit   Medication Sig    ascorbic acid, vitamin C, (VITAMIN C) 500 MG tablet Take 500 mg by mouth once daily.    hydroCHLOROthiazide (HYDRODIURIL) 25 MG tablet Take 1 tablet (25 mg total) by mouth once daily.    pravastatin (PRAVACHOL) 20 MG tablet Take 1 tablet (20 mg total) by mouth once daily.    rivaroxaban (XARELTO) 20 mg Tab Take 1 tablet (20 mg total) by mouth before dinner.    [DISCONTINUED] losartan (COZAAR) 25 MG tablet Take 1 tablet (25 mg total) by mouth once daily.    [DISCONTINUED] sertraline (ZOLOFT) 50 MG tablet Take 50 mg by mouth.     No current facility-administered medications on file prior to visit.       Past Medical History:   Diagnosis Date    Hypertension     Other pulmonary embolism without acute cor pulmonale 2022    Tendonitis      Past Surgical History:   Procedure Laterality Date    BONE MARROW ASPIRATION Right 8/25/2023    Procedure: ASPIRATION, BONE MARROW;  Surgeon: Axel Kapoor Jr., DPM;  Location: [facility];  Service: Podiatry;  Laterality: Right;    CATARACT EXTRACTION, BILATERAL       COLONOSCOPY N/A 2023    Procedure: COLONOSCOPY;  Surgeon: Zachary Torres MD;  Location: [facility] ENDO;  Service: Endoscopy;  Laterality: N/A;    HEEL SPUR SURGERY Left 2013    HYSTERECTOMY  10/2009    WHITNEY    OOPHORECTOMY  10/2009    Bilateral    REPAIR OF ACHILLES TENDON Right 2023    Procedure: REPAIR, TENDON, ACHILLES;  Surgeon: Axel Kapoor Jr., DPM;  Location: [facility] OR;  Service: Podiatry;  Laterality: Right;  pop saph    RESECTION OF GASTROCNEMIUS MUSCLE Right 2023    Procedure: RESECTION, MUSCLE, GASTROCNEMIUS;  Surgeon: Axel Kapoor Jr., DPM;  Location: [facility] OR;  Service: Podiatry;  Laterality: Right;  pop saph    SURGICAL REMOVAL OF RETROCALCANEAL EXOSTOSIS Right 2023    Procedure: EXCISION, EXOSTOSIS, RETROCALCANEAL;  Surgeon: Axel Kapoor Jr., DPM;  Location: [facility] OR;  Service: Podiatry;  Laterality: Right;    THROMBECTOMY N/A 2022    Procedure: THROMBECTOMY;  Surgeon: Kendall Fiore MD;  Location: [facility] CATH LAB/EP;  Service: Cardiology;  Laterality: N/A;     Family History   Problem Relation Age of Onset    Hypertension Mother         AMI,  age 50s    Lung cancer Father     Breast cancer Maternal Aunt     Breast cancer Paternal Aunt     Breast cancer Maternal Cousin 51    Colon cancer Neg Hx     Ovarian cancer Neg Hx      Social History     Tobacco Use    Smoking status: Never    Smokeless tobacco: Never   Substance Use Topics    Alcohol use: Yes     Comment: occasionally    Drug use: No        Review of Systems   Psychiatric/Behavioral:  Positive for depression. Negative for suicidal ideas. The patient is nervous/anxious.         OBJECTIVE:     Vital Signs:  Pulse: 74 (24)  BP: (!) 142/89 (24)  SpO2: 100 % (24)  Body mass index is 36.74 kg/m².  Physical Exam  HENT:      Head: Normocephalic.      Right Ear: External ear normal.      Left Ear: External ear normal.      Nose: Nose normal.      Mouth/Throat:      Mouth:  Mucous membranes are moist.   Eyes:      Extraocular Movements: Extraocular movements intact.   Cardiovascular:      Rate and Rhythm: Normal rate.      Pulses: Normal pulses.   Pulmonary:      Effort: Pulmonary effort is normal.   Abdominal:      Palpations: Abdomen is soft.   Musculoskeletal:         General: Normal range of motion.      Cervical back: Normal range of motion.   Skin:     General: Skin is warm.      Capillary Refill: Capillary refill takes less than 2 seconds.   Neurological:      General: No focal deficit present.      Mental Status: She is alert and oriented to person, place, and time.   Psychiatric:      Comments: JUAN MANUEL 7: 16 severe  PHQ 9: 20 severe         Laboratory:  Hemoglobin A1C   Date Value Ref Range Status   11/17/2023 5.8 (H) 4.0 - 5.6 % Final     Comment:     ADA Screening Guidelines:  5.7-6.4%  Consistent with prediabetes  >or=6.5%  Consistent with diabetes    High levels of fetal hemoglobin interfere with the HbA1C  assay. Heterozygous hemoglobin variants (HbS, HgC, etc)do  not significantly interfere with this assay.   However, presence of multiple variants may affect accuracy.     08/22/2023 6.0 (H) 4.0 - 5.6 % Final     Comment:     ADA Screening Guidelines:  5.7-6.4%  Consistent with prediabetes  >or=6.5%  Consistent with diabetes    High levels of fetal hemoglobin interfere with the HbA1C  assay. Heterozygous hemoglobin variants (HbS, HgC, etc)do  not significantly interfere with this assay.   However, presence of multiple variants may affect accuracy.     06/01/2022 6.4 (H) 4.0 - 5.6 % Final     Comment:     ADA Screening Guidelines:  5.7-6.4%  Consistent with prediabetes  >or=6.5%  Consistent with diabetes    High levels of fetal hemoglobin interfere with the HbA1C  assay. Heterozygous hemoglobin variants (HbS, HgC, etc)do  not significantly interfere with this assay.   However, presence of multiple variants may affect accuracy.         A/P:  Mehreen was seen today for check  up.    Diagnoses and all orders for this visit:    JUAN MANUEL (generalized anxiety disorder)  -     sertraline (ZOLOFT) 100 MG tablet; Take 1 tablet (100 mg total) by mouth once daily.   - Increasing zoloft dose to 100mg, patient tolerated 50mg well, will reassess effect at next visit   - Advised continue with current therapist and psychiatrist care  Primary hypertension  -     losartan (COZAAR) 50 MG tablet; Take 1 tablet (50 mg total) by mouth once daily.   - Chronic condition, increasing losartan to 50mg at this visit, reassess at next visit  Vaccine refused by patient   - Person preference to avoid vaccines at this time, patient may be amenable to RSV vaccine in future      Follow up in about 4 weeks (around 4/9/2024).      Abdiel Rdz MD  [facility] Family Medicine PGY-3  03/12/2024

## 2024-03-18 NOTE — PROGRESS NOTES
I assume primary medical responsibility for this patient. I have reviewed the history, physical, and assessement & treatment plan with the resident and agree that the care is reasonable and necessary. This service has been performed by a resident without the presence of a teaching physician under the primary care exception. If necessary, an addendum of additional findings or evaluation beyond the resident documentation will be noted below.    Agree with decision to add antihypertensive medication along with increasing SSRI for ongoing depression

## 2024-03-25 ENCOUNTER — OFFICE VISIT (OUTPATIENT)
Dept: CARDIOLOGY | Facility: CLINIC | Age: 64
End: 2024-03-25
Payer: MEDICAID

## 2024-03-25 VITALS
OXYGEN SATURATION: 98 % | WEIGHT: 188.63 LBS | BODY MASS INDEX: 35.61 KG/M2 | HEART RATE: 77 BPM | HEIGHT: 61 IN | DIASTOLIC BLOOD PRESSURE: 84 MMHG | SYSTOLIC BLOOD PRESSURE: 130 MMHG

## 2024-03-25 DIAGNOSIS — I26.99 PULMONARY EMBOLISM, BILATERAL: ICD-10-CM

## 2024-03-25 DIAGNOSIS — M67.88 ACHILLES TENDONOSIS OF RIGHT LOWER EXTREMITY: ICD-10-CM

## 2024-03-25 DIAGNOSIS — E66.01 CLASS 2 SEVERE OBESITY DUE TO EXCESS CALORIES WITH SERIOUS COMORBIDITY AND BODY MASS INDEX (BMI) OF 35.0 TO 35.9 IN ADULT: ICD-10-CM

## 2024-03-25 DIAGNOSIS — I10 PRIMARY HYPERTENSION: ICD-10-CM

## 2024-03-25 DIAGNOSIS — Z82.49 FAMILY HISTORY OF CARDIAC ARREST: ICD-10-CM

## 2024-03-25 DIAGNOSIS — I10 ESSENTIAL HYPERTENSION: Primary | ICD-10-CM

## 2024-03-25 DIAGNOSIS — R73.03 PRE-DIABETES: ICD-10-CM

## 2024-03-25 DIAGNOSIS — E78.2 MIXED HYPERLIPIDEMIA: ICD-10-CM

## 2024-03-25 DIAGNOSIS — Z13.6 ENCOUNTER FOR SCREENING FOR CARDIOVASCULAR DISORDERS: ICD-10-CM

## 2024-03-25 DIAGNOSIS — R60.0 BILATERAL LOWER EXTREMITY EDEMA: ICD-10-CM

## 2024-03-25 PROBLEM — E66.812 CLASS 2 SEVERE OBESITY WITH SERIOUS COMORBIDITY AND BODY MASS INDEX (BMI) OF 35.0 TO 35.9 IN ADULT: Status: ACTIVE | Noted: 2024-03-25

## 2024-03-25 PROCEDURE — 99999 PR PBB SHADOW E&M-EST. PATIENT-LVL III: CPT | Mod: PBBFAC,,,

## 2024-03-25 PROCEDURE — 3008F BODY MASS INDEX DOCD: CPT | Mod: CPTII,,,

## 2024-03-25 PROCEDURE — 3075F SYST BP GE 130 - 139MM HG: CPT | Mod: CPTII,,,

## 2024-03-25 PROCEDURE — 3079F DIAST BP 80-89 MM HG: CPT | Mod: CPTII,,,

## 2024-03-25 PROCEDURE — 4010F ACE/ARB THERAPY RXD/TAKEN: CPT | Mod: CPTII,,,

## 2024-03-25 PROCEDURE — 99213 OFFICE O/P EST LOW 20 MIN: CPT | Mod: PBBFAC,PN

## 2024-03-25 PROCEDURE — 1159F MED LIST DOCD IN RCRD: CPT | Mod: CPTII,,,

## 2024-03-25 PROCEDURE — 99214 OFFICE O/P EST MOD 30 MIN: CPT | Mod: S$PBB,,,

## 2024-03-25 NOTE — ASSESSMENT & PLAN NOTE
Body mass index is 35.64 kg/m². Morbid obesity complicates all aspects of disease management from diagnostic modalities to treatment. Weight loss encouraged and health benefits explained to patient.

## 2024-03-25 NOTE — ASSESSMENT & PLAN NOTE
-Mother AMI early 50s, decreased   -NM Stress test to evaluate for ischemia 2/2 multiple cardiac risk factors, patient unable to walk 2/2 recent Achilles tendon repair   The 10-year ASCVD risk score (Castro PATRICK, et al., 2019) is: 9.3%    Values used to calculate the score:      Age: 63 years      Sex: Female      Is Non- : Yes      Diabetic: No      Tobacco smoker: No      Systolic Blood Pressure: 130 mmHg      Is BP treated: Yes      HDL Cholesterol: 51 mg/dL      Total Cholesterol: 242 mg/dL

## 2024-03-25 NOTE — ASSESSMENT & PLAN NOTE
-Goal BP < 130/80  -controlled  -continue medical therapy- HCTZ 25 mg, losartan 50 mg   -discussed lifestyle modifications

## 2024-03-25 NOTE — ASSESSMENT & PLAN NOTE
-.4 , Chol, 242 11-17-23, started on pravastatin 20 mg by PCP  -continue statin therapy  -repeat Lipid panel in 6 months

## 2024-03-25 NOTE — PROGRESS NOTES
Subjective:    Patient ID:  Mehreen Benites is a 63 y.o. female who presents for evaluation of Hypertension (Follow up)      PCP: Abdiel Rdz MD     Referring Provider: Ceci Herrera NP    HPI: Pateint is a 62 yo F w/PMH of HTN, PE, obesity, CKD-3 who presents to establish care.   Per records: She presented to the ED 2022 with sudden onset lightheadedness, weakness and dyspnea - found to have massive saddle pulmonary embolism with right heart strain and partially occlusive thrombus in the right popliteal vein.  No recent surgeries, immobilizations, long trips or prior history of DVT.     She underwent mechanical thrombectomy because of massive PE causing right heart strain.    She reports doing well and is currently on Xarely to without complications. Patient denies CP, SOB, palpitations, orthopnea, PND, presyncope, LOC, swelling, or claudication. Patient randomly  monitors home BP and ranges 140/80 prior to medication. Patient reports medication compliance without side effects. She reports weakness and back pain with standing > 20 minutes.   Patient does exercise regularly, walks 2-3 days/week . She is s/p Achilles tendon repair.       She reports family history of Cardiac disease, mother history of AMI early 50s, .        Past Medical History:   Diagnosis Date    Hypertension     Other pulmonary embolism without acute cor pulmonale     Tendonitis      Past Surgical History:   Procedure Laterality Date    BONE MARROW ASPIRATION Right 2023    Procedure: ASPIRATION, BONE MARROW;  Surgeon: Axel Kapoor Jr., DPM;  Location: [facility] OR;  Service: Podiatry;  Laterality: Right;    CATARACT EXTRACTION, BILATERAL      COLONOSCOPY N/A 2023    Procedure: COLONOSCOPY;  Surgeon: Zachary Torres MD;  Location: [facility] ENDO;  Service: Endoscopy;  Laterality: N/A;    HEEL SPUR SURGERY Left 2013    HYSTERECTOMY  10/2009    WHINTEY    OOPHORECTOMY  10/2009    Bilateral    REPAIR OF ACHILLES TENDON  Right 2023    Procedure: REPAIR, TENDON, ACHILLES;  Surgeon: Axel Kapoor Jr., DPM;  Location: [facility] OR;  Service: Podiatry;  Laterality: Right;  pop saph    RESECTION OF GASTROCNEMIUS MUSCLE Right 2023    Procedure: RESECTION, MUSCLE, GASTROCNEMIUS;  Surgeon: Axel Kapoor Jr., DPM;  Location: [facility] OR;  Service: Podiatry;  Laterality: Right;  pop saph    SURGICAL REMOVAL OF RETROCALCANEAL EXOSTOSIS Right 2023    Procedure: EXCISION, EXOSTOSIS, RETROCALCANEAL;  Surgeon: Axel Kapoor Jr., DPM;  Location: [facility] OR;  Service: Podiatry;  Laterality: Right;    THROMBECTOMY N/A 2022    Procedure: THROMBECTOMY;  Surgeon: Kendall Fiore MD;  Location: [facility] CATH LAB/EP;  Service: Cardiology;  Laterality: N/A;     Social History     Socioeconomic History    Marital status: Legally    Tobacco Use    Smoking status: Never    Smokeless tobacco: Never   Substance and Sexual Activity    Alcohol use: Yes     Comment: occasionally    Drug use: No    Sexual activity: Not Currently     Partners: Male     Birth control/protection: Post-menopausal, See Surgical Hx     Comment:      Family History   Problem Relation Age of Onset    Hypertension Mother         AMI,  age 50s    Lung cancer Father     Breast cancer Maternal Aunt     Breast cancer Paternal Aunt     Breast cancer Maternal Cousin 51    Colon cancer Neg Hx     Ovarian cancer Neg Hx        Review of patient's allergies indicates:  No Known Allergies    Medication List with Changes/Refills   Current Medications    ASCORBIC ACID, VITAMIN C, (VITAMIN C) 500 MG TABLET    Take 500 mg by mouth once daily.    HYDROCHLOROTHIAZIDE (HYDRODIURIL) 25 MG TABLET    Take 1 tablet (25 mg total) by mouth once daily.    LOSARTAN (COZAAR) 50 MG TABLET    Take 1 tablet (50 mg total) by mouth once daily.    PRAVASTATIN (PRAVACHOL) 20 MG TABLET    Take 1 tablet (20 mg total) by mouth once daily.    RIVAROXABAN (XARELTO) 20 MG TAB    Take 1  "tablet (20 mg total) by mouth before dinner.    SERTRALINE (ZOLOFT) 100 MG TABLET    Take 1 tablet (100 mg total) by mouth once daily.       Review of Systems   Constitutional: Negative for diaphoresis and fever.   HENT:  Negative for congestion and hearing loss.    Eyes:  Negative for blurred vision and pain.   Cardiovascular:  Negative for chest pain, claudication, dyspnea on exertion, leg swelling, near-syncope, palpitations and syncope.   Respiratory:  Negative for shortness of breath and sleep disturbances due to breathing.    Hematologic/Lymphatic: Negative for bleeding problem. Does not bruise/bleed easily.   Skin:  Negative for color change and poor wound healing.   Gastrointestinal:  Negative for abdominal pain and nausea.   Genitourinary:  Negative for bladder incontinence and flank pain.   Neurological:  Negative for focal weakness and light-headedness.        Objective:   /84 (BP Location: Left arm, Patient Position: Sitting, BP Method: Large (Manual))   Pulse 77   Ht 5' 1" (1.549 m)   Wt 85.6 kg (188 lb 9.7 oz)   LMP  (LMP Unknown)   SpO2 98%   BMI 35.64 kg/m²    Physical Exam  Constitutional:       Appearance: She is well-developed. She is not diaphoretic.   HENT:      Head: Normocephalic and atraumatic.   Eyes:      General: No scleral icterus.     Pupils: Pupils are equal, round, and reactive to light.   Neck:      Vascular: No JVD.   Cardiovascular:      Rate and Rhythm: Normal rate and regular rhythm.      Pulses: Intact distal pulses.           Radial pulses are 2+ on the right side and 2+ on the left side.        Dorsalis pedis pulses are 2+ on the right side and 2+ on the left side.        Posterior tibial pulses are 2+ on the right side and 2+ on the left side.      Heart sounds: S1 normal and S2 normal. No murmur heard.     No friction rub. No gallop.   Pulmonary:      Effort: Pulmonary effort is normal. No respiratory distress.      Breath sounds: Normal breath sounds. No wheezing " or rales.   Chest:      Chest wall: No tenderness.   Abdominal:      General: Bowel sounds are normal. There is no distension.      Palpations: Abdomen is soft. There is no mass.      Tenderness: There is no abdominal tenderness. There is no rebound.   Musculoskeletal:         General: No tenderness. Normal range of motion.      Cervical back: Normal range of motion and neck supple.      Right lower le+ Edema present.      Left lower le+ Edema present.   Skin:     General: Skin is warm and dry.      Coloration: Skin is not pale.   Neurological:      Mental Status: She is alert and oriented to person, place, and time.      Coordination: Coordination normal.      Deep Tendon Reflexes: Reflexes normal.   Psychiatric:         Behavior: Behavior normal.         Judgment: Judgment normal.             IMAGING     US BLE 10/23/23  Impression:     No evidence of deep venous thrombosis in either lower extremity.        US Lower Extremity Veins Bilat (23)  Impression:     No evidence of deep venous thrombosis in either lower extremity.        US Lower Extremity Veins Right 22  Impression:  No evidence of deep venous thrombosis in the right lower extremity.   Interval resolution of previously identified thrombus within the right popliteal vein.     CTA Chest Non-Coronary(PE Studies) 22  Impression:     1. Interval improvement of prior bilateral pulmonary arterial thromboemboli.  No residual central pulmonary thrombus identified.  Few small filling defects within segmental arteries potentially representing mild residual thrombus.  2. Mosaic attenuation of the bilateral pulmonary parenchyma which may represent increased pulmonary vascular resistance or small airways disease.  3. Mild cardiomegaly.  4. Additional findings as above.    Assessment:       1. Essential hypertension    2. Family history of cardiac arrest    3. Primary hypertension    4. Mixed hyperlipidemia    5. Pulmonary embolism, bilateral     6. Class 2 severe obesity due to excess calories with serious comorbidity and body mass index (BMI) of 35.0 to 35.9 in adult    7. Achilles tendonosis of right lower extremity    8. Pre-diabetes    9. Encounter for screening for cardiovascular disorders    10. Bilateral lower extremity edema         Plan:         Pulmonary embolism, bilateral  -PE 5/31/2022- post thrombectomy  -continue Xeralto 20 mg - indefinitely     Hypertension  -Goal BP < 130/80  -controlled  -continue medical therapy- HCTZ 25 mg, losartan 50 mg   -discussed lifestyle modifications     Class 2 severe obesity with serious comorbidity and body mass index (BMI) of 35.0 to 35.9 in adult  Body mass index is 35.64 kg/m². Morbid obesity complicates all aspects of disease management from diagnostic modalities to treatment. Weight loss encouraged and health benefits explained to patient.       Achilles tendonosis of right lower extremity  -S/P repair     Mixed hyperlipidemia  -.4 , Chol, 242 11-17-23, started on pravastatin 20 mg by PCP  -continue statin therapy  -repeat Lipid panel in 6 months     Pre-diabetes  -A1c 5.8 11/17/23  -discussed lifestyle modifications     Family history of cardiac arrest  -Mother AMI early 50s, decreased   -NM Stress test to evaluate for ischemia 2/2 multiple cardiac risk factors, patient unable to walk 2/2 recent Achilles tendon repair   The 10-year ASCVD risk score (Castro PATRICK, et al., 2019) is: 9.3%    Values used to calculate the score:      Age: 63 years      Sex: Female      Is Non- : Yes      Diabetic: No      Tobacco smoker: No      Systolic Blood Pressure: 130 mmHg      Is BP treated: Yes      HDL Cholesterol: 51 mg/dL      Total Cholesterol: 242 mg/dL      Bilateral lower extremity edema  -compression stockings  -elevate legs when sitting         Total duration of face to face visit time 30 minutes.  Total time spent counseling greater than fifty percent of total visit  time.  Counseling included discussion regarding imaging findings, diagnosis, possibilities, treatment options, risks and benefits.  The patient had many questions regarding the options and long-term effects      Jose Angel Thomas NP  Cardiology

## 2024-03-26 ENCOUNTER — PATIENT MESSAGE (OUTPATIENT)
Dept: CARDIOLOGY | Facility: CLINIC | Age: 64
End: 2024-03-26
Payer: MEDICAID

## 2024-03-28 ENCOUNTER — TELEPHONE (OUTPATIENT)
Dept: CARDIOLOGY | Facility: CLINIC | Age: 64
End: 2024-03-28
Payer: MEDICAID

## 2024-03-28 NOTE — TELEPHONE ENCOUNTER
Reached out to Mrs Benites, I explained that as of today its not approved I will check it on Tuesday 4/2 to make sure and give her a call.  Thank you,    Lay  Medical Assistant       ----- Message from Guerita Rodriguez sent at 3/28/2024 12:56 PM CDT -----  Pt Requesting Call Back    Who called: pt  Who called for pt:  Best call back #:  [phone]  Add notes: pt said she was told her stress test wasn't approved by staff at this office; pt wants to know will this appt be approved for her to have; pt said she have an appt with the doctor on 4/4/24 but I'm not seeing that appt in the system, please give pt clarity on that

## 2024-04-02 ENCOUNTER — TELEPHONE (OUTPATIENT)
Dept: CARDIOLOGY | Facility: CLINIC | Age: 64
End: 2024-04-02
Payer: MEDICAID

## 2024-04-02 NOTE — TELEPHONE ENCOUNTER
"----- Message from Guerita Rodriguez sent at 4/2/2024 11:10 AM CDT -----  Pt Requesting Call Back    Who called: pt  Who called for pt:  Best call back #: [phone]  Add notes: pt said her "stress test have been denied" and she wants to know does the doctor still want her to go "and take the other test"    "

## 2024-04-02 NOTE — TELEPHONE ENCOUNTER
Spoke with pt in regards to nuclear stress test. Pt stated insurance denied stress test. Called [facility] department at [phone] and was told moving forward this order will need a peer to peer for prior authorization to be sent back in. Verbally understood

## 2024-04-25 ENCOUNTER — OFFICE VISIT (OUTPATIENT)
Dept: FAMILY MEDICINE | Facility: HOSPITAL | Age: 64
End: 2024-04-25
Payer: MEDICAID

## 2024-04-25 VITALS
HEART RATE: 75 BPM | SYSTOLIC BLOOD PRESSURE: 146 MMHG | DIASTOLIC BLOOD PRESSURE: 94 MMHG | HEIGHT: 61 IN | WEIGHT: 190.25 LBS | BODY MASS INDEX: 35.92 KG/M2

## 2024-04-25 DIAGNOSIS — I10 PRIMARY HYPERTENSION: Primary | ICD-10-CM

## 2024-04-25 DIAGNOSIS — M67.88 ACHILLES TENDINOSIS: ICD-10-CM

## 2024-04-25 DIAGNOSIS — F41.1 GAD (GENERALIZED ANXIETY DISORDER): ICD-10-CM

## 2024-04-25 PROCEDURE — 99213 OFFICE O/P EST LOW 20 MIN: CPT | Performed by: STUDENT IN AN ORGANIZED HEALTH CARE EDUCATION/TRAINING PROGRAM

## 2024-04-25 RX ORDER — VALSARTAN 160 MG/1
160 TABLET ORAL DAILY
Qty: 90 TABLET | Refills: 3 | Status: SHIPPED | OUTPATIENT
Start: 2024-04-25 | End: 2025-04-25

## 2024-04-25 RX ORDER — SERTRALINE HYDROCHLORIDE 100 MG/1
150 TABLET, FILM COATED ORAL DAILY
Qty: 45 TABLET | Refills: 11 | Status: SHIPPED | OUTPATIENT
Start: 2024-04-25 | End: 2024-04-25 | Stop reason: CLARIF

## 2024-04-29 ENCOUNTER — TELEPHONE (OUTPATIENT)
Dept: CARDIOLOGY | Facility: CLINIC | Age: 64
End: 2024-04-29
Payer: MEDICAID

## 2024-04-29 NOTE — TELEPHONE ENCOUNTER
Reached out to Ms. Benites regarding her appt for tomorrow and confirmed it.     Thank You    Lay ZULUAGA MA           Message from Honey Erazo sent at 4/29/2024 10:02 AM CDT -----  Contact: pt  Type:  Patient Returning Call    Who Called:pt   Who Left Message for Patient:Lay   Does the patient know what this is regarding?:apportionment   Would the patient rather a call back or a response via [facility]? Call   Best Call Back Number:[phone]  Additional Information:     Pt stated she can make the appointment on 04/30

## 2024-04-29 NOTE — PROGRESS NOTES
[facility]  History & Physical    SUBJECTIVE:     Chief Complaint:   Chief Complaint   Patient presents with    paperwork       History of Present Illness:  63 y.o. female who  has a past medical history of Hypertension, Other pulmonary embolism without acute cor pulmonale (2022), and Tendonitis. presents to clinic today for HTN, anxiety, and ankle pain. Patient presents for follow up related to elevated BP and paperwork related to post ankle surgery disability. Patient still has significant pain in her right ankle following surgery, as well as limited range of motion effecting her ability to perform certain tasks.  Patient continues to due at home PT and has completed formal PT for her ankle. Patient states ankle pain is improving overall.  Patient reports that her overall anxiety has improved on zoloft but she still has episodes of acute anxiety spikes. Patient has no other complaints today.      Allergies:  Review of patient's allergies indicates:  No Known Allergies    Home Medications:  Current Outpatient Medications   Medication Sig Dispense Refill    ascorbic acid, vitamin C, (VITAMIN C) 500 MG tablet Take 500 mg by mouth once daily.      hydroCHLOROthiazide (HYDRODIURIL) 25 MG tablet Take 1 tablet (25 mg total) by mouth once daily. 90 tablet 3    pravastatin (PRAVACHOL) 20 MG tablet Take 1 tablet (20 mg total) by mouth once daily. 90 tablet 3    rivaroxaban (XARELTO) 20 mg Tab Take 1 tablet (20 mg total) by mouth before dinner. 90 tablet 3    valsartan (DIOVAN) 160 MG tablet Take 1 tablet (160 mg total) by mouth once daily. 90 tablet 3     No current facility-administered medications for this visit.       Past Medical History:   Diagnosis Date    Hypertension     Other pulmonary embolism without acute cor pulmonale 2022    Tendonitis      Past Surgical History:   Procedure Laterality Date    BONE MARROW ASPIRATION Right 8/25/2023    Procedure: ASPIRATION, BONE MARROW;  Surgeon: Axel Kapoor  WYATT Brown;  Location: [facility] OR;  Service: Podiatry;  Laterality: Right;    CATARACT EXTRACTION, BILATERAL      COLONOSCOPY N/A 2023    Procedure: COLONOSCOPY;  Surgeon: Zachary Torres MD;  Location: [facility] ENDO;  Service: Endoscopy;  Laterality: N/A;    HEEL SPUR SURGERY Left 2013    HYSTERECTOMY  10/2009    WHITNEY    OOPHORECTOMY  10/2009    Bilateral    REPAIR OF ACHILLES TENDON Right 2023    Procedure: REPAIR, TENDON, ACHILLES;  Surgeon: Axel Kapoor Jr., DPM;  Location: [facility] OR;  Service: Podiatry;  Laterality: Right;  pop saph    RESECTION OF GASTROCNEMIUS MUSCLE Right 2023    Procedure: RESECTION, MUSCLE, GASTROCNEMIUS;  Surgeon: Axel Kapoor Jr., DPM;  Location: [facility] OR;  Service: Podiatry;  Laterality: Right;  pop saph    SURGICAL REMOVAL OF RETROCALCANEAL EXOSTOSIS Right 2023    Procedure: EXCISION, EXOSTOSIS, RETROCALCANEAL;  Surgeon: Axel Kapoor Jr., DPM;  Location: [facility] OR;  Service: Podiatry;  Laterality: Right;    THROMBECTOMY N/A 2022    Procedure: THROMBECTOMY;  Surgeon: Kendall Fiore MD;  Location: [facility] CATH LAB/EP;  Service: Cardiology;  Laterality: N/A;     Family History   Problem Relation Name Age of Onset    Hypertension Mother          AMI,  age 50s    Lung cancer Father      Breast cancer Maternal Aunt      Breast cancer Paternal Aunt      Breast cancer Maternal Cousin  51    Colon cancer Neg Hx      Ovarian cancer Neg Hx       Social History     Tobacco Use    Smoking status: Never    Smokeless tobacco: Never   Substance Use Topics    Alcohol use: Yes     Comment: occasionally    Drug use: No        Review of Systems   Constitutional:  Negative for fever and weight loss.   HENT:  Negative for hearing loss and sore throat.    Eyes:  Negative for blurred vision.   Respiratory:  Negative for cough, shortness of breath and wheezing.    Cardiovascular:  Negative for chest pain and leg swelling.   Gastrointestinal:  Negative for heartburn,  nausea and vomiting.   Genitourinary:  Negative for dysuria.   Musculoskeletal:  Positive for joint pain. Negative for back pain and myalgias.   Neurological:  Negative for dizziness, weakness and headaches.        OBJECTIVE:     Vital Signs:  Pulse: 75 (04/25/24 1443)  BP: (!) 146/94 (04/25/24 1443)  Body mass index is 35.95 kg/m².  Physical Exam  Vitals reviewed.   HENT:      Head: Normocephalic.      Right Ear: External ear normal.      Left Ear: External ear normal.      Nose: Nose normal.      Mouth/Throat:      Mouth: Mucous membranes are moist.   Eyes:      Extraocular Movements: Extraocular movements intact.   Cardiovascular:      Rate and Rhythm: Normal rate.   Pulmonary:      Effort: Pulmonary effort is normal.   Abdominal:      Palpations: Abdomen is soft.   Musculoskeletal:      Cervical back: Normal range of motion.      Right foot: Decreased range of motion.      Comments: Favors left side when walking   Neurological:      Mental Status: She is alert.   Psychiatric:         Mood and Affect: Mood normal.         Laboratory:  Hemoglobin A1C   Date Value Ref Range Status   11/17/2023 5.8 (H) 4.0 - 5.6 % Final     Comment:     ADA Screening Guidelines:  5.7-6.4%  Consistent with prediabetes  >or=6.5%  Consistent with diabetes    High levels of fetal hemoglobin interfere with the HbA1C  assay. Heterozygous hemoglobin variants (HbS, HgC, etc)do  not significantly interfere with this assay.   However, presence of multiple variants may affect accuracy.     08/22/2023 6.0 (H) 4.0 - 5.6 % Final     Comment:     ADA Screening Guidelines:  5.7-6.4%  Consistent with prediabetes  >or=6.5%  Consistent with diabetes    High levels of fetal hemoglobin interfere with the HbA1C  assay. Heterozygous hemoglobin variants (HbS, HgC, etc)do  not significantly interfere with this assay.   However, presence of multiple variants may affect accuracy.     06/01/2022 6.4 (H) 4.0 - 5.6 % Final     Comment:     ADA Screening  Guidelines:  5.7-6.4%  Consistent with prediabetes  >or=6.5%  Consistent with diabetes    High levels of fetal hemoglobin interfere with the HbA1C  assay. Heterozygous hemoglobin variants (HbS, HgC, etc)do  not significantly interfere with this assay.   However, presence of multiple variants may affect accuracy.         A/P:  Mehreen was seen today for paperwork.    Diagnoses and all orders for this visit:    Primary hypertension  -     valsartan (DIOVAN) 160 MG tablet; Take 1 tablet (160 mg total) by mouth once daily.   - Increasing valsartan dose at this time, reassess at follow up visit, if needed increase dose further VS adding amlodipine  JUAN MANUEL (generalized anxiety disorder)   Continue current zoloft dose for 4 more weeks to assess for maximum benefit, consider up titration  to 150 mg at that time in risk benefit discussion with patient  - Alternative option of adding low dose buspar to current 100mg zoloft dose    Achilles tendinosis   - Paperwork completed related to continued right ankle pain following corrected surgery    - Patient continues to perform at home PT, healing rate within expected range following surgery      Follow up in about 4 weeks (around 5/23/2024).      Abdiel Rdz MD  [facility] PGY-3  04/30/2024

## 2024-04-29 NOTE — TELEPHONE ENCOUNTER
Left Voice Mail for patient to call back at their conveniece.Trying to set up peer to peer , scheduled it for tomorrow if pt can't make it no problem just wanted to save the appt just incase.

## 2024-04-30 ENCOUNTER — TELEPHONE (OUTPATIENT)
Dept: CARDIOLOGY | Facility: CLINIC | Age: 64
End: 2024-04-30
Payer: MEDICAID

## 2024-04-30 NOTE — PROGRESS NOTES
I assume primary medical responsibility for this patient. I have reviewed the history, physical, and assessment & treatment plan with the resident and agree that the care is reasonable and necessary. This service has been performed by a resident without the presence of a teaching physician under the primary care exception. If necessary, an addendum of additional findings or evaluation beyond the resident documentation will be noted below.        Cassius Chavarria Jr., DO    [facility]

## 2024-04-30 NOTE — TELEPHONE ENCOUNTER
Reached out to Pre Service to set up an appointment with the Insurance to do a peer to peer. Left a phone message also.    Thank you,    Lay Fernandez

## 2024-05-16 DIAGNOSIS — Z82.49 FAMILY HISTORY OF CARDIAC ARREST: ICD-10-CM

## 2024-05-16 DIAGNOSIS — R07.89 OTHER CHEST PAIN: Primary | ICD-10-CM

## 2024-06-24 ENCOUNTER — PATIENT MESSAGE (OUTPATIENT)
Dept: CARDIOLOGY | Facility: CLINIC | Age: 64
End: 2024-06-24
Payer: MEDICAID

## 2024-06-25 ENCOUNTER — OFFICE VISIT (OUTPATIENT)
Dept: CARDIOLOGY | Facility: CLINIC | Age: 64
End: 2024-06-25
Payer: MEDICAID

## 2024-06-25 VITALS
SYSTOLIC BLOOD PRESSURE: 118 MMHG | HEIGHT: 61 IN | WEIGHT: 187.63 LBS | OXYGEN SATURATION: 98 % | DIASTOLIC BLOOD PRESSURE: 84 MMHG | HEART RATE: 81 BPM | BODY MASS INDEX: 35.43 KG/M2

## 2024-06-25 DIAGNOSIS — Z82.49 FAMILY HISTORY OF CARDIAC ARREST: ICD-10-CM

## 2024-06-25 DIAGNOSIS — R73.03 PRE-DIABETES: ICD-10-CM

## 2024-06-25 DIAGNOSIS — E66.01 CLASS 2 SEVERE OBESITY DUE TO EXCESS CALORIES WITH SERIOUS COMORBIDITY AND BODY MASS INDEX (BMI) OF 35.0 TO 35.9 IN ADULT: ICD-10-CM

## 2024-06-25 DIAGNOSIS — R07.89 OTHER CHEST PAIN: Primary | ICD-10-CM

## 2024-06-25 DIAGNOSIS — I26.99 PULMONARY EMBOLISM, BILATERAL: ICD-10-CM

## 2024-06-25 DIAGNOSIS — E78.2 MIXED HYPERLIPIDEMIA: ICD-10-CM

## 2024-06-25 DIAGNOSIS — I10 PRIMARY HYPERTENSION: ICD-10-CM

## 2024-06-25 DIAGNOSIS — M67.88 ACHILLES TENDONOSIS OF RIGHT LOWER EXTREMITY: ICD-10-CM

## 2024-06-25 DIAGNOSIS — R60.0 BILATERAL LOWER EXTREMITY EDEMA: ICD-10-CM

## 2024-06-25 PROCEDURE — 3079F DIAST BP 80-89 MM HG: CPT | Mod: CPTII,,,

## 2024-06-25 PROCEDURE — 99213 OFFICE O/P EST LOW 20 MIN: CPT | Mod: PBBFAC,PN

## 2024-06-25 PROCEDURE — 99214 OFFICE O/P EST MOD 30 MIN: CPT | Mod: S$PBB,,,

## 2024-06-25 PROCEDURE — 3008F BODY MASS INDEX DOCD: CPT | Mod: CPTII,,,

## 2024-06-25 PROCEDURE — 3074F SYST BP LT 130 MM HG: CPT | Mod: CPTII,,,

## 2024-06-25 PROCEDURE — 1159F MED LIST DOCD IN RCRD: CPT | Mod: CPTII,,,

## 2024-06-25 PROCEDURE — 4010F ACE/ARB THERAPY RXD/TAKEN: CPT | Mod: CPTII,,,

## 2024-06-25 PROCEDURE — 99999 PR PBB SHADOW E&M-EST. PATIENT-LVL III: CPT | Mod: PBBFAC,,,

## 2024-06-25 NOTE — ASSESSMENT & PLAN NOTE
Body mass index is 35.45 kg/m². Morbid obesity complicates all aspects of disease management from diagnostic modalities to treatment. Weight loss encouraged and health benefits explained to patient.

## 2024-06-25 NOTE — ASSESSMENT & PLAN NOTE
-Mother AMI early 50s, decreased   -NM Stress test to evaluate for ischemia 2/2 multiple cardiac risk factors, patient unable to walk 2/2 recent Achilles tendon repair   The 10-year ASCVD risk score (Castro PATRICK, et al., 2019) is: 7.2%    Values used to calculate the score:      Age: 63 years      Sex: Female      Is Non- : Yes      Diabetic: No      Tobacco smoker: No      Systolic Blood Pressure: 118 mmHg      Is BP treated: Yes      HDL Cholesterol: 51 mg/dL      Total Cholesterol: 242 mg/dL

## 2024-06-25 NOTE — ASSESSMENT & PLAN NOTE
-Goal BP < 130/80  -controlled  -continue medical therapy- HCTZ 25 mg, losartan 50 mg( changed to valsartan 160 mg by PCP)   -discussed lifestyle modifications

## 2024-06-25 NOTE — ASSESSMENT & PLAN NOTE
NM stress test for ischemic evaluation; patient is unable to walk on treadmill 2/2 recovering for ACL repair and has limited mobility.

## 2024-06-25 NOTE — PROGRESS NOTES
Subjective:    Patient ID:  Mehreen Benites is a 63 y.o. female who presents for evaluation of No chief complaint on file.      PCP: Abdiel Rdz MD     Referring Provider: Ceci Herrera NP    HPI: Pateint is a 62 yo F w/PMH of HTN, PE, obesity, CKD-3 who presents  today for f/u appt and was continued on medical therapy. She was last seen on 3/25/24 to establish care. 2022 She underwent mechanical thrombectomy because of massive PE causing right heart strain. Repeat CTA chest and lower extremity venous ultrasound negative for PE or DVT.   She reports CP associated with SOB with minimal exertion. She also notes palpitations. She  is currently on Xarely to without complications. Patient denies orthopnea, PND, presyncope, LOC, swelling, or claudication. Patient randomly  monitors home BP and ranges 140/80 prior to medication. Patient reports medication compliance without side effects. She reports weakness and back pain with standing > 20 minutes. She is s/p Achilles tendon repair and is recovering slowly.       She reports family history of Cardiac disease, mother history of AMI early 50s, .      NM stress test for ischemic evaluation; patient is unable to walk on treadmill 2/2 recovering for ACL repair and has limited mobility.    Past Medical History:   Diagnosis Date    Hypertension     Other pulmonary embolism without acute cor pulmonale     Tendonitis      Past Surgical History:   Procedure Laterality Date    BONE MARROW ASPIRATION Right 2023    Procedure: ASPIRATION, BONE MARROW;  Surgeon: Axel Kapoor Jr., DPM;  Location: [facility] OR;  Service: Podiatry;  Laterality: Right;    CATARACT EXTRACTION, BILATERAL      COLONOSCOPY N/A 2023    Procedure: COLONOSCOPY;  Surgeon: Zachary Torres MD;  Location: [facility] ENDO;  Service: Endoscopy;  Laterality: N/A;    HEEL SPUR SURGERY Left 2013    HYSTERECTOMY  10/2009    WHITNEY    OOPHORECTOMY  10/2009    Bilateral    REPAIR OF ACHILLES  TENDON Right 2023    Procedure: REPAIR, TENDON, ACHILLES;  Surgeon: Axel Kapoor Jr., DPM;  Location: [facility] OR;  Service: Podiatry;  Laterality: Right;  pop saph    RESECTION OF GASTROCNEMIUS MUSCLE Right 2023    Procedure: RESECTION, MUSCLE, GASTROCNEMIUS;  Surgeon: Axel Kapoor Jr., DPM;  Location: [facility] OR;  Service: Podiatry;  Laterality: Right;  pop saph    SURGICAL REMOVAL OF RETROCALCANEAL EXOSTOSIS Right 2023    Procedure: EXCISION, EXOSTOSIS, RETROCALCANEAL;  Surgeon: Axel Kapoor Jr., DPM;  Location: [facility] OR;  Service: Podiatry;  Laterality: Right;    THROMBECTOMY N/A 2022    Procedure: THROMBECTOMY;  Surgeon: Kendall Fiore MD;  Location: [facility] CATH LAB/EP;  Service: Cardiology;  Laterality: N/A;     Social History     Socioeconomic History    Marital status: Legally    Tobacco Use    Smoking status: Never     Passive exposure: Never    Smokeless tobacco: Never   Substance and Sexual Activity    Alcohol use: Yes     Comment: occasionally    Drug use: No    Sexual activity: Not Currently     Partners: Male     Birth control/protection: Post-menopausal, See Surgical Hx     Comment:      Family History   Problem Relation Name Age of Onset    Hypertension Mother          AMI,  age 50s    Lung cancer Father      Breast cancer Maternal Aunt      Breast cancer Paternal Aunt      Breast cancer Maternal Cousin  51    Colon cancer Neg Hx      Ovarian cancer Neg Hx         Review of patient's allergies indicates:  No Known Allergies    Medication List with Changes/Refills   Current Medications    ASCORBIC ACID, VITAMIN C, (VITAMIN C) 500 MG TABLET    Take 500 mg by mouth once daily.    HYDROCHLOROTHIAZIDE (HYDRODIURIL) 25 MG TABLET    Take 1 tablet (25 mg total) by mouth once daily.    PRAVASTATIN (PRAVACHOL) 20 MG TABLET    Take 1 tablet (20 mg total) by mouth once daily.    RIVAROXABAN (XARELTO) 20 MG TAB    Take 1 tablet (20 mg total) by mouth before  "dinner.    VALSARTAN (DIOVAN) 160 MG TABLET    Take 1 tablet (160 mg total) by mouth once daily.       Review of Systems   Constitutional: Negative for diaphoresis and fever.   HENT:  Negative for congestion and hearing loss.    Eyes:  Negative for blurred vision and pain.   Cardiovascular:  Positive for chest pain, dyspnea on exertion and palpitations. Negative for claudication, leg swelling, near-syncope and syncope.   Respiratory:  Negative for shortness of breath and sleep disturbances due to breathing.    Hematologic/Lymphatic: Negative for bleeding problem. Does not bruise/bleed easily.   Skin:  Negative for color change and poor wound healing.   Gastrointestinal:  Negative for abdominal pain and nausea.   Genitourinary:  Negative for bladder incontinence and flank pain.   Neurological:  Negative for focal weakness and light-headedness.        Objective:   /84 (BP Location: Left arm, Patient Position: Sitting, BP Method: Large (Manual))   Pulse 81   Ht 5' 1" (1.549 m)   Wt 85.1 kg (187 lb 9.8 oz)   LMP  (LMP Unknown)   SpO2 98%   BMI 35.45 kg/m²    Physical Exam  Constitutional:       Appearance: She is well-developed. She is not diaphoretic.   HENT:      Head: Normocephalic and atraumatic.   Eyes:      General: No scleral icterus.     Pupils: Pupils are equal, round, and reactive to light.   Neck:      Vascular: No JVD.   Cardiovascular:      Rate and Rhythm: Normal rate and regular rhythm.      Pulses: Intact distal pulses.           Radial pulses are 2+ on the right side and 2+ on the left side.        Dorsalis pedis pulses are 2+ on the right side and 2+ on the left side.        Posterior tibial pulses are 2+ on the right side and 2+ on the left side.      Heart sounds: S1 normal and S2 normal. No murmur heard.     No friction rub. No gallop.   Pulmonary:      Effort: Pulmonary effort is normal. No respiratory distress.      Breath sounds: Normal breath sounds. No wheezing or rales.   Chest:    "   Chest wall: No tenderness.   Abdominal:      General: Bowel sounds are normal. There is no distension.      Palpations: Abdomen is soft. There is no mass.      Tenderness: There is no abdominal tenderness. There is no rebound.   Musculoskeletal:         General: No tenderness. Normal range of motion.      Cervical back: Normal range of motion and neck supple.      Right lower le+ Edema present.      Left lower le+ Edema present.   Skin:     General: Skin is warm and dry.      Coloration: Skin is not pale.   Neurological:      Mental Status: She is alert and oriented to person, place, and time.      Coordination: Coordination normal.      Deep Tendon Reflexes: Reflexes normal.   Psychiatric:         Behavior: Behavior normal.         Judgment: Judgment normal.             IMAGING     US BLE 10/23/23  Impression:     No evidence of deep venous thrombosis in either lower extremity.        US Lower Extremity Veins Bilat (23)  Impression:     No evidence of deep venous thrombosis in either lower extremity.        US Lower Extremity Veins Right 22  Impression:  No evidence of deep venous thrombosis in the right lower extremity.   Interval resolution of previously identified thrombus within the right popliteal vein.     CTA Chest Non-Coronary(PE Studies) 22  Impression:     1. Interval improvement of prior bilateral pulmonary arterial thromboemboli.  No residual central pulmonary thrombus identified.  Few small filling defects within segmental arteries potentially representing mild residual thrombus.  2. Mosaic attenuation of the bilateral pulmonary parenchyma which may represent increased pulmonary vascular resistance or small airways disease.  3. Mild cardiomegaly.  4. Additional findings as above.    Assessment:       1. Other chest pain    2. Family history of cardiac arrest    3. Primary hypertension    4. Mixed hyperlipidemia    5. Pulmonary embolism, bilateral    6. Class 2 severe obesity  due to excess calories with serious comorbidity and body mass index (BMI) of 35.0 to 35.9 in adult    7. Pre-diabetes    8. Achilles tendonosis of right lower extremity    9. Bilateral lower extremity edema           Plan:         Other chest pain  NM stress test for ischemic evaluation; patient is unable to walk on treadmill 2/2 recovering for ACL repair and has limited mobility.      Family history of cardiac arrest  -Mother AMI early 50s, decreased   -NM Stress test to evaluate for ischemia 2/2 multiple cardiac risk factors, patient unable to walk 2/2 recent Achilles tendon repair   The 10-year ASCVD risk score (Castro PATRICK, et al., 2019) is: 7.2%    Values used to calculate the score:      Age: 63 years      Sex: Female      Is Non- : Yes      Diabetic: No      Tobacco smoker: No      Systolic Blood Pressure: 118 mmHg      Is BP treated: Yes      HDL Cholesterol: 51 mg/dL      Total Cholesterol: 242 mg/dL      Hypertension  -Goal BP < 130/80  -controlled  -continue medical therapy- HCTZ 25 mg, losartan 50 mg( changed to valsartan 160 mg by PCP)   -discussed lifestyle modifications     Mixed hyperlipidemia  -.4 , Chol, 242 11-17-23  -continue statin therapy- pravastatin 20 mg   -repeat Lipid panel in 6 months     Pulmonary embolism, bilateral  -PE 5/31/2022- post thrombectomy  -continue Xeralto 20 mg - indefinitely     Class 2 severe obesity with serious comorbidity and body mass index (BMI) of 35.0 to 35.9 in adult  Body mass index is 35.45 kg/m². Morbid obesity complicates all aspects of disease management from diagnostic modalities to treatment. Weight loss encouraged and health benefits explained to patient.       Pre-diabetes  -A1c 5.8 11/17/23  -discussed lifestyle modifications     Achilles tendonosis of right lower extremity  -S/P repair     Bilateral lower extremity edema  -compression stockings  -elevate legs when sitting       Total duration of face to face visit time 30  minutes.  Total time spent counseling greater than fifty percent of total visit time.  Counseling included discussion regarding imaging findings, diagnosis, possibilities, treatment options, risks and benefits.  The patient had many questions regarding the options and long-term effects      Jose Angel Thomas NP  Cardiology

## 2024-06-25 NOTE — ASSESSMENT & PLAN NOTE
-.4 , Chol, 242 11-17-23  -continue statin therapy- pravastatin 20 mg   -repeat Lipid panel in 6 months

## 2024-07-11 ENCOUNTER — TELEPHONE (OUTPATIENT)
Dept: CARDIOLOGY | Facility: CLINIC | Age: 64
End: 2024-07-11
Payer: MEDICAID

## 2024-07-11 NOTE — TELEPHONE ENCOUNTER
Reached out to Patient.to reschedule her stress test but it wouldn't pull up the date. I reset the NM on 7/31.  Thank you,    Lay Brambila  Medical Assistant

## 2024-07-11 NOTE — TELEPHONE ENCOUNTER
Reached out to Ms Kamari' and advised her that I was contacted and told that her insurance hadnt yet approved her NM Stress Test. Due to no Cardiologist at [facility] we rescheduled it for 8/12 In [place].    Thank you,    Lay Brambila  Medical Assistant

## 2024-07-23 ENCOUNTER — TELEPHONE (OUTPATIENT)
Dept: HEMATOLOGY/ONCOLOGY | Facility: CLINIC | Age: 64
End: 2024-07-23
Payer: MEDICAID

## 2024-08-06 ENCOUNTER — OFFICE VISIT (OUTPATIENT)
Dept: FAMILY MEDICINE | Facility: HOSPITAL | Age: 64
End: 2024-08-06
Payer: MEDICAID

## 2024-08-06 VITALS
DIASTOLIC BLOOD PRESSURE: 98 MMHG | BODY MASS INDEX: 33.68 KG/M2 | HEART RATE: 60 BPM | SYSTOLIC BLOOD PRESSURE: 148 MMHG | HEIGHT: 61 IN | WEIGHT: 178.38 LBS

## 2024-08-06 DIAGNOSIS — R06.83 SNORING: ICD-10-CM

## 2024-08-06 DIAGNOSIS — E78.5 HYPERLIPIDEMIA, UNSPECIFIED HYPERLIPIDEMIA TYPE: ICD-10-CM

## 2024-08-06 DIAGNOSIS — R73.03 PRE-DIABETES: ICD-10-CM

## 2024-08-06 DIAGNOSIS — H61.23 IMPACTED CERUMEN OF BOTH EARS: ICD-10-CM

## 2024-08-06 DIAGNOSIS — I10 PRIMARY HYPERTENSION: Primary | ICD-10-CM

## 2024-08-06 PROCEDURE — 99214 OFFICE O/P EST MOD 30 MIN: CPT

## 2024-08-06 RX ORDER — SERTRALINE HYDROCHLORIDE 100 MG/1
150 TABLET, FILM COATED ORAL
COMMUNITY
Start: 2024-04-26

## 2024-08-12 ENCOUNTER — HOSPITAL ENCOUNTER (OUTPATIENT)
Dept: CARDIOLOGY | Facility: HOSPITAL | Age: 64
Discharge: HOME | End: 2024-08-12
Payer: MEDICAID

## 2024-08-12 ENCOUNTER — HOSPITAL ENCOUNTER (OUTPATIENT)
Dept: RADIOLOGY | Facility: HOSPITAL | Age: 64
Discharge: HOME | End: 2024-08-12
Payer: MEDICAID

## 2024-08-12 DIAGNOSIS — E78.2 MIXED HYPERLIPIDEMIA: ICD-10-CM

## 2024-08-12 DIAGNOSIS — Z82.49 FAMILY HISTORY OF CARDIAC ARREST: ICD-10-CM

## 2024-08-12 DIAGNOSIS — R07.89 OTHER CHEST PAIN: ICD-10-CM

## 2024-08-12 LAB
CV STRESS BASE HR: 62 BPM
DIASTOLIC BLOOD PRESSURE: 91 MMHG
OHS CV CPX 1 MINUTE RECOVERY HEART RATE: 82 BPM
OHS CV CPX 85 PERCENT MAX PREDICTED HEART RATE MALE: 135
OHS CV CPX ESTIMATED METS: 1
OHS CV CPX MAX PREDICTED HEART RATE: 159
OHS CV CPX PATIENT IS FEMALE: 1
OHS CV CPX PATIENT IS MALE: 0
OHS CV CPX PEAK DIASTOLIC BLOOD PRESSURE: 67 MMHG
OHS CV CPX PEAK HEAR RATE: 93 BPM
OHS CV CPX PEAK RATE PRESSURE PRODUCT: NORMAL
OHS CV CPX PEAK SYSTOLIC BLOOD PRESSURE: 149 MMHG
OHS CV CPX PERCENT MAX PREDICTED HEART RATE ACHIEVED: 61
OHS CV CPX RATE PRESSURE PRODUCT PRESENTING: 8990
STRESS ECHO POST EXERCISE DUR MIN: 4 MINUTES
STRESS ECHO POST EXERCISE DUR SEC: 0 SECONDS
SYSTOLIC BLOOD PRESSURE: 145 MMHG

## 2024-08-12 PROCEDURE — 93016 CV STRESS TEST SUPVJ ONLY: CPT | Mod: ,,, | Performed by: STUDENT IN AN ORGANIZED HEALTH CARE EDUCATION/TRAINING PROGRAM

## 2024-08-12 PROCEDURE — A9502 TC99M TETROFOSMIN: HCPCS

## 2024-08-12 PROCEDURE — 78452 HT MUSCLE IMAGE SPECT MULT: CPT | Mod: TC

## 2024-08-12 PROCEDURE — 93018 CV STRESS TEST I&R ONLY: CPT | Mod: ,,, | Performed by: STUDENT IN AN ORGANIZED HEALTH CARE EDUCATION/TRAINING PROGRAM

## 2024-08-12 PROCEDURE — 63600175 PHARM REV CODE 636 W HCPCS

## 2024-08-12 PROCEDURE — 78452 HT MUSCLE IMAGE SPECT MULT: CPT | Mod: 26,,, | Performed by: NUCLEAR MEDICINE

## 2024-08-12 PROCEDURE — 93017 CV STRESS TEST TRACING ONLY: CPT

## 2024-08-12 RX ORDER — REGADENOSON 0.08 MG/ML
0.4 INJECTION, SOLUTION INTRAVENOUS ONCE
Status: COMPLETED | OUTPATIENT
Start: 2024-08-12 | End: 2024-08-12

## 2024-08-12 RX ADMIN — TETROFOSMIN 10 MILLICURIE: 1.38 INJECTION, POWDER, LYOPHILIZED, FOR SOLUTION INTRAVENOUS at 09:08

## 2024-08-12 RX ADMIN — TETROFOSMIN 30 MILLICURIE: 1.38 INJECTION, POWDER, LYOPHILIZED, FOR SOLUTION INTRAVENOUS at 10:08

## 2024-08-12 RX ADMIN — REGADENOSON 0.4 MG: 0.08 INJECTION, SOLUTION INTRAVENOUS at 11:08

## 2024-08-13 ENCOUNTER — TELEPHONE (OUTPATIENT)
Dept: CARDIOLOGY | Facility: CLINIC | Age: 64
End: 2024-08-13
Payer: MEDICAID

## 2024-08-13 DIAGNOSIS — I26.99 PULMONARY EMBOLISM, BILATERAL: ICD-10-CM

## 2024-08-13 NOTE — TELEPHONE ENCOUNTER
----- Message from Alessandra Ortiz sent at 8/13/2024  2:18 PM CDT -----  Regarding: refill  Type:  RX Refill Request    Who Called: pt  Refill or New Rx:refill   RX Name and Strength:rivaroxaban (XARELTO) 20 mg Tab  pravastatin (PRAVACHOL) 20 MG tablet  Preferred Pharmacy with phone number:[facility] [address] [place] [address] [address] [facility]  [address] [facility] [address]  Phone: [phone] Fax: [phone]  Local or Mail Order:Local  Ordering Provider:Sharon   Would the patient rather a call back or a response via [facility]? Call  Best Call Back Number:[phone]  Additional Information:

## 2024-08-13 NOTE — TELEPHONE ENCOUNTER
I reached out to Mrs. Benites and informed Her of her results & She expressed understanding of the results.    Lay Brambila  Medical Assistant  [facility]    ----- Message from Jose Angel Thomas NP sent at 8/12/2024  8:45 PM CDT -----  Please inform, Ms. Benites your stress test is negative and the heart function is within normal limits. No change in treatment is required.    Thank you,  Jose Angel Thomas DNP

## 2024-08-23 ENCOUNTER — LAB VISIT (OUTPATIENT)
Dept: LAB | Facility: HOSPITAL | Age: 64
End: 2024-08-23
Attending: NURSE PRACTITIONER
Payer: MEDICAID

## 2024-08-23 DIAGNOSIS — R53.83 FATIGUE, UNSPECIFIED TYPE: ICD-10-CM

## 2024-08-23 LAB
ALBUMIN SERPL BCP-MCNC: 4.3 G/DL (ref 3.5–5.2)
ALP SERPL-CCNC: 74 U/L (ref 38–126)
ALT SERPL W/O P-5'-P-CCNC: 28 U/L (ref 10–44)
ANION GAP SERPL CALC-SCNC: 11 MMOL/L (ref 8–16)
AST SERPL-CCNC: 29 U/L (ref 15–46)
BASOPHILS # BLD AUTO: 0.04 K/UL (ref 0–0.2)
BASOPHILS NFR BLD: 0.7 % (ref 0–1.9)
BILIRUB SERPL-MCNC: 0.5 MG/DL (ref 0.1–1)
CALCIUM SERPL-MCNC: 9.6 MG/DL (ref 8.7–10.5)
CHLORIDE SERPL-SCNC: 103 MMOL/L (ref 95–110)
CO2 SERPL-SCNC: 28 MMOL/L (ref 23–29)
CREAT SERPL-MCNC: 1.3 MG/DL (ref 0.5–1.4)
DIFFERENTIAL METHOD BLD: NORMAL
EOSINOPHIL # BLD AUTO: 0.2 K/UL (ref 0–0.5)
EOSINOPHIL NFR BLD: 3.8 % (ref 0–8)
ERYTHROCYTE [DISTWIDTH] IN BLOOD BY AUTOMATED COUNT: 12.5 % (ref 11.5–14.5)
EST. GFR  (NO RACE VARIABLE): 46.8 ML/MIN/1.73 M^2
FERRITIN SERPL-MCNC: 127 NG/ML (ref 20–300)
GLUCOSE SERPL-MCNC: 108 MG/DL (ref 70–110)
HCT VFR BLD AUTO: 43.4 % (ref 37–48.5)
HGB BLD-MCNC: 13.9 G/DL (ref 12–16)
IMM GRANULOCYTES # BLD AUTO: 0.02 K/UL (ref 0–0.04)
IMM GRANULOCYTES NFR BLD AUTO: 0.3 % (ref 0–0.5)
IRON SERPL-MCNC: 57 UG/DL (ref 30–160)
LYMPHOCYTES # BLD AUTO: 2.5 K/UL (ref 1–4.8)
LYMPHOCYTES NFR BLD: 42.2 % (ref 18–48)
MCH RBC QN AUTO: 29.6 PG (ref 27–31)
MCHC RBC AUTO-ENTMCNC: 32 G/DL (ref 32–36)
MCV RBC AUTO: 92 FL (ref 82–98)
MONOCYTES # BLD AUTO: 0.5 K/UL (ref 0.3–1)
MONOCYTES NFR BLD: 9.1 % (ref 4–15)
NEUTROPHILS # BLD AUTO: 2.6 K/UL (ref 1.8–7.7)
NEUTROPHILS NFR BLD: 43.9 % (ref 38–73)
NRBC BLD-RTO: 0 /100 WBC
PLATELET # BLD AUTO: 298 K/UL (ref 150–450)
PMV BLD AUTO: 10.6 FL (ref 9.2–12.9)
POTASSIUM SERPL-SCNC: 4.8 MMOL/L (ref 3.5–5.1)
PROT SERPL-MCNC: 7.9 G/DL (ref 6–8.4)
RBC # BLD AUTO: 4.7 M/UL (ref 4–5.4)
SATURATED IRON: 18 % (ref 20–50)
SODIUM SERPL-SCNC: 142 MMOL/L (ref 136–145)
TOTAL IRON BINDING CAPACITY: 324 UG/DL (ref 250–450)
TRANSFERRIN SERPL-MCNC: 219 MG/DL (ref 200–375)
UUN UR-MCNC: 13 MG/DL (ref 7–17)
WBC # BLD AUTO: 5.85 K/UL (ref 3.9–12.7)

## 2024-08-23 PROCEDURE — 36415 COLL VENOUS BLD VENIPUNCTURE: CPT | Mod: PN | Performed by: NURSE PRACTITIONER

## 2024-08-23 PROCEDURE — 83540 ASSAY OF IRON: CPT | Mod: PN | Performed by: NURSE PRACTITIONER

## 2024-08-23 PROCEDURE — 82728 ASSAY OF FERRITIN: CPT | Performed by: NURSE PRACTITIONER

## 2024-08-23 PROCEDURE — 80053 COMPREHEN METABOLIC PANEL: CPT | Mod: PN | Performed by: NURSE PRACTITIONER

## 2024-08-23 PROCEDURE — 85025 COMPLETE CBC W/AUTO DIFF WBC: CPT | Mod: PN | Performed by: NURSE PRACTITIONER

## 2024-08-27 NOTE — PROGRESS NOTES
Labs reviewed. No emergent changes. Will discuss together at upcoming appointment.     Ceci Herrera NP

## 2024-08-28 ENCOUNTER — TELEPHONE (OUTPATIENT)
Dept: HEMATOLOGY/ONCOLOGY | Facility: CLINIC | Age: 64
End: 2024-08-28
Payer: MEDICAID

## 2024-08-28 NOTE — TELEPHONE ENCOUNTER
----- Message from Tangela Cunningham sent at 8/28/2024 12:05 PM CDT -----  Type:  rescfahad apt     Who Called: Pt   Does the patient know what this is regarding?: pt would like to switch her date back to sept 19 at 10am  Would the patient rather a call back or a response via [facility]? Call   Best Call Back Number:[phone]   Additional Information:

## 2024-08-29 ENCOUNTER — PATIENT MESSAGE (OUTPATIENT)
Dept: CARDIOLOGY | Facility: CLINIC | Age: 64
End: 2024-08-29
Payer: MEDICAID

## 2024-09-16 ENCOUNTER — OFFICE VISIT (OUTPATIENT)
Dept: FAMILY MEDICINE | Facility: HOSPITAL | Age: 64
End: 2024-09-16
Payer: MEDICAID

## 2024-09-16 VITALS
OXYGEN SATURATION: 99 % | DIASTOLIC BLOOD PRESSURE: 86 MMHG | HEART RATE: 87 BPM | SYSTOLIC BLOOD PRESSURE: 116 MMHG | BODY MASS INDEX: 33.68 KG/M2 | HEIGHT: 61 IN | WEIGHT: 178.38 LBS | RESPIRATION RATE: 16 BRPM

## 2024-09-16 DIAGNOSIS — R06.83 SNORING: ICD-10-CM

## 2024-09-16 DIAGNOSIS — E78.5 HYPERLIPIDEMIA, UNSPECIFIED HYPERLIPIDEMIA TYPE: ICD-10-CM

## 2024-09-16 DIAGNOSIS — I10 PRIMARY HYPERTENSION: Primary | ICD-10-CM

## 2024-09-16 DIAGNOSIS — R73.03 PRE-DIABETES: ICD-10-CM

## 2024-09-16 PROCEDURE — 99213 OFFICE O/P EST LOW 20 MIN: CPT

## 2024-09-16 NOTE — PROGRESS NOTES
"  [facility] CL[facility]IC NOTE  Follow-up Visit      SUBJECTIVE:     Patient: Mehreen Benites is a 63 y.o. female.    Chief Compliant:   Chief Complaint   Patient presents with    Follow-up     BP        History of Present Illness:  63yr old lady who has a past medical history of Hypertension, Other pulmonary embolism without acute cor pulmonale (2022), and Tendonitis. presents to clinic today for HTN follow-up.    HTN#  Patient presents for follow up related to elevated BP. Currently complaint with medication per report (Hctz and Valsartan). However, has restarted pravastatin. She states she had blood drawn however labs I ordered were not processed. Patient has no other complaints today.    HLD & DM# No new labs. There was apparent confusion when drawing labs.     Review of Systems   Constitutional:  Negative for fever and weight loss.   HENT:  Negative for ear pain, hearing loss, sore throat and tinnitus.    Eyes:  Negative for blurred vision.   Respiratory:  Negative for cough, shortness of breath and wheezing.    Cardiovascular:  Negative for chest pain and leg swelling.   Gastrointestinal:  Negative for abdominal pain, heartburn, nausea and vomiting.   Genitourinary:  Negative for dysuria.   Musculoskeletal:  Negative for myalgias.   Neurological:  Negative for dizziness, weakness and headaches.     A 10+ review of systems was performed with pertinent positives and negatives noted above in the history of present illness. Other systems were negative unless otherwise specified.    OBJECTIVE:     Vital Signs (Most Recent)  Vitals:    09/16/24 1342   BP: 116/86   Pulse: 87   Resp: 16   SpO2: 99%   Weight: 80.9 kg (178 lb 5.6 oz)   Height: 5' 1" (1.549 m)     BMI: Body mass index is 33.7 kg/m².     Physical Exam  Constitutional:       Appearance: Normal appearance. She is obese.   HENT:      Head: Normocephalic and atraumatic.      Right Ear: There is no impacted cerumen.      Left Ear: There is no impacted " cerumen.      Nose: Nose normal. No congestion or rhinorrhea.      Mouth/Throat:      Mouth: Mucous membranes are moist.   Eyes:      Extraocular Movements: Extraocular movements intact.      Pupils: Pupils are equal, round, and reactive to light.   Cardiovascular:      Rate and Rhythm: Normal rate and regular rhythm.      Pulses: Normal pulses.      Heart sounds: Normal heart sounds.   Pulmonary:      Effort: Pulmonary effort is normal. No respiratory distress.      Breath sounds: Normal breath sounds. No wheezing.   Musculoskeletal:         General: Normal range of motion.      Cervical back: Normal range of motion and neck supple. No tenderness.   Skin:     General: Skin is warm.      Capillary Refill: Capillary refill takes less than 2 seconds.   Neurological:      General: No focal deficit present.      Mental Status: She is alert and oriented to person, place, and time. Mental status is at baseline.      Cranial Nerves: No cranial nerve deficit.   Psychiatric:         Mood and Affect: Mood normal.         Behavior: Behavior normal.          ASSESSMENT:   Mehreen Benites is a 63 y.o. female who presents to clinic to for HTN check up.    1. Primary hypertension    2. Hyperlipidemia, unspecified hyperlipidemia type    3. Pre-diabetes    4. Snoring           PLAN:   Primary hypertension  - Chronic condition.  - Controlled. BP today 116/86.  - Counseled patient on importance of taking BP medication Daily and monitoring BP at home.  - Discussed Effects of Uncontrolled HTN  - Explained importance of lifestyle modifications, medication compliance, and recording routine blood pressure monitoring either at home or office in a log to ensure BP is controlled. Given counseling on need to keep blood pressure at least 140/90 and preferably 120/80 which is normal. Explained the increased risks for cardiovascular disease when BP is uncontrolled. Patient given counseling on signs and symptoms of possible elevated blood  pressure.     Hyperlipidemia, unspecified hyperlipidemia type  Uncontrolled? Patient now back on her Pravastatin 20 mg. Last results from 11/2023, Chol: 242 and LDL: 165.4, rest WNL.   - The 10-year ASCVD risk score (Castro PATRICK, et al., 2019) is: 6.8%    Values used to calculate the score:      Age: 63 years      Sex: Female      Is Non- : Yes      Diabetic: No      Tobacco smoker: No      Systolic Blood Pressure: 116 mmHg      Is BP treated: Yes      HDL Cholesterol: 51 mg/dL      Total Cholesterol: 242 mg/dL   - ASCVD risk score increased based on lipid panel results in combination with other risk factors. It is recommended to restart statin therapy at this time. She still has medication. She will contact for refills as needed.   - Patient has been encouraged to reduce greasy, fatty and oily foods. The patient is asked to make an attempt to improve diet to aid in medical management of this problem.   - Counseled patient on importance of exercising at least 150-300 minutes per week (i.e. at least 30 minutes, 3 days a week) of moderate physical activity.    - Will recheck lipid panel now. Lab order is still standing from prior visit.  -     Lipid Panel; Future; Expected date: 08/06/2024    Pre-diabetes  Stable. Previous A1c 11/2023: 5.8%. Recheck A1c now. Lab order is still standing from prior visit.  Counseled patient on the importance maintaining a healthy diet (including at least one-half of food eaten should be whole fruits and vegetables with the core elements of the other half of food  should include grains, whole grains, dairy, protein, and oils with lower saturated fat, as well as minimizing alcohol use and consumption of foods with added sugar, saturated fat, and sodium.)    - Counseled patient on importance of exercising at least 150-300 minutes per week (i.e. at least 30 minutes, 3 days a week) of moderate physical activity.    -     Hemoglobin A1C; Future; Expected date:  2024    Snoring  New. STOP-BAN poitns (intermediate risk) done today in office. Patient has been recommended to be seen by sleep medicine to evaluate for MELANIA. She has been educated on sleep study and CPAP device. She understands how this could be affecting her BP as well and how uncontrolled MELANIA can lead to further worsening of cardiopulm systems.   She would like to hold on referral since BP is well controlled at this time.   - Consider reassessing sleep medicine referral at next visit.     Impacted cerumen Bilateral   Resolved.   Patient has been following non-invasive recommendations for self ear hygiene.        Provided patient with anticipatory guidance and return precautions. Treatment plan discussed with patient, all questions answered, and patient acknowledged understanding and verbal agreement.    Follow-up in: 4 weeks; or sooner PRN if acute concerns arise.  BP log check and lab review.     Case discussed with Dr. Mike Cunningham MD  [facility], PGY-1  2024

## 2024-09-19 ENCOUNTER — OFFICE VISIT (OUTPATIENT)
Dept: HEMATOLOGY/ONCOLOGY | Facility: CLINIC | Age: 64
End: 2024-09-19
Payer: MEDICAID

## 2024-09-19 VITALS
HEART RATE: 83 BPM | DIASTOLIC BLOOD PRESSURE: 87 MMHG | HEIGHT: 61 IN | OXYGEN SATURATION: 98 % | TEMPERATURE: 98 F | SYSTOLIC BLOOD PRESSURE: 132 MMHG | WEIGHT: 180.56 LBS | RESPIRATION RATE: 20 BRPM | BODY MASS INDEX: 34.09 KG/M2

## 2024-09-19 DIAGNOSIS — Z86.718 HISTORY OF DVT (DEEP VEIN THROMBOSIS): ICD-10-CM

## 2024-09-19 DIAGNOSIS — N18.31 STAGE 3A CHRONIC KIDNEY DISEASE: ICD-10-CM

## 2024-09-19 DIAGNOSIS — I10 ESSENTIAL HYPERTENSION: ICD-10-CM

## 2024-09-19 DIAGNOSIS — E78.00 HYPERCHOLESTEROLEMIA: ICD-10-CM

## 2024-09-19 DIAGNOSIS — Z79.01 CHRONIC ANTICOAGULATION: ICD-10-CM

## 2024-09-19 DIAGNOSIS — D50.9 IRON DEFICIENCY ANEMIA, UNSPECIFIED IRON DEFICIENCY ANEMIA TYPE: ICD-10-CM

## 2024-09-19 DIAGNOSIS — I26.99 PULMONARY EMBOLISM, BILATERAL: Primary | ICD-10-CM

## 2024-09-19 DIAGNOSIS — Z13.1 SCREENING FOR DIABETES MELLITUS: ICD-10-CM

## 2024-09-19 PROCEDURE — 3008F BODY MASS INDEX DOCD: CPT | Mod: CPTII,,, | Performed by: NURSE PRACTITIONER

## 2024-09-19 PROCEDURE — 3079F DIAST BP 80-89 MM HG: CPT | Mod: CPTII,,, | Performed by: NURSE PRACTITIONER

## 2024-09-19 PROCEDURE — 1160F RVW MEDS BY RX/DR IN RCRD: CPT | Mod: CPTII,,, | Performed by: NURSE PRACTITIONER

## 2024-09-19 PROCEDURE — 99214 OFFICE O/P EST MOD 30 MIN: CPT | Mod: S$PBB,,, | Performed by: NURSE PRACTITIONER

## 2024-09-19 PROCEDURE — 99999 PR PBB SHADOW E&M-EST. PATIENT-LVL IV: CPT | Mod: PBBFAC,,, | Performed by: NURSE PRACTITIONER

## 2024-09-19 PROCEDURE — 4010F ACE/ARB THERAPY RXD/TAKEN: CPT | Mod: CPTII,,, | Performed by: NURSE PRACTITIONER

## 2024-09-19 PROCEDURE — 99214 OFFICE O/P EST MOD 30 MIN: CPT | Mod: PBBFAC,PN | Performed by: NURSE PRACTITIONER

## 2024-09-19 PROCEDURE — 3075F SYST BP GE 130 - 139MM HG: CPT | Mod: CPTII,,, | Performed by: NURSE PRACTITIONER

## 2024-09-19 PROCEDURE — 1159F MED LIST DOCD IN RCRD: CPT | Mod: CPTII,,, | Performed by: NURSE PRACTITIONER

## 2024-09-19 RX ORDER — FERROUS SULFATE 325(65) MG
325 TABLET ORAL EVERY OTHER DAY
Qty: 45 TABLET | Refills: 3 | Status: SHIPPED | OUTPATIENT
Start: 2024-09-19 | End: 2025-09-19

## 2024-09-19 NOTE — PROGRESS NOTES
PATIENT: Mehreen Benites  MRN: [id number]  DATE: 9/19/2024    Chief Complaint: PE, DVT    Subjective:     History of Present Illness:   HPI as per Dr Brandt's 8/8/22 office visit, reviewed and verified with pt    She presented to the ED 05/31/2022 with sudden onset lightheadedness, weakness and dyspnea - found to have massive saddle pulmonary embolism with right heart strain and partially occlusive thrombus in the right popliteal vein.  No recent surgeries, immobilizations, long trips or prior history of DVT.    She underwent mechanical thrombectomy because of massive PE causing right heart strain.    Family hx significant for son (40 yo) with DVT/PE and maternal first cousin (39 yo) with DVT/PE.    doing well on xarelto    INTERVAL  - pt here for dvt/pe follow up 9/19/24    - states she is feeling well with no chest pain, sob/ortiz, lightheadedness or dizziness, easy bleeding or bruising, abdominal pain, n/v/d, hematemesis, melena, hematuria.   - re SALINA history, she is eating more beets  - she is continuing to tolerate xarelto well at 20mg daily. Denies bleeding issues.  - 8/25/23 surgery achilles tendon repair, surgery on Friday and restarted Xarelto on the Sunday, did well  - colonoscopy 4/27/23 with Dr Torres, held xarelto 2 days prior. Colonoscopy with hemorrhoids and x1 polyp 4mm in descending colon removed, repeat in 10 yrs.  - bp elevation today, took am hctz and losartan shortly before this appt, no associated symptoms and bp at home when checked pt states systolic in 140s/80s, 147/83 a few days ago.      Past Medical, Surgical, Family and Social History Reviewed.    Medications and Allergies reviewed    Review of Systems   Constitutional:  Negative for fatigue, fever and unexpected weight change.   HENT:  Negative for mouth sores and nosebleeds.    Respiratory:  Negative for chest tightness and shortness of breath.    Cardiovascular:  Negative for chest pain, palpitations and leg swelling.   Gastrointestinal:   "Negative for abdominal pain, blood in stool, constipation, diarrhea, nausea and vomiting.   Genitourinary:  Negative for hematuria.   Musculoskeletal:  Positive for arthralgias and back pain. Negative for joint swelling.   Skin:  Negative for pallor.   Neurological:  Negative for dizziness, weakness and light-headedness.   Hematological:  Negative for adenopathy. Does not bruise/bleed easily.   Psychiatric/Behavioral:  Negative for suicidal ideas.    All other systems reviewed and are negative.      Objective:     Vitals:    09/19/24 0950   BP: 132/87   BP Location: Left arm   Patient Position: Sitting   BP Method: Large (Automatic)   Pulse: 83   Resp: 20   Temp: 97.9 °F (36.6 °C)   TempSrc: Oral   SpO2: 98%   Weight: 81.9 kg (180 lb 8.9 oz)   Height: 5' 1" (1.549 m)         BMI: Body mass index is 34.12 kg/m².    Physical Exam  Vitals and nursing note reviewed.   Constitutional:       General: She is not in acute distress.  HENT:      Head: Normocephalic and atraumatic.      Right Ear: External ear normal.      Left Ear: External ear normal.   Eyes:      General: No scleral icterus.     Pupils: Pupils are equal, round, and reactive to light.   Cardiovascular:      Rate and Rhythm: Normal rate and regular rhythm.      Pulses: Normal pulses.      Heart sounds: Normal heart sounds. No murmur heard.     Comments: 2+ posterior tib ble  Pulmonary:      Effort: Pulmonary effort is normal. No respiratory distress.      Breath sounds: Normal breath sounds.   Abdominal:      General: Bowel sounds are normal. There is no distension.      Palpations: Abdomen is soft.      Tenderness: There is no abdominal tenderness. There is no guarding.   Musculoskeletal:         General: Normal range of motion.      Cervical back: Normal range of motion and neck supple. No rigidity.      Right lower leg: No edema.      Left lower leg: No edema.      Comments: BLE negative homans, 2+ distal pulses. Wearing compression socks bilat.  "   Lymphadenopathy:      Cervical: No cervical adenopathy.   Skin:     General: Skin is warm and dry.      Coloration: Skin is not jaundiced or pale.      Findings: No bruising or erythema.   Neurological:      Mental Status: She is alert and oriented to person, place, and time. Mental status is at baseline.      Gait: Gait normal.   Psychiatric:         Attention and Perception: Attention normal.         Mood and Affect: Mood normal. Mood is not depressed.         Speech: Speech normal.         Behavior: Behavior normal. Behavior is cooperative.         Thought Content: Thought content normal.       ECOG SCORE               LABS  Lab Results   Component Value Date    WBC 5.85 08/23/2024    HGB 13.9 08/23/2024    HCT 43.4 08/23/2024    MCV 92 08/23/2024     08/23/2024       Lab Results   Component Value Date    DDIMER 0.31 09/27/2022     IMAGING    US BLE 10/23/23  Impression:     No evidence of deep venous thrombosis in either lower extremity.       US Lower Extremity Veins Bilat (2/13/23)  Impression:     No evidence of deep venous thrombosis in either lower extremity.       US Lower Extremity Veins Right 9/27/22  Impression:  No evidence of deep venous thrombosis in the right lower extremity.   Interval resolution of previously identified thrombus within the right popliteal vein.    CTA Chest Non-Coronary(PE Studies) 9/27/22  Impression:     1. Interval improvement of prior bilateral pulmonary arterial thromboemboli.  No residual central pulmonary thrombus identified.  Few small filling defects within segmental arteries potentially representing mild residual thrombus.  2. Mosaic attenuation of the bilateral pulmonary parenchyma which may represent increased pulmonary vascular resistance or small airways disease.  3. Mild cardiomegaly.  4. Additional findings as above.             Assessment:       1. Pulmonary embolism, bilateral    2. History of DVT (deep vein thrombosis)    3. Chronic anticoagulation   "  4. Iron deficiency anemia, unspecified iron deficiency anemia type    5. Essential hypertension    6. Screening for diabetes mellitus    7. Hypercholesterolemia    8. Stage 3a chronic kidney disease          Plan:   Past records including clinic and ED visits, labs, imaging, medications reviewed as available in epic    Massive unprovoked pulmonary embolism, SALINA  -1st episode, unprovoked, required mechanical thrombectomy  -positive family history blood clots  -she clearly has a hypercoagulable state based on her clinical presentation  -no need hypercoagulable workup  -continue Xarelto at full dose  -indefinite anticoagulation needed  -9/29/22 follow up with repeat US RLE (9/27/22) showing resolution of right popliteal vein thrombus, CTA chest (9/27/22) "Interval improvement of prior bilateral pulmonary arterial thromboemboli.  No residual central pulmonary thrombus identified.  Few small filling defects within segmental arteries potentially representing mild residual thrombus."  -9/27/22 d-dimer neg  -reports she has adequate xarelto at home, pcp will prescribe, advised to call if any of this changes or pcp unavailable  -CTA chest 1/17/23 "No acute cardiopulmonary disease.  Specifically, no residual filling defects are seen within the pulmonary arteries."  -Negative BLE US 2/13/23  -5/1/23 RLE US negative dvt  - held Xarelto 2 days prior 8/25/23 surgery and started 2 days later with early mobilization, adequate hydration.   -10/23/23 BLE US neg dvt  - 2/23/24 labs cbc normal, iron studies normal, cmp normal except as gfr noted with ckd 3a  - 8/23/24 labs cbc normal, iron sat 18%, tibc stable, ferritin 127 ng/mL, cmp stable with gfr 46.8 mL/min/1.73m2.  - Encouraged iron rich foods with vit c to enhance absorption   - Advised to reach out any questions or concerns. Discussed s/s of pe, dvt and when to seek emergent care. Questions answered.  - Xarelto refill x1 year on 8/13/24  - rtc 6 months for follow up    SALINA  - " 24 labs cbc normal, iron sat 18%, tibc stable, ferritin 127 ng/mL, cmp stable with gfr 46.8 mL/min/1.73m2.  - Encouraged iron rich foods with vit c to enhance absorption   - she will take oral iron once daily mwf  - continue to monitor, rtc 6 months with labs    HTN  -bp 132/87 today, no associated symptoms  -took losartan and hctz today prior appt  -mother history AMI early 50s,   -management deferred to cardiology, appt this week    CKD stage 3a, SEDRICK history  -GFR 46.8 (24) stable  -drinking 4-16oz water bottles most days, to push fluids  -encouraged adequate fluids and pcp follow up  - management deferred to pcp, appt 2 wks    She would like her pcp labs drawn, A1c and lipids as noted in chart. Unable to create appt with these, reordered as per pt request. Cards 24 and pcp 10/4/24      Total time of this visit, including time spent face to face with patient and/or via video/audio, and also in preparing for today's visit for MDM and documentation. (Medical Decision Making, including consideration of possible diagnoses, management options, complex medical record review, review of diagnostic tests and information, consideration and discussion of significant complications based on comorbidities, and discussion with providers involved with the care of the patient) 34 minutes. Greater than 50% was spent face to face with the patient counseling and coordinating care.          Ceci Herrera, NP-C  [facility]  Hematology/Oncology  [address] [place] [address] [id number]  [phone]

## 2024-09-19 NOTE — PATIENT INSTRUCTIONS
Iron rich foods listed and then Vit C rich foods listed. Combine vit C rich foods with iron rich foods to enhance absorption.    IRON RICH list from [employer]  Meat and Eggs    Beef  Lamb  Ham  Turkey  Chicken  Veal  Pork  Dried beef  Liver  Liverwurst  Eggs (any style)  Seafood    Shrimp  Clams  Scallops  Oysters  Tuna  Sardines  Amaury  Mackerel  Vegetables    Spinach  Sweet potatoes  Peas  Broccoli  String beans  Beet greens  Dandelion greens  Collards  Kale  Chard  Bread and Cereals    White bread (enriched)  Whole wheat bread  Enriched pasta  Wheat products  Bran cereals  Corn meal  Oat cereal  Cream of Wheat  Rye bread  Enriched rice  Fruit    Strawberries  Watermelon  Raisins  Dates  Figs  Prunes  Prune juice  Dried apricots  Dried peaches  Beans and Other Foods    Tofu  Beans (kidney, garbanzo, or white, canned)  Tomato products (e.g., paste)  Dried peas  Dried beans  Lentils  Instant breakfast  Corn syrup  Maple syrup  Molasses      Vit C RICH FOODS  citrus fruit, such as oranges and orange juice, susan, pineapple.  Peppers.  tomatoes  strawberries.  Guava, kiwi, papaya  Blackcurrants, blackberries.  broccoli.  brussels sprouts.  Mustard greens.  Red cabbage.  Kale.  potatoes.

## 2024-09-25 ENCOUNTER — OFFICE VISIT (OUTPATIENT)
Dept: CARDIOLOGY | Facility: CLINIC | Age: 64
End: 2024-09-25
Payer: MEDICAID

## 2024-09-25 VITALS
WEIGHT: 181.69 LBS | OXYGEN SATURATION: 98 % | DIASTOLIC BLOOD PRESSURE: 92 MMHG | HEIGHT: 61 IN | HEART RATE: 75 BPM | BODY MASS INDEX: 34.3 KG/M2 | SYSTOLIC BLOOD PRESSURE: 136 MMHG

## 2024-09-25 DIAGNOSIS — Z82.49 FAMILY HISTORY OF CARDIAC ARREST: ICD-10-CM

## 2024-09-25 DIAGNOSIS — M67.88 ACHILLES TENDONOSIS OF RIGHT LOWER EXTREMITY: ICD-10-CM

## 2024-09-25 DIAGNOSIS — E66.01 CLASS 2 SEVERE OBESITY DUE TO EXCESS CALORIES WITH SERIOUS COMORBIDITY AND BODY MASS INDEX (BMI) OF 35.0 TO 35.9 IN ADULT: ICD-10-CM

## 2024-09-25 DIAGNOSIS — R60.0 BILATERAL LOWER EXTREMITY EDEMA: ICD-10-CM

## 2024-09-25 DIAGNOSIS — E78.2 MIXED HYPERLIPIDEMIA: ICD-10-CM

## 2024-09-25 DIAGNOSIS — I26.99 PULMONARY EMBOLISM, BILATERAL: ICD-10-CM

## 2024-09-25 DIAGNOSIS — R07.89 OTHER CHEST PAIN: Primary | ICD-10-CM

## 2024-09-25 DIAGNOSIS — I10 PRIMARY HYPERTENSION: ICD-10-CM

## 2024-09-25 DIAGNOSIS — R73.03 PRE-DIABETES: ICD-10-CM

## 2024-09-25 PROCEDURE — 99999 PR PBB SHADOW E&M-EST. PATIENT-LVL III: CPT | Mod: PBBFAC,,,

## 2024-09-25 PROCEDURE — 99213 OFFICE O/P EST LOW 20 MIN: CPT | Mod: PBBFAC,PN

## 2024-09-25 NOTE — ASSESSMENT & PLAN NOTE
-Mother AMI early 50s, decreased   -NM Stress test: 8/12/24  Impression:     1. No scintigraphic evidence of exercise or pharmacological stress induced myocardial ischemic changes  2. The global left ventricular systolic function is normal with an LV ejection fraction of 77 % and no evidence of LV dilatation. Wall motion is normal.   The 10-year ASCVD risk score (Castro PATRICK, et al., 2019) is: 10%    Values used to calculate the score:      Age: 64 years      Sex: Female      Is Non- : Yes      Diabetic: No      Tobacco smoker: No      Systolic Blood Pressure: 132 mmHg      Is BP treated: Yes      HDL Cholesterol: 43 mg/dL      Total Cholesterol: 218 mg/dL

## 2024-09-25 NOTE — ASSESSMENT & PLAN NOTE
NM stress test 8/12/24  Interpretation Summary    The ECG portion of the study is negative for ischemia.    The patient reported no chest pain during the stress test.    The nuclear portion of this study will be reported separately.  Impression:     1. No scintigraphic evidence of exercise or pharmacological stress induced myocardial ischemic changes  2. The global left ventricular systolic function is normal with an LV ejection fraction of 77 % and no evidence of LV dilatation. Wall motion is normal.

## 2024-09-25 NOTE — PROGRESS NOTES
Subjective:    Patient ID:  Mehreen Benites is a 64 y.o. female who presents for evaluation of No chief complaint on file.      PCP: Serafin Cunningham MD     Referring Provider: Ceci Herrera NP    HPI: Pateint is a 62 yo F w/PMH of HTN, PE on Xarelto, obesity, CKD-3 who presents  today for f/u appt. She was last seen on 6/25/24 for f/u appt  and reported  CP associated with SOB with minimal exertion. She was continued on medical therapy w NM stress test ordered for ischemic evaluation.  NM stress test completed and negative for ischemia.   She is currently on Xarelto  without complications. She continues to note fatigue. She is followed by Hematology and is on iron supplements. Patient denies CP, SOB, palpitations, orthopnea, PND, presyncope, LOC, swelling, or claudication. Patient randomly  monitors home BP and ranges 140/80-90s. Patient reports medication compliance without side effects. She reports weakness and back pain with standing > 20 minutes. She is s/p Achilles tendon repair and is recovering slowly and reports some pain and swelling in the ankle.       Past Medical History:   Diagnosis Date    Hypertension     Other pulmonary embolism without acute cor pulmonale 2022    Tendonitis      Past Surgical History:   Procedure Laterality Date    BONE MARROW ASPIRATION Right 8/25/2023    Procedure: ASPIRATION, BONE MARROW;  Surgeon: Axel Kapoor Jr., DPM;  Location: [facility] OR;  Service: Podiatry;  Laterality: Right;    CATARACT EXTRACTION, BILATERAL      COLONOSCOPY N/A 04/27/2023    Procedure: COLONOSCOPY;  Surgeon: Zachary Torres MD;  Location: [facility];  Service: Endoscopy;  Laterality: N/A;    HEEL SPUR SURGERY Left 02/14/2013    HYSTERECTOMY  10/2009    WHITNEY    OOPHORECTOMY  10/2009    Bilateral    REPAIR OF ACHILLES TENDON Right 8/25/2023    Procedure: REPAIR, TENDON, ACHILLES;  Surgeon: Axel Kapoor Jr., DPM;  Location: [facility] OR;  Service: Podiatry;  Laterality: Right;  pop saph    RESECTION  OF GASTROCNEMIUS MUSCLE Right 2023    Procedure: RESECTION, MUSCLE, GASTROCNEMIUS;  Surgeon: Axel Kapoor Jr., DPM;  Location: [facility] OR;  Service: Podiatry;  Laterality: Right;  pop saph    SURGICAL REMOVAL OF RETROCALCANEAL EXOSTOSIS Right 2023    Procedure: EXCISION, EXOSTOSIS, RETROCALCANEAL;  Surgeon: Axel Kapoor Jr., DPM;  Location: [facility] OR;  Service: Podiatry;  Laterality: Right;    THROMBECTOMY N/A 2022    Procedure: THROMBECTOMY;  Surgeon: Kendall Fiore MD;  Location: [facility] CATH LAB/EP;  Service: Cardiology;  Laterality: N/A;     Social History     Socioeconomic History    Marital status: Legally    Tobacco Use    Smoking status: Never     Passive exposure: Never    Smokeless tobacco: Never   Substance and Sexual Activity    Alcohol use: Yes     Comment: occasionally    Drug use: No    Sexual activity: Not Currently     Partners: Male     Birth control/protection: Post-menopausal, See Surgical Hx     Comment:      Family History   Problem Relation Name Age of Onset    Hypertension Mother          AMI,  age 50s    Lung cancer Father      Breast cancer Maternal Aunt      Breast cancer Paternal Aunt      Breast cancer Maternal Cousin  51    Colon cancer Neg Hx      Ovarian cancer Neg Hx         Review of patient's allergies indicates:  No Known Allergies    Medication List with Changes/Refills   Current Medications    ASCORBIC ACID, VITAMIN C, (VITAMIN C) 500 MG TABLET    Take 500 mg by mouth once daily.    FERROUS SULFATE (FEOSOL) 325 MG (65 MG IRON) TAB TABLET    Take 1 tablet (325 mg total) by mouth every other day.    HYDROCHLOROTHIAZIDE (HYDRODIURIL) 25 MG TABLET    Take 1 tablet (25 mg total) by mouth once daily.    PRAVASTATIN (PRAVACHOL) 20 MG TABLET    Take 1 tablet (20 mg total) by mouth once daily.    RIVAROXABAN (XARELTO) 20 MG TAB    Take 1 tablet (20 mg total) by mouth before dinner.    SERTRALINE (ZOLOFT) 100 MG TABLET    Take 150 mg by mouth.  "   VALSARTAN (DIOVAN) 160 MG TABLET    Take 1 tablet (160 mg total) by mouth once daily.       Review of Systems   Constitutional: Positive for malaise/fatigue. Negative for diaphoresis and fever.   HENT:  Negative for congestion and hearing loss.    Eyes:  Negative for blurred vision and pain.   Cardiovascular:  Negative for claudication, leg swelling, near-syncope and syncope.   Respiratory:  Negative for shortness of breath and sleep disturbances due to breathing.    Hematologic/Lymphatic: Negative for bleeding problem. Does not bruise/bleed easily.   Skin:  Negative for color change and poor wound healing.   Gastrointestinal:  Negative for abdominal pain and nausea.   Genitourinary:  Negative for bladder incontinence and flank pain.   Neurological:  Negative for focal weakness and light-headedness.        Objective:   BP (!) 136/92 (BP Location: Left arm, Patient Position: Sitting, BP Method: Large (Manual))   Pulse 75   Ht 5' 1" (1.549 m)   Wt 82.4 kg (181 lb 10.5 oz)   LMP  (LMP Unknown)   SpO2 98%   BMI 34.32 kg/m²    Physical Exam  Constitutional:       Appearance: She is well-developed. She is not diaphoretic.   HENT:      Head: Normocephalic and atraumatic.   Eyes:      General: No scleral icterus.     Pupils: Pupils are equal, round, and reactive to light.   Neck:      Vascular: No JVD.   Cardiovascular:      Rate and Rhythm: Normal rate and regular rhythm.      Pulses: Intact distal pulses.           Radial pulses are 2+ on the right side and 2+ on the left side.        Dorsalis pedis pulses are 2+ on the right side and 2+ on the left side.        Posterior tibial pulses are 2+ on the right side and 2+ on the left side.      Heart sounds: S1 normal and S2 normal. No murmur heard.     No friction rub. No gallop.   Pulmonary:      Effort: Pulmonary effort is normal. No respiratory distress.      Breath sounds: Normal breath sounds. No wheezing or rales.   Chest:      Chest wall: No tenderness. "   Abdominal:      General: Bowel sounds are normal. There is no distension.      Palpations: Abdomen is soft. There is no mass.      Tenderness: There is no abdominal tenderness. There is no rebound.   Musculoskeletal:         General: No tenderness. Normal range of motion.      Cervical back: Normal range of motion and neck supple.      Right lower le+ Edema present.      Left lower le+ Edema present.   Skin:     General: Skin is warm and dry.      Coloration: Skin is not pale.   Neurological:      Mental Status: She is alert and oriented to person, place, and time.      Coordination: Coordination normal.      Deep Tendon Reflexes: Reflexes normal.   Psychiatric:         Behavior: Behavior normal.         Judgment: Judgment normal.               NM stress test 24  Interpretation Summary    The ECG portion of the study is negative for ischemia.    The patient reported no chest pain during the stress test.    The nuclear portion of this study will be reported separately.  Impression:     1. No scintigraphic evidence of exercise or pharmacological stress induced myocardial ischemic changes  2. The global left ventricular systolic function is normal with an LV ejection fraction of 77 % and no evidence of LV dilatation. Wall motion is normal.     IMAGING     US BLE 10/23/23  Impression:     No evidence of deep venous thrombosis in either lower extremity.        US Lower Extremity Veins Bilat (23)  Impression:     No evidence of deep venous thrombosis in either lower extremity.        US Lower Extremity Veins Right 22  Impression:  No evidence of deep venous thrombosis in the right lower extremity.   Interval resolution of previously identified thrombus within the right popliteal vein.     CTA Chest Non-Coronary(PE Studies) 22  Impression:    1. Interval improvement of prior bilateral pulmonary arterial thromboemboli.  No residual central pulmonary thrombus identified.  Few small filling  defects within segmental arteries potentially representing mild residual thrombus.  2. Mosaic attenuation of the bilateral pulmonary parenchyma which may represent increased pulmonary vascular resistance or small airways disease.  3. Mild cardiomegaly.  4. Additional findings as above.    Assessment:       1. Other chest pain    2. Family history of cardiac arrest    3. Primary hypertension    4. Mixed hyperlipidemia    5. Pulmonary embolism, bilateral    6. Pre-diabetes    7. Achilles tendonosis of right lower extremity    8. Bilateral lower extremity edema    9. Class 2 severe obesity due to excess calories with serious comorbidity and body mass index (BMI) of 35.0 to 35.9 in adult         Plan:         Other chest pain  NM stress test 8/12/24  Interpretation Summary    The ECG portion of the study is negative for ischemia.    The patient reported no chest pain during the stress test.    The nuclear portion of this study will be reported separately.  Impression:     1. No scintigraphic evidence of exercise or pharmacological stress induced myocardial ischemic changes  2. The global left ventricular systolic function is normal with an LV ejection fraction of 77 % and no evidence of LV dilatation. Wall motion is normal.     Family history of cardiac arrest  -Mother AMI early 50s, decreased   -NM Stress test: 8/12/24  Impression:     1. No scintigraphic evidence of exercise or pharmacological stress induced myocardial ischemic changes  2. The global left ventricular systolic function is normal with an LV ejection fraction of 77 % and no evidence of LV dilatation. Wall motion is normal.   The 10-year ASCVD risk score (Castro PATRICK, et al., 2019) is: 10%    Values used to calculate the score:      Age: 64 years      Sex: Female      Is Non- : Yes      Diabetic: No      Tobacco smoker: No      Systolic Blood Pressure: 132 mmHg      Is BP treated: Yes      HDL Cholesterol: 43 mg/dL      Total  Cholesterol: 218 mg/dL      Hypertension  -Goal BP < 130/80  -controlled, but elevated today 136/92   -continue medical therapy- HCTZ 25 mg, valsartan 160 mg   -discussed lifestyle modifications, low salt diet avoid processed meats, pickled meats, increase physical activity   -discussed risk associated with elevated blood pressure   -check BP twice daily, maintain log and bring to all appts     Mixed hyperlipidemia  -.0 9/19/24  -continue statin therapy- pravastatin 20 mg     Pulmonary embolism, bilateral  -PE 5/31/2022- post thrombectomy  -continue Xeralto 20 mg - indefinitely     Pre-diabetes  -A1c 5.8 9/19/24  -discussed lifestyle modifications     Achilles tendonosis of right lower extremity  -S/P repair   -Followed by Ortho     Bilateral lower extremity edema  -compression stockings  -elevate legs when sitting     Class 2 severe obesity with serious comorbidity and body mass index (BMI) of 35.0 to 35.9 in adult  Body mass index is 34.32 kg/m². Morbid obesity complicates all aspects of disease management from diagnostic modalities to treatment. Weight loss encouraged and health benefits explained to patient.     Total duration of face to face visit time 30 minutes.  Total time spent counseling greater than fifty percent of total visit time.  Counseling included discussion regarding imaging findings, diagnosis, possibilities, treatment options, risks and benefits.  The patient had many questions regarding the options and long-term effects      Jose Angel Thomas,WIL[facility]  Cardiology

## 2024-09-25 NOTE — ASSESSMENT & PLAN NOTE
-Goal BP < 130/80  -controlled, but elevated today 136/92   -continue medical therapy- HCTZ 25 mg, valsartan 160 mg   -discussed lifestyle modifications, low salt diet avoid processed meats, pickled meats, increase physical activity   -discussed risk associated with elevated blood pressure   -check BP twice daily, maintain log and bring to all appts

## 2024-09-25 NOTE — ASSESSMENT & PLAN NOTE
Body mass index is 34.32 kg/m². Morbid obesity complicates all aspects of disease management from diagnostic modalities to treatment. Weight loss encouraged and health benefits explained to patient.

## 2024-10-04 ENCOUNTER — OFFICE VISIT (OUTPATIENT)
Dept: FAMILY MEDICINE | Facility: HOSPITAL | Age: 64
End: 2024-10-04
Payer: MEDICAID

## 2024-10-04 VITALS
OXYGEN SATURATION: 99 % | WEIGHT: 178.56 LBS | BODY MASS INDEX: 33.71 KG/M2 | DIASTOLIC BLOOD PRESSURE: 87 MMHG | HEIGHT: 61 IN | HEART RATE: 75 BPM | SYSTOLIC BLOOD PRESSURE: 131 MMHG

## 2024-10-04 DIAGNOSIS — R73.03 PRE-DIABETES: ICD-10-CM

## 2024-10-04 DIAGNOSIS — F41.1 GAD (GENERALIZED ANXIETY DISORDER): Primary | ICD-10-CM

## 2024-10-04 DIAGNOSIS — M79.604 RIGHT LEG PAIN: ICD-10-CM

## 2024-10-04 DIAGNOSIS — Z28.21 IMMUNIZATION DECLINED: ICD-10-CM

## 2024-10-04 DIAGNOSIS — R41.3 MEMORY DISTURBANCE: ICD-10-CM

## 2024-10-04 DIAGNOSIS — Z12.31 ENCOUNTER FOR SCREENING MAMMOGRAM FOR MALIGNANT NEOPLASM OF BREAST: ICD-10-CM

## 2024-10-04 DIAGNOSIS — E78.2 MIXED HYPERLIPIDEMIA: ICD-10-CM

## 2024-10-04 DIAGNOSIS — I10 PRIMARY HYPERTENSION: ICD-10-CM

## 2024-10-04 PROCEDURE — 99213 OFFICE O/P EST LOW 20 MIN: CPT

## 2024-10-04 RX ORDER — ATORVASTATIN CALCIUM 40 MG/1
40 TABLET, FILM COATED ORAL DAILY
Qty: 90 TABLET | Refills: 3 | Status: SHIPPED | OUTPATIENT
Start: 2024-10-04 | End: 2025-10-04

## 2024-10-04 RX ORDER — SERTRALINE HYDROCHLORIDE 100 MG/1
150 TABLET, FILM COATED ORAL DAILY
Qty: 135 TABLET | Refills: 0 | Status: SHIPPED | OUTPATIENT
Start: 2024-10-04 | End: 2025-01-02

## 2024-10-04 NOTE — PROGRESS NOTES
"  [facility] FAMILY PRACTICE CLINIC NOTE  Follow-up Visit      SUBJECTIVE:     Patient: Mehreen Benites is a 64 y.o. female.    Chief Compliant:   Chief Complaint   Patient presents with    Follow-up       History of Present Illness:  63yr old lady who has a past medical history of Hypertension, Other pulmonary embolism without acute cor pulmonale (2022), CKD3 and Tendonitis. presents to clinic today for preventive follow-up.    HTN#  Patient presents for follow up related to elevated BP. Currently complaint with medication per report (Hctz and Valsartan). BP reviewed today with patient. Systolic ranging in the 130s and diastolic in the 80s.     HLD# Currently on pravastatin 20mg. Compliant with medication. Patient unable to exercise due to R leg pain. Patient diet not well controlled.      Prediabetes# Patient currently wanting to focus on diet and exercise routine to keep her A1c from increasing. Patient does not take any DM medication at this time.     Memory loss# Patient states that this past Tuesday she was heading to her bathroom and lost memory of events that occurred for a undefined period of time. She states that the first thing she remembers is sitting on the bathroom floor crying. She denied any pain in any area besides her R leg which had previously hurt. No signs of trauma per patient. No loss of urinary or anal sphincters. No associated prodromal signs such as palpitations, lightheadedness, N/V. She lives alone. Episode was unwitnessed. She admits on being under stress due 2 recent family members passing.     R leg pain# Pain has been persistent 6/10 since last Tuesday when she had a "memory gap". Has not tried any medication at this time and does not want to take medication. Patient with history of DVT and PE. Currently on indefinite anticoagulation with Xarelto. Has recently seen hematology and cardiology. US Lower Extremity Veins Bilat (2/13/23) showed no evidence of DVT in either lower extremity. No " "recent surgeries, immobilizations, long trips or trauma. She states she is feeling well with no chest pain, SOB, lightheadedness or dizziness, easy bleeding or bruising, abdominal pain, n/v/d, hematemesis, melena, hematuria. No redness or warmth to palpation of R lower extremely. No neurological deficits noted. Mild tenderness to medial aspect of R Knee. Patient wears compression stockings on occasion.       Review of Systems   Constitutional:  Negative for fever and weight loss.   HENT:  Negative for ear pain, hearing loss, sore throat and tinnitus.    Eyes:  Negative for blurred vision.   Respiratory:  Negative for cough, shortness of breath and wheezing.    Cardiovascular:  Positive for leg swelling (R leg). Negative for chest pain, palpitations and claudication.   Gastrointestinal:  Negative for abdominal pain, heartburn, nausea and vomiting.   Genitourinary:  Negative for dysuria.   Musculoskeletal:  Positive for joint pain (R knee). Negative for myalgias.   Neurological:  Negative for dizziness, weakness and headaches.     A 10+ review of systems was performed with pertinent positives and negatives noted above in the history of present illness. Other systems were negative unless otherwise specified.    OBJECTIVE:     Vital Signs (Most Recent)  Vitals:    10/04/24 0941   BP: 131/87   Pulse: 75   SpO2: 99%   Weight: 81 kg (178 lb 9.2 oz)   Height: 5' 1" (1.549 m)     BMI: Body mass index is 33.74 kg/m².     Physical Exam  Constitutional:       General: She is not in acute distress.     Appearance: Normal appearance. She is obese.   HENT:      Head: Normocephalic and atraumatic.      Right Ear: External ear normal.      Left Ear: External ear normal.      Nose: Nose normal. No congestion or rhinorrhea.      Mouth/Throat:      Mouth: Mucous membranes are moist.   Eyes:      Extraocular Movements: Extraocular movements intact.      Pupils: Pupils are equal, round, and reactive to light.   Cardiovascular:      Rate " and Rhythm: Normal rate and regular rhythm.      Pulses: Normal pulses.      Heart sounds: Normal heart sounds.   Pulmonary:      Effort: Pulmonary effort is normal. No respiratory distress.      Breath sounds: Normal breath sounds. No wheezing.   Abdominal:      General: Abdomen is flat.      Palpations: Abdomen is soft.   Musculoskeletal:         General: Tenderness (R medial aspect of knee) present. No swelling or deformity. Normal range of motion.      Cervical back: Normal range of motion.      Right lower leg: No edema.      Left lower leg: No edema.      Comments: BLE negative homans, 2+ distal pulses. Not wearing compression socks bilat.    Skin:     General: Skin is warm.      Capillary Refill: Capillary refill takes less than 2 seconds.   Neurological:      General: No focal deficit present.      Mental Status: She is alert and oriented to person, place, and time. Mental status is at baseline.      Cranial Nerves: No cranial nerve deficit.   Psychiatric:         Mood and Affect: Mood normal.         Behavior: Behavior normal.          ASSESSMENT:   Mehreen Benites is a 64 y.o. female who presents to clinic to for HTN check up.    1. JUAN MANUEL (generalized anxiety disorder)    2. Primary hypertension    3. Mixed hyperlipidemia    4. Pre-diabetes    5. Encounter for screening mammogram for malignant neoplasm of breast    6. Immunization declined    7. Right leg pain    8. Memory disturbance             PLAN:   JUAN MANUEL (generalized anxiety disorder) (Primary)  Memory disturbance  Memory disturbance most likely secondary to psychogenic etiology. Patient non-compliant with medication. She thought she only had to take this medication every other day. She has been informed that she should take medication daily and for at least 21 days to see if medication is helping since this is usually medicine reaches therapeutic effects. Patient is agreeable. Patient to follow-up in 3 weeks. Recent NM stress test 8/12/24 No  scintigraphic evidence of exercise or pharmacological stress induced myocardial ischemic changes. The global left ventricular systolic function is normal with an LV ejection fraction of 77 % and no evidence of LV dilatation. Wall motion is normal. Unlikely cardiac etiology.   - sertraline (ZOLOFT) 100 MG tablet; Take 1.5 tablets (150 mg total) by mouth once daily.  Dispense: 135 tablet; Refill: 0    Right leg pain  Patient on chronic anticoagulation with Xarelto. Wells criteria 2. US BLE 10/23/23 No evidence of deep venous thrombosis in either lower extremity. ED precautions have been discussed. Patient is aware that she should to go the the ED if any of the following signs or symptoms present: SOB, Chest pain, Confusion, Skin color discoloration, Intractable pain. So that they can get immediately evaluated.   - D dimer, quantitative; Future    Primary hypertension  - Chronic condition.  - Controlled. BP today 131/87.  - Counseled patient on importance of taking BP medication Daily and monitoring BP at home.  - Discussed Effects of Uncontrolled HTN  - Explained importance of lifestyle modifications, medication compliance, and recording routine blood pressure monitoring either at home or office in a log to ensure BP is controlled. Given counseling on need to keep blood pressure at least 140/90 and preferably 120/80 which is normal. Explained the increased risks for cardiovascular disease when BP is uncontrolled. Patient given counseling on signs and symptoms of possible elevated blood pressure.     Hyperlipidemia, unspecified hyperlipidemia type  Uncontrolled with pravastatin 20mg. Last lipid panel 09/19/24: Chol: 218, HDL: 43, TGL: 235, LDL: 128. VS. 11/2023, Chol: 242 and LDL: 165.4, rest WNL.   - The 10-year ASCVD risk score (Castro DK, et al., 2019) is: 9.8%    Values used to calculate the score:      Age: 64 years      Sex: Female      Is Non- : Yes      Diabetic: No      Tobacco smoker:  No      Systolic Blood Pressure: 131 mmHg      Is BP treated: Yes      HDL Cholesterol: 43 mg/dL      Total Cholesterol: 218 mg/dL   - ASCVD risk score increased based on lipid panel results in combination with other risk factors. It is recommended to continue statin therapy at this time. She will contact for refills as needed.   - Patient has been encouraged to reduce greasy, fatty and oily foods. The patient is asked to make an attempt to improve diet to aid in medical management of this problem.   - Counseled patient on importance of exercising at least 150-300 minutes per week (i.e. at least 30 minutes, 3 days a week) of moderate physical activity.    - Discontinue pravastatin 20mg daily.   - Start atorvastatin (LIPITOR) 40 MG tablet; Take 1 tablet (40 mg total) by mouth once daily.  Dispense: 90 tablet; Refill: 3    Pre-diabetes  Stable - Last Hb A1c 5.8 (09/19/24). Previous A1c 11/2023: 5.8%.   - Counseled patient on the importance maintaining a healthy diet (including at least one-half of food eaten should be whole fruits and vegetables with the core elements of the other half of food  should include grains, whole grains, dairy, protein, and oils with lower saturated fat, as well as minimizing alcohol use and consumption of foods with added sugar, saturated fat, and sodium.)    - Counseled patient on importance of exercising at least 150-300 minutes per week (i.e. at least 30 minutes, 3 days a week) of moderate physical activity.    - Patient agreeable on starting low dose metformin 500mg daily at recheck in 3mo if not well controlled with lifestyle changes.      Encounter for screening mammogram for malignant neoplasm of breast  Patient is due for screening mammogram. Last screening   - Mammo Digital Screening Bilat w/ [facility]; Future    Immunization declined  - Patient denied flu shot, COVID vaccine, shingles, rsv and tdap. Patient will contact us if he has any additional questions or concerns regarding  vaccines.     Preventive:   Colonoscopy done earlier this year, due again in 04/2033  Pap smear - has had hysterectomy and states she was told does not need pap smears     Provided patient with anticipatory guidance and return precautions. Treatment plan discussed with patient, all questions answered, and patient acknowledged understanding and verbal agreement.        Follow-up in: 3 weeks; or sooner PRN if acute concerns arise.  BP log check and lab review.     Case discussed with Dr. Ralf Cunningham MD  [facility] Family Medicine, PGY-1  10/04/2024

## 2024-10-22 ENCOUNTER — HOSPITAL ENCOUNTER (OUTPATIENT)
Dept: RADIOLOGY | Facility: HOSPITAL | Age: 64
Discharge: HOME | End: 2024-10-22
Payer: MEDICAID

## 2024-10-22 DIAGNOSIS — Z12.31 ENCOUNTER FOR SCREENING MAMMOGRAM FOR MALIGNANT NEOPLASM OF BREAST: ICD-10-CM

## 2024-10-22 PROCEDURE — 77067 SCR MAMMO BI INCL CAD: CPT | Mod: TC

## 2024-10-25 ENCOUNTER — OFFICE VISIT (OUTPATIENT)
Dept: FAMILY MEDICINE | Facility: HOSPITAL | Age: 64
End: 2024-10-25
Payer: MEDICAID

## 2024-10-25 VITALS
BODY MASS INDEX: 34.21 KG/M2 | WEIGHT: 181.19 LBS | HEART RATE: 77 BPM | OXYGEN SATURATION: 98 % | HEIGHT: 61 IN | DIASTOLIC BLOOD PRESSURE: 85 MMHG | SYSTOLIC BLOOD PRESSURE: 131 MMHG

## 2024-10-25 DIAGNOSIS — F41.1 GAD (GENERALIZED ANXIETY DISORDER): Primary | ICD-10-CM

## 2024-10-25 DIAGNOSIS — I10 PRIMARY HYPERTENSION: ICD-10-CM

## 2024-10-25 DIAGNOSIS — I26.99 PULMONARY EMBOLISM, BILATERAL: ICD-10-CM

## 2024-10-25 DIAGNOSIS — F32.A DEPRESSION, UNSPECIFIED DEPRESSION TYPE: ICD-10-CM

## 2024-10-25 DIAGNOSIS — Z28.21 IMMUNIZATION DECLINED: ICD-10-CM

## 2024-10-25 PROCEDURE — 99213 OFFICE O/P EST LOW 20 MIN: CPT

## 2024-10-25 RX ORDER — VALSARTAN 160 MG/1
160 TABLET ORAL DAILY
Qty: 90 TABLET | Refills: 3 | Status: SHIPPED | OUTPATIENT
Start: 2024-10-25 | End: 2025-10-25

## 2024-10-25 RX ORDER — HYDROCHLOROTHIAZIDE 25 MG/1
25 TABLET ORAL DAILY
Qty: 90 TABLET | Refills: 3 | Status: SHIPPED | OUTPATIENT
Start: 2024-10-25

## 2024-10-25 NOTE — PROGRESS NOTES
"  [facility]  Follow-up Visit      SUBJECTIVE:     Patient: Mehreen Benites is a 64 y.o. female.    Chief Compliant:   Chief Complaint   Patient presents with    Follow-up       History of Present Illness:  63yr old lady who has a past medical history of Hypertension, HLD, Pre-diabetes, Other pulmonary embolism without acute cor pulmonale (2022), CKD3 and Tendonitis. presents to clinic today for preventive follow-up.    JUAN MANUEL# Patient recently started taking Zoloft 150mg daily as she was previously taking it every other day due to confusion with regimen. She states she had a bad episode right after she started medication daily, however feels much better at this time and has no longer had memory gaps or "fainting" episodes. She denies any chest pain, palpitations, sob, lightheadedness, tinnitus, confusion, nausea, vomiting, diarrhea, constipation. Denies SI/HI.  Of note, patient recently has had family loss (cousin) this past week secondary to suicide. She was shocked about news and is processing this with son and family. Additionally, she is going to a mental health counselor and is appreciating talks.     HTN# BP today in office 131/85. Patient is requesting refills of valsartan and Hctz.     History of PE# Patient is requesting refills for Xarelto as she cannot get in touch with her hematologist.       Review of Systems   Constitutional:  Negative for fever, malaise/fatigue and weight loss.   HENT:  Negative for congestion, hearing loss and tinnitus.    Eyes:  Negative for blurred vision.   Respiratory:  Negative for cough, shortness of breath and wheezing.    Cardiovascular:  Negative for chest pain, palpitations, orthopnea and leg swelling.   Gastrointestinal:  Negative for blood in stool, constipation, diarrhea and nausea.   Genitourinary:  Negative for dysuria, frequency and hematuria.   Musculoskeletal:  Negative for back pain and neck pain.   Skin:  Negative for rash.   Neurological:  " "Negative for dizziness, tingling, weakness and headaches.   Endo/Heme/Allergies:  Negative for polydipsia.   Psychiatric/Behavioral:  Negative for depression, memory loss and suicidal ideas. The patient is nervous/anxious. The patient does not have insomnia.      A 10+ review of systems was performed with pertinent positives and negatives noted above in the history of present illness. Other systems were negative unless otherwise specified.    OBJECTIVE:     Vital Signs (Most Recent)  Vitals:    10/25/24 1034   BP: 131/85   Pulse: 77   SpO2: 98%   Weight: 82.2 kg (181 lb 3.5 oz)   Height: 5' 1" (1.549 m)     BMI: Body mass index is 34.24 kg/m².     Physical Exam  Constitutional:       Appearance: Normal appearance. She is obese.   HENT:      Head: Normocephalic and atraumatic.      Right Ear: External ear normal.      Left Ear: External ear normal.      Nose: Nose normal. No congestion or rhinorrhea.      Mouth/Throat:      Mouth: Mucous membranes are moist.   Eyes:      Extraocular Movements: Extraocular movements intact.      Pupils: Pupils are equal, round, and reactive to light.   Cardiovascular:      Rate and Rhythm: Normal rate and regular rhythm.      Pulses: Normal pulses.      Heart sounds: Normal heart sounds.   Pulmonary:      Effort: Pulmonary effort is normal. No respiratory distress.      Breath sounds: Normal breath sounds. No wheezing.   Abdominal:      General: Abdomen is flat. Bowel sounds are normal. There is no distension.      Palpations: Abdomen is soft.      Tenderness: There is no abdominal tenderness.   Musculoskeletal:         General: Normal range of motion.      Cervical back: Normal range of motion.      Right lower leg: No edema.      Left lower leg: No edema.   Skin:     General: Skin is warm and dry.      Capillary Refill: Capillary refill takes less than 2 seconds.   Neurological:      General: No focal deficit present.      Mental Status: She is alert and oriented to person, place, " and time. Mental status is at baseline.      Cranial Nerves: No cranial nerve deficit.   Psychiatric:         Mood and Affect: Mood normal.         Behavior: Behavior normal.          ASSESSMENT:   Mehreen Benites is a 64 y.o. female who presents to clinic to for    1. JUAN MANUEL (generalized anxiety disorder)    2. Depression, unspecified depression type    3. Primary hypertension    4. Pulmonary embolism, bilateral    5. Immunization declined         PLAN:     JUAN MANUEL (generalized anxiety disorder)  Depression, unspecified  Stable. PHQ9: 14 JUAN MANUEL: 7 Today in office. The patient meets criteria for a depressive episode based on PHQ9 of 14 and JUAN MANUEL 7. Denies SI, HI, AVH. Denies PTSD or manic episodes, significant famhx of bipolar, and no psychotic features. Patient compliant with medication zoloft 150mg daily and states she is currently seeing a mental health counselor. Recommended patient follow-up with counselor as needed. Patient is currently undergoing grieving process for recent family member loss. Of this visit, 20min of 45min visit I provided mental health counseling. Follow-up in 2mo or sooner if needed for any acute concerns.     Primary hypertension  - Chronic condition.  - Controlled. BP today 131/85.  - Counseled patient on importance of taking BP medication Daily and monitoring BP at home.  - Discussed Effects of Uncontrolled HTN  - Explained importance of lifestyle modifications, medication compliance, and recording routine blood pressure monitoring either at home or office in a log to ensure BP is controlled. Given counseling on need to keep blood pressure at least 140/90 and preferably 120/80 which is normal. Explained the increased risks for cardiovascular disease when BP is uncontrolled. Patient given counseling on signs and symptoms of possible elevated blood pressure.  -     valsartan (DIOVAN) 160 MG tablet; Take 1 tablet (160 mg total) by mouth once daily.  Dispense: 90 tablet; Refill: 3  -      hydroCHLOROthiazide (HYDRODIURIL) 25 MG tablet; Take 1 tablet (25 mg total) by mouth once daily.  Dispense: 90 tablet; Refill: 3    Pulmonary embolism, bilateral  Patient requesting refill of Xarelto. Has not been able to get in contact with hematology. Per hematology 09/19/24:   -1st episode, unprovoked, required mechanical thrombectomy  -positive family history blood clots  -she clearly has a hypercoagulable state based on her clinical presentation  -no need hypercoagulable workup  -continue Xarelto at full dose  -indefinite anticoagulation needed  -     rivaroxaban (XARELTO) 20 mg Tab; Take 1 tablet (20 mg total) by mouth before dinner.  Dispense: 90 tablet; Refill: 3    Immunization declined  - Patient denied flu shot, COVID vaccine, shingles, rsv and tdap. Patient will contact us if he has any additional questions or concerns regarding vaccines. Patient fear of needles.         Provided patient with anticipatory guidance and return precautions. Treatment plan discussed with patient, all questions answered, and patient acknowledged understanding and verbal agreement.      Follow-up in: 2 months; or sooner PRN if acute concerns arise.      Case discussed with Dr. MCKENZIE Cunningham MD  \[facility]\"" Family Medicine, PGY-1  10/25/2024

## 2024-10-30 ENCOUNTER — PATIENT MESSAGE (OUTPATIENT)
Dept: FAMILY MEDICINE | Facility: HOSPITAL | Age: 64
End: 2024-10-30
Payer: MEDICAID

## 2024-11-19 ENCOUNTER — OFFICE VISIT (OUTPATIENT)
Dept: CARDIOLOGY | Facility: CLINIC | Age: 64
End: 2024-11-19
Payer: MEDICAID

## 2024-11-19 VITALS
WEIGHT: 183 LBS | DIASTOLIC BLOOD PRESSURE: 84 MMHG | BODY MASS INDEX: 34.55 KG/M2 | OXYGEN SATURATION: 98 % | HEIGHT: 61 IN | HEART RATE: 74 BPM | SYSTOLIC BLOOD PRESSURE: 116 MMHG

## 2024-11-19 DIAGNOSIS — R73.03 PRE-DIABETES: ICD-10-CM

## 2024-11-19 DIAGNOSIS — R07.89 OTHER CHEST PAIN: ICD-10-CM

## 2024-11-19 DIAGNOSIS — Z82.49 FAMILY HISTORY OF CARDIAC ARREST: ICD-10-CM

## 2024-11-19 DIAGNOSIS — E66.812 CLASS 2 SEVERE OBESITY DUE TO EXCESS CALORIES WITH SERIOUS COMORBIDITY AND BODY MASS INDEX (BMI) OF 35.0 TO 35.9 IN ADULT: ICD-10-CM

## 2024-11-19 DIAGNOSIS — E78.2 MIXED HYPERLIPIDEMIA: ICD-10-CM

## 2024-11-19 DIAGNOSIS — R60.0 BILATERAL LOWER EXTREMITY EDEMA: ICD-10-CM

## 2024-11-19 DIAGNOSIS — R53.83 OTHER FATIGUE: ICD-10-CM

## 2024-11-19 DIAGNOSIS — I26.99 PULMONARY EMBOLISM, BILATERAL: ICD-10-CM

## 2024-11-19 DIAGNOSIS — R06.02 SOBOE (SHORTNESS OF BREATH ON EXERTION): ICD-10-CM

## 2024-11-19 DIAGNOSIS — I10 PRIMARY HYPERTENSION: Primary | ICD-10-CM

## 2024-11-19 DIAGNOSIS — M67.88 ACHILLES TENDONOSIS OF RIGHT LOWER EXTREMITY: ICD-10-CM

## 2024-11-19 DIAGNOSIS — E66.01 CLASS 2 SEVERE OBESITY DUE TO EXCESS CALORIES WITH SERIOUS COMORBIDITY AND BODY MASS INDEX (BMI) OF 35.0 TO 35.9 IN ADULT: ICD-10-CM

## 2024-11-19 PROCEDURE — 3044F HG A1C LEVEL LT 7.0%: CPT | Mod: CPTII,,,

## 2024-11-19 PROCEDURE — 3074F SYST BP LT 130 MM HG: CPT | Mod: CPTII,,,

## 2024-11-19 PROCEDURE — 1159F MED LIST DOCD IN RCRD: CPT | Mod: CPTII,,,

## 2024-11-19 PROCEDURE — 99213 OFFICE O/P EST LOW 20 MIN: CPT | Mod: PBBFAC,PN

## 2024-11-19 PROCEDURE — 3008F BODY MASS INDEX DOCD: CPT | Mod: CPTII,,,

## 2024-11-19 PROCEDURE — 4010F ACE/ARB THERAPY RXD/TAKEN: CPT | Mod: CPTII,,,

## 2024-11-19 PROCEDURE — 99999 PR PBB SHADOW E&M-EST. PATIENT-LVL III: CPT | Mod: PBBFAC,,,

## 2024-11-19 PROCEDURE — 3079F DIAST BP 80-89 MM HG: CPT | Mod: CPTII,,,

## 2024-11-19 PROCEDURE — 99214 OFFICE O/P EST MOD 30 MIN: CPT | Mod: S$PBB,,,

## 2024-11-19 PROCEDURE — 1160F RVW MEDS BY RX/DR IN RCRD: CPT | Mod: CPTII,,,

## 2024-11-19 NOTE — ASSESSMENT & PLAN NOTE
-Goal BP < 130/80  -controlled   -continue medical therapy- HCTZ 25 mg, valsartan 160 mg   -discussed lifestyle modifications, low salt diet avoid processed meats, pickled meats, increase physical activity   -discussed risk associated with elevated blood pressure   -check BP twice daily, maintain log and bring to all appts

## 2024-11-19 NOTE — ASSESSMENT & PLAN NOTE
NM stress test 8/12/24  Interpretation Summary    The ECG portion of the study is negative for ischemia.    The patient reported no chest pain during the stress test.    The nuclear portion of this study will be reported separately.  Impression:  1. No scintigraphic evidence of exercise or pharmacological stress induced myocardial ischemic changes  2. The global left ventricular systolic function is normal with an LV ejection fraction of 77 % and no evidence of LV dilatation. Wall motion is normal.     -CP free

## 2024-11-19 NOTE — ASSESSMENT & PLAN NOTE
-Mother AMI early 50s, decreased   -NM Stress test: 8/12/24  Impression:     1. No scintigraphic evidence of exercise or pharmacological stress induced myocardial ischemic changes  2. The global left ventricular systolic function is normal with an LV ejection fraction of 77 % and no evidence of LV dilatation. Wall motion is normal.   The 10-year ASCVD risk score (Castro PATRICK, et al., 2019) is: 7.1%    Values used to calculate the score:      Age: 64 years      Sex: Female      Is Non- : Yes      Diabetic: No      Tobacco smoker: No      Systolic Blood Pressure: 116 mmHg      Is BP treated: Yes      HDL Cholesterol: 43 mg/dL      Total Cholesterol: 218 mg/dL

## 2024-11-19 NOTE — ASSESSMENT & PLAN NOTE
Body mass index is 34.57 kg/m². Morbid obesity complicates all aspects of disease management from diagnostic modalities to treatment. Weight loss encouraged and health benefits explained to patient.

## 2024-11-19 NOTE — PROGRESS NOTES
Subjective:    Patient ID:  Mehreen Benites is a 64 y.o. female who presents for evaluation of No chief complaint on file.      PCP: Serafin Cunningham MD     Referring Provider: Ceci Herrera NP    HPI: Pateint is a 64 yo F w/PMH of HTN, PE on Xarelto, obesity, CKD-3 who presents today for f/u appt. She was last seen on 9/25/24 for f/u appt and was continued on medical therapy. Prior ischemic evaluation 2/ c/o CP. NM stress test completed and negative for ischemia. She is currently on Xarelto  without complications. She continues to note fatigue. She is followed by Hematology and is on iron supplements. Patient reports SOB w activity. She also notes increased fatigue. Patient denies CP, palpitations, orthopnea, PND, presyncope, LOC, swelling, or claudication. Patient randomly  monitors home BP and ranges 110-120s/80s. Patient reports medication compliance without side effects. She reports weakness and back pain with standing > 20 minutes. She also notes left knee pain.       Past Medical History:   Diagnosis Date    Hypertension     Other pulmonary embolism without acute cor pulmonale 2022    Tendonitis      Past Surgical History:   Procedure Laterality Date    BONE MARROW ASPIRATION Right 8/25/2023    Procedure: ASPIRATION, BONE MARROW;  Surgeon: Axel Kapoor Jr., DPM;  Location: [facility] OR;  Service: Podiatry;  Laterality: Right;    CATARACT EXTRACTION, BILATERAL      COLONOSCOPY N/A 04/27/2023    Procedure: COLONOSCOPY;  Surgeon: Zachary Torres MD;  Location: [facility] ENDO;  Service: Endoscopy;  Laterality: N/A;    HEEL SPUR SURGERY Left 02/14/2013    HYSTERECTOMY  10/2009    WHITNEY    OOPHORECTOMY  10/2009    Bilateral    REPAIR OF ACHILLES TENDON Right 8/25/2023    Procedure: REPAIR, TENDON, ACHILLES;  Surgeon: Axel Kapoor Jr., DPM;  Location: [facility] OR;  Service: Podiatry;  Laterality: Right;  pop saph    RESECTION OF GASTROCNEMIUS MUSCLE Right 8/25/2023    Procedure: RESECTION, MUSCLE, GASTROCNEMIUS;   Surgeon: Axel Kapoor Jr., DPM;  [facility];  Service: Podiatry;  Laterality: Right;  pop saph    SURGICAL REMOVAL OF RETROCALCANEAL EXOSTOSIS Right 2023    Procedure: EXCISION, EXOSTOSIS, RETROCALCANEAL;  Surgeon: Axel Kapoor Jr., DPM;  [facility];  Service: Podiatry;  Laterality: Right;    THROMBECTOMY N/A 2022    Procedure: THROMBECTOMY;  Surgeon: Kendall Fiore MD;  Location: [facility] CATH LAB/EP;  Service: Cardiology;  Laterality: N/A;     Social History     Socioeconomic History    Marital status: Legally    Tobacco Use    Smoking status: Never     Passive exposure: Never    Smokeless tobacco: Never   Substance and Sexual Activity    Alcohol use: Yes     Comment: occasionally    Drug use: No    Sexual activity: Not Currently     Partners: Male     Birth control/protection: Post-menopausal, See Surgical Hx     Comment:      Family History   Problem Relation Name Age of Onset    Hypertension Mother          AMI,  age 50s    Lung cancer Father      Breast cancer Maternal Aunt      Breast cancer Paternal Aunt      Breast cancer Maternal Cousin  51    Colon cancer Neg Hx      Ovarian cancer Neg Hx         Review of patient's allergies indicates:  No Known Allergies    Medication List with Changes/Refills   Current Medications    ASCORBIC ACID, VITAMIN C, (VITAMIN C) 500 MG TABLET    Take 500 mg by mouth once daily.    ATORVASTATIN (LIPITOR) 40 MG TABLET    Take 1 tablet (40 mg total) by mouth once daily.    FERROUS SULFATE (FEOSOL) 325 MG (65 MG IRON) TAB TABLET    Take 1 tablet (325 mg total) by mouth every other day.    HYDROCHLOROTHIAZIDE (HYDRODIURIL) 25 MG TABLET    Take 1 tablet (25 mg total) by mouth once daily.    RIVAROXABAN (XARELTO) 20 MG TAB    Take 1 tablet (20 mg total) by mouth before dinner.    SERTRALINE (ZOLOFT) 100 MG TABLET    Take 1.5 tablets (150 mg total) by mouth once daily.    VALSARTAN (DIOVAN) 160 MG TABLET    Take 1 tablet (160  "mg total) by mouth once daily.       Review of Systems   Constitutional: Positive for malaise/fatigue. Negative for diaphoresis and fever.   HENT:  Negative for congestion and hearing loss.    Eyes:  Negative for blurred vision and pain.   Cardiovascular:  Positive for dyspnea on exertion. Negative for claudication, leg swelling, near-syncope and syncope.   Respiratory:  Positive for shortness of breath. Negative for sleep disturbances due to breathing.    Hematologic/Lymphatic: Negative for bleeding problem. Does not bruise/bleed easily.   Skin:  Negative for color change and poor wound healing.   Musculoskeletal:  Positive for back pain and joint pain.   Gastrointestinal:  Negative for abdominal pain and nausea.   Genitourinary:  Negative for bladder incontinence and flank pain.   Neurological:  Negative for focal weakness and light-headedness.        Objective:   /84 (BP Location: Left arm, Patient Position: Sitting)   Pulse 74   Ht 5' 1" (1.549 m)   Wt 83 kg (182 lb 15.7 oz)   LMP  (LMP Unknown)   SpO2 98%   BMI 34.57 kg/m²    Physical Exam  Constitutional:       Appearance: She is well-developed. She is not diaphoretic.   HENT:      Head: Normocephalic and atraumatic.   Eyes:      General: No scleral icterus.     Pupils: Pupils are equal, round, and reactive to light.   Neck:      Vascular: No JVD.   Cardiovascular:      Rate and Rhythm: Normal rate and regular rhythm.      Pulses: Intact distal pulses.           Radial pulses are 2+ on the right side and 2+ on the left side.        Dorsalis pedis pulses are 2+ on the right side and 2+ on the left side.        Posterior tibial pulses are 2+ on the right side and 2+ on the left side.      Heart sounds: S1 normal and S2 normal. No murmur heard.     No friction rub. No gallop.   Pulmonary:      Effort: Pulmonary effort is normal. No respiratory distress.      Breath sounds: Normal breath sounds. No wheezing or rales.   Chest:      Chest wall: No " tenderness.   Abdominal:      General: Bowel sounds are normal. There is no distension.      Palpations: Abdomen is soft. There is no mass.      Tenderness: There is no abdominal tenderness. There is no rebound.   Musculoskeletal:         General: No tenderness. Normal range of motion.      Cervical back: Normal range of motion and neck supple.      Right lower le+ Edema present.      Left lower le+ Edema present.   Skin:     General: Skin is warm and dry.      Coloration: Skin is not pale.   Neurological:      Mental Status: She is alert and oriented to person, place, and time.      Coordination: Coordination normal.      Deep Tendon Reflexes: Reflexes normal.   Psychiatric:         Behavior: Behavior normal.         Judgment: Judgment normal.               NM stress test 24  Interpretation Summary    The ECG portion of the study is negative for ischemia.    The patient reported no chest pain during the stress test.    The nuclear portion of this study will be reported separately.  Impression:     1. No scintigraphic evidence of exercise or pharmacological stress induced myocardial ischemic changes  2. The global left ventricular systolic function is normal with an LV ejection fraction of 77 % and no evidence of LV dilatation. Wall motion is normal.     IMAGING     US BLE 10/23/23  Impression:     No evidence of deep venous thrombosis in either lower extremity.        US Lower Extremity Veins Bilat (23)  Impression:     No evidence of deep venous thrombosis in either lower extremity.        US Lower Extremity Veins Right 22  Impression:  No evidence of deep venous thrombosis in the right lower extremity.   Interval resolution of previously identified thrombus within the right popliteal vein.     CTA Chest Non-Coronary(PE Studies) 22  Impression:  1. Interval improvement of prior bilateral pulmonary arterial thromboemboli.  No residual central pulmonary thrombus identified.  Few small  filling defects within segmental arteries potentially representing mild residual thrombus.  2. Mosaic attenuation of the bilateral pulmonary parenchyma which may represent increased pulmonary vascular resistance or small airways disease.  3. Mild cardiomegaly.  4. Additional findings as above.    Cardiac echo 10/17/2022  Summary  The left ventricle is normal in size with concentric remodeling and normal systolic function.  The estimated ejection fraction is 65%.  Normal left ventricular diastolic function.  The estimated PA systolic pressure is 23 mmHg.  Normal right ventricular size with normal right ventricular systolic function.  Normal central venous pressure (3 mmHg).  Mild mitral regurgitation.    Assessment:       1. Primary hypertension    2. Mixed hyperlipidemia    3. Family history of cardiac arrest    4. Other chest pain    5. Pulmonary embolism, bilateral    6. Pre-diabetes    7. Class 2 severe obesity due to excess calories with serious comorbidity and body mass index (BMI) of 35.0 to 35.9 in adult    8. Bilateral lower extremity edema    9. Achilles tendonosis of right lower extremity    10. SOBOE (shortness of breath on exertion)    11. Other fatigue           Plan:         Hypertension  -Goal BP < 130/80  -controlled   -continue medical therapy- HCTZ 25 mg, valsartan 160 mg   -discussed lifestyle modifications, low salt diet avoid processed meats, pickled meats, increase physical activity   -discussed risk associated with elevated blood pressure   -check BP twice daily, maintain log and bring to all appts     Mixed hyperlipidemia  -.0 9/19/24  -controlled   -continue statin therapy- pravastatin 20 mg     Family history of cardiac arrest  -Mother AMI early 50s, decreased   -NM Stress test: 8/12/24  Impression:     1. No scintigraphic evidence of exercise or pharmacological stress induced myocardial ischemic changes  2. The global left ventricular systolic function is normal with an LV ejection  fraction of 77 % and no evidence of LV dilatation. Wall motion is normal.   The 10-year ASCVD risk score (Castro PATRICK, et al., 2019) is: 7.1%    Values used to calculate the score:      Age: 64 years      Sex: Female      Is Non- : Yes      Diabetic: No      Tobacco smoker: No      Systolic Blood Pressure: 116 mmHg      Is BP treated: Yes      HDL Cholesterol: 43 mg/dL      Total Cholesterol: 218 mg/dL      Other chest pain  NM stress test 8/12/24  Interpretation Summary    The ECG portion of the study is negative for ischemia.    The patient reported no chest pain during the stress test.    The nuclear portion of this study will be reported separately.  Impression:  1. No scintigraphic evidence of exercise or pharmacological stress induced myocardial ischemic changes  2. The global left ventricular systolic function is normal with an LV ejection fraction of 77 % and no evidence of LV dilatation. Wall motion is normal.     -CP free     Pulmonary embolism, bilateral  -PE 5/31/2022- post thrombectomy  -continue Xeralto 20 mg - indefinitely     Pre-diabetes  -A1c 5.8 9/19/24  -discussed lifestyle modifications     Class 2 severe obesity with serious comorbidity and body mass index (BMI) of 35.0 to 35.9 in adult  Body mass index is 34.57 kg/m². Morbid obesity complicates all aspects of disease management from diagnostic modalities to treatment. Weight loss encouraged and health benefits explained to patient.       Bilateral lower extremity edema  -compression stockings  -elevate legs when sitting   -BLE venous US to evaluate for DVT, reflux     Achilles tendonosis of right lower extremity  -S/P repair, reports healed without complication   -Followed by Ortho     SOBOE (shortness of breath on exertion)  -Cardiac echo to evaluate heart function     Other fatigue  -cardiac echo to evaluate heart function     Total duration of face to face visit time 30 minutes.  Total time spent counseling greater  than fifty percent of total visit time.  Counseling included discussion regarding imaging findings, diagnosis, possibilities, treatment options, risks and benefits.  The patient had many questions regarding the options and long-term effects      Jose Angel Thomas DNP  Cardiology

## 2024-12-10 ENCOUNTER — HOSPITAL ENCOUNTER (OUTPATIENT)
Dept: CARDIOLOGY | Facility: HOSPITAL | Age: 64
Discharge: HOME | End: 2024-12-10
Payer: MEDICAID

## 2024-12-10 VITALS — BODY MASS INDEX: 34.17 KG/M2 | HEIGHT: 61 IN | WEIGHT: 181 LBS

## 2024-12-10 DIAGNOSIS — R60.0 BILATERAL LOWER EXTREMITY EDEMA: ICD-10-CM

## 2024-12-10 DIAGNOSIS — R06.02 SOBOE (SHORTNESS OF BREATH ON EXERTION): ICD-10-CM

## 2024-12-10 DIAGNOSIS — R53.83 OTHER FATIGUE: ICD-10-CM

## 2024-12-10 LAB
APICAL FOUR CHAMBER EJECTION FRACTION: 70 %
APICAL TWO CHAMBER EJECTION FRACTION: 65 %
ASCENDING AORTA: 2.61 CM
AV INDEX (PROSTH): 0.7
AV MEAN GRADIENT: 3.3 MMHG
AV PEAK GRADIENT: 5.8 MMHG
AV VALVE AREA BY VELOCITY RATIO: 2.1 CM²
AV VALVE AREA: 2.2 CM²
AV VELOCITY RATIO: 0.67
BSA FOR ECHO PROCEDURE: 1.88 M2
CV ECHO LV RWT: 0.54 CM
DOP CALC AO PEAK VEL: 1.2 M/S
DOP CALC AO VTI: 25.6 CM
DOP CALC LVOT AREA: 3.1 CM2
DOP CALC LVOT DIAMETER: 2 CM
DOP CALC LVOT PEAK VEL: 0.8 M/S
DOP CALC LVOT STROKE VOLUME: 55.9 CM3
DOP CALC MV VTI: 29.9 CM
DOP CALCLVOT PEAK VEL VTI: 17.8 CM
E WAVE DECELERATION TIME: 223.86 MSEC
E/A RATIO: 0.67
E/E' RATIO: 11.54 M/S
ECHO LV POSTERIOR WALL: 1 CM (ref 0.6–1.1)
FRACTIONAL SHORTENING: 35.1 % (ref 28–44)
INTERVENTRICULAR SEPTUM: 0.8 CM (ref 0.6–1.1)
IVC DIAMETER: 1.29 CM
LEFT ATRIUM AREA SYSTOLIC (APICAL 2 CHAMBER): 12.3 CM2
LEFT ATRIUM AREA SYSTOLIC (APICAL 4 CHAMBER): 15.23 CM2
LEFT ATRIUM VOLUME INDEX MOD: 18 ML/M2
LEFT ATRIUM VOLUME MOD: 32.63 ML
LEFT INTERNAL DIMENSION IN SYSTOLE: 2.4 CM (ref 2.1–4)
LEFT VENTRICLE DIASTOLIC VOLUME INDEX: 32.89 ML/M2
LEFT VENTRICLE DIASTOLIC VOLUME: 59.53 ML
LEFT VENTRICLE END DIASTOLIC VOLUME APICAL 2 CHAMBER: 31.39 ML
LEFT VENTRICLE END DIASTOLIC VOLUME APICAL 4 CHAMBER: 61.12 ML
LEFT VENTRICLE END SYSTOLIC VOLUME APICAL 2 CHAMBER: 27.08 ML
LEFT VENTRICLE END SYSTOLIC VOLUME APICAL 4 CHAMBER: 38.35 ML
LEFT VENTRICLE MASS INDEX: 53.5 G/M2
LEFT VENTRICLE SYSTOLIC VOLUME INDEX: 11 ML/M2
LEFT VENTRICLE SYSTOLIC VOLUME: 19.95 ML
LEFT VENTRICULAR INTERNAL DIMENSION IN DIASTOLE: 3.7 CM (ref 3.5–6)
LEFT VENTRICULAR MASS: 96.9 G
LV LATERAL E/E' RATIO: 9.38 M/S
LV SEPTAL E/E' RATIO: 15 M/S
LVED V (TEICH): 59.53 ML
LVES V (TEICH): 19.95 ML
LVOT MG: 1.44 MMHG
LVOT MV: 0.58 CM/S
MV MEAN GRADIENT: 1 MMHG
MV PEAK A VEL: 1.12 M/S
MV PEAK E VEL: 0.75 M/S
MV PEAK GRADIENT: 5 MMHG
MV STENOSIS PRESSURE HALF TIME: 63.86 MS
MV VALVE AREA BY CONTINUITY EQUATION: 1.87 CM2
MV VALVE AREA P 1/2 METHOD: 3.45 CM2
OHS CV RV/LV RATIO: 0.78 CM
OHS LV EJECTION FRACTION SIMPSONS BIPLANE MOD: 67 %
PISA MRMAX VEL: 3.27 M/S
PISA TR MAX VEL: 2.2 M/S
RA PRESSURE ESTIMATED: 3 MMHG
RA VOL SYS: 14.63 ML
RIGHT ATRIAL AREA: 8.3 CM2
RIGHT ATRIUM VOLUME AREA LENGTH APICAL 4 CHAMBER: 14.64 ML
RIGHT VENTRICLE DIASTOLIC BASEL DIMENSION: 2.9 CM
RV TB RVSP: 5 MMHG
RV TISSUE DOPPLER FREE WALL SYSTOLIC VELOCITY 1 (APICAL 4 CHAMBER VIEW): 10.55 CM/S
SINUS: 2.71 CM
STJ: 2.18 CM
TDI LATERAL: 0.08 M/S
TDI SEPTAL: 0.05 M/S
TDI: 0.07 M/S
TR MAX PG: 19 MMHG
TRICUSPID ANNULAR PLANE SYSTOLIC EXCURSION: 1.76 CM
TV REST PULMONARY ARTERY PRESSURE: 22 MMHG
Z-SCORE OF LEFT VENTRICULAR DIMENSION IN END DIASTOLE: -3.03
Z-SCORE OF LEFT VENTRICULAR DIMENSION IN END SYSTOLE: -2.01

## 2024-12-10 PROCEDURE — 93308 TTE F-UP OR LMTD: CPT | Mod: 26,,, | Performed by: INTERNAL MEDICINE

## 2024-12-10 PROCEDURE — 93970 EXTREMITY STUDY: CPT | Mod: TC

## 2024-12-10 PROCEDURE — 93306 TTE W/DOPPLER COMPLETE: CPT

## 2024-12-10 PROCEDURE — 93970 EXTREMITY STUDY: CPT | Mod: 26,,, | Performed by: INTERNAL MEDICINE

## 2024-12-11 ENCOUNTER — TELEPHONE (OUTPATIENT)
Dept: CARDIOLOGY | Facility: CLINIC | Age: 64
End: 2024-12-11
Payer: MEDICAID

## 2024-12-11 NOTE — TELEPHONE ENCOUNTER
I reached out to Ms. Benites and informed Her of her results & She expressed understanding of the results.    Lay Brambila  Medical Assistant  [facility]  [phone] - Office  [phone] - Fax    ----- Message from Jose Angel Thomas NP sent at 12/11/2024  1:49 PM CST -----  Please inform, Ms. Benites the ultrasound look good. The heart function is within normal limits. No additional testing is required.    Thank you,  Jose Angel Thomas DNP

## 2024-12-12 ENCOUNTER — TELEPHONE (OUTPATIENT)
Dept: CARDIOLOGY | Facility: CLINIC | Age: 64
End: 2024-12-12
Payer: MEDICAID

## 2024-12-12 LAB
LEFT GREAT SAPHENOUS DISTAL THIGH DIA: 0.26 CM
LEFT GREAT SAPHENOUS JUNCTION DIA: 0.58 CM
LEFT GREAT SAPHENOUS KNEE DIA: 0.21 CM
LEFT GREAT SAPHENOUS MIDDLE THIGH DIA: 0.35 CM
LEFT GREAT SAPHENOUS PROXIMAL CALF DIA: 0.25 CM
LEFT SMALL SAPHENOUS KNEE DIA: 0.23 CM
RIGHT GREAT SAPHENOUS DISTAL THIGH DIA: 0.2 CM
RIGHT GREAT SAPHENOUS JUNCTION DIA: 0.53 CM
RIGHT GREAT SAPHENOUS KNEE DIA: 0.2 CM
RIGHT GREAT SAPHENOUS MIDDLE THIGH DIA: 0.33 CM
RIGHT GREAT SAPHENOUS PROXIMAL CALF DIA: 0.17 CM
RIGHT SMALL SAPHENOUS KNEE DIA: 0.2 CM

## 2024-12-12 NOTE — TELEPHONE ENCOUNTER
I reached out to Ms. Benites and informed Her of her results & She expressed understanding of the results.    Lay Brambila  Medical Assistant  [facility]  [phone] - Office  [phone] - Fax      ----- Message from Jose Angel Thomas NP sent at 12/12/2024  1:38 PM CST -----  Please inform, Ms. Benites, the ultrasound was negative for blood clots or reflux. No additional testing is required.    Thank you,  Jose Angel Thomas, DNP

## 2024-12-30 ENCOUNTER — OFFICE VISIT (OUTPATIENT)
Dept: FAMILY MEDICINE | Facility: HOSPITAL | Age: 64
End: 2024-12-30
Payer: MEDICAID

## 2024-12-30 VITALS
HEART RATE: 79 BPM | RESPIRATION RATE: 18 BRPM | BODY MASS INDEX: 34.92 KG/M2 | SYSTOLIC BLOOD PRESSURE: 134 MMHG | WEIGHT: 184.94 LBS | OXYGEN SATURATION: 99 % | DIASTOLIC BLOOD PRESSURE: 81 MMHG | HEIGHT: 61 IN

## 2024-12-30 DIAGNOSIS — F41.1 GAD (GENERALIZED ANXIETY DISORDER): ICD-10-CM

## 2024-12-30 DIAGNOSIS — F32.A DEPRESSION, UNSPECIFIED DEPRESSION TYPE: ICD-10-CM

## 2024-12-30 DIAGNOSIS — R06.02 SOB (SHORTNESS OF BREATH) ON EXERTION: ICD-10-CM

## 2024-12-30 DIAGNOSIS — J06.9 UPPER RESPIRATORY TRACT INFECTION, UNSPECIFIED TYPE: Primary | ICD-10-CM

## 2024-12-30 DIAGNOSIS — Z28.21 IMMUNIZATION DECLINED: ICD-10-CM

## 2024-12-30 DIAGNOSIS — G47.9 SLEEP DISORDER: ICD-10-CM

## 2024-12-30 PROCEDURE — 99214 OFFICE O/P EST MOD 30 MIN: CPT

## 2024-12-30 RX ORDER — BUDESONIDE AND FORMOTEROL FUMARATE DIHYDRATE 80; 4.5 UG/1; UG/1
2 AEROSOL RESPIRATORY (INHALATION) 4 TIMES DAILY PRN
Qty: 10.2 G | Refills: 3 | Status: SHIPPED | OUTPATIENT
Start: 2024-12-30 | End: 2025-12-30

## 2024-12-30 NOTE — PROGRESS NOTES
[facility] NOTE  Follow-up Visit      SUBJECTIVE:     Patient: Mehreen Benites is a 64 y.o. female.    Chief Compliant:   Chief Complaint   Patient presents with    Follow-up    Nasal Congestion     Sore throat, dry cough       History of Present Illness:  64 y.o. female who  has a past medical history of Hypertension, Other pulmonary embolism without acute cor pulmonale (2022), and Tendonitis. Presents to clinic for a sick visit.     URI# Patient states she started feeling ill 2 days ago after interacting with nephew who is sick. Admits to sore throat and dry cough. Mild congestion. Endorses fatigue and mild SOB on exertion. She denies any fever or chills. No runny nose, headache, watery eyes, ear pain/itching. She self tested yesterday for COVID-19 and was negative. She states she occasionally feels SOB especially at night when cough is predominant and this occasionally wakes her up. She states she has heard some wheezing on occasions. She has never used an inhaler before. Has not taken any OTC medications.     JUAN MANUEL# Patient taking Zoloft 150mg daily. She denies any chest pain, palpitations, lightheadedness, tinnitus, confusion, nausea, vomiting, diarrhea, constipation. Denies SI/HI. Additionally, she is going to a mental health counselor and is appreciating talks. Patient states improvement with medication and counseling.      HTN# BP today in office 134/81.       Review of Systems   Constitutional:  Negative for chills, fever, malaise/fatigue and weight loss.   HENT:  Positive for congestion and sore throat. Negative for ear pain, hearing loss and tinnitus.    Eyes:  Negative for blurred vision.   Respiratory:  Positive for cough (dry), shortness of breath and wheezing. Negative for sputum production.    Cardiovascular:  Negative for chest pain and palpitations.   Gastrointestinal:  Negative for blood in stool, constipation, diarrhea and nausea.   Genitourinary:  Negative for dysuria,  "frequency and hematuria.   Musculoskeletal:  Negative for back pain and neck pain.   Skin:  Negative for itching and rash.   Neurological:  Negative for dizziness, tingling, weakness and headaches.   Psychiatric/Behavioral:  Positive for depression. Negative for suicidal ideas. The patient is nervous/anxious.      A 10+ review of systems was performed with pertinent positives and negatives noted above in the history of present illness. Other systems were negative unless otherwise specified.    OBJECTIVE:     Vital Signs (Most Recent)  Vitals:    12/30/24 0937   BP: 134/81   BP Location: Left arm   Patient Position: Sitting   Pulse: 79   Resp: 18   SpO2: 99%   Weight: 83.9 kg (184 lb 15.5 oz)   Height: 5' 1" (1.549 m)     BMI: Body mass index is 34.95 kg/m².     Physical Exam  Constitutional:       General: She is not in acute distress.     Appearance: Normal appearance. She is obese.   HENT:      Head: Normocephalic and atraumatic.      Right Ear: External ear normal.      Left Ear: External ear normal.      Nose: Nose normal. No congestion or rhinorrhea.      Mouth/Throat:      Mouth: Mucous membranes are moist.      Pharynx: Uvula midline. No oropharyngeal exudate or posterior oropharyngeal erythema.      Tonsils: No tonsillar exudate or tonsillar abscesses. 3+ on the right. 2+ on the left.   Eyes:      Extraocular Movements: Extraocular movements intact.      Conjunctiva/sclera: Conjunctivae normal.      Pupils: Pupils are equal, round, and reactive to light.   Cardiovascular:      Rate and Rhythm: Normal rate and regular rhythm.      Pulses: Normal pulses.      Heart sounds: Normal heart sounds.   Pulmonary:      Effort: Pulmonary effort is normal. No respiratory distress.      Breath sounds: Normal breath sounds. No stridor. No wheezing or rhonchi.   Abdominal:      General: Abdomen is flat. Bowel sounds are normal. There is no distension.      Palpations: Abdomen is soft.   Musculoskeletal:         General: " Normal range of motion.      Cervical back: Normal range of motion and neck supple. No tenderness.   Skin:     General: Skin is warm.      Capillary Refill: Capillary refill takes less than 2 seconds.   Neurological:      General: No focal deficit present.      Mental Status: She is alert and oriented to person, place, and time. Mental status is at baseline.      Cranial Nerves: No cranial nerve deficit.   Psychiatric:         Mood and Affect: Mood normal.         Behavior: Behavior normal.          ASSESSMENT:   Mehreen Benites is a 64 y.o. female who presents to clinic to for    1. Upper respiratory tract infection, unspecified type    2. SOB (shortness of breath) on exertion    3. Sleep disorder    4. JUAN MANUEL (generalized anxiety disorder)    5. Depression, unspecified depression type    6. Immunization declined         PLAN:     Upper respiratory tract infection, unspecified type  SOB (shortness of breath) on exertion  New. Stable. Centor criteria: 0. Covid negative yesterday per patient. The patient has NO history of asthma. Triggers include smoke and cold. Does not smoke, but is exposed to household with smokers (). Recent Echo and nuclear medicine stress test reviewed unremarkable. Followed by cardiology. Likely underlying MELANIA / COPD.   Educate the patient on proper inhaler technique and the importance of carrying the inhaler at all times.  Advise the patient to avoid known triggers and to use the inhaler at the first sign of symptoms  budesonide-formoterol 80-4.5 mcg (SYMBICORT) 80-4.5 mcg/actuation HFAA; Inhale 2 puffs into the lungs 4 (four) times daily as needed (1 to 2 inhalations every 4 hours as needed for wheezing or shortness of breath). Controller  Dispense: 10.2 g; Refill: 3  benzocaine-menthoL 15-3.6 mg Lozg; 1 lozenge by Mucous Membrane route 4 (four) times daily as needed (sore throat).  Dispense: 60 lozenge; Refill: 3  Complete PFT with bronchodilator and FeNO; Future    Sleep  disorder  Stable. STOP-BANG today 6 .   Patient endorsing witnessed apnea. Fatigue/Tiredness during daytime. Snoring +.   Has never been evaluated by sleep medicine. No use of CPAP. No evidence of cardiopulmonary etiologies at this time.   Discussed benefits of CPAP of improved sleep, reduced fatigue, improved cardiovascular health and mental health state.   Patient agreeable in referral to sleep medicine.   -     Ambulatory referral/consult to Sleep Disorders; Future; Expected date: 01/06/2025    JUAN MANUEL (generalized anxiety disorder)  Depression, unspecified depression type  Stable. PHQ9: 14 JUAN MANUEL: 7 (10/25/24) today PHQ9 - 17 and JUAN MANUEL 19.  The patient meets criteria for a severe depression and anxiety. Denies SI, HI, AVH. Denies PTSD or manic episodes, significant famhx of bipolar, and no psychotic features. Patient compliant with medication zoloft 150mg daily and states she is currently seeing a mental health counselor. Recommended patient follow-up with counselor as needed. Of this visit, 10min of 30min visit I provided mental health counseling. Follow-up in 3mo or sooner if needed for any acute concerns.   Continue with zoloft as prescribed    Immunization declined  - Patient denied flu shot, COVID vaccine, shingles, rsv and tdap. Patient will contact us if he has any additional questions or concerns regarding vaccines. Patient fear of needles.       Provided patient with anticipatory guidance and return precautions. Treatment plan discussed with patient, all questions answered, and patient acknowledged understanding and verbal agreement.      Follow-up in: 3 months; or sooner PRN if acute concerns arise.    The following information is provided to all patients.  This information is to help you find resources for any of the problems found today that may be affecting your health:                Living healthy guide: www.[facility].[place].gov       Understanding Diabetes: www.diabetes.org       Eating healthy:  www.cdc.gov/healthyweight      CDC home safety checklist: www.cdc.gov/steadi/patient.html      Agency on Aging: www.goea.[place].gov       Alcoholics anonymous (AA): www.aa.org      Physical Activity: www.allie.nih.gov/xi8umlb       Tobacco use: www.quitwithusla.org       DISCLAIMER: This note was partially created using [employer] Simplified Voice Recognition software. Typographical and content errors may occur with this process. While efforts are made to detect and correct such errors, in some cases errors will persist. For this reason, wording in this document should be considered in the proper context and not strictly verbatim.    Case discussed with Dr. BOONE Cunningham MD  \[facility]\"" Family Medicine, PGY-1  12/30/2024

## 2025-01-16 ENCOUNTER — TELEPHONE (OUTPATIENT)
Dept: HEMATOLOGY/ONCOLOGY | Facility: CLINIC | Age: 65
End: 2025-01-16
Payer: MEDICAID

## 2025-01-16 NOTE — TELEPHONE ENCOUNTER
----- Message from Tangela sent at 1/16/2025 11:48 AM CST -----  Type:  Sooner Apoointment Request    Caller is requesting a sooner appointment.  Caller declined first available appointment listed below.  Caller will not accept being placed on the waitlist and is requesting a message be sent to doctor.  Name of Caller: Pt   When is the first available appointment? N/A  Symptoms: f/u - previous Sharon pt   Would the patient rather a call back or a response via [facility]? Call   Best Call Back Number:[phone]   Additional Information:  
fall [place] no loc

## 2025-02-04 ENCOUNTER — LAB VISIT (OUTPATIENT)
Dept: LAB | Facility: HOSPITAL | Age: 65
End: 2025-02-04
Attending: PHYSICIAN ASSISTANT
Payer: MEDICAID

## 2025-02-04 DIAGNOSIS — D50.9 IRON DEFICIENCY ANEMIA, UNSPECIFIED IRON DEFICIENCY ANEMIA TYPE: ICD-10-CM

## 2025-02-04 DIAGNOSIS — D50.9 IRON DEFICIENCY ANEMIA, UNSPECIFIED IRON DEFICIENCY ANEMIA TYPE: Primary | ICD-10-CM

## 2025-02-04 LAB
ALBUMIN SERPL BCP-MCNC: 4.1 G/DL (ref 3.5–5.2)
ALP SERPL-CCNC: 80 U/L (ref 38–126)
ALT SERPL W/O P-5'-P-CCNC: 25 U/L (ref 10–44)
ANION GAP SERPL CALC-SCNC: 8 MMOL/L (ref 8–16)
AST SERPL-CCNC: 32 U/L (ref 15–46)
BASOPHILS # BLD AUTO: 0.05 K/UL (ref 0–0.2)
BASOPHILS NFR BLD: 0.9 % (ref 0–1.9)
BILIRUB SERPL-MCNC: 1 MG/DL (ref 0.1–1)
CALCIUM SERPL-MCNC: 9.5 MG/DL (ref 8.7–10.5)
CHLORIDE SERPL-SCNC: 101 MMOL/L (ref 95–110)
CO2 SERPL-SCNC: 29 MMOL/L (ref 23–29)
CREAT SERPL-MCNC: 1.29 MG/DL (ref 0.5–1.4)
DIFFERENTIAL METHOD BLD: ABNORMAL
EOSINOPHIL # BLD AUTO: 0.1 K/UL (ref 0–0.5)
EOSINOPHIL NFR BLD: 2.2 % (ref 0–8)
ERYTHROCYTE [DISTWIDTH] IN BLOOD BY AUTOMATED COUNT: 12 % (ref 11.5–14.5)
EST. GFR  (NO RACE VARIABLE): 46.9 ML/MIN/1.73 M^2
FERRITIN SERPL-MCNC: 173 NG/ML (ref 20–300)
GLUCOSE SERPL-MCNC: 106 MG/DL (ref 70–110)
HCT VFR BLD AUTO: 43.1 % (ref 37–48.5)
HGB BLD-MCNC: 13.9 G/DL (ref 12–16)
IMM GRANULOCYTES # BLD AUTO: 0.02 K/UL (ref 0–0.04)
IMM GRANULOCYTES NFR BLD AUTO: 0.4 % (ref 0–0.5)
IRON SERPL-MCNC: 112 UG/DL (ref 30–160)
LYMPHOCYTES # BLD AUTO: 2.6 K/UL (ref 1–4.8)
LYMPHOCYTES NFR BLD: 48.2 % (ref 18–48)
MCH RBC QN AUTO: 29.6 PG (ref 27–31)
MCHC RBC AUTO-ENTMCNC: 32.3 G/DL (ref 32–36)
MCV RBC AUTO: 92 FL (ref 82–98)
MONOCYTES # BLD AUTO: 0.5 K/UL (ref 0.3–1)
MONOCYTES NFR BLD: 9.6 % (ref 4–15)
NEUTROPHILS # BLD AUTO: 2.1 K/UL (ref 1.8–7.7)
NEUTROPHILS NFR BLD: 38.7 % (ref 38–73)
NRBC BLD-RTO: 0 /100 WBC
PLATELET # BLD AUTO: 201 K/UL (ref 150–450)
PMV BLD AUTO: 11.3 FL (ref 9.2–12.9)
POTASSIUM SERPL-SCNC: 4.7 MMOL/L (ref 3.5–5.1)
PROT SERPL-MCNC: 8.1 G/DL (ref 6–8.4)
RBC # BLD AUTO: 4.7 M/UL (ref 4–5.4)
SATURATED IRON: 34 % (ref 20–50)
SODIUM SERPL-SCNC: 138 MMOL/L (ref 136–145)
TOTAL IRON BINDING CAPACITY: 327 UG/DL (ref 250–450)
TRANSFERRIN SERPL-MCNC: 221 MG/DL (ref 200–375)
UUN UR-MCNC: 17 MG/DL (ref 7–17)
WBC # BLD AUTO: 5.41 K/UL (ref 3.9–12.7)

## 2025-02-04 PROCEDURE — 83540 ASSAY OF IRON: CPT | Mod: PN | Performed by: PHYSICIAN ASSISTANT

## 2025-02-04 PROCEDURE — 85025 COMPLETE CBC W/AUTO DIFF WBC: CPT | Mod: PN | Performed by: PHYSICIAN ASSISTANT

## 2025-02-04 PROCEDURE — 82728 ASSAY OF FERRITIN: CPT | Performed by: PHYSICIAN ASSISTANT

## 2025-02-04 PROCEDURE — 80053 COMPREHEN METABOLIC PANEL: CPT | Mod: PN | Performed by: PHYSICIAN ASSISTANT

## 2025-02-04 PROCEDURE — 36415 COLL VENOUS BLD VENIPUNCTURE: CPT | Mod: PN | Performed by: PHYSICIAN ASSISTANT

## 2025-02-04 NOTE — PROGRESS NOTES
HEMATOLOGY/ONCOLOGY CLINIC VISIT NOTE    PATIENT: Merheen Benites  MRN: [id number]  DATE: 2/4/2025    Chief Complaint: PE, DVT    Subjective:     History of Present Illness:   Mehreen is a 64 y.o. female followed by hematology for management of unprovoked massive saddle pulmonary embolism with right heart strain and partially occlussive thrombus in the right popliteal vein (dx in 5/2022). S/p mechanical thrombectomy and was started on Xarelto. No hypercoagulable workup done.     Other medical diagnoses include HTN, JUAN MANUEL, HLD, pre diabetes, obesity, and tendonitis of her ankles s/p achilles tendon repair 8/2023.     Family hx: significant for son (40 yo) with DVT/PE and maternal first cousin (39 yo) with DVT/PE.    Diet/Smoke/Alcohol: non smoker; no alcohol; does not eat vegetables (will eat green beans, celery, corn, and carrots)    Colonoscopy: 2/2023 - hemorrhoids on perianal exam, as well as, internal; 4mm polyp removed negative for malignancy    Mammogram: 10/4/2024 - negative     INTERVAL hx:   Presents with her  today. She reports feeling well, but has intermittent SOB with standing and walking since her tendonitis surgery in 2023. Since surgery has not been as active as before because of this. Has gained weight since she is not as active. No currently working, trying to apply for disability. Denies chest pain, worsening of leg swelling, fatigue, fever, bruising, bleeding, dizziness, or headaches.     Past Medical, Surgical, Family and Social History Reviewed.    Medications and Allergies reviewed    Review of Systems   Constitutional:  Negative for fatigue, fever and unexpected weight change.   HENT:  Negative for mouth sores and nosebleeds.    Respiratory:  Positive for shortness of breath. Negative for chest tightness.    Cardiovascular:  Negative for chest pain, palpitations and leg swelling.   Gastrointestinal:  Negative for abdominal pain, blood in stool, constipation, diarrhea, nausea and  "vomiting.   Genitourinary:  Negative for hematuria.   Musculoskeletal:  Negative for arthralgias, back pain and joint swelling.   Skin:  Negative for pallor.   Neurological:  Negative for dizziness, weakness and light-headedness.   Hematological:  Negative for adenopathy. Does not bruise/bleed easily.   Psychiatric/Behavioral:  Negative for suicidal ideas.    All other systems reviewed and are negative.    Objective:     Vitals:    02/05/25 0825   BP: 117/84   BP Location: Left arm   Patient Position: Sitting   Pulse: 68   Resp: 20   Temp: 97.7 °F (36.5 °C)   TempSrc: Oral   SpO2: 97%   Weight: 84.3 kg (185 lb 13.6 oz)   Height: 5' 1" (1.549 m)       BMI: Body mass index is 35.12 kg/m².    Physical Exam  Vitals and nursing note reviewed.   Constitutional:       General: She is not in acute distress.  HENT:      Head: Normocephalic and atraumatic.      Right Ear: External ear normal.      Left Ear: External ear normal.   Eyes:      General: No scleral icterus.     Pupils: Pupils are equal, round, and reactive to light.   Cardiovascular:      Rate and Rhythm: Normal rate and regular rhythm.      Pulses: Normal pulses.      Heart sounds: Normal heart sounds. No murmur heard.     Comments: 2+ posterior tib ble  Pulmonary:      Effort: Pulmonary effort is normal. No respiratory distress.      Breath sounds: Normal breath sounds.   Abdominal:      General: Bowel sounds are normal. There is no distension.      Palpations: Abdomen is soft.      Tenderness: There is no abdominal tenderness. There is no guarding.   Musculoskeletal:         General: Normal range of motion.      Cervical back: Normal range of motion and neck supple. No rigidity.      Right lower leg: No edema.      Left lower leg: No edema.      Comments: BLE negative homans, 2+ distal pulses.   Lymphadenopathy:      Cervical: No cervical adenopathy.   Skin:     General: Skin is warm and dry.      Coloration: Skin is not jaundiced or pale.      Findings: No " bruising or erythema.   Neurological:      Mental Status: She is alert and oriented to person, place, and time. Mental status is at baseline.      Gait: Gait normal.   Psychiatric:         Attention and Perception: Attention normal.         Mood and Affect: Mood normal. Mood is not depressed.         Speech: Speech normal.         Behavior: Behavior normal. Behavior is cooperative.         Thought Content: Thought content normal.       ECOG SCORE    1 - Restricted in strenuous activity-ambulatory and able to carry out work of a light nature         LABS  Lab Results   Component Value Date    WBC 5.41 02/04/2025    HGB 13.9 02/04/2025    HCT 43.1 02/04/2025    MCV 92 02/04/2025     02/04/2025       Lab Results   Component Value Date    DDIMER 0.31 09/27/2022     IMAGING  US BLE 12/10/2024  No evidence of Right or left lower extremity DVT    US BLE 10/23/23  Impression:     No evidence of deep venous thrombosis in either lower extremity.       US Lower Extremity Veins Bilat (2/13/23)  Impression:     No evidence of deep venous thrombosis in either lower extremity.       US Lower Extremity Veins Right 9/27/22  Impression:  No evidence of deep venous thrombosis in the right lower extremity.   Interval resolution of previously identified thrombus within the right popliteal vein.    CTA Chest Non-Coronary(PE Studies) 9/27/22  Impression:     1. Interval improvement of prior bilateral pulmonary arterial thromboemboli.  No residual central pulmonary thrombus identified.  Few small filling defects within segmental arteries potentially representing mild residual thrombus.  2. Mosaic attenuation of the bilateral pulmonary parenchyma which may represent increased pulmonary vascular resistance or small airways disease.  3. Mild cardiomegaly.  4. Additional findings as above.       Assessment:       1. Pulmonary embolism, bilateral    2. History of DVT (deep vein thrombosis)    3. Iron deficiency anemia, unspecified iron  deficiency anemia type    4. Stage 3a chronic kidney disease    5. Primary hypertension      Plan:     Massive unprovoked pulmonary embolism and h/o DVT  -1st episode, unprovoked, required mechanical thrombectomy 2022  - with positive family history blood clots  - based on presentation and family history it would appear she has a hypercoagulable condition, but no testing has been done. Consider this at next appointment  - Continue Xarelto at 20mg indefinitely   - Advised to reach out any questions or concerns. Discussed s/s of pe, dvt and when to seek emergent care. Questions answered.  - RTC in 6 months for follow up     SALINA - resolved   - no history of anemia, but low saturated iron x1 2024   - Recheck wnl. Iron studies are normal    - CBC without anemia  - Continue to monitor while on Xarelto as there is a risk of bleeding     HTN  -bp 117/84 today, no associated symptoms  -took losartan and hctz today prior appt  -mother history AMI early 50s,   -management deferred to cardiology    CKD stage 3a, SEDRICK history  -GFR 46.9  stable  -drinking 4-16oz water bottles most days, to push fluids  -encouraged adequate fluids and pcp follow up  - management deferred to pcp     I spent a total of 36 minutes on the day of the visit.This includes face to face time and non-face to face time preparing to see the patient (eg, review of tests), obtaining and/or reviewing separately obtained history, documenting clinical information in the electronic or other health record, independently interpreting results and communicating results to the patient/family/caregiver, or care coordinator.     Alejandra Mullen PA-C  Hematology/Oncology  [facility] - [address], [address]  [place] [address]  Phone: [phone]  Fax: [phone]

## 2025-02-05 ENCOUNTER — OFFICE VISIT (OUTPATIENT)
Dept: HEMATOLOGY/ONCOLOGY | Facility: CLINIC | Age: 65
End: 2025-02-05
Payer: MEDICAID

## 2025-02-05 ENCOUNTER — HOSPITAL ENCOUNTER (OUTPATIENT)
Dept: PULMONOLOGY | Facility: HOSPITAL | Age: 65
Discharge: HOME | End: 2025-02-05
Payer: MEDICAID

## 2025-02-05 VITALS
RESPIRATION RATE: 20 BRPM | BODY MASS INDEX: 35.09 KG/M2 | HEART RATE: 68 BPM | DIASTOLIC BLOOD PRESSURE: 84 MMHG | SYSTOLIC BLOOD PRESSURE: 117 MMHG | WEIGHT: 185.88 LBS | HEIGHT: 61 IN | OXYGEN SATURATION: 97 % | TEMPERATURE: 98 F

## 2025-02-05 DIAGNOSIS — I10 PRIMARY HYPERTENSION: ICD-10-CM

## 2025-02-05 DIAGNOSIS — I26.99 PULMONARY EMBOLISM, BILATERAL: Primary | ICD-10-CM

## 2025-02-05 DIAGNOSIS — R06.02 SOB (SHORTNESS OF BREATH): Primary | ICD-10-CM

## 2025-02-05 DIAGNOSIS — Z86.718 HISTORY OF DVT (DEEP VEIN THROMBOSIS): ICD-10-CM

## 2025-02-05 DIAGNOSIS — N18.31 STAGE 3A CHRONIC KIDNEY DISEASE: ICD-10-CM

## 2025-02-05 DIAGNOSIS — R06.02 SOB (SHORTNESS OF BREATH) ON EXERTION: ICD-10-CM

## 2025-02-05 DIAGNOSIS — D50.9 IRON DEFICIENCY ANEMIA, UNSPECIFIED IRON DEFICIENCY ANEMIA TYPE: ICD-10-CM

## 2025-02-05 DIAGNOSIS — R06.02 SOB (SHORTNESS OF BREATH): ICD-10-CM

## 2025-02-05 PROCEDURE — 3074F SYST BP LT 130 MM HG: CPT | Mod: CPTII,,, | Performed by: PHYSICIAN ASSISTANT

## 2025-02-05 PROCEDURE — 3079F DIAST BP 80-89 MM HG: CPT | Mod: CPTII,,, | Performed by: PHYSICIAN ASSISTANT

## 2025-02-05 PROCEDURE — 1160F RVW MEDS BY RX/DR IN RCRD: CPT | Mod: CPTII,,, | Performed by: PHYSICIAN ASSISTANT

## 2025-02-05 PROCEDURE — 99999 PR PBB SHADOW E&M-EST. PATIENT-LVL III: CPT | Mod: PBBFAC,,, | Performed by: PHYSICIAN ASSISTANT

## 2025-02-05 PROCEDURE — 1159F MED LIST DOCD IN RCRD: CPT | Mod: CPTII,,, | Performed by: PHYSICIAN ASSISTANT

## 2025-02-05 PROCEDURE — 99213 OFFICE O/P EST LOW 20 MIN: CPT | Mod: PBBFAC,PO | Performed by: PHYSICIAN ASSISTANT

## 2025-02-05 PROCEDURE — 99214 OFFICE O/P EST MOD 30 MIN: CPT | Mod: S$PBB,,, | Performed by: PHYSICIAN ASSISTANT

## 2025-02-05 PROCEDURE — 3008F BODY MASS INDEX DOCD: CPT | Mod: CPTII,,, | Performed by: PHYSICIAN ASSISTANT

## 2025-02-05 RX ORDER — FERROUS SULFATE 325(65) MG
325 TABLET ORAL EVERY OTHER DAY
Qty: 15 TABLET | Refills: 11 | Status: SHIPPED | OUTPATIENT
Start: 2025-02-05 | End: 2026-02-05

## 2025-02-05 RX ORDER — FERROUS SULFATE 325(65) MG
325 TABLET ORAL EVERY OTHER DAY
Qty: 45 TABLET | Refills: 3 | Status: SHIPPED | OUTPATIENT
Start: 2025-02-05 | End: 2025-02-05

## 2025-02-05 NOTE — PROCEDURES
Unable to do procedure. She was SOB upon arrival to PFT lab and could only exhale 1.44 seconds after numerous attempts

## 2025-03-13 ENCOUNTER — OFFICE VISIT (OUTPATIENT)
Dept: CARDIOLOGY | Facility: CLINIC | Age: 65
End: 2025-03-13
Payer: MEDICAID

## 2025-03-13 VITALS
HEART RATE: 80 BPM | DIASTOLIC BLOOD PRESSURE: 87 MMHG | OXYGEN SATURATION: 99 % | HEIGHT: 61 IN | SYSTOLIC BLOOD PRESSURE: 122 MMHG | WEIGHT: 185.19 LBS | BODY MASS INDEX: 34.96 KG/M2

## 2025-03-13 DIAGNOSIS — R53.83 OTHER FATIGUE: ICD-10-CM

## 2025-03-13 DIAGNOSIS — R60.0 BILATERAL LOWER EXTREMITY EDEMA: ICD-10-CM

## 2025-03-13 DIAGNOSIS — E66.01 CLASS 2 SEVERE OBESITY DUE TO EXCESS CALORIES WITH SERIOUS COMORBIDITY AND BODY MASS INDEX (BMI) OF 35.0 TO 35.9 IN ADULT: ICD-10-CM

## 2025-03-13 DIAGNOSIS — Z82.49 FAMILY HISTORY OF CARDIAC ARREST: ICD-10-CM

## 2025-03-13 DIAGNOSIS — E78.2 MIXED HYPERLIPIDEMIA: ICD-10-CM

## 2025-03-13 DIAGNOSIS — R73.03 PRE-DIABETES: ICD-10-CM

## 2025-03-13 DIAGNOSIS — E66.812 CLASS 2 SEVERE OBESITY DUE TO EXCESS CALORIES WITH SERIOUS COMORBIDITY AND BODY MASS INDEX (BMI) OF 35.0 TO 35.9 IN ADULT: ICD-10-CM

## 2025-03-13 DIAGNOSIS — M67.88 ACHILLES TENDONOSIS OF RIGHT LOWER EXTREMITY: ICD-10-CM

## 2025-03-13 DIAGNOSIS — R06.02 SOBOE (SHORTNESS OF BREATH ON EXERTION): ICD-10-CM

## 2025-03-13 DIAGNOSIS — I10 PRIMARY HYPERTENSION: Primary | ICD-10-CM

## 2025-03-13 DIAGNOSIS — I26.99 PULMONARY EMBOLISM, BILATERAL: ICD-10-CM

## 2025-03-13 PROCEDURE — 99999 PR PBB SHADOW E&M-EST. PATIENT-LVL III: CPT | Mod: PBBFAC,,,

## 2025-03-13 PROCEDURE — 99213 OFFICE O/P EST LOW 20 MIN: CPT | Mod: PBBFAC,PN

## 2025-03-13 NOTE — ASSESSMENT & PLAN NOTE
Cardiac echo 10/10/24  Summary    Left Ventricle: The left ventricle is normal in size. There is concentric remodeling. Normal wall motion. There is normal systolic function with a visually estimated ejection fraction of 60 - 65%. Quantitated ejection fraction is 67%. There is normal diastolic function.    Right Ventricle: Normal right ventricular cavity size. Systolic function is normal.    Right Atrium: Normal right atrial size.    Aortic Valve: The aortic valve is a trileaflet valve. Aortic valve peak velocity is 1.2 m/s. Mean gradient is 3.3 mmHg. There is no significant regurgitation.    Mitral Valve: The mitral valve is structurally normal. There is trace regurgitation.    Tricuspid Valve: There is trace regurgitation.    Aorta: Aortic root is normal in size measuring 2.71 cm.    Pulmonary Artery: The estimated pulmonary artery systolic pressure is 22 mmHg.    IVC/SVC: Normal venous pressure at 3 mmHg.    Pericardium: There is no pericardial effusion.    -Followed by Pulmonary  -continue Symbicort INH

## 2025-03-13 NOTE — ASSESSMENT & PLAN NOTE
-S/P repair, reports healed without complication, continues to note burning sensation    -Followed by Ortho

## 2025-03-13 NOTE — ASSESSMENT & PLAN NOTE
Cardiac echo 10/10/24  Summary    Left Ventricle: The left ventricle is normal in size. There is concentric remodeling. Normal wall motion. There is normal systolic function with a visually estimated ejection fraction of 60 - 65%. Quantitated ejection fraction is 67%. There is normal diastolic function.    Right Ventricle: Normal right ventricular cavity size. Systolic function is normal.    Right Atrium: Normal right atrial size.    Aortic Valve: The aortic valve is a trileaflet valve. Aortic valve peak velocity is 1.2 m/s. Mean gradient is 3.3 mmHg. There is no significant regurgitation.    Mitral Valve: The mitral valve is structurally normal. There is trace regurgitation.    Tricuspid Valve: There is trace regurgitation.    Aorta: Aortic root is normal in size measuring 2.71 cm.    Pulmonary Artery: The estimated pulmonary artery systolic pressure is 22 mmHg.    IVC/SVC: Normal venous pressure at 3 mmHg.    Pericardium: There is no pericardial effusion.

## 2025-03-13 NOTE — ASSESSMENT & PLAN NOTE
-Mother AMI early 50s, decreased   -NM Stress test: 8/12/24  Impression:     1. No scintigraphic evidence of exercise or pharmacological stress induced myocardial ischemic changes  2. The global left ventricular systolic function is normal with an LV ejection fraction of 77 % and no evidence of LV dilatation. Wall motion is normal.   The 10-year ASCVD risk score (Castro PATRICK, et al., 2019) is: 8.1%    Values used to calculate the score:      Age: 64 years      Sex: Female      Is Non- : Yes      Diabetic: No      Tobacco smoker: No      Systolic Blood Pressure: 122 mmHg      Is BP treated: Yes      HDL Cholesterol: 43 mg/dL      Total Cholesterol: 218 mg/dL

## 2025-03-13 NOTE — ASSESSMENT & PLAN NOTE
-compression stockings  -elevate legs when sitting   CV Bilateral Venous US 12/10/2024  Interpretation Summary    There is no evidence of a right lower extremity DVT.    There is no evidence of a left lower extremity DVT.    No significant reflux in superficial or deep veins bilateral lower extremity

## 2025-03-13 NOTE — ASSESSMENT & PLAN NOTE
Body mass index is 34.99 kg/m². Morbid obesity complicates all aspects of disease management from diagnostic modalities to treatment. Weight loss encouraged and health benefits explained to patient.

## 2025-03-13 NOTE — PROGRESS NOTES
Subjective:    Patient ID:  Mehreen Benites is a 64 y.o. female who presents for evaluation of Follow-up      PCP: Serafin Cunningham MD     Referring Provider: Ceci Herrera NP    HPI: Pateint is a 62 yo F w/PMH of HTN, HLD, PE on Xarelto, (unprovoked massive saddle pulmonary embolism with right heart strain and partially occlussive thrombus in the right popliteal vein (dx in 5/2022). S/p mechanical thrombectomy), obesity, CKD-3 , pre diabetes, tendonitis of her ankles s/p achilles tendon repair 8/2023, who presents today for f/u appt, HTN management. She was last seen on 11/19/24 for f/u appt and was continued on medical therapy. Prior ischemic evaluation 2/ c/o CP. NM stress test completed and negative for ischemia. She is currently on Xarelto  without complications. She continues to note fatigue. She is followed by Hematology and was last seen on 2/5/2025 and was continued on medical therapy w Xarelto at 20mg indefinitely. Patient reports intermittent SOB w activity. Patient denies CP, palpitations, orthopnea, PND, presyncope, LOC, swelling, or claudication. Patient randomly  monitors home BP and ranges 110-120s/80s. Patient reports medication compliance without side effects. She reports weakness and back pain with standing > 20 minutes. She also notes left knee pain.       Past Medical History:   Diagnosis Date    Hypertension     Other pulmonary embolism without acute cor pulmonale 2022    Tendonitis      Past Surgical History:   Procedure Laterality Date    BONE MARROW ASPIRATION Right 8/25/2023    Procedure: ASPIRATION, BONE MARROW;  Surgeon: Axel Kapoor Jr., DPM;  Location: [facility] OR;  Service: Podiatry;  Laterality: Right;    CATARACT EXTRACTION, BILATERAL      COLONOSCOPY N/A 04/27/2023    Procedure: COLONOSCOPY;  Surgeon: Zachary Torres MD;  Location: [facility] ENDO;  Service: Endoscopy;  Laterality: N/A;    HEEL SPUR SURGERY Left 02/14/2013    HYSTERECTOMY  10/2009    WHITNEY    OOPHORECTOMY  10/2009     Bilateral    REPAIR OF ACHILLES TENDON Right 2023    Procedure: REPAIR, TENDON, ACHILLES;  Surgeon: Axel Kapoor Jr., DPM;  Location: [facility] OR;  Service: Podiatry;  Laterality: Right;  pop saph    RESECTION OF GASTROCNEMIUS MUSCLE Right 2023    Procedure: RESECTION, MUSCLE, GASTROCNEMIUS;  Surgeon: Axel Kapoor Jr., DPM;  Location: [facility] OR;  Service: Podiatry;  Laterality: Right;  pop saph    SURGICAL REMOVAL OF RETROCALCANEAL EXOSTOSIS Right 2023    Procedure: EXCISION, EXOSTOSIS, RETROCALCANEAL;  Surgeon: Axel Kapoor Jr., DPM;  Location: [facility] OR;  Service: Podiatry;  Laterality: Right;    THROMBECTOMY N/A 2022    Procedure: THROMBECTOMY;  Surgeon: Kendall Fiore MD;  Location: [facility] CATH LAB/EP;  Service: Cardiology;  Laterality: N/A;     Social History     Socioeconomic History    Marital status: Legally    Tobacco Use    Smoking status: Never     Passive exposure: Never    Smokeless tobacco: Never   Substance and Sexual Activity    Alcohol use: Yes     Comment: occasionally    Drug use: No    Sexual activity: Not Currently     Partners: Male     Birth control/protection: Post-menopausal, See Surgical Hx     Comment:      Family History   Problem Relation Name Age of Onset    Hypertension Mother          AMI,  age 50s    Lung cancer Father      Breast cancer Maternal Aunt      Breast cancer Paternal Aunt      Breast cancer Maternal Cousin  51    Colon cancer Neg Hx      Ovarian cancer Neg Hx         Review of patient's allergies indicates:  No Known Allergies    Medication List with Changes/Refills   Current Medications    ASCORBIC ACID, VITAMIN C, (VITAMIN C) 500 MG TABLET    Take 500 mg by mouth once daily.    ATORVASTATIN (LIPITOR) 40 MG TABLET    Take 1 tablet (40 mg total) by mouth once daily.    BENZOCAINE-MENTHOL 15-3.6 MG LOZG    1 lozenge by Mucous Membrane route 4 (four) times daily as needed (sore throat).    BUDESONIDE-FORMOTEROL 80-4.5  "MCG (SYMBICORT) 80-4.5 MCG/ACTUATION HFAA    Inhale 2 puffs into the lungs 4 (four) times daily as needed (1 to 2 inhalations every 4 hours as needed for wheezing or shortness of breath). Controller    FERROUS SULFATE (FEOSOL) 325 MG (65 MG IRON) TAB TABLET    Take 1 tablet (325 mg total) by mouth every other day.    HYDROCHLOROTHIAZIDE (HYDRODIURIL) 25 MG TABLET    Take 1 tablet (25 mg total) by mouth once daily.    RIVAROXABAN (XARELTO) 20 MG TAB    Take 1 tablet (20 mg total) by mouth before dinner.    SERTRALINE (ZOLOFT) 100 MG TABLET    Take 1.5 tablets (150 mg total) by mouth once daily.    VALSARTAN (DIOVAN) 160 MG TABLET    Take 1 tablet (160 mg total) by mouth once daily.       Review of Systems   Constitutional: Negative for diaphoresis and fever.   HENT:  Negative for congestion and hearing loss.    Eyes:  Negative for blurred vision and pain.   Cardiovascular:  Positive for dyspnea on exertion. Negative for claudication, leg swelling, near-syncope and syncope.   Respiratory:  Positive for shortness of breath. Negative for sleep disturbances due to breathing.    Hematologic/Lymphatic: Negative for bleeding problem. Does not bruise/bleed easily.   Skin:  Negative for color change and poor wound healing.   Musculoskeletal:  Positive for back pain and joint pain.   Gastrointestinal:  Negative for abdominal pain and nausea.   Genitourinary:  Negative for bladder incontinence and flank pain.   Neurological:  Negative for focal weakness and light-headedness.        Objective:   /87 (BP Location: Left arm, Patient Position: Sitting)   Pulse 80   Ht 5' 1" (1.549 m)   Wt 84 kg (185 lb 3 oz)   LMP  (LMP Unknown)   SpO2 99%   BMI 34.99 kg/m²    Physical Exam  Constitutional:       Appearance: She is well-developed. She is not diaphoretic.   HENT:      Head: Normocephalic and atraumatic.   Eyes:      General: No scleral icterus.     Pupils: Pupils are equal, round, and reactive to light.   Neck:      " Vascular: No JVD.   Cardiovascular:      Rate and Rhythm: Normal rate and regular rhythm.      Pulses: Intact distal pulses.           Radial pulses are 2+ on the right side and 2+ on the left side.        Dorsalis pedis pulses are 2+ on the right side and 2+ on the left side.        Posterior tibial pulses are 2+ on the right side and 2+ on the left side.      Heart sounds: S1 normal and S2 normal. No murmur heard.     No friction rub. No gallop.   Pulmonary:      Effort: Pulmonary effort is normal. No respiratory distress.      Breath sounds: Normal breath sounds. No wheezing or rales.   Chest:      Chest wall: No tenderness.   Abdominal:      General: Bowel sounds are normal. There is no distension.      Palpations: Abdomen is soft. There is no mass.      Tenderness: There is no abdominal tenderness. There is no rebound.   Musculoskeletal:         General: No tenderness. Normal range of motion.      Cervical back: Normal range of motion and neck supple.      Right lower le+ Edema present.      Left lower le+ Edema present.   Skin:     General: Skin is warm and dry.      Coloration: Skin is not pale.   Neurological:      Mental Status: She is alert and oriented to person, place, and time.      Coordination: Coordination normal.      Deep Tendon Reflexes: Reflexes normal.   Psychiatric:         Behavior: Behavior normal.         Judgment: Judgment normal.           Cardiac echo 10/10/24  Summary    Left Ventricle: The left ventricle is normal in size. There is concentric remodeling. Normal wall motion. There is normal systolic function with a visually estimated ejection fraction of 60 - 65%. Quantitated ejection fraction is 67%. There is normal diastolic function.    Right Ventricle: Normal right ventricular cavity size. Systolic function is normal.    Right Atrium: Normal right atrial size.    Aortic Valve: The aortic valve is a trileaflet valve. Aortic valve peak velocity is 1.2 m/s. Mean gradient is 3.3  mmHg. There is no significant regurgitation.    Mitral Valve: The mitral valve is structurally normal. There is trace regurgitation.    Tricuspid Valve: There is trace regurgitation.    Aorta: Aortic root is normal in size measuring 2.71 cm.    Pulmonary Artery: The estimated pulmonary artery systolic pressure is 22 mmHg.    IVC/SVC: Normal venous pressure at 3 mmHg.    Pericardium: There is no pericardial effusion.    CV Bilateral Venous US 12/10/2024    Interpretation Summary    There is no evidence of a right lower extremity DVT.    There is no evidence of a left lower extremity DVT.    No significant reflux in superficial or deep veins bilateral lower extremity    NM stress test 8/12/24  Interpretation Summary    The ECG portion of the study is negative for ischemia.    The patient reported no chest pain during the stress test.    The nuclear portion of this study will be reported separately.  Impression:     1. No scintigraphic evidence of exercise or pharmacological stress induced myocardial ischemic changes  2. The global left ventricular systolic function is normal with an LV ejection fraction of 77 % and no evidence of LV dilatation. Wall motion is normal.     IMAGING     US BLE 10/23/23  Impression:     No evidence of deep venous thrombosis in either lower extremity.        US Lower Extremity Veins Bilat (2/13/23)  Impression:     No evidence of deep venous thrombosis in either lower extremity.        US Lower Extremity Veins Right 9/27/22  Impression:  No evidence of deep venous thrombosis in the right lower extremity.   Interval resolution of previously identified thrombus within the right popliteal vein.     CTA Chest Non-Coronary(PE Studies) 9/27/22  Impression:  1. Interval improvement of prior bilateral pulmonary arterial thromboemboli.  No residual central pulmonary thrombus identified.  Few small filling defects within segmental arteries potentially representing mild residual thrombus.  2. Mosaic  attenuation of the bilateral pulmonary parenchyma which may represent increased pulmonary vascular resistance or small airways disease.  3. Mild cardiomegaly.  4. Additional findings as above.    Cardiac echo 10/17/2022  Summary  The left ventricle is normal in size with concentric remodeling and normal systolic function.  The estimated ejection fraction is 65%.  Normal left ventricular diastolic function.  The estimated PA systolic pressure is 23 mmHg.  Normal right ventricular size with normal right ventricular systolic function.  Normal central venous pressure (3 mmHg).  Mild mitral regurgitation.    Assessment:       1. Primary hypertension    2. Mixed hyperlipidemia    3. SOBOE (shortness of breath on exertion)    4. Family history of cardiac arrest    5. Pulmonary embolism, bilateral    6. Pre-diabetes    7. Class 2 severe obesity due to excess calories with serious comorbidity and body mass index (BMI) of 35.0 to 35.9 in adult    8. Achilles tendonosis of right lower extremity    9. Other fatigue    10. Bilateral lower extremity edema             Plan:         Hypertension  -Goal BP < 130/80  -controlled   -continue medical therapy- HCTZ 25 mg, valsartan 160 mg   -discussed lifestyle modifications, low salt diet avoid processed meats, pickled meats, increase physical activity   -discussed risk associated with elevated blood pressure   -check BP twice daily, maintain log and bring to all appts     Mixed hyperlipidemia  -.0 9/19/24  -controlled   -continue statin therapy- pravastatin 20 mg     SOBOE (shortness of breath on exertion)  Cardiac echo 10/10/24  Summary    Left Ventricle: The left ventricle is normal in size. There is concentric remodeling. Normal wall motion. There is normal systolic function with a visually estimated ejection fraction of 60 - 65%. Quantitated ejection fraction is 67%. There is normal diastolic function.    Right Ventricle: Normal right ventricular cavity size. Systolic  function is normal.    Right Atrium: Normal right atrial size.    Aortic Valve: The aortic valve is a trileaflet valve. Aortic valve peak velocity is 1.2 m/s. Mean gradient is 3.3 mmHg. There is no significant regurgitation.    Mitral Valve: The mitral valve is structurally normal. There is trace regurgitation.    Tricuspid Valve: There is trace regurgitation.    Aorta: Aortic root is normal in size measuring 2.71 cm.    Pulmonary Artery: The estimated pulmonary artery systolic pressure is 22 mmHg.    IVC/SVC: Normal venous pressure at 3 mmHg.    Pericardium: There is no pericardial effusion.    -Followed by Pulmonary  -continue Symbicort INH     Family history of cardiac arrest  -Mother AMI early 50s, decreased   -NM Stress test: 8/12/24  Impression:     1. No scintigraphic evidence of exercise or pharmacological stress induced myocardial ischemic changes  2. The global left ventricular systolic function is normal with an LV ejection fraction of 77 % and no evidence of LV dilatation. Wall motion is normal.   The 10-year ASCVD risk score (Castro DK, et al., 2019) is: 8.1%    Values used to calculate the score:      Age: 64 years      Sex: Female      Is Non- : Yes      Diabetic: No      Tobacco smoker: No      Systolic Blood Pressure: 122 mmHg      Is BP treated: Yes      HDL Cholesterol: 43 mg/dL      Total Cholesterol: 218 mg/dL      Pulmonary embolism, bilateral  -PE 5/31/2022- post thrombectomy  -continue Xeralto 20 mg - indefinitely     Pre-diabetes  -A1c 5.8 9/19/24  -discussed lifestyle modifications     Class 2 severe obesity with serious comorbidity and body mass index (BMI) of 35.0 to 35.9 in adult  Body mass index is 34.99 kg/m². Morbid obesity complicates all aspects of disease management from diagnostic modalities to treatment. Weight loss encouraged and health benefits explained to patient.       Achilles tendonosis of right lower extremity  -S/P repair, reports healed without  complication, continues to note burning sensation    -Followed by Ortho     Other fatigue  Cardiac echo 10/10/24  Summary    Left Ventricle: The left ventricle is normal in size. There is concentric remodeling. Normal wall motion. There is normal systolic function with a visually estimated ejection fraction of 60 - 65%. Quantitated ejection fraction is 67%. There is normal diastolic function.    Right Ventricle: Normal right ventricular cavity size. Systolic function is normal.    Right Atrium: Normal right atrial size.    Aortic Valve: The aortic valve is a trileaflet valve. Aortic valve peak velocity is 1.2 m/s. Mean gradient is 3.3 mmHg. There is no significant regurgitation.    Mitral Valve: The mitral valve is structurally normal. There is trace regurgitation.    Tricuspid Valve: There is trace regurgitation.    Aorta: Aortic root is normal in size measuring 2.71 cm.    Pulmonary Artery: The estimated pulmonary artery systolic pressure is 22 mmHg.    IVC/SVC: Normal venous pressure at 3 mmHg.    Pericardium: There is no pericardial effusion.    Bilateral lower extremity edema  -compression stockings  -elevate legs when sitting   CV Bilateral Venous US 12/10/2024  Interpretation Summary    There is no evidence of a right lower extremity DVT.    There is no evidence of a left lower extremity DVT.    No significant reflux in superficial or deep veins bilateral lower extremity      Total duration of face to face visit time 30 minutes.  Total time spent counseling greater than fifty percent of total visit time.  Counseling included discussion regarding imaging findings, diagnosis, possibilities, treatment options, risks and benefits.  The patient had many questions regarding the options and long-term effects      Jose Angel Thomas,WILMER  Cardiology

## 2025-05-26 ENCOUNTER — OFFICE VISIT (OUTPATIENT)
Dept: FAMILY MEDICINE | Facility: HOSPITAL | Age: 65
End: 2025-05-26
Payer: MEDICAID

## 2025-05-26 VITALS
HEIGHT: 61 IN | DIASTOLIC BLOOD PRESSURE: 90 MMHG | HEART RATE: 79 BPM | SYSTOLIC BLOOD PRESSURE: 147 MMHG | BODY MASS INDEX: 35.34 KG/M2 | WEIGHT: 187.19 LBS

## 2025-05-26 DIAGNOSIS — R06.02 SOB (SHORTNESS OF BREATH): Primary | ICD-10-CM

## 2025-05-26 DIAGNOSIS — E78.2 MIXED HYPERLIPIDEMIA: ICD-10-CM

## 2025-05-26 DIAGNOSIS — J06.9 UPPER RESPIRATORY TRACT INFECTION, UNSPECIFIED TYPE: ICD-10-CM

## 2025-05-26 PROCEDURE — 99213 OFFICE O/P EST LOW 20 MIN: CPT

## 2025-05-26 RX ORDER — FLUTICASONE PROPIONATE AND SALMETEROL 250; 50 UG/1; UG/1
1 POWDER RESPIRATORY (INHALATION) 2 TIMES DAILY
Qty: 180 EACH | Refills: 3 | Status: SHIPPED | OUTPATIENT
Start: 2025-05-26 | End: 2026-05-26

## 2025-05-26 RX ORDER — ATORVASTATIN CALCIUM 40 MG/1
40 TABLET, FILM COATED ORAL DAILY
Qty: 90 TABLET | Refills: 3 | Status: SHIPPED | OUTPATIENT
Start: 2025-05-26 | End: 2026-05-26

## 2025-05-26 RX ORDER — ALBUTEROL SULFATE 90 UG/1
2 INHALANT RESPIRATORY (INHALATION) EVERY 6 HOURS PRN
Qty: 18 G | Refills: 0 | Status: SHIPPED | OUTPATIENT
Start: 2025-05-26 | End: 2026-05-26

## 2025-05-26 NOTE — PROGRESS NOTES
[facility] FAMILY PRACTICE CLINIC NOTE  Follow-up Visit      SUBJECTIVE:     Patient: Mehreen Benites is a 64 y.o. female.    Chief Compliant:   Chief Complaint   Patient presents with    Annual Exam    Cough    Sinus Problem       History of Present Illness:  64 y.o. female who  has a past medical history of Hypertension, Other pulmonary embolism without acute cor pulmonale (2022), and Tendonitis. For a sick visit regarding ongoing cough with SOB.     #URI: Patient states she started feeling ill 1 week ago after attending her nephews graduation that was outdoors and was raining. He is sick. Admits to dry cough without sore throat. Mild congestion. Endorses fatigue and mild SOB on exertion. She denies any fever or chills. No runny nose, headache, watery eyes, ear pain/itching. She states she occasionally feels SOB especially at night when cough is predominant and this occasionally wakes her up. She states she has heard some wheezing on occasions. She has been using her budesonide-formoterol inhaler once daily without relief. Has not taken any OTC medications.     Review of Systems   Constitutional:  Negative for chills, fever, malaise/fatigue and weight loss.   HENT:  Positive for congestion. Negative for ear pain, hearing loss, sore throat and tinnitus.    Eyes:  Negative for blurred vision.   Respiratory:  Positive for cough (dry) and shortness of breath. Negative for sputum production and wheezing.    Cardiovascular:  Negative for chest pain and palpitations.   Gastrointestinal:  Negative for blood in stool, constipation, diarrhea and nausea.   Genitourinary:  Negative for dysuria, frequency and hematuria.   Musculoskeletal:  Negative for back pain and neck pain.   Skin:  Negative for itching and rash.   Neurological:  Negative for dizziness, tingling, weakness and headaches.     A 10+ review of systems was performed with pertinent positives and negatives noted above in the history of present illness. Other systems  "were negative unless otherwise specified.    OBJECTIVE:     Vital Signs (Most Recent)  Vitals:    05/26/25 1514   BP: (!) 147/90   Patient Position: Sitting   Pulse: 79   Weight: 84.9 kg (187 lb 2.7 oz)   Height: 5' 1" (1.549 m)     BMI: Body mass index is 35.37 kg/m².     Physical Exam  Constitutional:       General: She is not in acute distress.     Appearance: She is obese. She is not ill-appearing.   HENT:      Head: Normocephalic and atraumatic.      Right Ear: External ear normal.      Left Ear: External ear normal.      Nose: Congestion present. No rhinorrhea.      Mouth/Throat:      Mouth: Mucous membranes are moist.      Pharynx: Uvula midline. No oropharyngeal exudate or posterior oropharyngeal erythema.      Tonsils: No tonsillar exudate or tonsillar abscesses. 3+ on the right. 2+ on the left.   Eyes:      Extraocular Movements: Extraocular movements intact.      Conjunctiva/sclera: Conjunctivae normal.      Pupils: Pupils are equal, round, and reactive to light.   Cardiovascular:      Rate and Rhythm: Normal rate and regular rhythm.      Pulses: Normal pulses.      Heart sounds: Normal heart sounds.   Pulmonary:      Effort: Pulmonary effort is normal. No respiratory distress.      Breath sounds: No stridor. Decreased breath sounds present. No wheezing or rhonchi.      Comments: No clinical signs of increased work of breathing, including patient speaking in complete sentences, no accessory muscle use, or tripoding.  Abdominal:      General: Abdomen is flat. Bowel sounds are normal. There is no distension.      Palpations: Abdomen is soft.   Musculoskeletal:         General: Normal range of motion.      Cervical back: Normal range of motion and neck supple. No tenderness.      Right lower leg: No edema.      Left lower leg: No edema.   Skin:     General: Skin is warm.      Capillary Refill: Capillary refill takes less than 2 seconds.   Neurological:      General: No focal deficit present.      Mental " Status: She is alert and oriented to person, place, and time. Mental status is at baseline.      Cranial Nerves: No cranial nerve deficit.   Psychiatric:         Mood and Affect: Mood normal.         Behavior: Behavior normal.          ASSESSMENT:   Mehreen Benites is a 64 y.o. female who presents to clinic to for    1. SOB (shortness of breath)    2. Mixed hyperlipidemia    3. Upper respiratory tract infection, unspecified type         PLAN:   SOB (shortness of breath)  Upper respiratory tract infection, unspecified type  Stable. Centor criteria: 0. The patient has NO history of asthma. Triggers include smoke and cold. Patient reports intermittent SOB w activity. Does not smoke, but is exposed to household with smokers (). Most recent Echo and nuclear medicine stress test reviewed unremarkable. Followed by cardiology. Likely underlying MELANIA / COPD. Recently sent for PFTs 2/5/25 however unable to complete testing because of SOB could only exhale 1.44 seconds after numerous attempts.   Educate the patient on proper inhaler technique and the importance of carrying the inhaler at all times.  Advise the patient to avoid known triggers and to use the inhaler at the first sign of symptoms  Pending Sleep medicine evaluation 7/15/25   benzocaine-menthoL 15-3.6 mg Lozg; 1 lozenge by Mucous Membrane route 4 (four) times daily as needed (sore throat).  Dispense: 60 lozenge; Refill: 3  -     fluticasone-salmeterol diskus inhaler 250-50 mcg; Inhale 1 puff into the lungs 2 (two) times daily. Controller  Dispense: 180 each; Refill: 3  -     albuterol (VENTOLIN HFA) 90 mcg/actuation inhaler; Inhale 2 puffs into the lungs every 6 (six) hours as needed for Wheezing or Shortness of Breath. Rescue  Dispense: 18 g; Refill: 0  -     benzocaine-menthoL 15-3.6 mg Lozg; 1 lozenge by Mucous Membrane route 4 (four) times daily as needed (sore throat).  Dispense: 60 lozenge; Refill: 3  -     dextromethorphan-guaiFENesin  mg  (MUCINEX DM)  mg per 12 hr tablet; Take 1 tablet by mouth 2 (two) times daily as needed (productive cough).  Dispense: 20 tablet; Refill: 0  -     Ambulatory referral/consult to Pulmonology; Future; Expected date: 06/02/2025    Mixed hyperlipidemia  > Patient requesting refill  -     atorvastatin (LIPITOR) 40 MG tablet; Take 1 tablet (40 mg total) by mouth once daily.  Dispense: 90 tablet; Refill: 3    Provided patient with anticipatory guidance and return precautions. Treatment plan discussed with patient, all questions answered, and patient acknowledged understanding and verbal agreement.      Follow-up in: 4 months; or sooner PRN if acute concerns arise.  Annual check up.    The following information is provided to all patients.  This information is to help you find resources for any of the problems found today that may be affecting your health:                Living healthy guide: www.[facility].[place]       Understanding Diabetes: www.diabetes.org       Eating healthy: www.cdc.gov/healthyweight      [facility] home safety checklist: www.cdc.gov/steadi/patient.html      Agency on Aging: www.goea.[place]       Alcoholics anonymous (AA): www.aa.org      Physical Activity: www.allie.nih.gov/ny5hrfe       Tobacco use: www.quitwithusla.org       DISCLAIMER: This note was partially created using [employer] Voice Recognition software. Typographical and content errors may occur with this process. While efforts are made to detect and correct such errors, in some cases errors will persist. For this reason, wording in this document should be considered in the proper context and not strictly verbatim.    Case discussed with Dr. KINZA Cunningham MD  [facility] Medicine, PGY-1  05/29/2025

## 2025-06-16 ENCOUNTER — TELEPHONE (OUTPATIENT)
Dept: HEMATOLOGY/ONCOLOGY | Facility: CLINIC | Age: 65
End: 2025-06-16
Payer: MEDICAID

## 2025-06-16 ENCOUNTER — OFFICE VISIT (OUTPATIENT)
Dept: CARDIOLOGY | Facility: CLINIC | Age: 65
End: 2025-06-16
Payer: MEDICAID

## 2025-06-16 VITALS
HEART RATE: 67 BPM | OXYGEN SATURATION: 98 % | SYSTOLIC BLOOD PRESSURE: 138 MMHG | HEIGHT: 61 IN | BODY MASS INDEX: 35.21 KG/M2 | WEIGHT: 186.5 LBS | DIASTOLIC BLOOD PRESSURE: 85 MMHG

## 2025-06-16 DIAGNOSIS — E66.01 CLASS 2 SEVERE OBESITY DUE TO EXCESS CALORIES WITH SERIOUS COMORBIDITY AND BODY MASS INDEX (BMI) OF 35.0 TO 35.9 IN ADULT: ICD-10-CM

## 2025-06-16 DIAGNOSIS — R07.89 OTHER CHEST PAIN: ICD-10-CM

## 2025-06-16 DIAGNOSIS — R20.0 NUMBNESS AND TINGLING OF RIGHT LOWER EXTREMITY: ICD-10-CM

## 2025-06-16 DIAGNOSIS — I10 PRIMARY HYPERTENSION: Primary | ICD-10-CM

## 2025-06-16 DIAGNOSIS — I26.99 PULMONARY EMBOLISM, BILATERAL: ICD-10-CM

## 2025-06-16 DIAGNOSIS — Z82.49 FAMILY HISTORY OF CARDIAC ARREST: ICD-10-CM

## 2025-06-16 DIAGNOSIS — R73.03 PRE-DIABETES: ICD-10-CM

## 2025-06-16 DIAGNOSIS — R53.83 OTHER FATIGUE: ICD-10-CM

## 2025-06-16 DIAGNOSIS — E78.2 MIXED HYPERLIPIDEMIA: ICD-10-CM

## 2025-06-16 DIAGNOSIS — R60.0 BILATERAL LOWER EXTREMITY EDEMA: ICD-10-CM

## 2025-06-16 DIAGNOSIS — R20.2 NUMBNESS AND TINGLING OF RIGHT LOWER EXTREMITY: ICD-10-CM

## 2025-06-16 DIAGNOSIS — R06.02 SOBOE (SHORTNESS OF BREATH ON EXERTION): ICD-10-CM

## 2025-06-16 DIAGNOSIS — E66.812 CLASS 2 SEVERE OBESITY DUE TO EXCESS CALORIES WITH SERIOUS COMORBIDITY AND BODY MASS INDEX (BMI) OF 35.0 TO 35.9 IN ADULT: ICD-10-CM

## 2025-06-16 PROCEDURE — 99999 PR PBB SHADOW E&M-EST. PATIENT-LVL III: CPT | Mod: PBBFAC,,,

## 2025-06-16 PROCEDURE — 1160F RVW MEDS BY RX/DR IN RCRD: CPT | Mod: CPTII,,,

## 2025-06-16 PROCEDURE — G2211 COMPLEX E/M VISIT ADD ON: HCPCS | Mod: ,,,

## 2025-06-16 PROCEDURE — 99214 OFFICE O/P EST MOD 30 MIN: CPT | Mod: S$PBB,,,

## 2025-06-16 PROCEDURE — 3008F BODY MASS INDEX DOCD: CPT | Mod: CPTII,,,

## 2025-06-16 PROCEDURE — 3075F SYST BP GE 130 - 139MM HG: CPT | Mod: CPTII,,,

## 2025-06-16 PROCEDURE — 99213 OFFICE O/P EST LOW 20 MIN: CPT | Mod: PBBFAC,PN

## 2025-06-16 PROCEDURE — 3079F DIAST BP 80-89 MM HG: CPT | Mod: CPTII,,,

## 2025-06-16 PROCEDURE — 4010F ACE/ARB THERAPY RXD/TAKEN: CPT | Mod: CPTII,,,

## 2025-06-16 PROCEDURE — 1159F MED LIST DOCD IN RCRD: CPT | Mod: CPTII,,,

## 2025-06-16 NOTE — ASSESSMENT & PLAN NOTE
-Mother AMI early 50s, decreased   -NM Stress test: 8/12/24  Impression:     1. No scintigraphic evidence of exercise or pharmacological stress induced myocardial ischemic changes  2. The global left ventricular systolic function is normal with an LV ejection fraction of 77 % and no evidence of LV dilatation. Wall motion is normal.   The 10-year ASCVD risk score (Castro PATRICK, et al., 2019) is: 11.2%    Values used to calculate the score:      Age: 64 years      Sex: Female      Is Non- : Yes      Diabetic: No      Tobacco smoker: No      Systolic Blood Pressure: 138 mmHg      Is BP treated: Yes      HDL Cholesterol: 43 mg/dL      Total Cholesterol: 218 mg/dL

## 2025-06-16 NOTE — PROGRESS NOTES
Subjective:    Patient ID:  Mehreen Benites is a 64 y.o. female who presents for evaluation of No chief complaint on file.      PCP: Serafin Cunningham MD     Referring Provider: Ceci Herrera NP    HPI: Pateint is a 65 yo F w/PMH of HTN, HLD, PE on Xarelto, (unprovoked massive saddle pulmonary embolism with right heart strain and partially occlussive thrombus in the right popliteal vein (dx in 5/2022). S/p mechanical thrombectomy), obesity, CKD-3 , pre diabetes, tendonitis of her ankles s/p achilles tendon repair 8/2023, who presents today for f/u appt, HTN management. She was last seen on 3/13/2025 for f/u appt and was continued on medical therapy. Prior ischemic evaluation 2/ c/o CP. NM stress test completed and negative for ischemia. She is currently on Xarelto  without complications. She continues to note fatigue. She is followed by Hematology and was last seen on 2/5/2025 and was continued on medical therapy w Xarelto at 20mg indefinitely. Patient reports intermittent SOB w activity. Patient denies CP, palpitations, orthopnea, PND, presyncope, LOC, swelling, or claudication. Patient reports numbness and tingling RLE. Patient also reports cramping RLE which intensifies at night.  Patient randomly  monitors home BP and ranges 110-120s/80s. Patient reports medication compliance without side effects. She reports weakness and back pain with standing > 20 minutes. She also notes left knee pain.       Past Medical History:   Diagnosis Date    Hypertension     Other pulmonary embolism without acute cor pulmonale 2022    Tendonitis      Past Surgical History:   Procedure Laterality Date    BONE MARROW ASPIRATION Right 8/25/2023    Procedure: ASPIRATION, BONE MARROW;  Surgeon: Axel Kapoor Jr., DPM;  Location: [facility] OR;  Service: Podiatry;  Laterality: Right;    CATARACT EXTRACTION, BILATERAL      COLONOSCOPY N/A 04/27/2023    Procedure: COLONOSCOPY;  Surgeon: Zachary Torres MD;  Location: [facility] ENDO;  Service:  Endoscopy;  Laterality: N/A;    HEEL SPUR SURGERY Left 2013    HYSTERECTOMY  10/2009    WHITNEY    OOPHORECTOMY  10/2009    Bilateral    REPAIR OF ACHILLES TENDON Right 2023    Procedure: REPAIR, TENDON, ACHILLES;  Surgeon: Axel Kapoor Jr., DPM;  Location: [facility] OR;  Service: Podiatry;  Laterality: Right;  pop saph    RESECTION OF GASTROCNEMIUS MUSCLE Right 2023    Procedure: RESECTION, MUSCLE, GASTROCNEMIUS;  Surgeon: Axel Kapoor Jr., DPM;  Location: [facility] OR;  Service: Podiatry;  Laterality: Right;  pop saph    SURGICAL REMOVAL OF RETROCALCANEAL EXOSTOSIS Right 2023    Procedure: EXCISION, EXOSTOSIS, RETROCALCANEAL;  Surgeon: Axel Kapoor Jr., DPM;  Location: [facility] OR;  Service: Podiatry;  Laterality: Right;    THROMBECTOMY N/A 2022    Procedure: THROMBECTOMY;  Surgeon: Kendall Fiore MD;  Location: [facility] CATH LAB/EP;  Service: Cardiology;  Laterality: N/A;     Social History     Socioeconomic History    Marital status: Legally    Tobacco Use    Smoking status: Never     Passive exposure: Never    Smokeless tobacco: Never   Substance and Sexual Activity    Alcohol use: Yes     Comment: occasionally    Drug use: No    Sexual activity: Not Currently     Partners: Male     Birth control/protection: Post-menopausal, See Surgical Hx     Comment:      Family History   Problem Relation Name Age of Onset    Hypertension Mother          AMI,  age 50s    Lung cancer Father      Breast cancer Maternal Aunt      Breast cancer Paternal Aunt      Breast cancer Maternal Cousin  51    Colon cancer Neg Hx      Ovarian cancer Neg Hx         Review of patient's allergies indicates:  No Known Allergies    Medication List with Changes/Refills   Current Medications    ALBUTEROL (VENTOLIN HFA) 90 MCG/ACTUATION INHALER    Inhale 2 puffs into the lungs every 6 (six) hours as needed for Wheezing or Shortness of Breath. Rescue    ASCORBIC ACID, VITAMIN C, (VITAMIN C) 500 MG  "TABLET    Take 500 mg by mouth once daily.    ATORVASTATIN (LIPITOR) 40 MG TABLET    Take 1 tablet (40 mg total) by mouth once daily.    BENZOCAINE-MENTHOL 15-3.6 MG LOZG    1 lozenge by Mucous Membrane route 4 (four) times daily as needed (sore throat).    FERROUS SULFATE (FEOSOL) 325 MG (65 MG IRON) TAB TABLET    Take 1 tablet (325 mg total) by mouth every other day.    FLUTICASONE-SALMETEROL DISKUS INHALER 250-50 MCG    Inhale 1 puff into the lungs 2 (two) times daily. Controller    HYDROCHLOROTHIAZIDE (HYDRODIURIL) 25 MG TABLET    Take 1 tablet (25 mg total) by mouth once daily.    RIVAROXABAN (XARELTO) 20 MG TAB    Take 1 tablet (20 mg total) by mouth before dinner.    SERTRALINE (ZOLOFT) 100 MG TABLET    Take 1.5 tablets (150 mg total) by mouth once daily.    VALSARTAN (DIOVAN) 160 MG TABLET    Take 1 tablet (160 mg total) by mouth once daily.       Review of Systems   Constitutional: Negative for diaphoresis and fever.   HENT:  Negative for congestion and hearing loss.    Eyes:  Negative for blurred vision and pain.   Cardiovascular:  Positive for dyspnea on exertion. Negative for claudication, leg swelling, near-syncope and syncope.   Respiratory:  Positive for shortness of breath. Negative for sleep disturbances due to breathing.    Hematologic/Lymphatic: Negative for bleeding problem. Does not bruise/bleed easily.   Skin:  Negative for color change and poor wound healing.   Musculoskeletal:  Positive for back pain and joint pain.   Gastrointestinal:  Negative for abdominal pain and nausea.   Genitourinary:  Negative for bladder incontinence and flank pain.   Neurological:  Positive for numbness and paresthesias. Negative for focal weakness and light-headedness.        Right lower extremity         Objective:   /85 (BP Location: Left arm, Patient Position: Sitting)   Pulse 67   Ht 5' 1" (1.549 m)   Wt 84.6 kg (186 lb 8.2 oz)   LMP  (LMP Unknown)   SpO2 98%   BMI 35.24 kg/m²    Physical " Exam  Constitutional:       Appearance: She is well-developed. She is not diaphoretic.   HENT:      Head: Normocephalic and atraumatic.   Eyes:      General: No scleral icterus.     Pupils: Pupils are equal, round, and reactive to light.   Neck:      Vascular: No JVD.   Cardiovascular:      Rate and Rhythm: Normal rate and regular rhythm.      Pulses: Intact distal pulses.           Radial pulses are 2+ on the right side and 2+ on the left side.        Dorsalis pedis pulses are 2+ on the right side and 2+ on the left side.        Posterior tibial pulses are 2+ on the right side and 2+ on the left side.      Heart sounds: S1 normal and S2 normal. No murmur heard.     No friction rub. No gallop.   Pulmonary:      Effort: Pulmonary effort is normal. No respiratory distress.      Breath sounds: Normal breath sounds. No wheezing or rales.   Chest:      Chest wall: No tenderness.   Abdominal:      General: Bowel sounds are normal. There is no distension.      Palpations: Abdomen is soft. There is no mass.      Tenderness: There is no abdominal tenderness. There is no rebound.   Musculoskeletal:         General: No tenderness. Normal range of motion.      Cervical back: Normal range of motion and neck supple.      Right lower le+ Edema present.      Left lower le+ Edema present.   Skin:     General: Skin is warm and dry.      Coloration: Skin is not pale.   Neurological:      Mental Status: She is alert and oriented to person, place, and time.      Coordination: Coordination normal.      Deep Tendon Reflexes: Reflexes normal.   Psychiatric:         Behavior: Behavior normal.         Judgment: Judgment normal.           Cardiac echo 10/10/24  Summary    Left Ventricle: The left ventricle is normal in size. There is concentric remodeling. Normal wall motion. There is normal systolic function with a visually estimated ejection fraction of 60 - 65%. Quantitated ejection fraction is 67%. There is normal diastolic  function.    Right Ventricle: Normal right ventricular cavity size. Systolic function is normal.    Right Atrium: Normal right atrial size.    Aortic Valve: The aortic valve is a trileaflet valve. Aortic valve peak velocity is 1.2 m/s. Mean gradient is 3.3 mmHg. There is no significant regurgitation.    Mitral Valve: The mitral valve is structurally normal. There is trace regurgitation.    Tricuspid Valve: There is trace regurgitation.    Aorta: Aortic root is normal in size measuring 2.71 cm.    Pulmonary Artery: The estimated pulmonary artery systolic pressure is 22 mmHg.    IVC/SVC: Normal venous pressure at 3 mmHg.    Pericardium: There is no pericardial effusion.    CV Bilateral Venous US 12/10/2024    Interpretation Summary    There is no evidence of a right lower extremity DVT.    There is no evidence of a left lower extremity DVT.    No significant reflux in superficial or deep veins bilateral lower extremity    NM stress test 8/12/24  Interpretation Summary    The ECG portion of the study is negative for ischemia.    The patient reported no chest pain during the stress test.    The nuclear portion of this study will be reported separately.  Impression:     1. No scintigraphic evidence of exercise or pharmacological stress induced myocardial ischemic changes  2. The global left ventricular systolic function is normal with an LV ejection fraction of 77 % and no evidence of LV dilatation. Wall motion is normal.     IMAGING     US BLE 10/23/23  Impression:     No evidence of deep venous thrombosis in either lower extremity.        US Lower Extremity Veins Bilat (2/13/23)  Impression:     No evidence of deep venous thrombosis in either lower extremity.        US Lower Extremity Veins Right 9/27/22  Impression:  No evidence of deep venous thrombosis in the right lower extremity.   Interval resolution of previously identified thrombus within the right popliteal vein.     CTA Chest Non-Coronary(PE Studies)  9/27/22  Impression:  1. Interval improvement of prior bilateral pulmonary arterial thromboemboli.  No residual central pulmonary thrombus identified.  Few small filling defects within segmental arteries potentially representing mild residual thrombus.  2. Mosaic attenuation of the bilateral pulmonary parenchyma which may represent increased pulmonary vascular resistance or small airways disease.  3. Mild cardiomegaly.  4. Additional findings as above.    Cardiac echo 10/17/2022  Summary  The left ventricle is normal in size with concentric remodeling and normal systolic function.  The estimated ejection fraction is 65%.  Normal left ventricular diastolic function.  The estimated PA systolic pressure is 23 mmHg.  Normal right ventricular size with normal right ventricular systolic function.  Normal central venous pressure (3 mmHg).  Mild mitral regurgitation.    Assessment:       1. Primary hypertension    2. Mixed hyperlipidemia    3. Other chest pain    4. SOBOE (shortness of breath on exertion)    5. Family history of cardiac arrest    6. Pulmonary embolism, bilateral    7. Numbness and tingling of right lower extremity    8. Class 2 severe obesity due to excess calories with serious comorbidity and body mass index (BMI) of 35.0 to 35.9 in adult    9. Pre-diabetes    10. Bilateral lower extremity edema    11. Other fatigue         Plan:         Hypertension  -Goal BP < 130/80  -controlled   -continue medical therapy- HCTZ 25 mg, valsartan 160 mg   -discussed lifestyle modifications, low salt diet avoid processed meats, pickled meats, increase physical activity   -discussed risk associated with elevated blood pressure   -check BP twice daily, maintain log and bring to all appts     Mixed hyperlipidemia  -.0 9/19/24  -controlled   -continue statin therapy- pravastatin 20 mg     Other chest pain  NM stress test 8/12/24  Interpretation Summary    The ECG portion of the study is negative for ischemia.    The  patient reported no chest pain during the stress test.    The nuclear portion of this study will be reported separately.  Impression:  1. No scintigraphic evidence of exercise or pharmacological stress induced myocardial ischemic changes  2. The global left ventricular systolic function is normal with an LV ejection fraction of 77 % and no evidence of LV dilatation. Wall motion is normal.     -CP free     SOBOE (shortness of breath on exertion)  Cardiac echo 10/10/24  Summary    Left Ventricle: The left ventricle is normal in size. There is concentric remodeling. Normal wall motion. There is normal systolic function with a visually estimated ejection fraction of 60 - 65%. Quantitated ejection fraction is 67%. There is normal diastolic function.    Right Ventricle: Normal right ventricular cavity size. Systolic function is normal.    Right Atrium: Normal right atrial size.    Aortic Valve: The aortic valve is a trileaflet valve. Aortic valve peak velocity is 1.2 m/s. Mean gradient is 3.3 mmHg. There is no significant regurgitation.    Mitral Valve: The mitral valve is structurally normal. There is trace regurgitation.    Tricuspid Valve: There is trace regurgitation.    Aorta: Aortic root is normal in size measuring 2.71 cm.    Pulmonary Artery: The estimated pulmonary artery systolic pressure is 22 mmHg.    IVC/SVC: Normal venous pressure at 3 mmHg.    Pericardium: There is no pericardial effusion.    -Followed by Pulmonary  -continue Symbicort INH     Family history of cardiac arrest  -Mother AMI early 50s, decreased   -NM Stress test: 8/12/24  Impression:     1. No scintigraphic evidence of exercise or pharmacological stress induced myocardial ischemic changes  2. The global left ventricular systolic function is normal with an LV ejection fraction of 77 % and no evidence of LV dilatation. Wall motion is normal.   The 10-year ASCVD risk score ([place] DK, et al., 2019) is: 11.2%    Values used to calculate the  score:      Age: 64 years      Sex: Female      Is Non- : Yes      Diabetic: No      Tobacco smoker: No      Systolic Blood Pressure: 138 mmHg      Is BP treated: Yes      HDL Cholesterol: 43 mg/dL      Total Cholesterol: 218 mg/dL      Pulmonary embolism, bilateral  -PE 5/31/2022- post thrombectomy  -continue Xeralto 20 mg - indefinitely     Class 2 severe obesity with serious comorbidity and body mass index (BMI) of 35.0 to 35.9 in adult  Body mass index is 35.24 kg/m². Morbid obesity complicates all aspects of disease management from diagnostic modalities to treatment. Weight loss encouraged and health benefits explained to patient.       Pre-diabetes  -A1c 5.8 9/19/24  -discussed lifestyle modifications     Bilateral lower extremity edema  -compression stockings  -elevate legs when sitting   CV Bilateral Venous US 12/10/2024  Interpretation Summary    There is no evidence of a right lower extremity DVT.    There is no evidence of a left lower extremity DVT.    No significant reflux in superficial or deep veins bilateral lower extremity    Other fatigue  Cardiac echo 10/10/24  Summary    Left Ventricle: The left ventricle is normal in size. There is concentric remodeling. Normal wall motion. There is normal systolic function with a visually estimated ejection fraction of 60 - 65%. Quantitated ejection fraction is 67%. There is normal diastolic function.    Right Ventricle: Normal right ventricular cavity size. Systolic function is normal.    Right Atrium: Normal right atrial size.    Aortic Valve: The aortic valve is a trileaflet valve. Aortic valve peak velocity is 1.2 m/s. Mean gradient is 3.3 mmHg. There is no significant regurgitation.    Mitral Valve: The mitral valve is structurally normal. There is trace regurgitation.    Tricuspid Valve: There is trace regurgitation.    Aorta: Aortic root is normal in size measuring 2.71 cm.    Pulmonary Artery: The estimated pulmonary artery  systolic pressure is 22 mmHg.    IVC/SVC: Normal venous pressure at 3 mmHg.    Pericardium: There is no pericardial effusion.    Numbness and tingling of right lower extremity  -BLE arterial US to evaluate for stenosis         Total duration of face to face visit time 30 minutes.  Total time spent counseling greater than fifty percent of total visit time.  Counseling included discussion regarding imaging findings, diagnosis, possibilities, treatment options, risks and benefits.  The patient had many questions regarding the options and long-term effects      Jose Angel Thomas,WILMER  Cardiology

## 2025-06-16 NOTE — ASSESSMENT & PLAN NOTE
Body mass index is 35.24 kg/m². Morbid obesity complicates all aspects of disease management from diagnostic modalities to treatment. Weight loss encouraged and health benefits explained to patient.

## 2025-07-15 ENCOUNTER — OFFICE VISIT (OUTPATIENT)
Dept: SLEEP MEDICINE | Facility: CLINIC | Age: 65
End: 2025-07-15
Payer: MEDICAID

## 2025-07-15 VITALS
BODY MASS INDEX: 34.55 KG/M2 | WEIGHT: 183 LBS | HEART RATE: 79 BPM | SYSTOLIC BLOOD PRESSURE: 118 MMHG | DIASTOLIC BLOOD PRESSURE: 87 MMHG | HEIGHT: 61 IN

## 2025-07-15 DIAGNOSIS — R06.81 WITNESSED EPISODE OF APNEA: Primary | ICD-10-CM

## 2025-07-15 DIAGNOSIS — R35.1 NOCTURIA: ICD-10-CM

## 2025-07-15 DIAGNOSIS — F51.09 OTHER INSOMNIA NOT DUE TO A SUBSTANCE OR KNOWN PHYSIOLOGICAL CONDITION: ICD-10-CM

## 2025-07-15 DIAGNOSIS — R06.83 SNORING: ICD-10-CM

## 2025-07-15 DIAGNOSIS — G47.10 HYPERSOMNOLENCE: ICD-10-CM

## 2025-07-15 PROCEDURE — 99999 PR PBB SHADOW E&M-EST. PATIENT-LVL III: CPT | Mod: PBBFAC,,, | Performed by: PHYSICIAN ASSISTANT

## 2025-07-15 PROCEDURE — 4010F ACE/ARB THERAPY RXD/TAKEN: CPT | Mod: CPTII,,, | Performed by: PHYSICIAN ASSISTANT

## 2025-07-15 PROCEDURE — 3008F BODY MASS INDEX DOCD: CPT | Mod: CPTII,,, | Performed by: PHYSICIAN ASSISTANT

## 2025-07-15 PROCEDURE — 99204 OFFICE O/P NEW MOD 45 MIN: CPT | Mod: S$PBB,,, | Performed by: PHYSICIAN ASSISTANT

## 2025-07-15 PROCEDURE — 1160F RVW MEDS BY RX/DR IN RCRD: CPT | Mod: CPTII,,, | Performed by: PHYSICIAN ASSISTANT

## 2025-07-15 PROCEDURE — 3079F DIAST BP 80-89 MM HG: CPT | Mod: CPTII,,, | Performed by: PHYSICIAN ASSISTANT

## 2025-07-15 PROCEDURE — 99213 OFFICE O/P EST LOW 20 MIN: CPT | Mod: PBBFAC | Performed by: PHYSICIAN ASSISTANT

## 2025-07-15 PROCEDURE — 1159F MED LIST DOCD IN RCRD: CPT | Mod: CPTII,,, | Performed by: PHYSICIAN ASSISTANT

## 2025-07-15 PROCEDURE — 3074F SYST BP LT 130 MM HG: CPT | Mod: CPTII,,, | Performed by: PHYSICIAN ASSISTANT

## 2025-07-15 NOTE — PROGRESS NOTES
"Referred by Serafin Cunningham MD     NEW PATIENT VISIT    Mehreen Benites  is a pleasant 64 y.o. female  with PMH significant for HTN, HLD, hx PE, hx DVT, preDM, JUAN MANUEL, BMI 34+  who presents for sleep evaluation following referral from PCP      C/o snoring, witnessed apneas, poor disrupted and un-refreshing sleep, difficulties with sleep onset and maintenance, and excessive daytime sleepiness and fatigue. Denies drowsiness when driving. Denies sleep walking, does endorse some talking during sleep. Does endorse occasional twitching/jerking during sleep. Denies kicking or falls from bed. Does endorse occasional cramping and tingling in lower legs at night. States PCP recommended evaluation for MELANIA, which is why she presents today.    SLEEP SCHEDULE   Environment    Bed Time 10PM   Sleep Latency 2-3hrs   Arousals 3-4   Nocturia 1-2   Back to sleep 1.5hrs   Wake time 9AM   Naps None    Work        Past Medical History:   Diagnosis Date    Hypertension     Other pulmonary embolism without acute cor pulmonale 2022    Tendonitis      Problem List[1]  Current Medications[2]       Vitals:    07/15/25 0950   BP: 118/87   Pulse: 79   Weight: 83 kg (182 lb 15.7 oz)   Height: 5' 1" (1.549 m)     Physical Exam:    GEN:   Well-appearing  Psych:  Appropriate affect, demonstrates insight  SKIN:  No rash on the face or bridge of the nose      LABS:   Lab Results   Component Value Date    HGB 13.9 02/04/2025    CO2 29 02/04/2025         RECORDS REVIEWED:    No previous sleep study    12/10/24 Echo:    Left Ventricle: The left ventricle is normal in size. There is concentric remodeling. Normal wall motion. There is normal systolic function with a visually estimated ejection fraction of 60 - 65%. Quantitated ejection fraction is 67%. There is normal diastolic function.    Right Ventricle: Normal right ventricular cavity size. Systolic function is normal.    Right Atrium: Normal right atrial size.    Aortic Valve: The aortic valve " is a trileaflet valve. Aortic valve peak velocity is 1.2 m/s. Mean gradient is 3.3 mmHg. There is no significant regurgitation.    Mitral Valve: The mitral valve is structurally normal. There is trace regurgitation.    Tricuspid Valve: There is trace regurgitation.    Aorta: Aortic root is normal in size measuring 2.71 cm.    Pulmonary Artery: The estimated pulmonary artery systolic pressure is 22 mmHg.    IVC/SVC: Normal venous pressure at 3 mmHg.    Pericardium: There is no pericardial effusion.    ASSESSMENT    [place] Sleepiness Scale:  Sitting and reading:   3  Watching TV:    3  Passenger in a car x 1 hr:  0  Sitting quietly after lunch:  2  Lying down to rest in PM:  0  Sitting, inactive in public:  1  Sitting+ talking to someone:  1  Stopped in traffic:   0  Total    10/24    PROBLEM DESCRIPTION/ Sx on Presentation  STATUS   Sx MELANIA   + snoring, + witnessed apneas  + wakes feeling un-refreshed   + wakes with headaches  Denies known family hx of MELANIA  New   Daytime Sx   + sleepiness when inactive   ESS 10/24 on intake  New   Insomnia   Trouble falling asleep: 2-3hrs  Arousals:         3-4  Hard to get back to sleep?: 1.5hrs    Prior pertinent medications:  Current pertinent medications:   New   Nocturia   x 1-2 per sleep period  New   RLS Does endorse occasional cramping and tingling in lower legs at night, couple times a week  Ferritin 8/23/24 127  New     Other issues:       PLAN      -recommend sleep testing  -HST ordered  -discussed trial therapy if MELANIA present and the patient is open to a trial of CPAP therapy  -discussed MELANIA and PAP with patient in detail, including possible complications of untreated MELANIA like heart attack/stroke  -advised on strict driving precautions; advised never to drive drowsy     Advised on plan of care. Answered all patient questions. Patient verbalized understanding and voiced agreement with plan of care.     RTC if dx of MELANIA made and CPAP ordered, will need follow up 31-90 days  after receiving machine for compliance       The patient was given open opportunity to ask questions and/or express concerns about treatment plan. All questions/concerns were discussed.     Two patient identifiers used prior to evaluation.                  [1]   Patient Active Problem List  Diagnosis    Hypertension    Saddle embolus of pulmonary artery    Pulmonary embolism, bilateral    Acute deep vein thrombosis (DVT) of right popliteal vein    Pre-diabetes    Colon polyps    History of colon polyps    Acquired posterior equinus of right lower extremity    Bone spur of posterior portion of calcaneus    Tendonitis, Achilles, right    Achilles tendonosis of right lower extremity    Retrocalcaneal bursitis (back of heel), right    Decreased range of motion of right ankle    Abnormality of gait and mobility    JUAN MANUEL (generalized anxiety disorder)    Class 2 severe obesity with serious comorbidity and body mass index (BMI) of 35.0 to 35.9 in adult    Mixed hyperlipidemia    Family history of cardiac arrest    Bilateral lower extremity edema    Other chest pain    SOBOE (shortness of breath on exertion)    Other fatigue    Numbness and tingling of right lower extremity   [2]   Current Outpatient Medications:     albuterol (VENTOLIN HFA) 90 mcg/actuation inhaler, Inhale 2 puffs into the lungs every 6 (six) hours as needed for Wheezing or Shortness of Breath. Rescue, Disp: 18 g, Rfl: 0    ascorbic acid, vitamin C, (VITAMIN C) 500 MG tablet, Take 500 mg by mouth once daily., Disp: , Rfl:     atorvastatin (LIPITOR) 40 MG tablet, Take 1 tablet (40 mg total) by mouth once daily., Disp: 90 tablet, Rfl: 3    benzocaine-menthoL 15-3.6 mg Lozg, 1 lozenge by Mucous Membrane route 4 (four) times daily as needed (sore throat)., Disp: 60 lozenge, Rfl: 3    ferrous sulfate (FEOSOL) 325 mg (65 mg iron) Tab tablet, Take 1 tablet (325 mg total) by mouth every other day., Disp: 15 tablet, Rfl: 11    fluticasone-salmeterol diskus inhaler  250-50 mcg, Inhale 1 puff into the lungs 2 (two) times daily. Controller, Disp: 180 each, Rfl: 3    hydroCHLOROthiazide (HYDRODIURIL) 25 MG tablet, Take 1 tablet (25 mg total) by mouth once daily., Disp: 90 tablet, Rfl: 3    rivaroxaban (XARELTO) 20 mg Tab, Take 1 tablet (20 mg total) by mouth before dinner., Disp: 90 tablet, Rfl: 3    sertraline (ZOLOFT) 100 MG tablet, Take 1.5 tablets (150 mg total) by mouth once daily., Disp: 135 tablet, Rfl: 0    valsartan (DIOVAN) 160 MG tablet, Take 1 tablet (160 mg total) by mouth once daily., Disp: 90 tablet, Rfl: 3

## 2025-07-24 PROBLEM — F43.23 ADJUSTMENT DISORDER WITH MIXED ANXIETY AND DEPRESSED MOOD: Status: ACTIVE | Noted: 2025-07-24

## 2025-07-24 PROBLEM — F43.20 GRIEF REACTION: Status: ACTIVE | Noted: 2025-07-24

## 2025-08-04 ENCOUNTER — LAB VISIT (OUTPATIENT)
Dept: LAB | Facility: HOSPITAL | Age: 65
End: 2025-08-04
Attending: PHYSICIAN ASSISTANT
Payer: MEDICAID

## 2025-08-04 DIAGNOSIS — D50.9 IRON DEFICIENCY ANEMIA, UNSPECIFIED IRON DEFICIENCY ANEMIA TYPE: ICD-10-CM

## 2025-08-04 DIAGNOSIS — I26.99 PULMONARY EMBOLISM, BILATERAL: ICD-10-CM

## 2025-08-04 LAB
ABSOLUTE EOSINOPHIL (OHS): 0.08 K/UL
ABSOLUTE MONOCYTE (OHS): 0.63 K/UL (ref 0.3–1)
ABSOLUTE NEUTROPHIL COUNT (OHS): 4.2 K/UL (ref 1.8–7.7)
ALBUMIN SERPL BCP-MCNC: 4.5 G/DL (ref 3.5–5.2)
ALP SERPL-CCNC: 97 UNIT/L (ref 38–126)
ALT SERPL W/O P-5'-P-CCNC: 38 UNIT/L (ref 10–44)
ANION GAP (OHS): 7 MMOL/L (ref 8–16)
AST SERPL-CCNC: 28 UNIT/L (ref 15–46)
BASOPHILS # BLD AUTO: 0.05 K/UL
BASOPHILS NFR BLD AUTO: 0.6 %
BILIRUB SERPL-MCNC: 0.9 MG/DL (ref 0.1–1)
BUN SERPL-MCNC: 11 MG/DL (ref 7–17)
CALCIUM SERPL-MCNC: 9.3 MG/DL (ref 8.7–10.5)
CHLORIDE SERPL-SCNC: 97 MMOL/L (ref 95–110)
CO2 SERPL-SCNC: 32 MMOL/L (ref 23–29)
CREAT SERPL-MCNC: 1.3 MG/DL (ref 0.5–1.4)
D DIMER PPP IA.FEU-MCNC: 0.3 MG/L FEU
ERYTHROCYTE [DISTWIDTH] IN BLOOD BY AUTOMATED COUNT: 11.9 % (ref 11.5–14.5)
FERRITIN SERPL-MCNC: 305.7 NG/ML (ref 20–300)
GFR SERPLBLD CREATININE-BSD FMLA CKD-EPI: 47 ML/MIN/1.73/M2
GLUCOSE SERPL-MCNC: 109 MG/DL (ref 70–110)
HCT VFR BLD AUTO: 43.7 % (ref 37–48.5)
HGB BLD-MCNC: 14.5 GM/DL (ref 12–16)
IMM GRANULOCYTES # BLD AUTO: 0.03 K/UL (ref 0–0.04)
IMM GRANULOCYTES NFR BLD AUTO: 0.4 % (ref 0–0.5)
IRON SATN MFR SERPL: 16 % (ref 20–50)
IRON SERPL-MCNC: 56 UG/DL (ref 30–160)
LYMPHOCYTES # BLD AUTO: 2.92 K/UL (ref 1–4.8)
MCH RBC QN AUTO: 29.6 PG (ref 27–31)
MCHC RBC AUTO-ENTMCNC: 33.2 G/DL (ref 32–36)
MCV RBC AUTO: 89 FL (ref 82–98)
NUCLEATED RBC (/100WBC) (OHS): 0 /100 WBC
PLATELET # BLD AUTO: 231 K/UL (ref 150–450)
PMV BLD AUTO: 11.2 FL (ref 9.2–12.9)
POTASSIUM SERPL-SCNC: 4.8 MMOL/L (ref 3.5–5.1)
PROT SERPL-MCNC: 8.9 GM/DL (ref 6–8.4)
RBC # BLD AUTO: 4.9 M/UL (ref 4–5.4)
RELATIVE EOSINOPHIL (OHS): 1 %
RELATIVE LYMPHOCYTE (OHS): 36.9 % (ref 18–48)
RELATIVE MONOCYTE (OHS): 8 % (ref 4–15)
RELATIVE NEUTROPHIL (OHS): 53.1 % (ref 38–73)
SODIUM SERPL-SCNC: 136 MMOL/L (ref 136–145)
TIBC SERPL-MCNC: 355 UG/DL (ref 250–450)
TRANSFERRIN SERPL-MCNC: 240 MG/DL (ref 200–375)
WBC # BLD AUTO: 7.91 K/UL (ref 3.9–12.7)

## 2025-08-04 PROCEDURE — 82728 ASSAY OF FERRITIN: CPT | Mod: PN

## 2025-08-04 PROCEDURE — 82310 ASSAY OF CALCIUM: CPT | Mod: PN

## 2025-08-04 PROCEDURE — 36415 COLL VENOUS BLD VENIPUNCTURE: CPT | Mod: PN

## 2025-08-04 PROCEDURE — 85379 FIBRIN DEGRADATION QUANT: CPT | Mod: PN

## 2025-08-04 PROCEDURE — 85025 COMPLETE CBC W/AUTO DIFF WBC: CPT | Mod: PN

## 2025-08-04 PROCEDURE — 83540 ASSAY OF IRON: CPT | Mod: PN

## 2025-08-05 ENCOUNTER — HOSPITAL ENCOUNTER (OUTPATIENT)
Dept: CARDIOLOGY | Facility: HOSPITAL | Age: 65
Discharge: HOME | End: 2025-08-05
Payer: MEDICAID

## 2025-08-05 DIAGNOSIS — R20.2 NUMBNESS AND TINGLING OF RIGHT LOWER EXTREMITY: ICD-10-CM

## 2025-08-05 DIAGNOSIS — R20.0 NUMBNESS AND TINGLING OF RIGHT LOWER EXTREMITY: ICD-10-CM

## 2025-08-05 PROCEDURE — 93925 LOWER EXTREMITY STUDY: CPT | Mod: 26,,, | Performed by: INTERNAL MEDICINE

## 2025-08-05 PROCEDURE — 93925 LOWER EXTREMITY STUDY: CPT

## 2025-08-06 ENCOUNTER — HOSPITAL ENCOUNTER (OUTPATIENT)
Dept: SLEEP MEDICINE | Facility: HOSPITAL | Age: 65
Discharge: HOME | End: 2025-08-06
Attending: PSYCHIATRY & NEUROLOGY
Payer: MEDICAID

## 2025-08-06 DIAGNOSIS — R06.83 SNORING: ICD-10-CM

## 2025-08-06 DIAGNOSIS — F51.09 OTHER INSOMNIA NOT DUE TO A SUBSTANCE OR KNOWN PHYSIOLOGICAL CONDITION: ICD-10-CM

## 2025-08-06 DIAGNOSIS — R35.1 NOCTURIA: ICD-10-CM

## 2025-08-06 DIAGNOSIS — R06.81 WITNESSED EPISODE OF APNEA: ICD-10-CM

## 2025-08-06 DIAGNOSIS — G47.10 HYPERSOMNOLENCE: ICD-10-CM

## 2025-08-06 PROCEDURE — 95800 SLP STDY UNATTENDED: CPT

## 2025-08-06 NOTE — PROGRESS NOTES
Per physician orders, patient was given home sleep testing device and instructed on how to apply the device before going to bed tonight. I sized the device and showed the patient using a mirror how the device fits and what it should look like so they can use a mirror when putting it on themselves at home. We reviewed the instruction booklet. Patient verbalized understanding of the instructions and teach back complete. Patient was instructed to return the device the next day between the hours of 5:00am and 9:00am in the drop box that is located to the left and you walk into the main lobby of the hospital. I also educated the patient on sleep apnea, treatments options for sleep apnea, and what to expect after the home sleep study is complete.       Assessment: Neck Size: 14 Inches        Mallampati Score: 1          [place] Sleepiness Scale Score: 6    Home Sleep Testing Device: [employer] YOSELIN 620 - S/N: [id number]

## 2025-08-08 PROBLEM — R06.81 WITNESSED EPISODE OF APNEA: Status: ACTIVE | Noted: 2025-08-08

## 2025-08-08 LAB
LEFT ANT TIBIAL SYS PSV: 82 CM/S
LEFT CFA PSV: 175 CM/S
LEFT DORSALIS PEDIS PSV: 64 CM/S
LEFT PERONEAL SYS PSV: 26 CM/S
LEFT POPLITEAL PSV: 68 CM/S
LEFT POST TIBIAL SYS PSV: 99 CM/S
LEFT PROFUNDA SYS PSV: 79 CM/S
LEFT SUPER FEMORAL DIST SYS PSV: 85 CM/S
LEFT SUPER FEMORAL MID SYS PSV: 74 CM/S
LEFT SUPER FEMORAL PROX SYS PSV: 160 CM/S
LEFT TIB/PER TRUNK SYS PSV: 79 CM/S
RIGHT ANT TIBIAL SYS PSV: 74 CM/S
RIGHT CFA PSV: 103 CM/S
RIGHT DORSALIS PEDIS PSV: 91 CM/S
RIGHT PERONEAL SYS PSV: 45 CM/S
RIGHT POPLITEAL PSV: 71 CM/S
RIGHT POST TIBIAL SYS PSV: 82 CM/S
RIGHT PROFUNDA SYS PSV: 67 CM/S
RIGHT SUPER FEMORAL DIST SYS PSV: 76 CM/S
RIGHT SUPER FEMORAL MID SYS PSV: 87 CM/S
RIGHT SUPER FEMORAL PROX SYS PSV: 76 CM/S
RIGHT TIB/PER TRUNK SYS PSV: 80 CM/S

## 2025-08-11 ENCOUNTER — PATIENT MESSAGE (OUTPATIENT)
Dept: SLEEP MEDICINE | Facility: CLINIC | Age: 65
End: 2025-08-11
Payer: MEDICAID

## 2025-08-12 ENCOUNTER — TELEPHONE (OUTPATIENT)
Dept: CARDIOLOGY | Facility: CLINIC | Age: 65
End: 2025-08-12
Payer: MEDICAID

## 2025-08-18 ENCOUNTER — OFFICE VISIT (OUTPATIENT)
Dept: HEMATOLOGY/ONCOLOGY | Facility: CLINIC | Age: 65
End: 2025-08-18
Payer: MEDICAID

## 2025-08-18 VITALS
OXYGEN SATURATION: 98 % | RESPIRATION RATE: 18 BRPM | DIASTOLIC BLOOD PRESSURE: 87 MMHG | HEART RATE: 67 BPM | SYSTOLIC BLOOD PRESSURE: 131 MMHG | HEIGHT: 61 IN | TEMPERATURE: 98 F | WEIGHT: 182.56 LBS | BODY MASS INDEX: 34.47 KG/M2

## 2025-08-18 DIAGNOSIS — Z86.718 HISTORY OF DVT (DEEP VEIN THROMBOSIS): ICD-10-CM

## 2025-08-18 DIAGNOSIS — Z86.39 HISTORY OF IRON DEFICIENCY: ICD-10-CM

## 2025-08-18 DIAGNOSIS — Z86.711 HISTORY OF PULMONARY EMBOLISM: Primary | ICD-10-CM

## 2025-08-18 PROCEDURE — 99999 PR PBB SHADOW E&M-EST. PATIENT-LVL III: CPT | Mod: PBBFAC,,, | Performed by: INTERNAL MEDICINE

## 2025-08-18 PROCEDURE — 3075F SYST BP GE 130 - 139MM HG: CPT | Mod: CPTII,,, | Performed by: INTERNAL MEDICINE

## 2025-08-18 PROCEDURE — 1111F DSCHRG MED/CURRENT MED MERGE: CPT | Mod: CPTII,,, | Performed by: INTERNAL MEDICINE

## 2025-08-18 PROCEDURE — 99214 OFFICE O/P EST MOD 30 MIN: CPT | Mod: S$PBB,,, | Performed by: INTERNAL MEDICINE

## 2025-08-18 PROCEDURE — 3079F DIAST BP 80-89 MM HG: CPT | Mod: CPTII,,, | Performed by: INTERNAL MEDICINE

## 2025-08-18 PROCEDURE — 3008F BODY MASS INDEX DOCD: CPT | Mod: CPTII,,, | Performed by: INTERNAL MEDICINE

## 2025-08-18 PROCEDURE — 1160F RVW MEDS BY RX/DR IN RCRD: CPT | Mod: CPTII,,, | Performed by: INTERNAL MEDICINE

## 2025-08-18 PROCEDURE — 1159F MED LIST DOCD IN RCRD: CPT | Mod: CPTII,,, | Performed by: INTERNAL MEDICINE

## 2025-08-18 PROCEDURE — 99213 OFFICE O/P EST LOW 20 MIN: CPT | Mod: PBBFAC,PN | Performed by: INTERNAL MEDICINE

## 2025-08-18 PROCEDURE — 4010F ACE/ARB THERAPY RXD/TAKEN: CPT | Mod: CPTII,,, | Performed by: INTERNAL MEDICINE

## 2025-08-19 ENCOUNTER — TELEPHONE (OUTPATIENT)
Dept: SLEEP MEDICINE | Facility: CLINIC | Age: 65
End: 2025-08-19
Payer: MEDICAID

## (undated) DEVICE — VISIPAQUE 320 200ML +PK

## (undated) DEVICE — SEE MEDLINE ITEM 157187

## (undated) DEVICE — SHEATH 10FR 11CM BRITE TIP

## (undated) DEVICE — PADS RADI PERIPHERAL SHIELD

## (undated) DEVICE — GUIDEWIRE AMPLATZ .035X260 SS

## (undated) DEVICE — VISE RADIFOCUS MULTI TORQUE

## (undated) DEVICE — PAD RADI FEMORAL

## (undated) DEVICE — PAD DEFIB CADENCE ADULT R2

## (undated) DEVICE — GUIDEWIRE STD .035X260CM ANG

## (undated) DEVICE — CATH IMPULSE 5FR PIGTAIL 125CM

## (undated) DEVICE — SHEATH DRYSEAL 12FR 65CM 4.7MM

## (undated) DEVICE — SET MICRO PUNCT 4FR/MPIS-401

## (undated) DEVICE — Device

## (undated) DEVICE — CATH INFINITI JUDKINS JR4

## (undated) DEVICE — TRANSDUCER TRU WAVE

## (undated) DEVICE — TUBING HPCIL ROT M/F ADPT 48IN

## (undated) DEVICE — DEVICE PERCLOSE SUT CLSR 6FR

## (undated) DEVICE — CATH INDIGO LGHTNG ASP 12

## (undated) DEVICE — SHEATH INTRODUCER 8FR 11CM

## (undated) DEVICE — SEPERATOR INDIGO CATH 12

## (undated) DEVICE — KIT LEFT HEART MANIFOLD CUSTOM

## (undated) DEVICE — COVER PROBE US 5.5X58L NON LTX

## (undated) DEVICE — CANISTER INDIGO ENGINE

## (undated) DEVICE — SHEATH INTRODUCER 6FR 11CM